# Patient Record
Sex: MALE | Race: WHITE | NOT HISPANIC OR LATINO | URBAN - METROPOLITAN AREA
[De-identification: names, ages, dates, MRNs, and addresses within clinical notes are randomized per-mention and may not be internally consistent; named-entity substitution may affect disease eponyms.]

---

## 2018-07-16 ENCOUNTER — EMERGENCY (EMERGENCY)
Facility: HOSPITAL | Age: 38
LOS: 0 days | Discharge: HOME | End: 2018-07-16
Attending: EMERGENCY MEDICINE | Admitting: EMERGENCY MEDICINE

## 2018-07-16 VITALS
SYSTOLIC BLOOD PRESSURE: 178 MMHG | HEART RATE: 80 BPM | DIASTOLIC BLOOD PRESSURE: 105 MMHG | RESPIRATION RATE: 17 BRPM | OXYGEN SATURATION: 99 %

## 2018-07-16 VITALS
RESPIRATION RATE: 18 BRPM | OXYGEN SATURATION: 98 % | TEMPERATURE: 98 F | HEART RATE: 90 BPM | SYSTOLIC BLOOD PRESSURE: 190 MMHG | DIASTOLIC BLOOD PRESSURE: 100 MMHG

## 2018-07-16 DIAGNOSIS — I10 ESSENTIAL (PRIMARY) HYPERTENSION: ICD-10-CM

## 2018-07-16 DIAGNOSIS — F17.200 NICOTINE DEPENDENCE, UNSPECIFIED, UNCOMPLICATED: ICD-10-CM

## 2018-07-16 DIAGNOSIS — J45.909 UNSPECIFIED ASTHMA, UNCOMPLICATED: ICD-10-CM

## 2018-07-16 NOTE — ED ADULT NURSE NOTE - CHPI ED SYMPTOMS NEG
no chest pain/no vomiting/no shortness of breath/no syncope/no cough/no back pain/no fever/no nausea/no dizziness/no chills

## 2018-07-16 NOTE — ED PROVIDER NOTE - MEDICAL DECISION MAKING DETAILS
Patient sent from [facility] [place] for asymptomatic HTN -- he has no CP, SOB, LE swelling, no change in exercise tolerance, works outside on a regular basis doing heavy work and has had no changes in his tolerance for work or exertional sx. At this point BP is elevated but patient has good outpatient follow up, believes he can be seen in the office in 1-2 days and has no signs of end organ damage. He is aware that he will likely require initiation of BP meds, that they will require frequent BP checks and slow titration of meds -- He will return for any CP, SOB, dizziness, weakness, change in urination or if he is unable to get appropriate outpatient assessment.

## 2018-07-16 NOTE — ED ADULT NURSE NOTE - OBJECTIVE STATEMENT
Pt presents with elevated BP for 2 days. Pt states he had a headache, cold sweats at work today, prompting visit to urgent care. Pt was BIBA from urgent care to ED. Pt denies chest pain, sob, chills, n/v. Pt with hx of asthma, no other significant PMH Pt presents with elevated BP for 2 days. Pt states he had a headache, cold sweats at work today, prompting visit to urgent care. Pt was outside working in heat. Pt was BIBA from urgent care to ED. Pt denies chest pain, sob, chills, n/v. Pt with hx of asthma, no other significant PMH. Pt asymptomatic at this time.

## 2018-07-16 NOTE — ED PROVIDER NOTE - PHYSICAL EXAMINATION
VITAL SIGNS: I have reviewed nursing notes and confirm.  CONSTITUTIONAL: Well-developed; well-nourished; in no acute distress.  SKIN: Skin exam is warm and dry, no acute rash.  HEAD: Normocephalic; atraumatic.  EYES: PERRL, EOM intact; conjunctiva and sclera clear.  ENT: No nasal discharge; airway clear. TMs clear.  NECK: Supple; non tender.  CARD: Regular rate and rhythm.  RESP: No wheezes, rales or rhonchi.  ABD: Normal bowel sounds; soft; non-distended; non-tender; no pulsatile mass  EXT: Normal ROM. No clubbing, cyanosis or edema..  NEURO: Awake, alert, oriented. CN II-XII intact, 5/5 strength at upper and lower extremities, no pronator drift, intact finger to nose, steady gait and tandem gait, clear speech  PSYCH: Cooperative, appropriate.

## 2018-07-16 NOTE — ED PROVIDER NOTE - NS ED ROS FT
Constitutional: no fever, chills, no recent weight loss, change in appetite or malaise  Cardiac: No chest pain, SOB or edema.  Respiratory: No cough or respiratory distress  GI: No nausea, vomiting, diarrhea or abdominal pain.  : No dysuria, frequency, urgency or hematuria  MS: no pain to back or extremities, no loss of ROM, no weakness  Neuro: No headache or weakness. No LOC.  Skin: No skin rash.  Except as documented in the HPI, all other systems are negative.

## 2018-07-16 NOTE — ED PROVIDER NOTE - OBJECTIVE STATEMENT
39 yo M, no significant history here for elevated BP, sent from [facility] [place] -- patient was working outside, was very sweaty, a friend told him this could be a sign of high blood pressure so sent him to [facility] -- was found to be hyptertensive there and was sent to ED. Sx of sweatiness resolved when he went into his air conditioned home.     He has no CP, SOB, LE swelling, HA, change in exercise tolerance, abdominal pain.   No history of elevated BP, not currently on BP meds.

## 2018-07-16 NOTE — ED PROVIDER NOTE - PROGRESS NOTE DETAILS
[place] [facility] [place] T changes, no peaked T waves, normal rate and intervals.

## 2018-10-14 ENCOUNTER — INPATIENT (INPATIENT)
Facility: HOSPITAL | Age: 38
LOS: 4 days | Discharge: HOME | End: 2018-10-19
Attending: HOSPITALIST | Admitting: HOSPITALIST

## 2018-10-14 VITALS
OXYGEN SATURATION: 97 % | SYSTOLIC BLOOD PRESSURE: 149 MMHG | WEIGHT: 190.04 LBS | HEIGHT: 72 IN | DIASTOLIC BLOOD PRESSURE: 89 MMHG | RESPIRATION RATE: 36 BRPM | HEART RATE: 114 BPM

## 2018-10-14 LAB
ALBUMIN SERPL ELPH-MCNC: 4.9 G/DL — SIGNIFICANT CHANGE UP (ref 3.5–5.2)
ALP SERPL-CCNC: 101 U/L — SIGNIFICANT CHANGE UP (ref 30–115)
ALT FLD-CCNC: 161 U/L — HIGH (ref 0–41)
ANION GAP SERPL CALC-SCNC: 28 MMOL/L — HIGH (ref 7–14)
AST SERPL-CCNC: 136 U/L — HIGH (ref 0–41)
BASE EXCESS BLDV CALC-SCNC: 0.7 MMOL/L — SIGNIFICANT CHANGE UP (ref -2–2)
BILIRUB DIRECT SERPL-MCNC: 0.5 MG/DL — HIGH (ref 0–0.2)
BILIRUB INDIRECT FLD-MCNC: 1.1 MG/DL — SIGNIFICANT CHANGE UP (ref 0.2–1.2)
BILIRUB SERPL-MCNC: 1.6 MG/DL — HIGH (ref 0.2–1.2)
BUN SERPL-MCNC: 8 MG/DL — LOW (ref 10–20)
CA-I SERPL-SCNC: 1.08 MMOL/L — LOW (ref 1.12–1.3)
CALCIUM SERPL-MCNC: 10.2 MG/DL — HIGH (ref 8.5–10.1)
CHLORIDE SERPL-SCNC: 89 MMOL/L — LOW (ref 98–110)
CK SERPL-CCNC: 256 U/L — HIGH (ref 0–225)
CO2 SERPL-SCNC: 19 MMOL/L — SIGNIFICANT CHANGE UP (ref 17–32)
CREAT SERPL-MCNC: 0.9 MG/DL — SIGNIFICANT CHANGE UP (ref 0.7–1.5)
GAS PNL BLDV: 132 MMOL/L — LOW (ref 136–145)
GAS PNL BLDV: SIGNIFICANT CHANGE UP
GLUCOSE SERPL-MCNC: 94 MG/DL — SIGNIFICANT CHANGE UP (ref 70–99)
HCO3 BLDV-SCNC: 21 MMOL/L — LOW (ref 22–29)
HCT VFR BLD CALC: 52.8 % — HIGH (ref 42–52)
HCT VFR BLDA CALC: 60.5 % — HIGH (ref 34–44)
HGB BLD CALC-MCNC: 19.7 G/DL — HIGH (ref 14–18)
HGB BLD-MCNC: 19.2 G/DL — CRITICAL HIGH (ref 14–18)
LACTATE BLDV-MCNC: SIGNIFICANT CHANGE UP MMOL/L (ref 0.5–1.6)
LIDOCAIN IGE QN: >600 U/L — HIGH (ref 7–60)
MAGNESIUM SERPL-MCNC: 1.4 MG/DL — LOW (ref 1.8–2.4)
MCHC RBC-ENTMCNC: 33.3 PG — HIGH (ref 27–31)
MCHC RBC-ENTMCNC: 36.4 G/DL — SIGNIFICANT CHANGE UP (ref 32–37)
MCV RBC AUTO: 91.7 FL — SIGNIFICANT CHANGE UP (ref 80–94)
PCO2 BLDV: 25 MMHG — LOW (ref 41–51)
PH BLDV: 7.53 — HIGH (ref 7.26–7.43)
PLATELET # BLD AUTO: 219 K/UL — SIGNIFICANT CHANGE UP (ref 130–400)
PO2 BLDV: 39 MMHG — SIGNIFICANT CHANGE UP (ref 20–40)
POTASSIUM BLDV-SCNC: SIGNIFICANT CHANGE UP MMOL/L (ref 3.3–5.6)
POTASSIUM SERPL-MCNC: 3.7 MMOL/L — SIGNIFICANT CHANGE UP (ref 3.5–5)
POTASSIUM SERPL-SCNC: 3.7 MMOL/L — SIGNIFICANT CHANGE UP (ref 3.5–5)
PROT SERPL-MCNC: 7.6 G/DL — SIGNIFICANT CHANGE UP (ref 6–8)
RBC # BLD: 5.76 M/UL — SIGNIFICANT CHANGE UP (ref 4.7–6.1)
RBC # FLD: 13 % — SIGNIFICANT CHANGE UP (ref 11.5–14.5)
SAO2 % BLDV: 82 % — SIGNIFICANT CHANGE UP
SODIUM SERPL-SCNC: 136 MMOL/L — SIGNIFICANT CHANGE UP (ref 135–146)
TROPONIN T SERPL-MCNC: <0.01 NG/ML — SIGNIFICANT CHANGE UP
WBC # BLD: 19.44 K/UL — HIGH (ref 4.8–10.8)
WBC # FLD AUTO: 19.44 K/UL — HIGH (ref 4.8–10.8)

## 2018-10-14 RX ORDER — ONDANSETRON 8 MG/1
4 TABLET, FILM COATED ORAL ONCE
Qty: 0 | Refills: 0 | Status: COMPLETED | OUTPATIENT
Start: 2018-10-14 | End: 2018-10-14

## 2018-10-14 RX ORDER — SODIUM CHLORIDE 9 MG/ML
1000 INJECTION INTRAMUSCULAR; INTRAVENOUS; SUBCUTANEOUS ONCE
Qty: 0 | Refills: 0 | Status: COMPLETED | OUTPATIENT
Start: 2018-10-14 | End: 2018-10-14

## 2018-10-14 RX ORDER — PANTOPRAZOLE SODIUM 20 MG/1
40 TABLET, DELAYED RELEASE ORAL ONCE
Qty: 0 | Refills: 0 | Status: COMPLETED | OUTPATIENT
Start: 2018-10-14 | End: 2018-10-14

## 2018-10-14 RX ORDER — HYDROMORPHONE HYDROCHLORIDE 2 MG/ML
1 INJECTION INTRAMUSCULAR; INTRAVENOUS; SUBCUTANEOUS ONCE
Qty: 0 | Refills: 0 | Status: DISCONTINUED | OUTPATIENT
Start: 2018-10-14 | End: 2018-10-14

## 2018-10-14 RX ORDER — FAMOTIDINE 10 MG/ML
20 INJECTION INTRAVENOUS ONCE
Qty: 0 | Refills: 0 | Status: COMPLETED | OUTPATIENT
Start: 2018-10-14 | End: 2018-10-14

## 2018-10-14 RX ORDER — MORPHINE SULFATE 50 MG/1
6 CAPSULE, EXTENDED RELEASE ORAL ONCE
Qty: 0 | Refills: 0 | Status: DISCONTINUED | OUTPATIENT
Start: 2018-10-14 | End: 2018-10-14

## 2018-10-14 RX ADMIN — HYDROMORPHONE HYDROCHLORIDE 1 MILLIGRAM(S): 2 INJECTION INTRAMUSCULAR; INTRAVENOUS; SUBCUTANEOUS at 22:29

## 2018-10-14 RX ADMIN — SODIUM CHLORIDE 1000 MILLILITER(S): 9 INJECTION INTRAMUSCULAR; INTRAVENOUS; SUBCUTANEOUS at 22:45

## 2018-10-14 RX ADMIN — SODIUM CHLORIDE 2000 MILLILITER(S): 9 INJECTION INTRAMUSCULAR; INTRAVENOUS; SUBCUTANEOUS at 22:45

## 2018-10-14 RX ADMIN — SODIUM CHLORIDE 2000 MILLILITER(S): 9 INJECTION INTRAMUSCULAR; INTRAVENOUS; SUBCUTANEOUS at 22:13

## 2018-10-14 RX ADMIN — HYDROMORPHONE HYDROCHLORIDE 1 MILLIGRAM(S): 2 INJECTION INTRAMUSCULAR; INTRAVENOUS; SUBCUTANEOUS at 22:45

## 2018-10-14 RX ADMIN — MORPHINE SULFATE 6 MILLIGRAM(S): 50 CAPSULE, EXTENDED RELEASE ORAL at 22:13

## 2018-10-14 RX ADMIN — PANTOPRAZOLE SODIUM 40 MILLIGRAM(S): 20 TABLET, DELAYED RELEASE ORAL at 22:45

## 2018-10-14 RX ADMIN — MORPHINE SULFATE 6 MILLIGRAM(S): 50 CAPSULE, EXTENDED RELEASE ORAL at 22:23

## 2018-10-14 RX ADMIN — FAMOTIDINE 20 MILLIGRAM(S): 10 INJECTION INTRAVENOUS at 22:13

## 2018-10-14 RX ADMIN — ONDANSETRON 4 MILLIGRAM(S): 8 TABLET, FILM COATED ORAL at 22:13

## 2018-10-14 NOTE — ED PROVIDER NOTE - PHYSICAL EXAMINATION
CONSTITUTIONAL: Well-appearing; well-nourished; in no apparent distress.   NECK: Supple; non-tender; no cervical lymphadenopathy. No JVD.   CARDIOVASCULAR: Normal S1, S2; no murmurs, rubs, or gallops.   RESPIRATORY: Normal chest excursion with respiration; breath sounds clear and equal bilaterally; no wheezes, rhonchi, or rales.  GI/: + non distended abdomen. + epigastric region ttp. No rebound or guarding. Negative Marquez's sign. No tenderness at mcburneys point  MS: No evidence of trauma or deformity. Normal ROM in all four extremities; non-tender to palpation  SKIN: Normal for age and race; warm; dry; good turgor; no apparent lesions or exudate.   NEURO/PSYCH: A & O x 4; grossly unremarkable. mood and manner are appropriate. Grooming and personal hygiene are appropriate.

## 2018-10-14 NOTE — ED PROVIDER NOTE - OBJECTIVE STATEMENT
38 year old male denies pmhx c/o epigastric abdominal pain, several episodes of bilious vomiting since 3am yesterday. Sts symptoms got progressively worse 1 hour pta. + mid sternal chest pressure. Pt sts he drinks about a half L of vodka a day. Last drink was yesterday. No diarrhea, past abdominal surgeries, fever, chills, no sob, significant cardiac hx, rectal bleeding, hx of pancreatitis.

## 2018-10-14 NOTE — ED ADULT TRIAGE NOTE - ARRIVAL INFO ADDITIONAL COMMENTS
Patient presenting in triage with hyperventilations, carpo- pedal spasms, Patient presenting in triage with hyperventilations, carpopedal spasms, very anxious

## 2018-10-14 NOTE — ED PROVIDER NOTE - ATTENDING CONTRIBUTION TO CARE
37 y/o male with hx of chronic alcoholism presents to ED with epigastric abdominal pain and vomiting x 1 day, progressively worsening.  No blood.  No BRBPR or melena.  No fever or chills. No diarrhea.  Last drank yesterday.  PE:  agree with above.  A/P:  Epigastric Pain, r/o perforated viscus, r/o pancreatitis.  Labs, IVF, Analgesia, CT scan.

## 2018-10-14 NOTE — ED PROVIDER NOTE - NS ED ROS FT
Constitutional: no fever, chills, no recent weight loss, change in appetite or malaise  Cardiac: + midsternal chest pressure. No edema  Respiratory: No cough or respiratory distress  GI: + n/v, epigastric abdominal pain. No diarrhea  : No dysuria, frequency, urgency or hematuria  MS: no pain to back or extremities, no loss of ROM, no weakness  Neuro: No headache or weakness. No LOC.  Skin: No skin rash.  Except as documented in the HPI, all other systems are negative.

## 2018-10-15 DIAGNOSIS — Z98.890 OTHER SPECIFIED POSTPROCEDURAL STATES: Chronic | ICD-10-CM

## 2018-10-15 PROBLEM — J45.909 UNSPECIFIED ASTHMA, UNCOMPLICATED: Chronic | Status: INACTIVE | Noted: 2018-07-16 | Resolved: 2018-10-15

## 2018-10-15 LAB
ALBUMIN SERPL ELPH-MCNC: 3.8 G/DL — SIGNIFICANT CHANGE UP (ref 3.5–5.2)
ALP SERPL-CCNC: 70 U/L — SIGNIFICANT CHANGE UP (ref 30–115)
ALT FLD-CCNC: 97 U/L — HIGH (ref 0–41)
ANION GAP SERPL CALC-SCNC: 16 MMOL/L — HIGH (ref 7–14)
ANION GAP SERPL CALC-SCNC: 16 MMOL/L — HIGH (ref 7–14)
AST SERPL-CCNC: 76 U/L — HIGH (ref 0–41)
BASE EXCESS BLDV CALC-SCNC: -0.7 MMOL/L — SIGNIFICANT CHANGE UP (ref -2–2)
BASOPHILS # BLD AUTO: 0.02 K/UL — SIGNIFICANT CHANGE UP (ref 0–0.2)
BASOPHILS # BLD AUTO: 0.03 K/UL — SIGNIFICANT CHANGE UP (ref 0–0.2)
BASOPHILS # BLD AUTO: 0.04 K/UL — SIGNIFICANT CHANGE UP (ref 0–0.2)
BASOPHILS NFR BLD AUTO: 0.1 % — SIGNIFICANT CHANGE UP (ref 0–1)
BASOPHILS NFR BLD AUTO: 0.2 % — SIGNIFICANT CHANGE UP (ref 0–1)
BASOPHILS NFR BLD AUTO: 0.2 % — SIGNIFICANT CHANGE UP (ref 0–1)
BILIRUB SERPL-MCNC: 1.7 MG/DL — HIGH (ref 0.2–1.2)
BUN SERPL-MCNC: 8 MG/DL — LOW (ref 10–20)
BUN SERPL-MCNC: 9 MG/DL — LOW (ref 10–20)
CA-I SERPL-SCNC: 1.03 MMOL/L — LOW (ref 1.12–1.3)
CALCIUM SERPL-MCNC: 7.6 MG/DL — LOW (ref 8.5–10.1)
CALCIUM SERPL-MCNC: 7.8 MG/DL — LOW (ref 8.5–10.1)
CHLORIDE SERPL-SCNC: 96 MMOL/L — LOW (ref 98–110)
CHLORIDE SERPL-SCNC: 97 MMOL/L — LOW (ref 98–110)
CHOLEST SERPL-MCNC: 116 MG/DL — SIGNIFICANT CHANGE UP (ref 100–200)
CK MB CFR SERPL CALC: 3 NG/ML — SIGNIFICANT CHANGE UP (ref 0.6–6.3)
CK MB CFR SERPL CALC: 3.8 NG/ML — SIGNIFICANT CHANGE UP (ref 0.6–6.3)
CK SERPL-CCNC: 227 U/L — HIGH (ref 0–225)
CK SERPL-CCNC: 410 U/L — HIGH (ref 0–225)
CO2 SERPL-SCNC: 24 MMOL/L — SIGNIFICANT CHANGE UP (ref 17–32)
CO2 SERPL-SCNC: 24 MMOL/L — SIGNIFICANT CHANGE UP (ref 17–32)
CREAT SERPL-MCNC: 0.7 MG/DL — SIGNIFICANT CHANGE UP (ref 0.7–1.5)
CREAT SERPL-MCNC: 0.8 MG/DL — SIGNIFICANT CHANGE UP (ref 0.7–1.5)
EOSINOPHIL # BLD AUTO: 0 K/UL — SIGNIFICANT CHANGE UP (ref 0–0.7)
EOSINOPHIL # BLD AUTO: 0 K/UL — SIGNIFICANT CHANGE UP (ref 0–0.7)
EOSINOPHIL # BLD AUTO: 0.03 K/UL — SIGNIFICANT CHANGE UP (ref 0–0.7)
EOSINOPHIL NFR BLD AUTO: 0 % — SIGNIFICANT CHANGE UP (ref 0–8)
EOSINOPHIL NFR BLD AUTO: 0 % — SIGNIFICANT CHANGE UP (ref 0–8)
EOSINOPHIL NFR BLD AUTO: 0.2 % — SIGNIFICANT CHANGE UP (ref 0–8)
GAS PNL BLDV: 133 MMOL/L — LOW (ref 136–145)
GAS PNL BLDV: SIGNIFICANT CHANGE UP
GLUCOSE SERPL-MCNC: 65 MG/DL — LOW (ref 70–99)
GLUCOSE SERPL-MCNC: 79 MG/DL — SIGNIFICANT CHANGE UP (ref 70–99)
HCO3 BLDV-SCNC: 25 MMOL/L — SIGNIFICANT CHANGE UP (ref 22–29)
HCT VFR BLD CALC: 47.9 % — SIGNIFICANT CHANGE UP (ref 42–52)
HCT VFR BLD CALC: 48.9 % — SIGNIFICANT CHANGE UP (ref 42–52)
HCT VFR BLDA CALC: 54.3 % — HIGH (ref 34–44)
HDLC SERPL-MCNC: 47 MG/DL — SIGNIFICANT CHANGE UP
HGB BLD CALC-MCNC: 17.7 G/DL — SIGNIFICANT CHANGE UP (ref 14–18)
HGB BLD-MCNC: 16.7 G/DL — SIGNIFICANT CHANGE UP (ref 14–18)
HGB BLD-MCNC: 17.2 G/DL — SIGNIFICANT CHANGE UP (ref 14–18)
IMM GRANULOCYTES NFR BLD AUTO: 0.3 % — SIGNIFICANT CHANGE UP (ref 0.1–0.3)
IMM GRANULOCYTES NFR BLD AUTO: 0.4 % — HIGH (ref 0.1–0.3)
IMM GRANULOCYTES NFR BLD AUTO: 0.4 % — HIGH (ref 0.1–0.3)
LACTATE BLDV-MCNC: 1.2 MMOL/L — SIGNIFICANT CHANGE UP (ref 0.5–1.6)
LACTATE SERPL-SCNC: 2.2 MMOL/L — SIGNIFICANT CHANGE UP (ref 0.5–2.2)
LACTATE SERPL-SCNC: 5.2 MMOL/L — CRITICAL HIGH (ref 0.5–2.2)
LIPID PNL WITH DIRECT LDL SERPL: 58 MG/DL — SIGNIFICANT CHANGE UP (ref 4–129)
LYMPHOCYTES # BLD AUTO: 0.72 K/UL — LOW (ref 1.2–3.4)
LYMPHOCYTES # BLD AUTO: 0.97 K/UL — LOW (ref 1.2–3.4)
LYMPHOCYTES # BLD AUTO: 1.16 K/UL — LOW (ref 1.2–3.4)
LYMPHOCYTES # BLD AUTO: 4.8 % — LOW (ref 20.5–51.1)
LYMPHOCYTES # BLD AUTO: 5 % — LOW (ref 20.5–51.1)
LYMPHOCYTES # BLD AUTO: 8.2 % — LOW (ref 20.5–51.1)
MAGNESIUM SERPL-MCNC: 1.6 MG/DL — LOW (ref 1.8–2.4)
MCHC RBC-ENTMCNC: 33 PG — HIGH (ref 27–31)
MCHC RBC-ENTMCNC: 33.3 PG — HIGH (ref 27–31)
MCHC RBC-ENTMCNC: 34.9 G/DL — SIGNIFICANT CHANGE UP (ref 32–37)
MCHC RBC-ENTMCNC: 35.2 G/DL — SIGNIFICANT CHANGE UP (ref 32–37)
MCV RBC AUTO: 94.7 FL — HIGH (ref 80–94)
MCV RBC AUTO: 94.8 FL — HIGH (ref 80–94)
MONOCYTES # BLD AUTO: 0.8 K/UL — HIGH (ref 0.1–0.6)
MONOCYTES # BLD AUTO: 0.93 K/UL — HIGH (ref 0.1–0.6)
MONOCYTES # BLD AUTO: 0.97 K/UL — HIGH (ref 0.1–0.6)
MONOCYTES NFR BLD AUTO: 4.8 % — SIGNIFICANT CHANGE UP (ref 1.7–9.3)
MONOCYTES NFR BLD AUTO: 5.7 % — SIGNIFICANT CHANGE UP (ref 1.7–9.3)
MONOCYTES NFR BLD AUTO: 6.5 % — SIGNIFICANT CHANGE UP (ref 1.7–9.3)
NEUTROPHILS # BLD AUTO: 12.03 K/UL — HIGH (ref 1.4–6.5)
NEUTROPHILS # BLD AUTO: 13.27 K/UL — HIGH (ref 1.4–6.5)
NEUTROPHILS # BLD AUTO: 17.42 K/UL — HIGH (ref 1.4–6.5)
NEUTROPHILS NFR BLD AUTO: 85.4 % — HIGH (ref 42.2–75.2)
NEUTROPHILS NFR BLD AUTO: 88.2 % — HIGH (ref 42.2–75.2)
NEUTROPHILS NFR BLD AUTO: 89.6 % — HIGH (ref 42.2–75.2)
NRBC # BLD: 0 /100 WBCS — SIGNIFICANT CHANGE UP (ref 0–0)
PCO2 BLDV: 42 MMHG — SIGNIFICANT CHANGE UP (ref 41–51)
PH BLDV: 7.38 — SIGNIFICANT CHANGE UP (ref 7.26–7.43)
PLATELET # BLD AUTO: 163 K/UL — SIGNIFICANT CHANGE UP (ref 130–400)
PLATELET # BLD AUTO: 179 K/UL — SIGNIFICANT CHANGE UP (ref 130–400)
PO2 BLDV: 123 MMHG — HIGH (ref 20–40)
POTASSIUM BLDV-SCNC: 3.9 MMOL/L — SIGNIFICANT CHANGE UP (ref 3.3–5.6)
POTASSIUM SERPL-MCNC: 4.3 MMOL/L — SIGNIFICANT CHANGE UP (ref 3.5–5)
POTASSIUM SERPL-MCNC: 4.3 MMOL/L — SIGNIFICANT CHANGE UP (ref 3.5–5)
POTASSIUM SERPL-SCNC: 4.3 MMOL/L — SIGNIFICANT CHANGE UP (ref 3.5–5)
POTASSIUM SERPL-SCNC: 4.3 MMOL/L — SIGNIFICANT CHANGE UP (ref 3.5–5)
PROT SERPL-MCNC: 5.9 G/DL — LOW (ref 6–8)
RBC # BLD: 5.06 M/UL — SIGNIFICANT CHANGE UP (ref 4.7–6.1)
RBC # BLD: 5.16 M/UL — SIGNIFICANT CHANGE UP (ref 4.7–6.1)
RBC # FLD: 13.2 % — SIGNIFICANT CHANGE UP (ref 11.5–14.5)
RBC # FLD: 13.4 % — SIGNIFICANT CHANGE UP (ref 11.5–14.5)
SAO2 % BLDV: 96 % — SIGNIFICANT CHANGE UP
SODIUM SERPL-SCNC: 136 MMOL/L — SIGNIFICANT CHANGE UP (ref 135–146)
SODIUM SERPL-SCNC: 137 MMOL/L — SIGNIFICANT CHANGE UP (ref 135–146)
TOTAL CHOLESTEROL/HDL RATIO MEASUREMENT: 2.5 RATIO — LOW (ref 4–5.5)
TRIGL SERPL-MCNC: 84 MG/DL — SIGNIFICANT CHANGE UP (ref 10–149)
TROPONIN T SERPL-MCNC: <0.01 NG/ML — SIGNIFICANT CHANGE UP
TROPONIN T SERPL-MCNC: <0.01 NG/ML — SIGNIFICANT CHANGE UP
WBC # BLD: 14.1 K/UL — HIGH (ref 4.8–10.8)
WBC # BLD: 15.03 K/UL — HIGH (ref 4.8–10.8)
WBC # FLD AUTO: 14.1 K/UL — HIGH (ref 4.8–10.8)
WBC # FLD AUTO: 15.03 K/UL — HIGH (ref 4.8–10.8)

## 2018-10-15 RX ORDER — HYDROMORPHONE HYDROCHLORIDE 2 MG/ML
1 INJECTION INTRAMUSCULAR; INTRAVENOUS; SUBCUTANEOUS ONCE
Qty: 0 | Refills: 0 | Status: DISCONTINUED | OUTPATIENT
Start: 2018-10-15 | End: 2018-10-15

## 2018-10-15 RX ORDER — PANTOPRAZOLE SODIUM 20 MG/1
40 TABLET, DELAYED RELEASE ORAL
Qty: 0 | Refills: 0 | Status: DISCONTINUED | OUTPATIENT
Start: 2018-10-15 | End: 2018-10-19

## 2018-10-15 RX ORDER — MORPHINE SULFATE 50 MG/1
4 CAPSULE, EXTENDED RELEASE ORAL EVERY 6 HOURS
Qty: 0 | Refills: 0 | Status: DISCONTINUED | OUTPATIENT
Start: 2018-10-15 | End: 2018-10-16

## 2018-10-15 RX ORDER — ONDANSETRON 8 MG/1
4 TABLET, FILM COATED ORAL EVERY 12 HOURS
Qty: 0 | Refills: 0 | Status: DISCONTINUED | OUTPATIENT
Start: 2018-10-15 | End: 2018-10-15

## 2018-10-15 RX ORDER — MAGNESIUM SULFATE 500 MG/ML
1 VIAL (ML) INJECTION ONCE
Qty: 0 | Refills: 0 | Status: COMPLETED | OUTPATIENT
Start: 2018-10-15 | End: 2018-10-15

## 2018-10-15 RX ORDER — KETOROLAC TROMETHAMINE 30 MG/ML
15 SYRINGE (ML) INJECTION EVERY 8 HOURS
Qty: 0 | Refills: 0 | Status: DISCONTINUED | OUTPATIENT
Start: 2018-10-15 | End: 2018-10-16

## 2018-10-15 RX ORDER — ONDANSETRON 8 MG/1
4 TABLET, FILM COATED ORAL EVERY 12 HOURS
Qty: 0 | Refills: 0 | Status: DISCONTINUED | OUTPATIENT
Start: 2018-10-15 | End: 2018-10-18

## 2018-10-15 RX ORDER — SODIUM CHLORIDE 9 MG/ML
1000 INJECTION INTRAMUSCULAR; INTRAVENOUS; SUBCUTANEOUS
Qty: 0 | Refills: 0 | Status: DISCONTINUED | OUTPATIENT
Start: 2018-10-15 | End: 2018-10-15

## 2018-10-15 RX ORDER — OXYCODONE HYDROCHLORIDE 5 MG/1
5 TABLET ORAL EVERY 6 HOURS
Qty: 0 | Refills: 0 | Status: DISCONTINUED | OUTPATIENT
Start: 2018-10-15 | End: 2018-10-15

## 2018-10-15 RX ORDER — FOLIC ACID 0.8 MG
1 TABLET ORAL DAILY
Qty: 0 | Refills: 0 | Status: DISCONTINUED | OUTPATIENT
Start: 2018-10-15 | End: 2018-10-18

## 2018-10-15 RX ORDER — SODIUM CHLORIDE 9 MG/ML
1000 INJECTION, SOLUTION INTRAVENOUS
Qty: 0 | Refills: 0 | Status: DISCONTINUED | OUTPATIENT
Start: 2018-10-15 | End: 2018-10-16

## 2018-10-15 RX ORDER — ENOXAPARIN SODIUM 100 MG/ML
40 INJECTION SUBCUTANEOUS EVERY 24 HOURS
Qty: 0 | Refills: 0 | Status: DISCONTINUED | OUTPATIENT
Start: 2018-10-15 | End: 2018-10-17

## 2018-10-15 RX ORDER — THIAMINE MONONITRATE (VIT B1) 100 MG
100 TABLET ORAL DAILY
Qty: 0 | Refills: 0 | Status: DISCONTINUED | OUTPATIENT
Start: 2018-10-15 | End: 2018-10-18

## 2018-10-15 RX ADMIN — MORPHINE SULFATE 4 MILLIGRAM(S): 50 CAPSULE, EXTENDED RELEASE ORAL at 11:57

## 2018-10-15 RX ADMIN — Medication 100 MILLIGRAM(S): at 11:04

## 2018-10-15 RX ADMIN — Medication 2 MILLIGRAM(S): at 22:25

## 2018-10-15 RX ADMIN — SODIUM CHLORIDE 250 MILLILITER(S): 9 INJECTION INTRAMUSCULAR; INTRAVENOUS; SUBCUTANEOUS at 11:59

## 2018-10-15 RX ADMIN — ONDANSETRON 4 MILLIGRAM(S): 8 TABLET, FILM COATED ORAL at 04:22

## 2018-10-15 RX ADMIN — Medication 15 MILLIGRAM(S): at 10:46

## 2018-10-15 RX ADMIN — Medication 2 MILLIGRAM(S): at 17:40

## 2018-10-15 RX ADMIN — Medication 1 MILLIGRAM(S): at 11:04

## 2018-10-15 RX ADMIN — SODIUM CHLORIDE 250 MILLILITER(S): 9 INJECTION INTRAMUSCULAR; INTRAVENOUS; SUBCUTANEOUS at 04:03

## 2018-10-15 RX ADMIN — SODIUM CHLORIDE 150 MILLILITER(S): 9 INJECTION, SOLUTION INTRAVENOUS at 14:08

## 2018-10-15 RX ADMIN — HYDROMORPHONE HYDROCHLORIDE 1 MILLIGRAM(S): 2 INJECTION INTRAMUSCULAR; INTRAVENOUS; SUBCUTANEOUS at 00:12

## 2018-10-15 RX ADMIN — MORPHINE SULFATE 4 MILLIGRAM(S): 50 CAPSULE, EXTENDED RELEASE ORAL at 18:33

## 2018-10-15 RX ADMIN — SODIUM CHLORIDE 1000 MILLILITER(S): 9 INJECTION INTRAMUSCULAR; INTRAVENOUS; SUBCUTANEOUS at 00:12

## 2018-10-15 RX ADMIN — MORPHINE SULFATE 4 MILLIGRAM(S): 50 CAPSULE, EXTENDED RELEASE ORAL at 04:22

## 2018-10-15 RX ADMIN — Medication 100 GRAM(S): at 14:44

## 2018-10-15 RX ADMIN — SODIUM CHLORIDE 150 MILLILITER(S): 9 INJECTION, SOLUTION INTRAVENOUS at 21:38

## 2018-10-15 RX ADMIN — SODIUM CHLORIDE 250 MILLILITER(S): 9 INJECTION INTRAMUSCULAR; INTRAVENOUS; SUBCUTANEOUS at 07:35

## 2018-10-15 NOTE — PATIENT PROFILE ADULT - NSTRANSFERBELONGINGSDISPO_GEN_A_NUR
Subjective:       Patient ID: Oracio Patel is a 52 y.o. male.    Chief Complaint: Eye Problem (right eye; been taking benadryl; started 2 weeks ago; denies pain or itching )    Patient of Dr Rodney - first time seeing in clinic        Eye Problem    The right eye is affected. This is a new problem. The current episode started in the past 7 days. The problem occurs daily. The problem has been gradually worsening. The injury mechanism was a direct trauma. The pain is at a severity of 2/10. The pain is mild. There is no known exposure to pink eye. He does not wear contacts. Associated symptoms include eye redness and itching. Pertinent negatives include no blurred vision, eye discharge, double vision, fever, foreign body sensation, nausea, photophobia, recent URI or vomiting. He has tried eye drops and commercial eye wash for the symptoms. The treatment provided no relief.     Vitals:    08/09/18 0807   BP: 138/84   Pulse: 76   Resp: 16   Temp: 98.7 °F (37.1 °C)     Review of Systems   Constitutional: Negative for fatigue and fever.   HENT: Negative.    Eyes: Positive for redness and itching. Negative for blurred vision, double vision, photophobia and discharge.   Respiratory: Negative for cough and shortness of breath.    Cardiovascular: Negative for chest pain.   Gastrointestinal: Negative for abdominal pain, diarrhea, nausea and vomiting.   Endocrine: Negative.    Genitourinary: Negative for dysuria and hematuria.   Musculoskeletal: Negative.    Skin: Negative for color change and rash.   Allergic/Immunologic: Negative.    Neurological: Negative.  Negative for numbness.   Hematological: Negative.        Past Medical History:   Diagnosis Date    Atrophic testicle     Beta thalassemia     Colon polyp     Hemorrhoids     HLD (hyperlipidemia)     Seasonal allergic rhinitis      Objective:      Physical Exam   Constitutional: He is oriented to person, place, and time. He appears well-developed and  well-nourished.   HENT:   Head: Normocephalic and atraumatic.   Mouth/Throat: Oropharynx is clear and moist.   Eyes: Conjunctivae and EOM are normal. Pupils are equal, round, and reactive to light. Right conjunctiva is not injected. Right conjunctiva has no hemorrhage. Left conjunctiva is not injected. Left conjunctiva has no hemorrhage.       Swelling to right eye lid    Neck: Normal range of motion. Neck supple.   Cardiovascular: Normal rate, regular rhythm, normal heart sounds and intact distal pulses.    Pulmonary/Chest: Effort normal and breath sounds normal.   Abdominal: Soft. Bowel sounds are normal.   Musculoskeletal: Normal range of motion.   Neurological: He is alert and oriented to person, place, and time.   Skin: Skin is warm and dry.   Psychiatric: He has a normal mood and affect. His behavior is normal.   Nursing note and vitals reviewed.      Assessment:       1. Preseptal cellulitis of right upper eyelid    2. Hyperlipidemia, unspecified hyperlipidemia type        Plan:       Preseptal cellulitis of right upper eyelid  -     sulfamethoxazole-trimethoprim 800-160mg (BACTRIM DS) 800-160 mg Tab; Take 1 tablet by mouth 2 (two) times daily. for 10 days  Dispense: 20 tablet; Refill: 0  Sun precautions with med     Hyperlipidemia, unspecified hyperlipidemia type  On lipitor     FU if not better   Education and any change in vision is emergent situation               with patient

## 2018-10-15 NOTE — H&P ADULT - NSHPLABSRESULTS_GEN_ALL_CORE
19.2   19.44 )-----------( 219      ( 14 Oct 2018 22:02 )             52.8   10-14    136  |  89<L>  |  8<L>  ----------------------------<  94  3.7   |  19  |  0.9    Ca    10.2<H>      14 Oct 2018 22:02  Mg     1.4     10-14    TPro  7.6  /  Alb  4.9  /  TBili  1.6<H>  /  DBili  0.5<H>  /  AST  136<H>  /  ALT  161<H>  /  AlkPhos  101  10-14  Lipase, Serum (10.14.18 @ 22:02)    Lipase, Serum: >600 U/L  < from: CT Abdomen and Pelvis w/ IV Cont (10.15.18 @ 00:51) >    Acute pancreatitis as detailed above.  No loculated fluid collection.  Hepatic steatosis.    < end of copied text >

## 2018-10-15 NOTE — H&P ADULT - ASSESSMENT
38Yr M no significant PMH presented to ED with 2 day hx of abd pain nausea and vomiting    #) Abd Pain N/V secondary to acute Pancreatitis  - Admit to medicine  - Lipase >600, Lactate 5.3, CT shows acute pancreatitis  - NPO  - IVF 250cc/hr NS  - Pain control Morphine and Oxycodone  - Zofran PRN for nausea  - PPI daily  - F/U Lipid profile    #) Transaminitis  - Likely secondary to ETOH use  - f/u RUQ sono  - Trend Hepatic function panel    #) Elevated lactate, WBC and Hgb  - Likely secondary to hemoconcentration and dehydration  - Cont IV fluid and f/u CBC and lactate    #) ETOH abuse  - Thiamine and Folic acid  - Pt doesnt think he is abusing. Will do Lorazepam PRN for agitation    DVT PPX Lovenox Sq  NPO

## 2018-10-15 NOTE — SBIRT NOTE. - NSSBIRTSERVICES_GEN_A_ED_FT
Provided SBIRT services:  Full Screen Positive. Brief Intervention Performed.    Screening results were reviewed with the patient and patient was provided information about healthy guidelines and potential negative consequences associated with level of risk. Motivation and readiness to reduce or stop use was discussed and goals and activities to make changes were suggested/offered.      AUDIT Score: 10  DAST-10 Score: 0  Duration = 15 Minutes

## 2018-10-15 NOTE — CONSULT NOTE ADULT - REASON FOR ADMISSION
MALE JESSICA           DOL 103d      PMA 39 weeks  25w with S/P NEC/perf/distal colostomy placed, Inguinal hernia, Anemia, Feeding support  *************************************************************************  Weight (grams): 2815 -5  Intake(ml/kg/day):  148  Urine output: X 8  Ostomy: 15ml/kg/d      FEN: FEHM (24 kcal/oz) to 52 q3h (154).  Nipple and OG remainder over 1 hr. Needs pacing, PO 35%.  (OT/PT).    ADWG:  3/9:  30 g/day.  Wylie 5 %  Renal: S/P REMINGTON,      GI: S/P NEC and ex lap 1/24 with perf of sigmoid, and descending colon, now with transverse colostomy.  Plan for closure week of 3/26. 3/19 Mucous fistula contrast study fine.  Apnea of Prematurity:   off Caffeine 2/15.  On  mL 21%.    CVS: S/P PDA and 3 courses of indocin, 1/18 ECHO- No PDA   Hem:  Anemia with last PRBC tx  1/18.        ID: Mom declines vaccines due to fear of autism.  Neuro:  Head US 1/1, 2/2: WNL  NDE 7. EI needed. Follow-up in 6 months   Seizures-ruled out by Video EEG 2/4- 2/5.     Ophthalmology:  3/12:  S2 Z2 Bilateral. Re-exam 2 weeks.  Thermal:   In open crib  Social:       Meds: PVS & Fe      Plan: Feeds as above. Monitor ostomy output.  Mom does not want vaccinations for fear of autism and vaccines. Surgery looking for OR time week of 3/26. MRI PTD. Family meeting this week.  Labs/Images/Studies: CBC, Lytes at am Abd pain, nausea and vomiting

## 2018-10-15 NOTE — H&P ADULT - HISTORY OF PRESENT ILLNESS
38Yr M no significant PMH presented to ED with 2 day hx of abd pain nausea and vomiting. Pt reports vomiting in non-bilious non-bloody, pain is 10/10 without any relieving factors. Eating makes pain unbearable. HE reports symptoms started after he was drinking vodka. He reports drinking 1/2 bottle of vodka daily. He endorses he is able to stop drinking whenever he wants without any withdrawal. He denies fever, chills, chest pain, SOB, diarrhea, or leg swelling.  In the ED /84 , WBC 19, Hgb 19.2, Lactate 5.2 and lipase >600. , . CT shows acute pancreatitis  Pt received 3L IVF, pain medication and zofran

## 2018-10-15 NOTE — CONSULT NOTE ADULT - SUBJECTIVE AND OBJECTIVE BOX
39 yo  male with no significant PMH, hx of Opioid Use Disorder, admitted for acute pancreatitis 2/2 Alcohol Use Disorder. Detox service consulted for management of AUD. Ativan taper, thiamine, folic acid ordered by primary team. Pt reports drinking 1/2 L vodka per night for past 2 years, last use 1 day ptp. Pt denies confusion/tremor/N/V/HA/pain. Reports no acute distress on Ativan taper. Pt expresses motivation to stop drinking after realizing the severity of acute pancreatitis and the impact of alcohol use on his health. Discussed inpatient and outpatient rehab options with pt.     Alcohol use:  1/2 L vodka per night for past 2 years, + eye opener  Denies past detox/rehab/withdrawal seizures    Opioid: Reports past use, sober for 11+ years  Denies current opioid, sedative hypnotic, or cannabis use    iSTOP Reference #: [id number]    Vital Signs Last 24 Hrs  T(C): 36.1 (15 Oct 2018 16:52), Max: 36.8 (15 Oct 2018 10:54)  T(F): 96.9 (15 Oct 2018 16:52), Max: 98.2 (15 Oct 2018 10:54)  HR: 111 (15 Oct 2018 16:52) (106 - 114)  BP: 126/79 (15 Oct 2018 16:52) (126/79 - 176/98)  BP(mean): --  RR: 18 (15 Oct 2018 16:52) (18 - 36)  SpO2: 96% (15 Oct 2018 16:52) (96% - 98%)                          16.7   14.10 )-----------( 163      ( 15 Oct 2018 13:10 )             47.9   10-15    136  |  96<L>  |  9<L>  ----------------------------<  65<L>  4.3   |  24  |  0.8    Ca    7.6<L>      15 Oct 2018 13:10  Mg     1.6     10-15    TPro  5.9<L>  /  Alb  3.8  /  TBili  1.7<H>  /  DBili  x   /  AST  76<H>  /  ALT  97<H>  /  AlkPhos  70  10-15

## 2018-10-15 NOTE — CONSULT NOTE ADULT - ASSESSMENT
37 yo  male with Alcohol Use Disorder admitted for acute pancreatitis 2/2 Alcohol Use Disorder, on Ativan taper started by primary team, currently in no acute distress    Dx: Alcohol use disorder    Plan:  1. Continue Ativan taper, thiamine, folic acid  2. CORRINAWA protocol  3. Pt interested in cessation - may follow up at [facility] Intake [phone] when medically stable

## 2018-10-15 NOTE — H&P ADULT - ATTENDING COMMENTS
Pt was seen and examined at bedside independently, he is c/o abdominal pain and constipation.  He was admitted for acute pancreatitis with known h/o Etoh abuse. Case was Pt was seen and examined at bedside independently, he is c/o abdominal pain and constipation.  He was admitted for acute pancreatitis with known h/o Etoh abuse. Case d/w medical resident during round, agree with above assessment and plan.  In addition will consult addiction medicine and start Detox protocol for Etoh abuse. Pt was extensively consulted regarding abstinence, he is receptive.   Will change IV fluids to 1/2 NS at 150 cc/h, keep pt NPO on pain medications standing and PRN, GI consult, f/u repeat pancreatic enzymes in 24 hours.

## 2018-10-15 NOTE — H&P ADULT - RS GEN PE MLT RESP DETAILS PC
good air movement/respirations non-labored/clear to auscultation bilaterally/airway patent/breath sounds equal/normal

## 2018-10-16 LAB
ALBUMIN SERPL ELPH-MCNC: 3.1 G/DL — LOW (ref 3.5–5.2)
ALP SERPL-CCNC: 61 U/L — SIGNIFICANT CHANGE UP (ref 30–115)
ALT FLD-CCNC: 61 U/L — HIGH (ref 0–41)
AMYLASE P1 CFR SERPL: 145 U/L — HIGH (ref 25–115)
ANION GAP SERPL CALC-SCNC: 13 MMOL/L — SIGNIFICANT CHANGE UP (ref 7–14)
AST SERPL-CCNC: 48 U/L — HIGH (ref 0–41)
BILIRUB SERPL-MCNC: 1.5 MG/DL — HIGH (ref 0.2–1.2)
BUN SERPL-MCNC: 9 MG/DL — LOW (ref 10–20)
CALCIUM SERPL-MCNC: 7.6 MG/DL — LOW (ref 8.5–10.1)
CHLORIDE SERPL-SCNC: 96 MMOL/L — LOW (ref 98–110)
CO2 SERPL-SCNC: 28 MMOL/L — SIGNIFICANT CHANGE UP (ref 17–32)
CREAT SERPL-MCNC: 0.9 MG/DL — SIGNIFICANT CHANGE UP (ref 0.7–1.5)
GLUCOSE SERPL-MCNC: 76 MG/DL — SIGNIFICANT CHANGE UP (ref 70–99)
HCT VFR BLD CALC: 44.8 % — SIGNIFICANT CHANGE UP (ref 42–52)
HGB BLD-MCNC: 15.5 G/DL — SIGNIFICANT CHANGE UP (ref 14–18)
LIDOCAIN IGE QN: 180 U/L — HIGH (ref 7–60)
MAGNESIUM SERPL-MCNC: 1.9 MG/DL — SIGNIFICANT CHANGE UP (ref 1.8–2.4)
MCHC RBC-ENTMCNC: 32.9 PG — HIGH (ref 27–31)
MCHC RBC-ENTMCNC: 34.6 G/DL — SIGNIFICANT CHANGE UP (ref 32–37)
MCV RBC AUTO: 95.1 FL — HIGH (ref 80–94)
NRBC # BLD: 0 /100 WBCS — SIGNIFICANT CHANGE UP (ref 0–0)
PLATELET # BLD AUTO: 128 K/UL — LOW (ref 130–400)
POTASSIUM SERPL-MCNC: 4 MMOL/L — SIGNIFICANT CHANGE UP (ref 3.5–5)
POTASSIUM SERPL-SCNC: 4 MMOL/L — SIGNIFICANT CHANGE UP (ref 3.5–5)
PROT SERPL-MCNC: 5.1 G/DL — LOW (ref 6–8)
RBC # BLD: 4.71 M/UL — SIGNIFICANT CHANGE UP (ref 4.7–6.1)
RBC # FLD: 13.2 % — SIGNIFICANT CHANGE UP (ref 11.5–14.5)
SODIUM SERPL-SCNC: 137 MMOL/L — SIGNIFICANT CHANGE UP (ref 135–146)
WBC # BLD: 14.56 K/UL — HIGH (ref 4.8–10.8)
WBC # FLD AUTO: 14.56 K/UL — HIGH (ref 4.8–10.8)

## 2018-10-16 RX ORDER — MORPHINE SULFATE 50 MG/1
4 CAPSULE, EXTENDED RELEASE ORAL EVERY 4 HOURS
Qty: 0 | Refills: 0 | Status: DISCONTINUED | OUTPATIENT
Start: 2018-10-16 | End: 2018-10-16

## 2018-10-16 RX ORDER — SODIUM CHLORIDE 9 MG/ML
1000 INJECTION, SOLUTION INTRAVENOUS
Qty: 0 | Refills: 0 | Status: DISCONTINUED | OUTPATIENT
Start: 2018-10-16 | End: 2018-10-16

## 2018-10-16 RX ORDER — SODIUM CHLORIDE 9 MG/ML
1000 INJECTION, SOLUTION INTRAVENOUS
Qty: 0 | Refills: 0 | Status: DISCONTINUED | OUTPATIENT
Start: 2018-10-16 | End: 2018-10-17

## 2018-10-16 RX ORDER — MORPHINE SULFATE 50 MG/1
4 CAPSULE, EXTENDED RELEASE ORAL
Qty: 0 | Refills: 0 | Status: DISCONTINUED | OUTPATIENT
Start: 2018-10-16 | End: 2018-10-18

## 2018-10-16 RX ADMIN — SODIUM CHLORIDE 250 MILLILITER(S): 9 INJECTION, SOLUTION INTRAVENOUS at 23:53

## 2018-10-16 RX ADMIN — MORPHINE SULFATE 4 MILLIGRAM(S): 50 CAPSULE, EXTENDED RELEASE ORAL at 23:01

## 2018-10-16 RX ADMIN — MORPHINE SULFATE 4 MILLIGRAM(S): 50 CAPSULE, EXTENDED RELEASE ORAL at 20:15

## 2018-10-16 RX ADMIN — MORPHINE SULFATE 4 MILLIGRAM(S): 50 CAPSULE, EXTENDED RELEASE ORAL at 01:12

## 2018-10-16 RX ADMIN — Medication 2 MILLIGRAM(S): at 05:28

## 2018-10-16 RX ADMIN — Medication 0.5 MILLIGRAM(S): at 12:59

## 2018-10-16 RX ADMIN — Medication 100 MILLIGRAM(S): at 12:57

## 2018-10-16 RX ADMIN — ENOXAPARIN SODIUM 40 MILLIGRAM(S): 100 INJECTION SUBCUTANEOUS at 07:55

## 2018-10-16 RX ADMIN — Medication 15 MILLIGRAM(S): at 05:35

## 2018-10-16 RX ADMIN — MORPHINE SULFATE 4 MILLIGRAM(S): 50 CAPSULE, EXTENDED RELEASE ORAL at 07:53

## 2018-10-16 RX ADMIN — SODIUM CHLORIDE 200 MILLILITER(S): 9 INJECTION, SOLUTION INTRAVENOUS at 10:43

## 2018-10-16 RX ADMIN — MORPHINE SULFATE 4 MILLIGRAM(S): 50 CAPSULE, EXTENDED RELEASE ORAL at 17:18

## 2018-10-16 RX ADMIN — MORPHINE SULFATE 4 MILLIGRAM(S): 50 CAPSULE, EXTENDED RELEASE ORAL at 19:54

## 2018-10-16 RX ADMIN — MORPHINE SULFATE 4 MILLIGRAM(S): 50 CAPSULE, EXTENDED RELEASE ORAL at 23:30

## 2018-10-16 RX ADMIN — SODIUM CHLORIDE 250 MILLILITER(S): 9 INJECTION, SOLUTION INTRAVENOUS at 20:01

## 2018-10-16 RX ADMIN — Medication 2 MILLIGRAM(S): at 01:12

## 2018-10-16 RX ADMIN — MORPHINE SULFATE 4 MILLIGRAM(S): 50 CAPSULE, EXTENDED RELEASE ORAL at 13:29

## 2018-10-16 RX ADMIN — Medication 1 MILLIGRAM(S): at 12:57

## 2018-10-16 RX ADMIN — MORPHINE SULFATE 4 MILLIGRAM(S): 50 CAPSULE, EXTENDED RELEASE ORAL at 03:27

## 2018-10-16 RX ADMIN — Medication 0.5 MILLIGRAM(S): at 19:54

## 2018-10-16 RX ADMIN — MORPHINE SULFATE 4 MILLIGRAM(S): 50 CAPSULE, EXTENDED RELEASE ORAL at 12:59

## 2018-10-16 RX ADMIN — PANTOPRAZOLE SODIUM 40 MILLIGRAM(S): 20 TABLET, DELAYED RELEASE ORAL at 05:27

## 2018-10-16 RX ADMIN — MORPHINE SULFATE 4 MILLIGRAM(S): 50 CAPSULE, EXTENDED RELEASE ORAL at 16:48

## 2018-10-16 RX ADMIN — MORPHINE SULFATE 4 MILLIGRAM(S): 50 CAPSULE, EXTENDED RELEASE ORAL at 11:10

## 2018-10-16 RX ADMIN — Medication 15 MILLIGRAM(S): at 06:50

## 2018-10-16 RX ADMIN — MORPHINE SULFATE 4 MILLIGRAM(S): 50 CAPSULE, EXTENDED RELEASE ORAL at 03:49

## 2018-10-16 RX ADMIN — Medication 0.5 MILLIGRAM(S): at 23:54

## 2018-10-16 NOTE — PROGRESS NOTE ADULT - SUBJECTIVE AND OBJECTIVE BOX
SUBJECTIVE:    Patient is a 38y old Male who presents with a chief complaint of Abd pain, nausea and vomiting (16 Oct 2018 11:30)    Currently admitted to medicine with the primary diagnosis of Pancreatitis     Today is hospital day 1d. This morning he is resting comfortably in bed and reports no new issues or overnight events. Patient reports sleeping well overnight, still with some abdominal pain and nausea although they have improved since presentation. He is ambulating in the hallway    PAST MEDICAL & SURGICAL HISTORY  No pertinent past medical history  H/O rotator cuff surgery    SOCIAL HISTORY:  Negative for smoking/alcohol/drug use.     ALLERGIES:  No Known Allergies    MEDICATIONS:  STANDING MEDICATIONS  enoxaparin Injectable 40 milliGRAM(s) SubCutaneous every 24 hours  folic acid 1 milliGRAM(s) Oral daily  lactated ringers. 1000 milliLiter(s) IV Continuous <Continuous>  pantoprazole    Tablet 40 milliGRAM(s) Oral before breakfast  thiamine 100 milliGRAM(s) Oral daily    PRN MEDICATIONS  LORazepam     Tablet 0.5 milliGRAM(s) Oral every 4 hours PRN  morphine  - Injectable 4 milliGRAM(s) IV Push every 3 hours PRN  ondansetron Injectable 4 milliGRAM(s) IV Push every 12 hours PRN    VITALS:   T(F): 98.2  HR: 125  BP: 117/69  RR: 18  SpO2: 96%    LABS:                        15.5   14.56 )-----------( 128      ( 16 Oct 2018 15:01 )             44.8     10-16    137  |  96<L>  |  9<L>  ----------------------------<  76  4.0   |  28  |  0.9    Ca    7.6<L>      16 Oct 2018 15:01  Mg     1.9     10-16    TPro  5.1<L>  /  Alb  3.1<L>  /  TBili  1.5<H>  /  DBili  x   /  AST  48<H>  /  ALT  61<H>  /  AlkPhos  61  10-16              CARDIAC MARKERS ( 15 Oct 2018 13:10 )  x     / <0.01 ng/mL / 410 U/L / x     / 3.0 ng/mL  CARDIAC MARKERS ( 15 Oct 2018 09:50 )  x     / <0.01 ng/mL / 227 U/L / x     / 3.8 ng/mL  CARDIAC MARKERS ( 14 Oct 2018 22:02 )  x     / <0.01 ng/mL / 256 U/L / x     / x          RADIOLOGY:    < from: CT Abdomen and Pelvis w/ IV Cont (10.15.18 @ 00:51) >  IMPRESSION:    Acute pancreatitis as detailed above.  No loculated fluid collection.  Hepatic steatosis.    < end of copied text >    < from: US Abdomen Limited (10.15.18 @ 04:51) >  IMPRESSION:    No evidence of cholelithiasis or cholecystitis.    Hepatomegaly with fatty infiltration.    Heterogeneous pancreatic head correlating with finding of acute   pancreatitis on recent CT.    < end of copied text >    PHYSICAL EXAM:  GEN: No acute distress  LUNGS: Clear to auscultation bilaterally   HEART: Tachycardic  ABD: Soft, tender to palpation diffusely but more in the upper quadrants  EXT: NC/NC/NE/2+PP/BEAN/Skin Intact.   NEURO: AAOX3    Intravenous access: PIV

## 2018-10-16 NOTE — PROGRESS NOTE ADULT - ASSESSMENT
# Abd Pain, N/V secondary to acute pancreatitis 2/2 etoh abuse - + SIRS on admission; no sepsis  clear liquids as yovany  LR 250cc/hr   Pain control Morphine 4mg ivp q3 prn (avoid NSAID, d/c ketorolac)  Zofran PRN for nausea  PPI daily  GI not needed for now  no abx for now, wbc decr    # Transaminitis - Likely secondary to ETOH use  check hep A, B, C panel  check LFTs q48  outpt f/u  d/c etoh    # ETOH abuse  Thiamine and Folic acid  ativan 0.5mg po q4 prn w/drawal  will refer to outpt counselling  inpt detox consult not needed    # DVT PPX: Lovenox Sq    Dispo: prob d/c in 48 hrs or so, if things cont to improve

## 2018-10-16 NOTE — MEDICAL STUDENT PROGRESS NOTE(EDUCATION) - NS MD HP STUD ASPLAN ASSES FT
Patient is a 38 year old male with no significant past medical history with acute pancreatitis and transaminitis. CT scan findings are consistent with acute pancreatitis.

## 2018-10-16 NOTE — MEDICAL STUDENT PROGRESS NOTE(EDUCATION) - SUBJECTIVE AND OBJECTIVE BOX
Patient is a 38 year old male with no significant PMH with a chief complaint of abdominal pain, nausea, and multiple episodes of vomiting that began 2 days ago. The vomiting is non-bilious and non-bloody. He rates the abdominal pain as a 10/10 with no alleviating factors. The pain radiates to his back. His symptoms began after consuming a large amount of vodka. He states that he drinks 2-3 glasses of vodka per night. Patient is a 38 year old male with no significant PMH with a chief complaint of abdominal pain that radiates to the back, nausea, and vomiting for two days. Today his abdominal pain and nausea have improved, but he is still feeling like he needs to gag at times. His pain in controlled with the medication.      Vital Signs:  T: 98.8  HR: 115  BP: 125/75  R: 18    PE:  General: No acute distress  Heart: RRR, no murmurs, rubs, or gallops  Lungs: CTAB, no wheezes, no crackles  Abdomen: mid-epigastric tenderness to palpation, soft, non-distended  Neuro: A&Ox3    10/15 CBC: 14.10/16.7/47.9/163  Lipase > 600  AST: 76  ALT: 97

## 2018-10-16 NOTE — PROGRESS NOTE ADULT - SUBJECTIVE AND OBJECTIVE BOX
ELZBIETADENISSE REY  38y  Male  ***My note supercedes ALL resident notes that I sign.  My corrections for their notes are in my note.***    I can be reached directly on [employer]. My office number is [phone]. My personal cell number is [phone].    PMD: Dr Colón    INTERVAL EVENTS: Here for f/u of pancreatitis. Pt feeling better than from admission, but still w/ a fair amt of abd pain. Pt thinks he might be able to try broth tonight, certainly not ready for solids. Vomiting has stopped. Occ still nauseous, but not severe. Does not feel any withdrawal, says ativan helps. Pt drinks a few glasses of vodka and cranberry a night, after work, to relax and wind down. He is not depressed, in fact, he says that his sister moved back in with him and they get along well. They are renovating the house, so things there are improving. His job is stable. No suicidal ideation at all.    T(F): 98.8 (10-16-18 @ 04:57), Max: 98.8 (10-16-18 @ 04:57)  HR: 115 (10-16-18 @ 04:57) (100 - 115)  BP: 125/75 (10-16-18 @ 04:57) (125/75 - 132/85)  RR: 18 (10-16-18 @ 04:57) (18 - 20)  SpO2: 96% (10-16-18 @ 01:15) (96% - 96%)    Gen: mild-mod pain in abd, colicky; excellent mental status; very polite and nice  HEENT: PERRL, EOMI, scl white, mouth clr, nose clr  Neck: no nodes, thyroid nl  lungs: CTA b/l  Hrt: s1 s2 reg, still tachy , no murmur  abd: dec BS, slightly distended, + diffusely tender, even to light touch; + voluntary guarding  ext: no edema; no c/c  neuro: wnl, AA ox3; no DTs; CN intact; no motor/sens def  skin- nl    LABS:                        16.7    (    94.7   14.10 )-----------( ---------      163      ( 15 Oct 2018 13:10 )             47.9    (    13.4     WBC Count: 14.10 K/uL (10-15-18 @ 13:10)  WBC Count: 15.03 K/uL (10-15-18 @ 09:50)  WBC Count: 19.44 K/uL (10-14-18 @ 22:02)    136   (   96   (   65      10-15-18 @ 13:10  ----------------------               4.3   (   24   (   9                             -----                        0.8  Ca  7.6   Mg  --    P   --     LFT  5.9  (  1.7  (  76       10-15-18 @ 13:10  -------------------------  3.8  (  70  (  97    T kristen 1.7   AP 70  AST 76  ALT 97  10-15-18 @ 13:10  T kristen 1.6         10-14-18 @ 22:02    CARDIAC MARKERS ( 15 Oct 2018 13:10 )  x     / <0.01 ng/mL / 410 U/L / x     / 3.0 ng/mL  CARDIAC MARKERS ( 15 Oct 2018 09:50 )  x     / <0.01 ng/mL / 227 U/L / x     / 3.8 ng/mL  CARDIAC MARKERS ( 14 Oct 2018 22:02 )  x     / <0.01 ng/mL / 256 U/L / x     / x        Lipase, Serum (10.14.18 @ 22:02)    Lipase, Serum: >600 U/L    Lipid Profile in AM (10.15.18 @ 13:10)    Total Cholesterol/HDL Ratio Measurement: 2.5 Ratio    Cholesterol, Serum: 116 mg/dL    Triglycerides, Serum: 84 mg/dL    HDL Cholesterol, Serum: 47: HDL Levels >/= 60 mg/dL are considered beneficial and a "negative" risk  factor.  Effective 08/15/2018: New reference range and interpretive comment. mg/dL    Direct LDL: 58    RADIOLOGY & ADDITIONAL TESTS:  < from: US Abdomen Limited (10.15.18 @ 04:51) >  ASCITES:  None.    IMPRESSION:    No evidence of cholelithiasis or cholecystitis.    Hepatomegaly with fatty infiltration.    Heterogeneous pancreatic head correlating with finding of acute   pancreatitis on recent CT.    < end of copied text >    < from: CT Abdomen and Pelvis w/ IV Cont (10.15.18 @ 00:51) >  IMPRESSION:    Acute pancreatitis as detailed above.  No loculated fluid collection.  Hepatic steatosis.    < end of copied text >    < from: Xray Chest 2 Views PA/Lat (10.14.18 @ 23:39) >  Impression:      No radiographic evidence of acute cardiopulmonary disease.    Unchanged.    < end of copied text >      MEDICATIONS:    enoxaparin Injectable 40 milliGRAM(s) SubCutaneous every 24 hours  folic acid 1 milliGRAM(s) Oral daily  lactated ringers. 1000 milliLiter(s) IV Continuous <Continuous>  LORazepam     Tablet 0.5 milliGRAM(s) Oral every 4 hours PRN  morphine  - Injectable 4 milliGRAM(s) IV Push every 4 hours PRN  ondansetron Injectable 4 milliGRAM(s) IV Push every 12 hours PRN  pantoprazole    Tablet 40 milliGRAM(s) Oral before breakfast  thiamine 100 milliGRAM(s) Oral daily

## 2018-10-16 NOTE — MEDICAL STUDENT PROGRESS NOTE(EDUCATION) - NS MD HP STUD ASPLAN PLAN FT
# Acute Pancreatitis  -advance diet to clear liquids  - cc/hr  -continue pain control  -zofran prn for nausea    #Transaminitis  -f/u RUQ US    #ETOH Abuse  -consult addiction medicine # Acute Pancreatitis  -advance diet to clear liquids  - cc/hr  -morphine 4mg IV q3 prn  -zofran prn for nausea  -PPI daily    #Transaminitis secondary to ETOH use  -hep A,B,C panel  -check LFTs q48  -outpatient f/u    #ETOH Abuse  -thiamine and folic acid  -ativan 0.5mg PO q4 prn for withdrawal    #DVT Prophylaxis  -lovenox subq

## 2018-10-16 NOTE — PROGRESS NOTE ADULT - ASSESSMENT
38Yr M no significant PMH presented to ED with 2 day hx of abd pain nausea and vomiting    #) Acute alcoholic pancreatitis  - On admission: Lipase >600, Lactate 5.3, CT shows acute pancreatitis. Today Lipase 180, Lactate 2.2  - Clear liquids today as tolerated  - IVF LR 250cc/hr  - Pain control Morphine  - Zofran PRN for nausea  - PPI daily  - Triglycerides WNL    #) Transaminitis  - Likely secondary to ETOH use  - RUQ US - hepatomegaly with steatosis  - LFTs q48  - f/u Hepatitis panel    #) ETOH abuse  - Thiamine and Folic acid  - Ativan 0.5 mg PO PRN  - Outpatient counseling  - Pt doesn't think he is abusing. Will do Lorazepam PRN for agitation    DVT PPX Lovenox Sq    Dispo: from home. If tolerating diet can be discharged tomorrow with outpatient follow up

## 2018-10-17 LAB
ANION GAP SERPL CALC-SCNC: 15 MMOL/L — HIGH (ref 7–14)
BASOPHILS # BLD AUTO: 0.03 K/UL — SIGNIFICANT CHANGE UP (ref 0–0.2)
BASOPHILS NFR BLD AUTO: 0.2 % — SIGNIFICANT CHANGE UP (ref 0–1)
BUN SERPL-MCNC: 6 MG/DL — LOW (ref 10–20)
CALCIUM SERPL-MCNC: 8.2 MG/DL — LOW (ref 8.5–10.1)
CHLORIDE SERPL-SCNC: 98 MMOL/L — SIGNIFICANT CHANGE UP (ref 98–110)
CO2 SERPL-SCNC: 27 MMOL/L — SIGNIFICANT CHANGE UP (ref 17–32)
CREAT SERPL-MCNC: 0.7 MG/DL — SIGNIFICANT CHANGE UP (ref 0.7–1.5)
EOSINOPHIL # BLD AUTO: 0.04 K/UL — SIGNIFICANT CHANGE UP (ref 0–0.7)
EOSINOPHIL NFR BLD AUTO: 0.3 % — SIGNIFICANT CHANGE UP (ref 0–8)
GLUCOSE SERPL-MCNC: 66 MG/DL — LOW (ref 70–99)
HCT VFR BLD CALC: 43.9 % — SIGNIFICANT CHANGE UP (ref 42–52)
HGB BLD-MCNC: 14.8 G/DL — SIGNIFICANT CHANGE UP (ref 14–18)
IMM GRANULOCYTES NFR BLD AUTO: 0.6 % — HIGH (ref 0.1–0.3)
LYMPHOCYTES # BLD AUTO: 1.11 K/UL — LOW (ref 1.2–3.4)
LYMPHOCYTES # BLD AUTO: 9 % — LOW (ref 20.5–51.1)
MCHC RBC-ENTMCNC: 32.5 PG — HIGH (ref 27–31)
MCHC RBC-ENTMCNC: 33.7 G/DL — SIGNIFICANT CHANGE UP (ref 32–37)
MCV RBC AUTO: 96.5 FL — HIGH (ref 80–94)
MONOCYTES # BLD AUTO: 1.03 K/UL — HIGH (ref 0.1–0.6)
MONOCYTES NFR BLD AUTO: 8.3 % — SIGNIFICANT CHANGE UP (ref 1.7–9.3)
NEUTROPHILS # BLD AUTO: 10.05 K/UL — HIGH (ref 1.4–6.5)
NEUTROPHILS NFR BLD AUTO: 81.6 % — HIGH (ref 42.2–75.2)
NRBC # BLD: 0 /100 WBCS — SIGNIFICANT CHANGE UP (ref 0–0)
PLATELET # BLD AUTO: 121 K/UL — LOW (ref 130–400)
POTASSIUM SERPL-MCNC: 3.7 MMOL/L — SIGNIFICANT CHANGE UP (ref 3.5–5)
POTASSIUM SERPL-SCNC: 3.7 MMOL/L — SIGNIFICANT CHANGE UP (ref 3.5–5)
RBC # BLD: 4.55 M/UL — LOW (ref 4.7–6.1)
RBC # FLD: 13.2 % — SIGNIFICANT CHANGE UP (ref 11.5–14.5)
SODIUM SERPL-SCNC: 140 MMOL/L — SIGNIFICANT CHANGE UP (ref 135–146)
WBC # BLD: 12.34 K/UL — HIGH (ref 4.8–10.8)
WBC # FLD AUTO: 12.34 K/UL — HIGH (ref 4.8–10.8)

## 2018-10-17 RX ORDER — SODIUM CHLORIDE 9 MG/ML
1000 INJECTION, SOLUTION INTRAVENOUS
Qty: 0 | Refills: 0 | Status: DISCONTINUED | OUTPATIENT
Start: 2018-10-17 | End: 2018-10-18

## 2018-10-17 RX ADMIN — Medication 0.5 MILLIGRAM(S): at 19:44

## 2018-10-17 RX ADMIN — MORPHINE SULFATE 4 MILLIGRAM(S): 50 CAPSULE, EXTENDED RELEASE ORAL at 16:32

## 2018-10-17 RX ADMIN — SODIUM CHLORIDE 250 MILLILITER(S): 9 INJECTION, SOLUTION INTRAVENOUS at 04:30

## 2018-10-17 RX ADMIN — SODIUM CHLORIDE 250 MILLILITER(S): 9 INJECTION, SOLUTION INTRAVENOUS at 11:45

## 2018-10-17 RX ADMIN — ENOXAPARIN SODIUM 40 MILLIGRAM(S): 100 INJECTION SUBCUTANEOUS at 08:08

## 2018-10-17 RX ADMIN — MORPHINE SULFATE 4 MILLIGRAM(S): 50 CAPSULE, EXTENDED RELEASE ORAL at 20:30

## 2018-10-17 RX ADMIN — MORPHINE SULFATE 4 MILLIGRAM(S): 50 CAPSULE, EXTENDED RELEASE ORAL at 04:15

## 2018-10-17 RX ADMIN — MORPHINE SULFATE 4 MILLIGRAM(S): 50 CAPSULE, EXTENDED RELEASE ORAL at 03:43

## 2018-10-17 RX ADMIN — Medication 0.5 MILLIGRAM(S): at 11:49

## 2018-10-17 RX ADMIN — MORPHINE SULFATE 4 MILLIGRAM(S): 50 CAPSULE, EXTENDED RELEASE ORAL at 22:49

## 2018-10-17 RX ADMIN — Medication 1 MILLIGRAM(S): at 11:45

## 2018-10-17 RX ADMIN — MORPHINE SULFATE 4 MILLIGRAM(S): 50 CAPSULE, EXTENDED RELEASE ORAL at 19:44

## 2018-10-17 RX ADMIN — MORPHINE SULFATE 4 MILLIGRAM(S): 50 CAPSULE, EXTENDED RELEASE ORAL at 11:48

## 2018-10-17 RX ADMIN — PANTOPRAZOLE SODIUM 40 MILLIGRAM(S): 20 TABLET, DELAYED RELEASE ORAL at 08:07

## 2018-10-17 RX ADMIN — Medication 0.5 MILLIGRAM(S): at 23:45

## 2018-10-17 RX ADMIN — MORPHINE SULFATE 4 MILLIGRAM(S): 50 CAPSULE, EXTENDED RELEASE ORAL at 23:15

## 2018-10-17 RX ADMIN — Medication 100 MILLIGRAM(S): at 11:45

## 2018-10-17 RX ADMIN — SODIUM CHLORIDE 150 MILLILITER(S): 9 INJECTION, SOLUTION INTRAVENOUS at 21:45

## 2018-10-17 RX ADMIN — Medication 0.5 MILLIGRAM(S): at 04:16

## 2018-10-17 RX ADMIN — MORPHINE SULFATE 4 MILLIGRAM(S): 50 CAPSULE, EXTENDED RELEASE ORAL at 14:09

## 2018-10-17 NOTE — PROGRESS NOTE ADULT - SUBJECTIVE AND OBJECTIVE BOX
SUBJECTIVE:    Patient is a 38y old Male who presents with a chief complaint of Abd pain, nausea and vomiting (17 Oct 2018 16:46)    Currently admitted to medicine with the primary diagnosis of Pancreatitis     Today is hospital day 2d. This morning he is resting comfortably in bed and reports no new issues or overnight events. Patient reports that pain has improved significantly although he has some B/L flank pain now. He is tolerating clears today and will advance to solids today.    PAST MEDICAL & SURGICAL HISTORY  No pertinent past medical history  H/O rotator cuff surgery    SOCIAL HISTORY:  Negative for smoking/alcohol/drug use.     ALLERGIES:  No Known Allergies    MEDICATIONS:  STANDING MEDICATIONS  folic acid 1 milliGRAM(s) Oral daily  lactated ringers. 1000 milliLiter(s) IV Continuous <Continuous>  pantoprazole    Tablet 40 milliGRAM(s) Oral before breakfast  thiamine 100 milliGRAM(s) Oral daily    PRN MEDICATIONS  LORazepam     Tablet 0.5 milliGRAM(s) Oral every 4 hours PRN  morphine  - Injectable 4 milliGRAM(s) IV Push every 3 hours PRN  ondansetron Injectable 4 milliGRAM(s) IV Push every 12 hours PRN    VITALS:   T(F): 98.1  HR: 94  BP: 143/79  RR: 18  SpO2: --    LABS:                        14.8   12.34 )-----------( 121      ( 17 Oct 2018 08:18 )             43.9     10-17    140  |  98  |  6<L>  ----------------------------<  66<L>  3.7   |  27  |  0.7    Ca    8.2<L>      17 Oct 2018 08:18  Mg     1.9     10-16    TPro  5.1<L>  /  Alb  3.1<L>  /  TBili  1.5<H>  /  DBili  x   /  AST  48<H>  /  ALT  61<H>  /  AlkPhos  61  10-16      RADIOLOGY:    < from: CT Abdomen and Pelvis w/ IV Cont (10.15.18 @ 00:51) >  IMPRESSION:    Acute pancreatitis as detailed above.  No loculated fluid collection.  Hepatic steatosis.    < end of copied text >    PHYSICAL EXAM:  GEN: No acute distress  LUNGS: Clear to auscultation bilaterally   HEART: Tachycardic  ABD: Soft, mildly tender   EXT: NC/NC/NE/2+PP/BEAN/Skin Intact.   NEURO: AAOX3    Intravenous access: PIV

## 2018-10-17 NOTE — PROGRESS NOTE ADULT - SUBJECTIVE AND OBJECTIVE BOX
COLEDENISSE  38y  Male  ***My note supercedes ALL resident notes that I sign.  My corrections for their notes are in my note.***    I can be reached directly on [employer]. My office number is [phone]. My personal cell number is [phone].    INTERVAL EVENTS: Here for f/u of pancreatitis. Pt is doing very well. Aurelia clrs. Would like to adv to solids. Pain is much better. Pt is ambulating well. No EtOH w/drawal.    T(F): 98.1 (10-17-18 @ 12:52), Max: 99.4 (10-16-18 @ 20:08)  HR: 94 (10-17-18 @ 12:52) (94 - 106)  BP: 143/79 (10-17-18 @ 12:52) (119/57 - 143/79)  RR: 18 (10-17-18 @ 12:52) (17 - 18)  SpO2: --    Gen: excellent mental status; very polite and nice  HEENT: PERRL, EOMI, scl white, mouth clr, nose clr  Neck: no nodes, thyroid nl  lungs: CTA b/l  Hrt: s1 s2 reg, HR better - 84, no murmur  abd: dec BS, non-distended, practically no pain; no guarding, markedly better  ext: no edema; no c/c  neuro: wnl, AA ox3; no DTs; CN intact; no motor/sens def  skin- nl    LABS:                        14.8    (    96.5   12.34 )-----------( ---------      121      ( 17 Oct 2018 08:18 )             43.9    (    13.2     WBC Count: 12.34 K/uL (10-17-18 @ 08:18)  WBC Count: 14.56 K/uL (10-16-18 @ 15:01)  WBC Count: 14.10 K/uL (10-15-18 @ 13:10)  WBC Count: 15.03 K/uL (10-15-18 @ 09:50)  WBC Count: 19.44 K/uL (10-14-18 @ 22:02)    Platelet Count - Automated: 121 K/uL (10-17-18 @ 08:18)  Platelet Count - Automated: 128 K/uL (10-16-18 @ 15:01)  Platelet Count - Automated: 163 K/uL (10-15-18 @ 13:10)  Platelet Count - Automated: 179 K/uL (10-15-18 @ 09:50)  Platelet Count - Automated: 219 K/uL (10-14-18 @ 22:02)    140   (   98   (   66      10-17-18 @ 08:18  ----------------------               3.7   (   27   (   6                             -----                        0.7  Ca  8.2   Mg  --    P   --     T kristen 1.5   AP 61  AST 48  ALT 61  10-16-18 @ 15:01  T krisetn 1.7   AP 70  AST 76  ALT 97  10-15-18 @ 13:10  T kristen 1.6         10-14-18 @ 22:02    Lipase, Serum (10.16.18 @ 15:01)    Lipase, Serum: 180 U/L    Lipid Profile in AM (10.15.18 @ 13:10)    Total Cholesterol/HDL Ratio Measurement: 2.5 Ratio    Cholesterol, Serum: 116 mg/dL    Triglycerides, Serum: 84 mg/dL    HDL Cholesterol, Serum: 47: HDL Levels >/= 60 mg/dL are considered beneficial and a "negative" risk  factor.  Effective 08/15/2018: New reference range and interpretive comment. mg/dL    Direct LDL: 58    RADIOLOGY & ADDITIONAL TESTS:      MEDICATIONS:    folic acid 1 milliGRAM(s) Oral daily  lactated ringers. 1000 milliLiter(s) IV Continuous <Continuous>  LORazepam     Tablet 0.5 milliGRAM(s) Oral every 4 hours PRN  morphine  - Injectable 4 milliGRAM(s) IV Push every 3 hours PRN  ondansetron Injectable 4 milliGRAM(s) IV Push every 12 hours PRN  pantoprazole    Tablet 40 milliGRAM(s) Oral before breakfast  thiamine 100 milliGRAM(s) Oral daily

## 2018-10-17 NOTE — MEDICAL STUDENT PROGRESS NOTE(EDUCATION) - NS MD HP STUD ASPLAN PLAN FT
#Acute Alcoholic Pancreatitis  -continue clear liquids, advance diet as tolerated  -IVF  cc/hr   -pain control morphine prn  -zofran prn for nausea  -PPI daily  -d/c lovenox    #Transaminitis secondary to ETOH use  -RUQ US showed hepatic steatosis  -AST: 48, ALT: 61  -f/u Hepatitis panel #Acute Alcoholic Pancreatitis  -continue clear liquids, advance diet as tolerated  -IVF  cc/hr   -pain control morphine 4mg IV q3 prn  -zofran prn for nausea  -PPI daily  -lorazepam 0.5 mg PO q4 prn for alcohol withdrawal  -d/c lovenox    #Transaminitis secondary to ETOH use  -RUQ US showed hepatic steatosis  -AST: 48, ALT: 61  -f/u Hepatitis panel #Acute Alcoholic Pancreatitis  -continue clear liquids, advance diet as tolerated  -IVF  cc/hr for now  -pain control morphine 4mg IV q3 prn  -Zofran prn for nausea  -PPI daily  -lorazepam 0.5 mg PO q4 prn for alcohol withdrawal  -d/c Lovenox    #Transaminitis secondary to ETOH use  -RUQ US showed hepatic steatosis  -LFTs improving - AST: 48, ALT: 61  -f/u Hepatitis panel    Disposition: anticipate discharge tomorrow

## 2018-10-17 NOTE — MEDICAL STUDENT PROGRESS NOTE(EDUCATION) - SUBJECTIVE AND OBJECTIVE BOX
Patient is a 38 year old male with no significant PMH who presented to the ED complaining on nausea, vomiting, and abdominal pain and was found to have acute alcoholic pancreatitis. Yesterday he was started on a clear liquid diet. He states that last night he was able to eat some broth and half of a jello, which was followed by a severe cramping pain. Overall today he is feeling better but still has some abdominal pain and bilateral flank pain.     Vital Signs:    T: 98.8  HR: 98  BP: 127/67  R: 17    PE:  General: No acute distress  Heart: normal S1,S2, no murmurs  Lungs: CTAB, no wheezes, no crackles  Abdomen: mild tenderness to lower abdomen bilaterally, slightly distended, soft  Neuro: A&Ox3 Patient is a 38 year old male with no significant PMH who presented to the ED complaining on nausea, vomiting, and abdominal pain and was found to have acute alcoholic pancreatitis. Yesterday he was started on a clear liquid diet. He states that last night he was able to eat some broth and half of a jello, which was followed by a severe cramping pain. Overall today he is feeling better but still has some abdominal pain and bilateral flank pain.     Vital Signs:    T: 98.8  HR: 98  BP: 127/67  R: 17    PE:  General: No acute distress  Heart: normal S1,S2, no murmurs  Lungs: CTAB, no wheezes, no crackles  Abdomen: mild tenderness to lower abdomen bilaterally, slightly distended, soft  Neuro: A&Ox3    CBC: 12.34/14.8/43.9/121  BMP: 140/3.7/98/27/6/0.7/66 Patient is a 38 year old male with no significant PMH who presented to the ED complaining on nausea, vomiting, and abdominal pain and was found to have acute alcoholic pancreatitis. Yesterday he was started on a clear liquid diet. He states that last night he was able to eat some broth and half of a jello, broth gave him some cramping pain while the jello was tolerable. Overall today he is feeling better, ambulating in the hallway but still has some mild abdominal pain and new bilateral flank pain.     Vital Signs:    T: 98.8  HR: 98  BP: 127/67  R: 17    PE:  General: No acute distress  Heart: RRR, normal S1,S2, no murmurs, rubs or gallops  Lungs: CTAB, no wheezes, no crackles, no rhonchi  Abdomen: mild tenderness to lower abdomen bilaterally, mildly distended, soft, +BS  Neuro: A&Ox3    CBC: 12.34/14.8/43.9/121  BMP: 140/3.7/98/27/6/0.7/66

## 2018-10-17 NOTE — PROGRESS NOTE ADULT - ASSESSMENT
# Abd Pain, N/V secondary to acute pancreatitis 2/2 etoh abuse - + SIRS on admission; no sepsis  improving well  decr LR 150cc/hr   adv diet to gi soft - low fat  Pain control Morphine 4mg ivp q3 prn (avoid NSAID, d/c ketorolac)  Zofran PRN for nausea  PPI daily  GI not needed for now  no abx for now, wbc decr    # Transaminitis - Likely secondary to ETOH use - improving  check hep A, B, C panel  check LFTs q48  outpt f/u  d/c etoh    # ETOH abuse  Thiamine and Folic acid  ativan 0.5mg po q4 prn w/drawal  will refer to outpt counselling  inpt detox consult not needed    # DVT PPX: Lovenox Sq    Dispo: prob d/c home tomorrow # Abd Pain, N/V secondary to acute pancreatitis 2/2 etoh abuse - + SIRS on admission; no sepsis  improving well  decr LR 150cc/hr   adv diet to gi soft - low fat  Pain control Morphine 4mg ivp q3 prn (avoid NSAID, d/c ketorolac)  Zofran PRN for nausea  PPI daily  GI not needed for now  no abx for now, wbc decr    # Transaminitis - Likely secondary to ETOH use - improving  check hep A, B, C panel  check LFTs q48  outpt f/u  d/c etoh    # thrombocytopenia - could be effect of SIRS/pancreatitis or result of LMWH  plt count dropped by > 50%  d/c LMWH  ambulate pt  cbc monika    # ETOH abuse  Thiamine and Folic acid  ativan 0.5mg po q4 prn w/drawal  will refer to outpt counselling  inpt detox consult not needed    # DVT PPX: ambulate pt    Dispo: prob d/c home tomorrow

## 2018-10-17 NOTE — PROGRESS NOTE ADULT - ASSESSMENT
38Yr M no significant PMH presented to ED with 2 day hx of abd pain nausea and vomiting    #) Acute alcoholic pancreatitis  - On admission: Lipase >600, Lactate 5.3, CT shows acute pancreatitis. LFTs improved Lipase 180, Lactate 2.2   - Advance diet to solids  - Decrease IVF LR to 150cc/hr  - Pain control Morphine  - Zofran PRN for nausea  - PPI daily  - Triglycerides WNL    #) Transaminitis - improving  - Likely secondary to ETOH use  - RUQ US - hepatomegaly with steatosis  - LFTs q48  - f/u Hepatitis panel    #) ETOH abuse  - Thiamine and Folic acid  - Ativan 0.5 mg PO PRN  - Outpatient counseling  - Pt doesn't think he is abusing. Will do Lorazepam PRN for agitation    DVT PPX Lovenox Sq    Dispo: from home. If tolerating diet can be discharged tomorrow with outpatient follow up

## 2018-10-18 LAB
ALBUMIN SERPL ELPH-MCNC: 3.1 G/DL — LOW (ref 3.5–5.2)
ALP SERPL-CCNC: 69 U/L — SIGNIFICANT CHANGE UP (ref 30–115)
ALT FLD-CCNC: 44 U/L — HIGH (ref 0–41)
ANION GAP SERPL CALC-SCNC: 15 MMOL/L — HIGH (ref 7–14)
AST SERPL-CCNC: 39 U/L — SIGNIFICANT CHANGE UP (ref 0–41)
BASOPHILS # BLD AUTO: 0.03 K/UL — SIGNIFICANT CHANGE UP (ref 0–0.2)
BASOPHILS NFR BLD AUTO: 0.2 % — SIGNIFICANT CHANGE UP (ref 0–1)
BILIRUB SERPL-MCNC: 1.1 MG/DL — SIGNIFICANT CHANGE UP (ref 0.2–1.2)
BUN SERPL-MCNC: 4 MG/DL — LOW (ref 10–20)
CALCIUM SERPL-MCNC: 8.1 MG/DL — LOW (ref 8.5–10.1)
CHLORIDE SERPL-SCNC: 98 MMOL/L — SIGNIFICANT CHANGE UP (ref 98–110)
CO2 SERPL-SCNC: 25 MMOL/L — SIGNIFICANT CHANGE UP (ref 17–32)
CREAT SERPL-MCNC: 0.7 MG/DL — SIGNIFICANT CHANGE UP (ref 0.7–1.5)
EOSINOPHIL # BLD AUTO: 0.05 K/UL — SIGNIFICANT CHANGE UP (ref 0–0.7)
EOSINOPHIL NFR BLD AUTO: 0.4 % — SIGNIFICANT CHANGE UP (ref 0–8)
GLUCOSE SERPL-MCNC: 71 MG/DL — SIGNIFICANT CHANGE UP (ref 70–99)
HAV IGM SER-ACNC: SIGNIFICANT CHANGE UP
HBV CORE IGM SER-ACNC: SIGNIFICANT CHANGE UP
HBV SURFACE AG SER-ACNC: SIGNIFICANT CHANGE UP
HCT VFR BLD CALC: 42.6 % — SIGNIFICANT CHANGE UP (ref 42–52)
HCV AB S/CO SERPL IA: 0.09 S/CO — SIGNIFICANT CHANGE UP
HCV AB SERPL-IMP: SIGNIFICANT CHANGE UP
HGB BLD-MCNC: 14.9 G/DL — SIGNIFICANT CHANGE UP (ref 14–18)
IMM GRANULOCYTES NFR BLD AUTO: 0.4 % — HIGH (ref 0.1–0.3)
LACTATE SERPL-SCNC: 0.7 MMOL/L — SIGNIFICANT CHANGE UP (ref 0.5–2.2)
LYMPHOCYTES # BLD AUTO: 1.39 K/UL — SIGNIFICANT CHANGE UP (ref 1.2–3.4)
LYMPHOCYTES # BLD AUTO: 10.1 % — LOW (ref 20.5–51.1)
MCHC RBC-ENTMCNC: 33 PG — HIGH (ref 27–31)
MCHC RBC-ENTMCNC: 35 G/DL — SIGNIFICANT CHANGE UP (ref 32–37)
MCV RBC AUTO: 94.5 FL — HIGH (ref 80–94)
MONOCYTES # BLD AUTO: 1.39 K/UL — HIGH (ref 0.1–0.6)
MONOCYTES NFR BLD AUTO: 10.1 % — HIGH (ref 1.7–9.3)
NEUTROPHILS # BLD AUTO: 10.79 K/UL — HIGH (ref 1.4–6.5)
NEUTROPHILS NFR BLD AUTO: 78.8 % — HIGH (ref 42.2–75.2)
NRBC # BLD: 0 /100 WBCS — SIGNIFICANT CHANGE UP (ref 0–0)
PLATELET # BLD AUTO: 190 K/UL — SIGNIFICANT CHANGE UP (ref 130–400)
POTASSIUM SERPL-MCNC: 3.5 MMOL/L — SIGNIFICANT CHANGE UP (ref 3.5–5)
POTASSIUM SERPL-SCNC: 3.5 MMOL/L — SIGNIFICANT CHANGE UP (ref 3.5–5)
PROT SERPL-MCNC: 5.1 G/DL — LOW (ref 6–8)
RBC # BLD: 4.51 M/UL — LOW (ref 4.7–6.1)
RBC # FLD: 12.6 % — SIGNIFICANT CHANGE UP (ref 11.5–14.5)
SODIUM SERPL-SCNC: 138 MMOL/L — SIGNIFICANT CHANGE UP (ref 135–146)
WBC # BLD: 13.7 K/UL — HIGH (ref 4.8–10.8)
WBC # FLD AUTO: 13.7 K/UL — HIGH (ref 4.8–10.8)

## 2018-10-18 RX ORDER — OXYCODONE AND ACETAMINOPHEN 5; 325 MG/1; MG/1
1 TABLET ORAL EVERY 6 HOURS
Qty: 0 | Refills: 0 | Status: DISCONTINUED | OUTPATIENT
Start: 2018-10-18 | End: 2018-10-19

## 2018-10-18 RX ORDER — SODIUM CHLORIDE 9 MG/ML
1000 INJECTION, SOLUTION INTRAVENOUS
Qty: 0 | Refills: 0 | Status: DISCONTINUED | OUTPATIENT
Start: 2018-10-18 | End: 2018-10-19

## 2018-10-18 RX ORDER — SIMETHICONE 80 MG/1
80 TABLET, CHEWABLE ORAL EVERY 6 HOURS
Qty: 0 | Refills: 0 | Status: DISCONTINUED | OUTPATIENT
Start: 2018-10-18 | End: 2018-10-19

## 2018-10-18 RX ADMIN — SIMETHICONE 80 MILLIGRAM(S): 80 TABLET, CHEWABLE ORAL at 23:26

## 2018-10-18 RX ADMIN — Medication 1 MILLIGRAM(S): at 11:46

## 2018-10-18 RX ADMIN — MORPHINE SULFATE 4 MILLIGRAM(S): 50 CAPSULE, EXTENDED RELEASE ORAL at 06:00

## 2018-10-18 RX ADMIN — Medication 0.5 MILLIGRAM(S): at 05:41

## 2018-10-18 RX ADMIN — MORPHINE SULFATE 4 MILLIGRAM(S): 50 CAPSULE, EXTENDED RELEASE ORAL at 08:42

## 2018-10-18 RX ADMIN — SIMETHICONE 80 MILLIGRAM(S): 80 TABLET, CHEWABLE ORAL at 17:28

## 2018-10-18 RX ADMIN — OXYCODONE AND ACETAMINOPHEN 1 TABLET(S): 5; 325 TABLET ORAL at 21:41

## 2018-10-18 RX ADMIN — SODIUM CHLORIDE 150 MILLILITER(S): 9 INJECTION, SOLUTION INTRAVENOUS at 20:16

## 2018-10-18 RX ADMIN — MORPHINE SULFATE 4 MILLIGRAM(S): 50 CAPSULE, EXTENDED RELEASE ORAL at 05:40

## 2018-10-18 RX ADMIN — OXYCODONE AND ACETAMINOPHEN 1 TABLET(S): 5; 325 TABLET ORAL at 15:30

## 2018-10-18 RX ADMIN — MORPHINE SULFATE 4 MILLIGRAM(S): 50 CAPSULE, EXTENDED RELEASE ORAL at 12:30

## 2018-10-18 RX ADMIN — SIMETHICONE 80 MILLIGRAM(S): 80 TABLET, CHEWABLE ORAL at 13:32

## 2018-10-18 RX ADMIN — SODIUM CHLORIDE 150 MILLILITER(S): 9 INJECTION, SOLUTION INTRAVENOUS at 17:46

## 2018-10-18 RX ADMIN — MORPHINE SULFATE 4 MILLIGRAM(S): 50 CAPSULE, EXTENDED RELEASE ORAL at 11:43

## 2018-10-18 RX ADMIN — SODIUM CHLORIDE 150 MILLILITER(S): 9 INJECTION, SOLUTION INTRAVENOUS at 01:09

## 2018-10-18 RX ADMIN — PANTOPRAZOLE SODIUM 40 MILLIGRAM(S): 20 TABLET, DELAYED RELEASE ORAL at 05:40

## 2018-10-18 RX ADMIN — MORPHINE SULFATE 4 MILLIGRAM(S): 50 CAPSULE, EXTENDED RELEASE ORAL at 01:50

## 2018-10-18 RX ADMIN — MORPHINE SULFATE 4 MILLIGRAM(S): 50 CAPSULE, EXTENDED RELEASE ORAL at 09:29

## 2018-10-18 RX ADMIN — MORPHINE SULFATE 4 MILLIGRAM(S): 50 CAPSULE, EXTENDED RELEASE ORAL at 02:30

## 2018-10-18 RX ADMIN — OXYCODONE AND ACETAMINOPHEN 1 TABLET(S): 5; 325 TABLET ORAL at 14:45

## 2018-10-18 RX ADMIN — Medication 100 MILLIGRAM(S): at 11:46

## 2018-10-18 NOTE — PROGRESS NOTE ADULT - SUBJECTIVE AND OBJECTIVE BOX
COLEDENISSE  38y  Male  ***My note supercedes ALL resident notes that I sign.  My corrections for their notes are in my note.***    I can be reached directly on [employer] [id number]. My office number is [phone]. My personal cell number is [phone].    INTERVAL EVENTS: Here for f/u of pancreatitis. Pt doing much better. Aurelia meals. Has some LLQ abd pain, that gets better w/ flatus and was better after sm BM last night. Pt has no epigastric pain. He does have residual back pain, but this is better since admit.    T(F): 98.5 (10-18-18 @ 06:01), Max: 99.5 (10-17-18 @ 19:57)  HR: 98 (10-18-18 @ 06:01) (94 - 98)  BP: 140/83 (10-18-18 @ 06:01) (140/83 - 143/79)  RR: 18 (10-18-18 @ 06:01) (18 - 18)  SpO2: --    Gen: no distress; not much pain, relaxed  HEENT: PERRL, EOMI, scl white, mouth clr, nose clr  Neck: no nodes, thyroid nl  lungs: CTA b/l  Hrt: s1 s2 reg, HR better - 88, no murmur  abd: dec BS, non-distended, no guarding, has pain in LLQ, no rebound, soft  ext: no edema; no c/c  neuro: wnl, AA ox3; no DTs; CN intact; no motor/sens def  skin- nl    LABS:                        14.9    (    94.5   13.70 )-----------( ---------      190      ( 18 Oct 2018 05:45 )             42.6    (    12.6     WBC Count: 13.70 K/uL (10-18-18 @ 05:45)  WBC Count: 12.34 K/uL (10-17-18 @ 08:18)  WBC Count: 14.56 K/uL (10-16-18 @ 15:01)  WBC Count: 14.10 K/uL (10-15-18 @ 13:10)  WBC Count: 15.03 K/uL (10-15-18 @ 09:50)  WBC Count: 19.44 K/uL (10-14-18 @ 22:02)    138   (   98   (   71      10-18-18 @ 05:45  ----------------------               3.5   (   25   (   4                             -----                        0.7  Ca  8.1   Mg  --    P   --     LFT  5.1  (  1.1  (  39       10-18-18 @ 05:45  -------------------------  3.1  (  69  (  44    Hep A, B,C - neg    RADIOLOGY & ADDITIONAL TESTS:      MEDICATIONS:    folic acid 1 milliGRAM(s) Oral daily  lactated ringers. 1000 milliLiter(s) IV Continuous <Continuous>  morphine  - Injectable 4 milliGRAM(s) IV Push every 3 hours PRN  pantoprazole    Tablet 40 milliGRAM(s) Oral before breakfast  thiamine 100 milliGRAM(s) Oral daily

## 2018-10-18 NOTE — PROGRESS NOTE ADULT - ASSESSMENT
# Abd Pain, N/V secondary to acute pancreatitis 2/2 etoh abuse -> + SIRS on admission; no sepsis  improving well; wbc still mildly elevated  d/c IVFs, pt is considering going home today  adv diet to gi soft - low fat  change morphine to percocet q6 prn - only use for epigastric or back pain (not for LLQ pain)  Zofran PRN for nausea  PPI daily  add simethicone for gas  GI not needed for now  no abx for now, afeb    # LLQ pain could be related to some constipation from morphine, or inflam fluid from pancreatitis, or other  change morphine to percocet - try not to take opiates  walk around  simethicone  cont diet and re-eval after lunch    # Transaminitis - Likely secondary to ETOH use - improving well; no evid of cirrhosis  hep A, B, C panel - neg  outpt f/u for fatty liver  d/c etoh    # thrombocytopenia - could be effect of SIRS/pancreatitis or result of LMWH  plt count dropped by > 50%  d/c LMWH - plt is better  ambulate pt  cbc monika    # ETOH abuse  d/c vitamins  d/c ativan  refer to outpt counselling  inpt detox consult not needed    # DVT PPX: ambulate pt    Dispo: if pt tolerates lunch and pain is minimal, he can go home today; if pain worsening, then hold d/c and restart LR at 150/hr (consider re-imaging w/ CT a/p w/ iv contrast)

## 2018-10-18 NOTE — PROGRESS NOTE ADULT - ASSESSMENT
38Yr M no significant PMH presented to ED with 2 day hx of abd pain nausea and vomiting    #) Acute alcoholic pancreatitis  - On admission: Lipase >600, Lactate 5.3, CT shows acute pancreatitis. LFTs improved Lipase 180, Lactate 2.2   - Low fat diet  - Restarted IVF LR to 150cc/hr as patient is complaining of new pain especially after eating  - Pain control - Percocet  - Zofran PRN for nausea  - PPI daily  - Triglycerides WNL    #) Transaminitis - improving  - Likely secondary to ETOH use  - RUQ US - hepatomegaly with steatosis  - LFTs q48  - Hepatitis panel - negative    #) ETOH abuse  - Thiamine and Folic acid  - Ativan 0.5 mg PO PRN  - Outpatient counseling  - Pt doesn't think he is abusing. Will do Lorazepam PRN for agitation    DVT PPX Lovenox Sq    Dispo: from home. If tolerating diet and feeling better can be discharged tomorrow with outpatient follow up

## 2018-10-18 NOTE — PROGRESS NOTE ADULT - SUBJECTIVE AND OBJECTIVE BOX
SUBJECTIVE:    Patient is a 38y old Male who presents with a chief complaint of Abd pain, nausea and vomiting (18 Oct 2018 12:19)    Currently admitted to medicine with the primary diagnosis of Pancreatitis     Today is hospital day 3d. This morning he is resting comfortably in bed and reports no new issues or overnight events. Symptoms has improved, tolerating low fat diet but still complaining of feeling bloated with gas. He is ambulating in the hallway.    PAST MEDICAL & SURGICAL HISTORY  No pertinent past medical history  H/O rotator cuff surgery    SOCIAL HISTORY:  Negative for smoking/alcohol/drug use.     ALLERGIES:  No Known Allergies    MEDICATIONS:  STANDING MEDICATIONS  lactated ringers. 1000 milliLiter(s) IV Continuous <Continuous>  pantoprazole    Tablet 40 milliGRAM(s) Oral before breakfast  simethicone 80 milliGRAM(s) Chew every 6 hours    PRN MEDICATIONS  oxyCODONE    5 mG/acetaminophen 325 mG 1 Tablet(s) Oral every 6 hours PRN    VITALS:   T(F): 97.4  HR: 97  BP: 145/93  RR: 18  SpO2: --    LABS:                        14.9   13.70 )-----------( 190      ( 18 Oct 2018 05:45 )             42.6     10-18    138  |  98  |  4<L>  ----------------------------<  71  3.5   |  25  |  0.7    Ca    8.1<L>      18 Oct 2018 05:45    TPro  5.1<L>  /  Alb  3.1<L>  /  TBili  1.1  /  DBili  x   /  AST  39  /  ALT  44<H>  /  AlkPhos  69  10-18          Lactate, Blood: 0.7 mmol/L (10-18-18 @ 05:45)          RADIOLOGY:    PHYSICAL EXAM:  GEN: No acute distress  LUNGS: Clear to auscultation bilaterally   HEART: Regular  ABD: Soft, mildly tender in LLQ  EXT: NC/NC/NE/2+PP/BEAN/Skin Intact.   NEURO: AAOX3    Intravenous access: PIV

## 2018-10-19 ENCOUNTER — TRANSCRIPTION ENCOUNTER (OUTPATIENT)
Age: 38
End: 2018-10-19

## 2018-10-19 VITALS
DIASTOLIC BLOOD PRESSURE: 62 MMHG | TEMPERATURE: 98 F | RESPIRATION RATE: 18 BRPM | HEART RATE: 81 BPM | SYSTOLIC BLOOD PRESSURE: 140 MMHG

## 2018-10-19 LAB
BASOPHILS # BLD AUTO: 0.04 K/UL — SIGNIFICANT CHANGE UP (ref 0–0.2)
BASOPHILS NFR BLD AUTO: 0.3 % — SIGNIFICANT CHANGE UP (ref 0–1)
EOSINOPHIL # BLD AUTO: 0.04 K/UL — SIGNIFICANT CHANGE UP (ref 0–0.7)
EOSINOPHIL NFR BLD AUTO: 0.3 % — SIGNIFICANT CHANGE UP (ref 0–8)
HCT VFR BLD CALC: 44.4 % — SIGNIFICANT CHANGE UP (ref 42–52)
HGB BLD-MCNC: 15.6 G/DL — SIGNIFICANT CHANGE UP (ref 14–18)
IMM GRANULOCYTES NFR BLD AUTO: 0.4 % — HIGH (ref 0.1–0.3)
LYMPHOCYTES # BLD AUTO: 1.34 K/UL — SIGNIFICANT CHANGE UP (ref 1.2–3.4)
LYMPHOCYTES # BLD AUTO: 10 % — LOW (ref 20.5–51.1)
MCHC RBC-ENTMCNC: 32.8 PG — HIGH (ref 27–31)
MCHC RBC-ENTMCNC: 35.1 G/DL — SIGNIFICANT CHANGE UP (ref 32–37)
MCV RBC AUTO: 93.3 FL — SIGNIFICANT CHANGE UP (ref 80–94)
MONOCYTES # BLD AUTO: 1.45 K/UL — HIGH (ref 0.1–0.6)
MONOCYTES NFR BLD AUTO: 10.8 % — HIGH (ref 1.7–9.3)
NEUTROPHILS # BLD AUTO: 10.49 K/UL — HIGH (ref 1.4–6.5)
NEUTROPHILS NFR BLD AUTO: 78.2 % — HIGH (ref 42.2–75.2)
NRBC # BLD: 0 /100 WBCS — SIGNIFICANT CHANGE UP (ref 0–0)
PLATELET # BLD AUTO: 253 K/UL — SIGNIFICANT CHANGE UP (ref 130–400)
RBC # BLD: 4.76 M/UL — SIGNIFICANT CHANGE UP (ref 4.7–6.1)
RBC # FLD: 12.4 % — SIGNIFICANT CHANGE UP (ref 11.5–14.5)
WBC # BLD: 13.42 K/UL — HIGH (ref 4.8–10.8)
WBC # FLD AUTO: 13.42 K/UL — HIGH (ref 4.8–10.8)

## 2018-10-19 RX ORDER — OMEPRAZOLE 10 MG/1
1 CAPSULE, DELAYED RELEASE ORAL
Qty: 14 | Refills: 0
Start: 2018-10-19 | End: 2018-11-01

## 2018-10-19 RX ORDER — SIMETHICONE 80 MG/1
1 TABLET, CHEWABLE ORAL
Qty: 28 | Refills: 0
Start: 2018-10-19 | End: 2018-10-25

## 2018-10-19 RX ADMIN — OXYCODONE AND ACETAMINOPHEN 1 TABLET(S): 5; 325 TABLET ORAL at 16:55

## 2018-10-19 RX ADMIN — OXYCODONE AND ACETAMINOPHEN 1 TABLET(S): 5; 325 TABLET ORAL at 12:02

## 2018-10-19 RX ADMIN — SIMETHICONE 80 MILLIGRAM(S): 80 TABLET, CHEWABLE ORAL at 06:26

## 2018-10-19 RX ADMIN — OXYCODONE AND ACETAMINOPHEN 1 TABLET(S): 5; 325 TABLET ORAL at 10:54

## 2018-10-19 RX ADMIN — OXYCODONE AND ACETAMINOPHEN 1 TABLET(S): 5; 325 TABLET ORAL at 18:04

## 2018-10-19 RX ADMIN — SIMETHICONE 80 MILLIGRAM(S): 80 TABLET, CHEWABLE ORAL at 11:18

## 2018-10-19 RX ADMIN — OXYCODONE AND ACETAMINOPHEN 1 TABLET(S): 5; 325 TABLET ORAL at 03:13

## 2018-10-19 RX ADMIN — OXYCODONE AND ACETAMINOPHEN 1 TABLET(S): 5; 325 TABLET ORAL at 03:42

## 2018-10-19 RX ADMIN — PANTOPRAZOLE SODIUM 40 MILLIGRAM(S): 20 TABLET, DELAYED RELEASE ORAL at 06:26

## 2018-10-19 RX ADMIN — SIMETHICONE 80 MILLIGRAM(S): 80 TABLET, CHEWABLE ORAL at 18:04

## 2018-10-19 NOTE — DISCHARGE NOTE ADULT - HOSPITAL COURSE
38Yr M no significant PMH presented to ED with 2 day hx of abd pain nausea and vomiting. Pt reports vomiting in non-bilious non-bloody, pain is 10/10 without any relieving factors. Eating makes pain unbearable. HE reports symptoms started after he was drinking vodka. He reports drinking 1/2 bottle of vodka daily. He endorses he is able to stop drinking whenever he wants without any withdrawal. He denies fever, chills, chest pain, SOB, diarrhea, or leg swelling.  In the ED /84 , WBC 19, Hgb 19.2, Lactate 5.2 and lipase >600. , . CT shows acute pancreatitis  Pt received 3L IVF, pain medication and zofran    #) Acute alcoholic pancreatitis  - On admission: Lipase >600, Lactate 5.3, CT shows acute pancreatitis. LFTs improved Lipase 180, Lactate 2.2   - Low fat diet  - IVF has been discontinued, as per patient's request and improved symptoms  - Pain control - Percocet  - Zofran PRN for nausea  - PPI daily  - Triglycerides WNL    #) Transaminitis - improving  - Likely secondary to ETOH use  - RUQ US - hepatomegaly with steatosis  - LFTs q48  - Hepatitis panel - negative    #) ETOH abuse  - Thiamine and Folic acid  - Ativan 0.5 mg PO PRN  - Outpatient counseling  - Pt doesn't think he is abusing. Will do Lorazepam PRN for agitation    DVT PPX Lovenox Sq

## 2018-10-19 NOTE — PROGRESS NOTE ADULT - SUBJECTIVE AND OBJECTIVE BOX
SUBJECTIVE:    Patient is a 38y old Male who presents with a chief complaint of Abd pain, nausea and vomiting (19 Oct 2018 12:45)    Currently admitted to medicine with the primary diagnosis of Alcohol-induced acute pancreatitis without infection or necrosis     Today is hospital day 4d. This morning he is resting comfortably in bed and reports no new issues or overnight events. Patient is feeling better, tolerating diet but complaining of mild abdominal pain mostly in the LLQ.    PAST MEDICAL & SURGICAL HISTORY  No pertinent past medical history  H/O rotator cuff surgery    SOCIAL HISTORY:  Negative for smoking/alcohol/drug use.     ALLERGIES:  No Known Allergies    MEDICATIONS:  STANDING MEDICATIONS  pantoprazole    Tablet 40 milliGRAM(s) Oral before breakfast  simethicone 80 milliGRAM(s) Chew every 6 hours    PRN MEDICATIONS  oxyCODONE    5 mG/acetaminophen 325 mG 1 Tablet(s) Oral every 6 hours PRN    VITALS:   T(F): 97.6  HR: 81  BP: 140/62  RR: 18  SpO2: 96%    LABS:                        15.6   13.42 )-----------( 253      ( 19 Oct 2018 07:53 )             44.4     10-18    138  |  98  |  4<L>  ----------------------------<  71  3.5   |  25  |  0.7    Ca    8.1<L>      18 Oct 2018 05:45    TPro  5.1<L>  /  Alb  3.1<L>  /  TBili  1.1  /  DBili  x   /  AST  39  /  ALT  44<H>  /  AlkPhos  69  10-18                  RADIOLOGY:    PHYSICAL EXAM:  GEN: No acute distress  LUNGS: Clear to auscultation bilaterally   HEART: Regular  ABD: Soft, mildly tender in LLQ  EXT: NC/NC/NE/2+PP/BEAN/Skin Intact.   NEURO: AAOX3    Intravenous access: PIV

## 2018-10-19 NOTE — PROGRESS NOTE ADULT - ASSESSMENT
38Yr M no significant PMH presented to ED with 2 day hx of abd pain nausea and vomiting    #) Acute alcoholic pancreatitis  - On admission: Lipase >600, Lactate 5.3, CT shows acute pancreatitis. LFTs improved Lipase 180, Lactate 2.2   - Low fat diet  - Pain control - Percocet  - Zofran PRN for nausea  - PPI daily  - Triglycerides WNL    #) Transaminitis - improving  - Likely secondary to ETOH use  - RUQ US - hepatomegaly with steatosis  - LFTs q48  - Hepatitis panel - negative    #) ETOH abuse  - Thiamine and Folic acid  - Ativan 0.5 mg PO PRN  - Outpatient counseling  - Pt doesn't think he is abusing. Will do Lorazepam PRN for agitation    DVT PPX Lovenox Sq    Dispo: from home. If tolerating diet and feeling better can be discharged  with outpatient follow up, However patient still have pain in the abdomen and also leukocytosis. will follow up of the repeat CT abdomen to check for narcotizing tissues.   D/W House staff

## 2018-10-19 NOTE — PROGRESS NOTE ADULT - ASSESSMENT
38Yr M no significant PMH presented to ED with 2 day hx of abd pain nausea and vomiting    #) Acute alcoholic pancreatitis  - On admission: Lipase >600, Lactate 5.3, CT shows acute pancreatitis. LFTs improved Lipase 180, Lactate 2.2   - WBC remains at 13  - Low fat diet  - Restarted IVF LR to 150cc/hr as patient is complaining of new pain especially after eating  - Pain control - Percocet  - Zofran PRN for nausea  - PPI daily  - Triglycerides WNL    #) Transaminitis - improving  - Likely secondary to ETOH use  - RUQ US - hepatomegaly with steatosis  - LFTs q48  - Hepatitis panel - negative    #) ETOH abuse  - Thiamine and Folic acid  - Ativan 0.5 mg PO PRN  - Outpatient counseling  - Pt doesn't think he is abusing. Will do Lorazepam PRN for agitation    DVT PPX Lovenox Sq    Dispo: from home. If tolerating diet and feeling better can be discharged tomorrow with outpatient follow up    <<<RESIDENT DISCHARGE NOTE>>>     DENISSE GALVAN  MRN-[id number]    FINAL DISCHARGE INTERVIEW:  Resident(s) Present: (Name: Dr. Angulo), RN Present: (Name:  ___________)    DISCHARGE MEDICATION RECONCILIATION  reviewed with Attending (Name: Dr. Mckeon)    DISPOSITION:   [ X ] Home,    [  ] Home with Visiting Nursing Services,   [    ]  SNF/ NH,    [   ] Acute Rehab (4A),   [   ] Other (Specify:_________)

## 2018-10-19 NOTE — DISCHARGE NOTE ADULT - PLAN OF CARE
Resolution of symptoms and prevention of recurrence You were admitted to the hospital for acute pancreatitis due to alcohol use  - We treated you conservatively including high rate IV fluids, NPO, anti-nausea medication and pain control  - Your symptoms has improved significantly as well as your lab values. You are able to tolerate low fat food, and pain has improved as well.   - We recommend that you continue with a low fat diet for now until your symptoms completely resolve.  - You can take Mylicon as needed for bloating or excessive gas, Percocet as needed for severe pain, and Omeprazole daily for 2 weeks to improve your dyspepsia  You were given information regarding cessation of alcohol use and you are encouraged to sign up for one of those programs.  Please follow up at the [facility] in one week

## 2018-10-19 NOTE — DISCHARGE NOTE ADULT - MEDICATION SUMMARY - MEDICATIONS TO TAKE
I will START or STAY ON the medications listed below when I get home from the hospital:    oxyCODONE-acetaminophen 5 mg-325 mg oral tablet  -- 1 tab(s) by mouth every 6 hours, As needed, Severe Pain (7 - 10)  -- Indication: For Severe pain    simethicone 80 mg oral tablet, chewable  -- 1 tab(s) by mouth every 6 hours, As Needed for bloating or gas  -- Indication: For Gas/Bloating    omeprazole 40 mg oral delayed release capsule  -- 1 cap(s) by mouth once a day   -- It is very important that you take or use this exactly as directed.  Do not skip doses or discontinue unless directed by your doctor.  Obtain medical advice before taking any non-prescription drugs as some may affect the action of this medication.  Swallow whole.  Do not crush.    -- Indication: For Dyspepsia

## 2018-10-19 NOTE — DISCHARGE NOTE ADULT - PATIENT PORTAL LINK FT
You can access the [facility] Patient Portal, offered by [facility], by registering with the following website: [email or web address]

## 2018-10-19 NOTE — PROGRESS NOTE ADULT - SUBJECTIVE AND OBJECTIVE BOX
SUBJECTIVE:    Patient is a 38y old Male who presents with a chief complaint of Abd pain, nausea and vomiting (18 Oct 2018 12:19)    Currently admitted to medicine with the primary diagnosis of Pancreatitis     This morning he is resting comfortably in bed and reports no new issues or overnight events. Symptoms has improved, tolerating low fat diet but still complaining of feeling bloated with gas. He is ambulating in the hallway.    But still complaints of pain at the abdomen   White count is still high    PAST MEDICAL & SURGICAL HISTORY  No pertinent past medical history  H/O rotator cuff surgery    SOCIAL HISTORY:  Negative for smoking/alcohol/drug use.     ALLERGIES:  No Known Allergies    MEDICATIONS:  STANDING MEDICATIONS  lactated ringers. 1000 milliLiter(s) IV Continuous <Continuous>  pantoprazole    Tablet 40 milliGRAM(s) Oral before breakfast  simethicone 80 milliGRAM(s) Chew every 6 hours    PRN MEDICATIONS  oxyCODONE    5 mG/acetaminophen 325 mG 1 Tablet(s) Oral every 6 hours PRN    VITALS:   T(F): 97.4  HR: 97  BP: 145/93  RR: 18  SpO2: --    LABS:                        14.9   13.70 )-----------( 190      ( 18 Oct 2018 05:45 )             42.6     10-18    138  |  98  |  4<L>  ----------------------------<  71  3.5   |  25  |  0.7    Ca    8.1<L>      18 Oct 2018 05:45    TPro  5.1<L>  /  Alb  3.1<L>  /  TBili  1.1  /  DBili  x   /  AST  39  /  ALT  44<H>  /  AlkPhos  69  10-18          Lactate, Blood: 0.7 mmol/L (10-18-18 @ 05:45)          RADIOLOGY:    PHYSICAL EXAM:  GEN: No acute distress  LUNGS/ pulomonary : Clear to auscultation bilaterally   HEART / CVS : Regular  ABD: Soft, mildly tender in LLQ  EXT: NC/NC/NE/2+PP/BEAN/Skin Intact.   NEURO: AAOX3    Intravenous access: PIV

## 2018-10-19 NOTE — DISCHARGE NOTE ADULT - CARE PLAN
Principal Discharge DX:	Alcohol-induced acute pancreatitis without infection or necrosis  Goal:	Resolution of symptoms and prevention of recurrence  Assessment and plan of treatment:	You were admitted to the hospital for acute pancreatitis due to alcohol use  - We treated you conservatively including high rate IV fluids, NPO, anti-nausea medication and pain control  - Your symptoms has improved significantly as well as your lab values. You are able to tolerate low fat food, and pain has improved as well.   - We recommend that you continue with a low fat diet for now until your symptoms completely resolve.  - You can take Mylicon as needed for bloating or excessive gas, Percocet as needed for severe pain, and Omeprazole daily for 2 weeks to improve your dyspepsia  You were given information regarding cessation of alcohol use and you are encouraged to sign up for one of those programs.  Please follow up at the [facility] in one week

## 2018-10-19 NOTE — DISCHARGE NOTE ADULT - CARE PROVIDER_API CALL
Laura Pedraza), Geriatric Medicine; Internal Medicine  [address] [place] [address] [phone]  Phone: [phone]  Fax: [phone]

## 2018-10-19 NOTE — PROGRESS NOTE ADULT - REASON FOR ADMISSION
Abd pain, nausea and vomiting

## 2018-10-26 DIAGNOSIS — K85.90 ACUTE PANCREATITIS WITHOUT NECROSIS OR INFECTION, UNSPECIFIED: ICD-10-CM

## 2018-10-26 DIAGNOSIS — D69.6 THROMBOCYTOPENIA, UNSPECIFIED: ICD-10-CM

## 2018-10-26 DIAGNOSIS — K59.03 DRUG INDUCED CONSTIPATION: ICD-10-CM

## 2018-10-26 DIAGNOSIS — T40.2X5A ADVERSE EFFECT OF OTHER OPIOIDS, INITIAL ENCOUNTER: ICD-10-CM

## 2018-10-26 DIAGNOSIS — R65.10 SYSTEMIC INFLAMMATORY RESPONSE SYNDROME (SIRS) OF NON-INFECTIOUS ORIGIN WITHOUT ACUTE ORGAN DYSFUNCTION: ICD-10-CM

## 2018-10-26 DIAGNOSIS — Z28.21 IMMUNIZATION NOT CARRIED OUT BECAUSE OF PATIENT REFUSAL: ICD-10-CM

## 2018-10-26 DIAGNOSIS — E86.0 DEHYDRATION: ICD-10-CM

## 2018-10-26 DIAGNOSIS — F17.210 NICOTINE DEPENDENCE, CIGARETTES, UNCOMPLICATED: ICD-10-CM

## 2018-10-26 DIAGNOSIS — F10.10 ALCOHOL ABUSE, UNCOMPLICATED: ICD-10-CM

## 2018-10-26 DIAGNOSIS — K85.20 ALCOHOL INDUCED ACUTE PANCREATITIS WITHOUT NECROSIS OR INFECTION: ICD-10-CM

## 2018-10-26 DIAGNOSIS — R74.0 NONSPECIFIC ELEVATION OF LEVELS OF TRANSAMINASE AND LACTIC ACID DEHYDROGENASE [LDH]: ICD-10-CM

## 2019-07-08 ENCOUNTER — INPATIENT (INPATIENT)
Facility: HOSPITAL | Age: 39
LOS: 4 days | Discharge: HOME | End: 2019-07-13
Attending: HOSPITALIST | Admitting: HOSPITALIST
Payer: SUBSIDIZED

## 2019-07-08 VITALS
TEMPERATURE: 98 F | HEART RATE: 119 BPM | RESPIRATION RATE: 20 BRPM | OXYGEN SATURATION: 98 % | WEIGHT: 190.04 LBS | HEIGHT: 68 IN | SYSTOLIC BLOOD PRESSURE: 173 MMHG | DIASTOLIC BLOOD PRESSURE: 113 MMHG

## 2019-07-08 DIAGNOSIS — Z98.890 OTHER SPECIFIED POSTPROCEDURAL STATES: Chronic | ICD-10-CM

## 2019-07-08 LAB
ALBUMIN SERPL ELPH-MCNC: 4.4 G/DL — SIGNIFICANT CHANGE UP (ref 3.5–5.2)
ALBUMIN SERPL ELPH-MCNC: 4.4 G/DL — SIGNIFICANT CHANGE UP (ref 3.5–5.2)
ALP SERPL-CCNC: 87 U/L — SIGNIFICANT CHANGE UP (ref 30–115)
ALP SERPL-CCNC: 94 U/L — SIGNIFICANT CHANGE UP (ref 30–115)
ALT FLD-CCNC: 149 U/L — HIGH (ref 0–41)
ALT FLD-CCNC: 164 U/L — HIGH (ref 0–41)
AMPHET UR-MCNC: NEGATIVE — SIGNIFICANT CHANGE UP
ANION GAP SERPL CALC-SCNC: 15 MMOL/L — HIGH (ref 7–14)
ANION GAP SERPL CALC-SCNC: 16 MMOL/L — HIGH (ref 7–14)
ANION GAP SERPL CALC-SCNC: 24 MMOL/L — HIGH (ref 7–14)
AST SERPL-CCNC: 106 U/L — HIGH (ref 0–41)
AST SERPL-CCNC: 120 U/L — HIGH (ref 0–41)
B-OH-BUTYR SERPL-SCNC: 1.5 MMOL/L — HIGH
BARBITURATES UR SCN-MCNC: NEGATIVE — SIGNIFICANT CHANGE UP
BASE EXCESS BLDV CALC-SCNC: 3 MMOL/L — HIGH (ref -2–2)
BASOPHILS # BLD AUTO: 0.04 K/UL — SIGNIFICANT CHANGE UP (ref 0–0.2)
BASOPHILS # BLD AUTO: 0.1 K/UL — SIGNIFICANT CHANGE UP (ref 0–0.2)
BASOPHILS NFR BLD AUTO: 0.3 % — SIGNIFICANT CHANGE UP (ref 0–1)
BASOPHILS NFR BLD AUTO: 0.6 % — SIGNIFICANT CHANGE UP (ref 0–1)
BENZODIAZ UR-MCNC: NEGATIVE — SIGNIFICANT CHANGE UP
BILIRUB DIRECT SERPL-MCNC: 0.2 MG/DL — SIGNIFICANT CHANGE UP (ref 0–0.2)
BILIRUB DIRECT SERPL-MCNC: 0.2 MG/DL — SIGNIFICANT CHANGE UP (ref 0–0.2)
BILIRUB INDIRECT FLD-MCNC: 1.1 MG/DL — SIGNIFICANT CHANGE UP (ref 0.2–1.2)
BILIRUB INDIRECT FLD-MCNC: 1.2 MG/DL — SIGNIFICANT CHANGE UP (ref 0.2–1.2)
BILIRUB SERPL-MCNC: 1.3 MG/DL — HIGH (ref 0.2–1.2)
BILIRUB SERPL-MCNC: 1.4 MG/DL — HIGH (ref 0.2–1.2)
BUN SERPL-MCNC: 6 MG/DL — LOW (ref 10–20)
BUN SERPL-MCNC: 6 MG/DL — LOW (ref 10–20)
BUN SERPL-MCNC: 8 MG/DL — LOW (ref 10–20)
CA-I SERPL-SCNC: 1.22 MMOL/L — SIGNIFICANT CHANGE UP (ref 1.12–1.3)
CALCIUM SERPL-MCNC: 10.2 MG/DL — HIGH (ref 8.5–10.1)
CALCIUM SERPL-MCNC: 9.5 MG/DL — SIGNIFICANT CHANGE UP (ref 8.5–10.1)
CALCIUM SERPL-MCNC: 9.5 MG/DL — SIGNIFICANT CHANGE UP (ref 8.5–10.1)
CHLORIDE SERPL-SCNC: 92 MMOL/L — LOW (ref 98–110)
CHLORIDE SERPL-SCNC: 92 MMOL/L — LOW (ref 98–110)
CHLORIDE SERPL-SCNC: 96 MMOL/L — LOW (ref 98–110)
CHOLEST SERPL-MCNC: 244 MG/DL — HIGH (ref 100–200)
CO2 SERPL-SCNC: 16 MMOL/L — LOW (ref 17–32)
CO2 SERPL-SCNC: 25 MMOL/L — SIGNIFICANT CHANGE UP (ref 17–32)
CO2 SERPL-SCNC: 29 MMOL/L — SIGNIFICANT CHANGE UP (ref 17–32)
COCAINE METAB.OTHER UR-MCNC: NEGATIVE — SIGNIFICANT CHANGE UP
CREAT SERPL-MCNC: 0.8 MG/DL — SIGNIFICANT CHANGE UP (ref 0.7–1.5)
CREAT SERPL-MCNC: 0.8 MG/DL — SIGNIFICANT CHANGE UP (ref 0.7–1.5)
CREAT SERPL-MCNC: 1.1 MG/DL — SIGNIFICANT CHANGE UP (ref 0.7–1.5)
DRUG SCREEN 1, URINE RESULT: SIGNIFICANT CHANGE UP
EOSINOPHIL # BLD AUTO: 0 K/UL — SIGNIFICANT CHANGE UP (ref 0–0.7)
EOSINOPHIL # BLD AUTO: 0.03 K/UL — SIGNIFICANT CHANGE UP (ref 0–0.7)
EOSINOPHIL NFR BLD AUTO: 0 % — SIGNIFICANT CHANGE UP (ref 0–8)
EOSINOPHIL NFR BLD AUTO: 0.2 % — SIGNIFICANT CHANGE UP (ref 0–8)
ETHANOL SERPL-MCNC: <10 MG/DL — SIGNIFICANT CHANGE UP
GAS PNL BLDV: 133 MMOL/L — LOW (ref 136–145)
GAS PNL BLDV: SIGNIFICANT CHANGE UP
GLUCOSE BLDC GLUCOMTR-MCNC: 97 MG/DL — SIGNIFICANT CHANGE UP (ref 70–99)
GLUCOSE SERPL-MCNC: 139 MG/DL — HIGH (ref 70–99)
GLUCOSE SERPL-MCNC: 76 MG/DL — SIGNIFICANT CHANGE UP (ref 70–99)
GLUCOSE SERPL-MCNC: 91 MG/DL — SIGNIFICANT CHANGE UP (ref 70–99)
HCO3 BLDV-SCNC: 30 MMOL/L — HIGH (ref 22–29)
HCT VFR BLD CALC: 53.2 % — HIGH (ref 42–52)
HCT VFR BLD CALC: 55.3 % — HIGH (ref 42–52)
HCT VFR BLDA CALC: 56.6 % — HIGH (ref 34–44)
HDLC SERPL-MCNC: 71 MG/DL — SIGNIFICANT CHANGE UP
HGB BLD CALC-MCNC: 18.5 G/DL — HIGH (ref 14–18)
HGB BLD-MCNC: 18.7 G/DL — HIGH (ref 14–18)
HGB BLD-MCNC: 19.5 G/DL — CRITICAL HIGH (ref 14–18)
IMM GRANULOCYTES NFR BLD AUTO: 0.4 % — HIGH (ref 0.1–0.3)
IMM GRANULOCYTES NFR BLD AUTO: 0.5 % — HIGH (ref 0.1–0.3)
LACTATE BLDV-MCNC: 2.6 MMOL/L — HIGH (ref 0.5–1.6)
LACTATE SERPL-SCNC: 2 MMOL/L — SIGNIFICANT CHANGE UP (ref 0.5–2.2)
LACTATE SERPL-SCNC: 4.1 MMOL/L — CRITICAL HIGH (ref 0.5–2.2)
LIDOCAIN IGE QN: 586 U/L — HIGH (ref 7–60)
LIPID PNL WITH DIRECT LDL SERPL: 161 MG/DL — HIGH (ref 4–129)
LYMPHOCYTES # BLD AUTO: 0.63 K/UL — LOW (ref 1.2–3.4)
LYMPHOCYTES # BLD AUTO: 13.5 % — LOW (ref 20.5–51.1)
LYMPHOCYTES # BLD AUTO: 2.23 K/UL — SIGNIFICANT CHANGE UP (ref 1.2–3.4)
LYMPHOCYTES # BLD AUTO: 4 % — LOW (ref 20.5–51.1)
MAGNESIUM SERPL-MCNC: 1.6 MG/DL — LOW (ref 1.8–2.4)
MCHC RBC-ENTMCNC: 32.2 PG — HIGH (ref 27–31)
MCHC RBC-ENTMCNC: 32.6 PG — HIGH (ref 27–31)
MCHC RBC-ENTMCNC: 35.2 G/DL — SIGNIFICANT CHANGE UP (ref 32–37)
MCHC RBC-ENTMCNC: 35.3 G/DL — SIGNIFICANT CHANGE UP (ref 32–37)
MCV RBC AUTO: 91.4 FL — SIGNIFICANT CHANGE UP (ref 80–94)
MCV RBC AUTO: 92.8 FL — SIGNIFICANT CHANGE UP (ref 80–94)
METHADONE UR-MCNC: NEGATIVE — SIGNIFICANT CHANGE UP
MONOCYTES # BLD AUTO: 0.97 K/UL — HIGH (ref 0.1–0.6)
MONOCYTES # BLD AUTO: 1.37 K/UL — HIGH (ref 0.1–0.6)
MONOCYTES NFR BLD AUTO: 6.1 % — SIGNIFICANT CHANGE UP (ref 1.7–9.3)
MONOCYTES NFR BLD AUTO: 8.3 % — SIGNIFICANT CHANGE UP (ref 1.7–9.3)
NEUTROPHILS # BLD AUTO: 12.71 K/UL — HIGH (ref 1.4–6.5)
NEUTROPHILS # BLD AUTO: 14.15 K/UL — HIGH (ref 1.4–6.5)
NEUTROPHILS NFR BLD AUTO: 76.9 % — HIGH (ref 42.2–75.2)
NEUTROPHILS NFR BLD AUTO: 89.2 % — HIGH (ref 42.2–75.2)
NRBC # BLD: 0 /100 WBCS — SIGNIFICANT CHANGE UP (ref 0–0)
NRBC # BLD: 0 /100 WBCS — SIGNIFICANT CHANGE UP (ref 0–0)
OPIATES UR-MCNC: POSITIVE
PCO2 BLDV: 53 MMHG — HIGH (ref 41–51)
PCP UR-MCNC: NEGATIVE — SIGNIFICANT CHANGE UP
PH BLDV: 7.36 — SIGNIFICANT CHANGE UP (ref 7.26–7.43)
PHOSPHATE SERPL-MCNC: 3.8 MG/DL — SIGNIFICANT CHANGE UP (ref 2.1–4.9)
PLATELET # BLD AUTO: 262 K/UL — SIGNIFICANT CHANGE UP (ref 130–400)
PLATELET # BLD AUTO: 295 K/UL — SIGNIFICANT CHANGE UP (ref 130–400)
PO2 BLDV: 28 MMHG — SIGNIFICANT CHANGE UP (ref 20–40)
POTASSIUM BLDV-SCNC: 4.7 MMOL/L — SIGNIFICANT CHANGE UP (ref 3.3–5.6)
POTASSIUM SERPL-MCNC: 4.5 MMOL/L — SIGNIFICANT CHANGE UP (ref 3.5–5)
POTASSIUM SERPL-MCNC: 4.7 MMOL/L — SIGNIFICANT CHANGE UP (ref 3.5–5)
POTASSIUM SERPL-MCNC: 5.3 MMOL/L — HIGH (ref 3.5–5)
POTASSIUM SERPL-SCNC: 4.5 MMOL/L — SIGNIFICANT CHANGE UP (ref 3.5–5)
POTASSIUM SERPL-SCNC: 4.7 MMOL/L — SIGNIFICANT CHANGE UP (ref 3.5–5)
POTASSIUM SERPL-SCNC: 5.3 MMOL/L — HIGH (ref 3.5–5)
PROPOXYPHENE QUALITATIVE URINE RESULT: NEGATIVE — SIGNIFICANT CHANGE UP
PROT SERPL-MCNC: 7.5 G/DL — SIGNIFICANT CHANGE UP (ref 6–8)
PROT SERPL-MCNC: 7.6 G/DL — SIGNIFICANT CHANGE UP (ref 6–8)
RBC # BLD: 5.73 M/UL — SIGNIFICANT CHANGE UP (ref 4.7–6.1)
RBC # BLD: 6.05 M/UL — SIGNIFICANT CHANGE UP (ref 4.7–6.1)
RBC # FLD: 14.8 % — HIGH (ref 11.5–14.5)
RBC # FLD: 15.1 % — HIGH (ref 11.5–14.5)
SAO2 % BLDV: 46 % — SIGNIFICANT CHANGE UP
SODIUM SERPL-SCNC: 133 MMOL/L — LOW (ref 135–146)
SODIUM SERPL-SCNC: 136 MMOL/L — SIGNIFICANT CHANGE UP (ref 135–146)
SODIUM SERPL-SCNC: 136 MMOL/L — SIGNIFICANT CHANGE UP (ref 135–146)
THC UR QL: POSITIVE
TOTAL CHOLESTEROL/HDL RATIO MEASUREMENT: 3.4 RATIO — LOW (ref 4–5.5)
TRIGL SERPL-MCNC: 192 MG/DL — HIGH (ref 10–149)
TROPONIN T SERPL-MCNC: <0.01 NG/ML — SIGNIFICANT CHANGE UP
WBC # BLD: 15.86 K/UL — HIGH (ref 4.8–10.8)
WBC # BLD: 16.53 K/UL — HIGH (ref 4.8–10.8)
WBC # FLD AUTO: 15.86 K/UL — HIGH (ref 4.8–10.8)
WBC # FLD AUTO: 16.53 K/UL — HIGH (ref 4.8–10.8)

## 2019-07-08 PROCEDURE — 71045 X-RAY EXAM CHEST 1 VIEW: CPT | Mod: 26

## 2019-07-08 PROCEDURE — 93010 ELECTROCARDIOGRAM REPORT: CPT

## 2019-07-08 PROCEDURE — 74177 CT ABD & PELVIS W/CONTRAST: CPT | Mod: 26

## 2019-07-08 PROCEDURE — 99291 CRITICAL CARE FIRST HOUR: CPT

## 2019-07-08 PROCEDURE — 76705 ECHO EXAM OF ABDOMEN: CPT | Mod: 26

## 2019-07-08 PROCEDURE — 99222 1ST HOSP IP/OBS MODERATE 55: CPT

## 2019-07-08 RX ORDER — SODIUM CHLORIDE 9 MG/ML
1000 INJECTION, SOLUTION INTRAVENOUS
Refills: 0 | Status: DISCONTINUED | OUTPATIENT
Start: 2019-07-08 | End: 2019-07-08

## 2019-07-08 RX ORDER — NICOTINE POLACRILEX 2 MG
1 GUM BUCCAL DAILY
Refills: 0 | Status: DISCONTINUED | OUTPATIENT
Start: 2019-07-08 | End: 2019-07-09

## 2019-07-08 RX ORDER — SODIUM CHLORIDE 9 MG/ML
2000 INJECTION, SOLUTION INTRAVENOUS ONCE
Refills: 0 | Status: COMPLETED | OUTPATIENT
Start: 2019-07-08 | End: 2019-07-08

## 2019-07-08 RX ORDER — FOLIC ACID 0.8 MG
1 TABLET ORAL DAILY
Refills: 0 | Status: DISCONTINUED | OUTPATIENT
Start: 2019-07-08 | End: 2019-07-13

## 2019-07-08 RX ORDER — HYDROMORPHONE HYDROCHLORIDE 2 MG/ML
1 INJECTION INTRAMUSCULAR; INTRAVENOUS; SUBCUTANEOUS ONCE
Refills: 0 | Status: DISCONTINUED | OUTPATIENT
Start: 2019-07-08 | End: 2019-07-08

## 2019-07-08 RX ORDER — FOLIC ACID 0.8 MG
1 TABLET ORAL ONCE
Refills: 0 | Status: COMPLETED | OUTPATIENT
Start: 2019-07-08 | End: 2019-07-08

## 2019-07-08 RX ORDER — PANTOPRAZOLE SODIUM 20 MG/1
40 TABLET, DELAYED RELEASE ORAL ONCE
Refills: 0 | Status: COMPLETED | OUTPATIENT
Start: 2019-07-08 | End: 2019-07-08

## 2019-07-08 RX ORDER — HYDROMORPHONE HYDROCHLORIDE 2 MG/ML
0.5 INJECTION INTRAMUSCULAR; INTRAVENOUS; SUBCUTANEOUS ONCE
Refills: 0 | Status: DISCONTINUED | OUTPATIENT
Start: 2019-07-08 | End: 2019-07-08

## 2019-07-08 RX ORDER — ONDANSETRON 8 MG/1
4 TABLET, FILM COATED ORAL ONCE
Refills: 0 | Status: COMPLETED | OUTPATIENT
Start: 2019-07-08 | End: 2019-07-08

## 2019-07-08 RX ORDER — MAGNESIUM SULFATE 500 MG/ML
2 VIAL (ML) INJECTION ONCE
Refills: 0 | Status: COMPLETED | OUTPATIENT
Start: 2019-07-08 | End: 2019-07-08

## 2019-07-08 RX ORDER — MORPHINE SULFATE 50 MG/1
4 CAPSULE, EXTENDED RELEASE ORAL ONCE
Refills: 0 | Status: DISCONTINUED | OUTPATIENT
Start: 2019-07-08 | End: 2019-07-08

## 2019-07-08 RX ORDER — ENOXAPARIN SODIUM 100 MG/ML
40 INJECTION SUBCUTANEOUS EVERY 24 HOURS
Refills: 0 | Status: DISCONTINUED | OUTPATIENT
Start: 2019-07-08 | End: 2019-07-13

## 2019-07-08 RX ORDER — THIAMINE MONONITRATE (VIT B1) 100 MG
400 TABLET ORAL ONCE
Refills: 0 | Status: COMPLETED | OUTPATIENT
Start: 2019-07-08 | End: 2019-07-08

## 2019-07-08 RX ORDER — HYDROMORPHONE HYDROCHLORIDE 2 MG/ML
2 INJECTION INTRAMUSCULAR; INTRAVENOUS; SUBCUTANEOUS EVERY 6 HOURS
Refills: 0 | Status: DISCONTINUED | OUTPATIENT
Start: 2019-07-08 | End: 2019-07-09

## 2019-07-08 RX ORDER — THIAMINE MONONITRATE (VIT B1) 100 MG
100 TABLET ORAL DAILY
Refills: 0 | Status: COMPLETED | OUTPATIENT
Start: 2019-07-08 | End: 2019-07-11

## 2019-07-08 RX ORDER — MORPHINE SULFATE 50 MG/1
2 CAPSULE, EXTENDED RELEASE ORAL
Refills: 0 | Status: DISCONTINUED | OUTPATIENT
Start: 2019-07-08 | End: 2019-07-10

## 2019-07-08 RX ORDER — SODIUM CHLORIDE 9 MG/ML
1000 INJECTION, SOLUTION INTRAVENOUS
Refills: 0 | Status: DISCONTINUED | OUTPATIENT
Start: 2019-07-08 | End: 2019-07-09

## 2019-07-08 RX ORDER — CHLORHEXIDINE GLUCONATE 213 G/1000ML
1 SOLUTION TOPICAL
Refills: 0 | Status: DISCONTINUED | OUTPATIENT
Start: 2019-07-08 | End: 2019-07-13

## 2019-07-08 RX ORDER — PANTOPRAZOLE SODIUM 20 MG/1
40 TABLET, DELAYED RELEASE ORAL DAILY
Refills: 0 | Status: DISCONTINUED | OUTPATIENT
Start: 2019-07-08 | End: 2019-07-13

## 2019-07-08 RX ORDER — THIAMINE MONONITRATE (VIT B1) 100 MG
100 TABLET ORAL ONCE
Refills: 0 | Status: COMPLETED | OUTPATIENT
Start: 2019-07-08 | End: 2019-07-08

## 2019-07-08 RX ORDER — ONDANSETRON 8 MG/1
8 TABLET, FILM COATED ORAL
Refills: 0 | Status: DISCONTINUED | OUTPATIENT
Start: 2019-07-08 | End: 2019-07-13

## 2019-07-08 RX ADMIN — SODIUM CHLORIDE 200 MILLILITER(S): 9 INJECTION, SOLUTION INTRAVENOUS at 14:23

## 2019-07-08 RX ADMIN — HYDROMORPHONE HYDROCHLORIDE 0.5 MILLIGRAM(S): 2 INJECTION INTRAMUSCULAR; INTRAVENOUS; SUBCUTANEOUS at 11:30

## 2019-07-08 RX ADMIN — MORPHINE SULFATE 2 MILLIGRAM(S): 50 CAPSULE, EXTENDED RELEASE ORAL at 20:18

## 2019-07-08 RX ADMIN — ONDANSETRON 4 MILLIGRAM(S): 8 TABLET, FILM COATED ORAL at 06:24

## 2019-07-08 RX ADMIN — MORPHINE SULFATE 4 MILLIGRAM(S): 50 CAPSULE, EXTENDED RELEASE ORAL at 06:43

## 2019-07-08 RX ADMIN — ONDANSETRON 8 MILLIGRAM(S): 8 TABLET, FILM COATED ORAL at 17:05

## 2019-07-08 RX ADMIN — Medication 1 MILLIGRAM(S): at 06:50

## 2019-07-08 RX ADMIN — Medication 400 MILLIGRAM(S): at 12:09

## 2019-07-08 RX ADMIN — HYDROMORPHONE HYDROCHLORIDE 0.5 MILLIGRAM(S): 2 INJECTION INTRAMUSCULAR; INTRAVENOUS; SUBCUTANEOUS at 10:47

## 2019-07-08 RX ADMIN — HYDROMORPHONE HYDROCHLORIDE 1 MILLIGRAM(S): 2 INJECTION INTRAMUSCULAR; INTRAVENOUS; SUBCUTANEOUS at 07:37

## 2019-07-08 RX ADMIN — Medication 50 GRAM(S): at 15:21

## 2019-07-08 RX ADMIN — SODIUM CHLORIDE 300 MILLILITER(S): 9 INJECTION, SOLUTION INTRAVENOUS at 22:00

## 2019-07-08 RX ADMIN — HYDROMORPHONE HYDROCHLORIDE 1 MILLIGRAM(S): 2 INJECTION INTRAMUSCULAR; INTRAVENOUS; SUBCUTANEOUS at 07:50

## 2019-07-08 RX ADMIN — PANTOPRAZOLE SODIUM 40 MILLIGRAM(S): 20 TABLET, DELAYED RELEASE ORAL at 15:21

## 2019-07-08 RX ADMIN — HYDROMORPHONE HYDROCHLORIDE 2 MILLIGRAM(S): 2 INJECTION INTRAMUSCULAR; INTRAVENOUS; SUBCUTANEOUS at 22:15

## 2019-07-08 RX ADMIN — Medication 2 MILLIGRAM(S): at 20:17

## 2019-07-08 RX ADMIN — Medication 2 MILLIGRAM(S): at 14:22

## 2019-07-08 RX ADMIN — Medication 100 MILLIGRAM(S): at 06:41

## 2019-07-08 RX ADMIN — ENOXAPARIN SODIUM 40 MILLIGRAM(S): 100 INJECTION SUBCUTANEOUS at 15:20

## 2019-07-08 RX ADMIN — MORPHINE SULFATE 4 MILLIGRAM(S): 50 CAPSULE, EXTENDED RELEASE ORAL at 06:11

## 2019-07-08 RX ADMIN — HYDROMORPHONE HYDROCHLORIDE 0.5 MILLIGRAM(S): 2 INJECTION INTRAMUSCULAR; INTRAVENOUS; SUBCUTANEOUS at 12:09

## 2019-07-08 RX ADMIN — SODIUM CHLORIDE 300 MILLILITER(S): 9 INJECTION, SOLUTION INTRAVENOUS at 16:50

## 2019-07-08 RX ADMIN — SODIUM CHLORIDE 2000 MILLILITER(S): 9 INJECTION, SOLUTION INTRAVENOUS at 00:00

## 2019-07-08 RX ADMIN — HYDROMORPHONE HYDROCHLORIDE 0.5 MILLIGRAM(S): 2 INJECTION INTRAMUSCULAR; INTRAVENOUS; SUBCUTANEOUS at 14:14

## 2019-07-08 RX ADMIN — HYDROMORPHONE HYDROCHLORIDE 2 MILLIGRAM(S): 2 INJECTION INTRAMUSCULAR; INTRAVENOUS; SUBCUTANEOUS at 16:50

## 2019-07-08 RX ADMIN — SODIUM CHLORIDE 2000 MILLILITER(S): 9 INJECTION, SOLUTION INTRAVENOUS at 07:33

## 2019-07-08 RX ADMIN — ONDANSETRON 4 MILLIGRAM(S): 8 TABLET, FILM COATED ORAL at 07:34

## 2019-07-08 RX ADMIN — PANTOPRAZOLE SODIUM 40 MILLIGRAM(S): 20 TABLET, DELAYED RELEASE ORAL at 09:58

## 2019-07-08 RX ADMIN — SODIUM CHLORIDE 2000 MILLILITER(S): 9 INJECTION, SOLUTION INTRAVENOUS at 06:11

## 2019-07-08 RX ADMIN — MORPHINE SULFATE 4 MILLIGRAM(S): 50 CAPSULE, EXTENDED RELEASE ORAL at 06:24

## 2019-07-08 RX ADMIN — HYDROMORPHONE HYDROCHLORIDE 2 MILLIGRAM(S): 2 INJECTION INTRAMUSCULAR; INTRAVENOUS; SUBCUTANEOUS at 22:45

## 2019-07-08 RX ADMIN — MORPHINE SULFATE 4 MILLIGRAM(S): 50 CAPSULE, EXTENDED RELEASE ORAL at 07:07

## 2019-07-08 NOTE — H&P ADULT - NSICDXPASTMEDICALHX_GEN_ALL_CORE_FT
PAST MEDICAL HISTORY:  Asthma no recent exacerbation, not using inhalers for a long time    No pertinent past medical history     Pancreatitis October 2018 due to alcohol

## 2019-07-08 NOTE — H&P ADULT - NSHPSOCIALHISTORY_GEN_ALL_CORE
Smoker 20 paq/year  Stopped alcohol after last pancreatitis in October 2018, restarted drinking last thursday 7/4, through saturday.

## 2019-07-08 NOTE — ED PROVIDER NOTE - ATTENDING CONTRIBUTION TO CARE
38 yo male with PMH chronic pancreatitis, asthma, hx alcohol abuse presents c/o acute onset epigastric pain and vomiting that started around 2 AM today awakening him from sleep. Pt states he was not feeling well on going to sleep and took an antacid and passed out. Drank several vodkas at a part over the weekend. No diarrhea, urinary complaints or flank pain. Denies any CP, SOB, cough, fevers, chills, palpitations, melena or hematochezia. No trauma or falls.     VITAL SIGNS: noted  CONSTITUTIONAL: Well-developed; well-nourished; in moderate distress, actively vomiting and retching  HEAD: Normocephalic; atraumatic  EYES: PERRL, EOM intact; conjunctiva and sclera clear  ENT: No nasal discharge; airway clear. MMM  NECK: Supple; non tender.    CARD: S1, S2 normal; no murmurs, gallops, or rubs. Regular rate and rhythm  RESP: CTAB/L, no wheezes, rales or rhonchi  ABD: Normal bowel sounds; soft; non-distended; + epigastric tenderness; no lower quadrant tenderness, no hepatosplenomegaly. No CVA tenderness  EXT: Normal ROM. No calf tenderness or edema. Distal pulses intact  NEURO: Alert, oriented. Grossly unremarkable. No focal deficits  SKIN: Skin exam is warm and dry   MS: No midline spinal tenderness

## 2019-07-08 NOTE — H&P ADULT - NSHPLABSRESULTS_GEN_ALL_CORE
Labs:                        19.5   16.53 )-----------( 295      ( 08 Jul 2019 06:10 )             55.3       07-08    136  |  96<L>  |  8<L>  ----------------------------<  139<H>  4.7   |  16<L>  |  1.1    Ca    10.2<H>      08 Jul 2019 06:10    TPro  7.6  /  Alb  4.4  /  TBili  1.4<H>  /  DBili  0.2  /  AST  120<H>  /  ALT  164<H>  /  AlkPhos  94  07-08            Lactate Trend  07-08 @ 06:10 Lactate:4.1       CT Abdomen and Pelvis w/ IV Cont (07.08.19 @ 09:38)    IMPRESSION: Findings compatible with acute pancreatitis. No evidence of   pancreatic necrosis or abscess formation. The splenic vein is patent     Xray Chest 1 View- PORTABLE-Urgent (07.08.19 @ 06:17)    No radiographic evidence of acute cardiopulmonary disease.

## 2019-07-08 NOTE — CONSULT NOTE ADULT - ASSESSMENT
IMPRESSION:  Acute Pancreatitis likely alcoholic   HO EtOH abuse     PLAN:    CNS: Pain control; CIWA protocol with ativan PRN; Folate/thiamine     HEENT: Oral care     PULMONARY: HOB at 45 degrees;     CARDIOVASCULAR: Keep I=O; LR at 200 cc /hr; CE x2; Repeat LA     GI: GI prophylaxis.  Feeding once tolerating; Zofran PRN; GI eval; RUQ sono;     RENAL: FU lytes; Monitor BUN    INFECTIOUS DISEASE: Panculture     HEMATOLOGICAL:  DVT prophylaxis. Monitor Ht     ENDOCRINE:  Follow up FS.  Insulin protocol if needed.    MUSCULOSKELETAL: OOB to chair     MICU monitoring for now IMPRESSION:    Acute Pancreatitis alcoholic / Binge drinking  HO EtOH abuse     PLAN:    CNS: Pain control; CIWA protocol with ativan PRN; Folate/thiamine     HEENT: Oral care     PULMONARY: HOB at 45 degrees;     CARDIOVASCULAR: Keep I=O; LR at 300 cc /hr; CE x2; Repeat LA     GI: GI prophylaxis.  Feeding once tolerating; Zofran PRN; GI eval; RUQ sono;     RENAL: FU lytes; Monitor BUN    INFECTIOUS DISEASE: Panculture     HEMATOLOGICAL:  DVT prophylaxis. Monitor Ht     ENDOCRINE:  Follow up FS.  Insulin protocol if needed.    MUSCULOSKELETAL: OOB to chair     MICU monitoring for now

## 2019-07-08 NOTE — CONSULT NOTE ADULT - SUBJECTIVE AND OBJECTIVE BOX
HPI:    39y Male with PMH of intermittent asthma, alcoholic pancreatitis in October 2018, presenting to ED on 7/8 with epigastric pain .  Patient started yesterday , and is continuos . Patient pain is severe 10 /10 in intensity , band like , radiating to the back , associated with nausea and vomiting , relieved by Dilaudid in ED. Patient had recurrent episodes of non bloody , bilious vomiting since yesterday.   Pt states he stopped drinking alcohol since last admission in October. However, he admits has been drinking over the weekend starting 7/4 through 7/6 , drinking Vodka(last drink on 7/6).   Pt denies sob, cp, f/c/d, urinary symptoms, lightheadedness, dizziness, HA. (08 Jul 2019 13:38). In ED , patient received 4 l OF LR. Patient denies any melena, hematochezia or hematemesis. Patient does not take any medication.       PAST MEDICAL & SURGICAL HISTORY  Asthma: no recent exacerbation, not using inhalers for a long time  Pancreatitis: October 2018 due to alcohol  No pertinent past medical history  H/O rotator cuff surgery      FAMILY HISTORY:  FAMILY HISTORY:  Alcohol use: sister, with admission due to pancreatitis and GI Bleed  Family history of pancreatic cancer (Father)      SOCIAL HISTORY:  smoker: 20 pack year history of smoking.   Alcohol:  continuos drinking over the weekend , history of alcohol abuse.   Drug: Denies    ALLERGIES:  No Known Allergies      MEDICATIONS:  MEDICATIONS  (STANDING):  chlorhexidine 4% Liquid 1 Application(s) Topical <User Schedule>  enoxaparin Injectable 40 milliGRAM(s) SubCutaneous every 24 hours  folic acid 1 milliGRAM(s) Oral daily  lactated ringers. 1000 milliLiter(s) (200 mL/Hr) IV Continuous <Continuous>  nicotine -  14 mG/24Hr(s) Patch 1 patch Transdermal daily  ondansetron Injectable 8 milliGRAM(s) IV Push two times a day  pantoprazole  Injectable 40 milliGRAM(s) IV Push daily  thiamine 100 milliGRAM(s) Oral daily    MEDICATIONS  (PRN):  HYDROmorphone  Injectable 2 milliGRAM(s) IV Push every 6 hours PRN Severe Pain (7 - 10)  LORazepam     Tablet 2 milliGRAM(s) Oral every 2 hours PRN CIWA-Ar score increase by 2 points and a total score of 7 or less  LORazepam     Tablet 2 milliGRAM(s) Oral every 1 hour PRN CIWA-Ar score 8 or greater      HOME MEDICATIONS:  Home Medications:      ROS:     REVIEW OF SYSTEMS:    CONSTITUTIONAL: No weakness, fevers or chills  EYES/ENT: No visual changes;  No vertigo or throat pain   NECK: No pain or stiffness  RESPIRATORY: No cough, wheezing, hemoptysis; No shortness of breath  CARDIOVASCULAR: No chest pain or palpitations  GASTROINTESTINAL: ++ epigastric pain. ++ nausea, ++vomiting, no  hematemesis; No diarrhea or constipation. No melena or hematochezia.  GENITOURINARY: No dysuria, frequency or hematuria  NEUROLOGICAL: No numbness or weakness  SKIN: No itching, rashes      VITALS:   T(F): 97.7 (07-08 @ 13:39), Max: 98.3 (07-08 @ 05:35)  HR: 99 (07-08 @ 14:15) (64 - 119)  BP: 198/117 (07-08 @ 14:15) (173/113 - 229/117)  BP(mean): --  RR: 20 (07-08 @ 14:15) (20 - 20)  SpO2: 100% (07-08 @ 14:15) (98% - 100%)    I&O's Summary      PHYSICAL EXAM:  Physical Exam:  GENERAL: NAD, well-developed  HEAD:  Atraumatic, Normocephalic  EYES: EOMI, PERRLA, conjunctiva and sclera clear  NECK: No thyromegaly  CHEST/LUNG: No accessory use of muscle  HEART: S1S2 Normal  ABDOMEN: Soft ,mild epigastric tenderness, , Nondistended; Bowel sounds present, no guarding or rigidity.  EXTREMITIES:  2+ Peripheral Pulses, No cyanosis  PSYCH: AAOx3  NEUROLOGY: non-focal      LABS:                        19.5   16.53 )-----------( 295      ( 08 Jul 2019 06:10 )             55.3     Lipase, Serum: 586 U/L (07.08.19 @ 06:10)    Blood Gas Venous - Lactate: 2.6 mmoL/L (07.08.19 @ 14:20)      LIVER FUNCTIONS - ( 08 Jul 2019 14:00 )  Alb: 4.4 g/dL / Pro: 7.5 g/dL / ALK PHOS: 87 U/L / ALT: 149 U/L / AST: 106 U/L / GGT: x           LIVER FUNCTIONS - ( 08 Jul 2019 14:00 )  Alb: 4.4 [3.5 - 5.2] / Pro: 7.5 [6.0 - 8.0] / ALK PHOS: 87 [30 - 115] / ALT: 149 [0 - 41] / AST: 106 [0 - 41] / GGT: x     LIVER FUNCTIONS - ( 08 Jul 2019 06:10 )  Alb: 4.4 [3.5 - 5.2] / Pro: 7.6 [6.0 - 8.0] / ALK PHOS: 94 [30 - 115] / ALT: 164 [0 - 41] / AST: 120 [0 - 41] / GGT: x       07-08    133<L>  |  92<L>  |  6<L>  ----------------------------<  76  5.3<H>   |  25  |  0.8    Ca    9.5      08 Jul 2019 14:00  Phos  3.8     07-08  Mg     1.6     07-08    Lipid Profile (07.08.19 @ 07:23)    Total Cholesterol/HDL Ratio Measurement: 3.4 Ratio    Cholesterol, Serum: 244 mg/dL    Triglycerides, Serum: 192 mg/dL    HDL Cholesterol, Serum: 71: HDL Levels >/= 60 mg/dL are considered beneficial and a "negative" risk  factor.  Effective 08/15/2018: New reference range and interpretive comment. mg/dL    Direct LDL: 161: LDL Cholesterol (mg/dL) --- Interpretive Comment (for adults 18 and over)  Optimal LDL Level may vary based on clinical situation  Below 70                   Ideal for people at very high risk of heart  disease  Below 100                  Ideal for people at risk of heart disease  100 - 129                    [place]  130 - 159                    Borderline high  160 - 189                    High  190 and Above           Very high mg/dL          07-08 Chol 244<H> <H> HDL 71 Trig 192<H>      RADIOLOGY:  < from: CT Abdomen and Pelvis w/ IV Cont (07.08.19 @ 09:38) >  IMPRESSION: Findings compatible with acute pancreatitis. No evidence of   pancreatic necrosis or abscess formation. The splenic vein is patent   < end of copied text >    < from: X- ray Chest 1 View- PORTABLE-Urgent (07.08.19 @ 06:17) >  No radiographic evidence of acute cardiopulmonary disease.      < end of copied text > HPI:    39y Male with PMH of intermittent asthma, alcoholic pancreatitis in October 2018, presenting to ED on 7/8 with epigastric pain .  Patient started yesterday , and is continuous . Patient pain is severe 10 /10 in intensity , band like , radiating to the back , associated with nausea and vomiting , relieved by Dilaudid in ED. Patient had recurrent episodes of non bloody , bilious vomiting since yesterday.   Pt states he stopped drinking alcohol since last admission in October. However, he admits has been drinking over the weekend starting 7/4 through 7/6 , drinking Vodka(last drink on 7/6).   Pt denies sob, cp, f/c/d, urinary symptoms, lightheadedness, dizziness, HA. (08 Jul 2019 13:38). In ED , patient received 4 l OF LR. Patient denies any melena, hematochezia or hematemesis. Patient does not take any medication.       PAST MEDICAL & SURGICAL HISTORY  Asthma: no recent exacerbation, not using inhalers for a long time  Pancreatitis: October 2018 due to alcohol  No pertinent past medical history  H/O rotator cuff surgery      FAMILY HISTORY:  FAMILY HISTORY:  Alcohol use: sister, with admission due to pancreatitis and GI Bleed  Family history of pancreatic cancer (Father)      SOCIAL HISTORY:  smoker: 20 pack year history of smoking.   Alcohol:  continuos drinking over the weekend , history of alcohol abuse.   Drug: Denies    ALLERGIES:  No Known Allergies      MEDICATIONS:  MEDICATIONS  (STANDING):  chlorhexidine 4% Liquid 1 Application(s) Topical <User Schedule>  enoxaparin Injectable 40 milliGRAM(s) SubCutaneous every 24 hours  folic acid 1 milliGRAM(s) Oral daily  lactated ringers. 1000 milliLiter(s) (200 mL/Hr) IV Continuous <Continuous>  nicotine -  14 mG/24Hr(s) Patch 1 patch Transdermal daily  ondansetron Injectable 8 milliGRAM(s) IV Push two times a day  pantoprazole  Injectable 40 milliGRAM(s) IV Push daily  thiamine 100 milliGRAM(s) Oral daily    MEDICATIONS  (PRN):  HYDROmorphone  Injectable 2 milliGRAM(s) IV Push every 6 hours PRN Severe Pain (7 - 10)  LORazepam     Tablet 2 milliGRAM(s) Oral every 2 hours PRN CIWA-Ar score increase by 2 points and a total score of 7 or less  LORazepam     Tablet 2 milliGRAM(s) Oral every 1 hour PRN CIWA-Ar score 8 or greater    .    ROS:     REVIEW OF SYSTEMS:    CONSTITUTIONAL: No weakness, fevers or chills  EYES/ENT: No visual changes;  No vertigo or throat pain   NECK: No pain or stiffness  RESPIRATORY: No cough, wheezing, hemoptysis; No shortness of breath  CARDIOVASCULAR: No chest pain or palpitations  GASTROINTESTINAL: ++ epigastric pain. ++ nausea, ++vomiting, no  hematemesis; No diarrhea or constipation. No melena or hematochezia.  GENITOURINARY: No dysuria, frequency or hematuria  NEUROLOGICAL: No numbness or weakness  SKIN: No itching, rashes      VITALS:   T(F): 97.7 (07-08 @ 13:39), Max: 98.3 (07-08 @ 05:35)  HR: 99 (07-08 @ 14:15) (64 - 119)  BP: 198/117 (07-08 @ 14:15) (173/113 - 229/117)  BP(mean): --  RR: 20 (07-08 @ 14:15) (20 - 20)  SpO2: 100% (07-08 @ 14:15) (98% - 100%)    I&O's Summary      PHYSICAL EXAM:  Physical Exam:  GENERAL: NAD, well-developed  HEAD:  Atraumatic, Normocephalic  EYES: EOMI, PERRLA, conjunctiva and sclera clear  NECK: No thyromegaly  CHEST/LUNG: No accessory use of muscle  HEART: S1S2 Normal  ABDOMEN: Soft ,mild epigastric tenderness, , Nondistended; Bowel sounds present, no guarding or rigidity.  EXTREMITIES:  2+ Peripheral Pulses, No cyanosis  PSYCH: AAOx3  NEUROLOGY: non-focal      LABS:                        19.5   16.53 )-----------( 295      ( 08 Jul 2019 06:10 )             55.3     Lipase, Serum: 586 U/L (07.08.19 @ 06:10)    Blood Gas Venous - Lactate: 2.6 mmoL/L (07.08.19 @ 14:20)      LIVER FUNCTIONS - ( 08 Jul 2019 14:00 )  Alb: 4.4 g/dL / Pro: 7.5 g/dL / ALK PHOS: 87 U/L / ALT: 149 U/L / AST: 106 U/L / GGT: x           LIVER FUNCTIONS - ( 08 Jul 2019 14:00 )  Alb: 4.4 [3.5 - 5.2] / Pro: 7.5 [6.0 - 8.0] / ALK PHOS: 87 [30 - 115] / ALT: 149 [0 - 41] / AST: 106 [0 - 41] / GGT: x     LIVER FUNCTIONS - ( 08 Jul 2019 06:10 )  Alb: 4.4 [3.5 - 5.2] / Pro: 7.6 [6.0 - 8.0] / ALK PHOS: 94 [30 - 115] / ALT: 164 [0 - 41] / AST: 120 [0 - 41] / GGT: x       07-08    133<L>  |  92<L>  |  6<L>  ----------------------------<  76  5.3<H>   |  25  |  0.8    Ca    9.5      08 Jul 2019 14:00  Phos  3.8     07-08  Mg     1.6     07-08    Lipid Profile (07.08.19 @ 07:23)    Total Cholesterol/HDL Ratio Measurement: 3.4 Ratio    Cholesterol, Serum: 244 mg/dL    Triglycerides, Serum: 192 mg/dL    HDL Cholesterol, Serum: 71: HDL Levels >/= 60 mg/dL are considered beneficial and a "negative" risk  factor.  Effective 08/15/2018: New reference range and interpretive comment. mg/dL    Direct LDL: 161: LDL Cholesterol (mg/dL) --- Interpretive Comment (for adults 18 and over)  Optimal LDL Level may vary based on clinical situation  Below 70                   Ideal for people at very high risk of heart  disease  Below 100                  Ideal for people at risk of heart disease  100 - 129                    [place]  130 - 159                    Borderline high  160 - 189                    High  190 and Above           Very high mg/dL          07-08 Chol 244<H> <H> HDL 71 Trig 192<H>      RADIOLOGY:  < from: CT Abdomen and Pelvis w/ IV Cont (07.08.19 @ 09:38) >  IMPRESSION: Findings compatible with acute pancreatitis. No evidence of   pancreatic necrosis or abscess formation. The splenic vein is patent   < end of copied text >    < from: X- ray Chest 1 View- PORTABLE-Urgent (07.08.19 @ 06:17) >  No radiographic evidence of acute cardiopulmonary disease.      < end of copied text >

## 2019-07-08 NOTE — H&P ADULT - NSICDXFAMILYHX_GEN_ALL_CORE_FT
FAMILY HISTORY:  Alcohol use, sister, with admission due to pancreatitis and GI Bleed    Father  Still living? No  Family history of pancreatic cancer, Age at diagnosis: Age Unknown

## 2019-07-08 NOTE — ED PROVIDER NOTE - PHYSICAL EXAMINATION
GEN: Alert & Oriented x 3, Patient appears uncomfortable and in pain.  Eyes: PERRL. No conjunctival injection. No scleral icterus.   RESP: Lungs clear to auscult bilat. no wheezes, rhonchi or rales. No retractions. Equal air entry.  CARDIO: regular rate and rhythm, no murmurs, rubs or gallops. Normal S1, S2. Radial pulses 2+ bilaterally. No lower extremity edema.  ABD: Nondistended.  No rebound tenderness/ (+) guarding. No pulsatile mass. Epigastric tenderness with diffuse generalized abd pain with palpation.   MS: Full ROM of extremities.  SKIN: no rashes/lesions, no petechiae, no ecchymosis.  NEURO: CN II-XII grossly intact. Strength + sensation intact x 4 extremities. Speech and cognition normal.
nuchal cord

## 2019-07-08 NOTE — CONSULT NOTE ADULT - ASSESSMENT
39y Male with PMH of intermittent asthma, alcoholic pancreatitis in October 2018, presenting to ED on 7/8 with epigastric pain .  Patient started yesterday , and is continuos . Patient pain is severe 10 /10 in intensity , band like , radiating to the back , associated with nausea and vomiting , relieved by Dilaudid in ED. Patient had recurrent episodes of non bloody , bilious vomiting since yesterday.   Pt states he stopped drinking alcohol since last admission in October. However, he admits has been drinking over the weekend starting 7/4 through 7/6 , drinking Vodka(last drink on 7/6).      #acute pancreatitis mostly alcohol induced   IV hydration with LR at 250 cc/hr  NPO for now  patient has significant hemoconcentration   repeat CBC , BMP to assess adequacy for hydration  pain control  [employer] PRN for nausea and vomiting   ultrasound abdomen   alcohol abstinence       #transaminitis: r/o alcoholic hepatitis versus choledocholithiasis  ultrasound abdomen  trend lfts   avoid hepatotoxic drugs     #alcohol abuse:   alcohol abstinence   thiamine and folic acid   watch for signs of withdrawal 39y Male with PMH of intermittent asthma, alcoholic pancreatitis in October 2018, presenting to ED on 7/8 with epigastric pain .  Patient started yesterday , and is continuos . Patient pain is severe 10 /10 in intensity , band like , radiating to the back , associated with nausea and vomiting , relieved by Dilaudid in ED. Patient had recurrent episodes of non bloody , bilious vomiting since yesterday.   Pt states he stopped drinking alcohol since last admission in October. However, he admits has been drinking over the weekend starting 7/4 through 7/6 , drinking Vodka(last drink on 7/6).      #acute pancreatitis most likely alcohol induced   IV hydration with LR at 250 cc/hr  NPO for now  patient has significant hemoconcentration   repeat CBC , BMP to assess adequacy for hydration (Goal is that neither BUN or HCT rise. If they do, increase IV fluids)  pain control  [employer] PRN for nausea and vomiting   ultrasound abdomen   alcohol abstinence       #transaminitis: r/o alcoholic hepatitis versus choledocholithiasis  ultrasound abdomen  trend lfts      #alcohol abuse:   alcohol abstinence   thiamine and folic acid   watch for signs of withdrawal

## 2019-07-08 NOTE — H&P ADULT - ASSESSMENT
Mr. Silveiro is a 39y Male with PMH of intermittent asthma, alcoholic pancreatitis in October 2018, presenting to ED on 7/8 with epigastric pain x 3hr. Pt states he developed severe, sharp epigastric pain, no aggravating, relieving factors, associated with nausea and NB vomiting. Pt states he stopped drinking alcohol since last admission in October, admits restarting drinking over the weekend starting 7/4 through 7/6 (last drink on 7/6).    # Acute pancreatitis and lactic acidosis  - with typical abdominal pain, elevated lipase and CT findings.  - No necrosis on CT of abdomen  - Received 4 L of LR in ED.   - Will start  cc/hour.  - NPO and start diet when tolerated.   - RUQ ultrasound + GI evaluation.   - Pain control, no indication for antibiotics at this time.   - will follow Hb, electrolytes and calcium, lactate in pm.    # Alcohol use  - last drink on 7/6  - will check serum alcohol level  - will initiate monitoring with [facility] protocol  - Thiamine + folic acid    # HTN  - Not known to have HTN, not on home medications.   - Asymptomatic at this time.   - Will give pain medications and lorazepam for agitation and reevaluate the need for HTN medications    # Nicotine dependance  - will start nicotine patch     Pantoprazole IV  Lovenox prophylaxis  Full code Mr. Silverio is a 39y Male with PMH of intermittent asthma, alcoholic pancreatitis in October 2018, presenting to ED on 7/8 with epigastric pain x 3hr. Pt states he developed severe, sharp epigastric pain, no aggravating, relieving factors, associated with nausea and NB vomiting. Pt states he stopped drinking alcohol since last admission in October, admits restarting drinking over the weekend starting 7/4 through 7/6 (last drink on 7/6).    # Acute pancreatitis and lactic acidosis  - with typical abdominal pain, elevated lipase and CT findings.  - No necrosis on CT of abdomen  - Received 4 L of LR in ED.   - Cont  cc/hour.  - NPO   - RUQ ultrasound + GI evaluation.   - Pain control, no indication for antibiotics at this time.     # Alcohol use  - last drink on 7/6  - [facility] protocol  - Thiamine + folic acid    # HTN  - Not known to have HTN, not on home medications.   - Asymptomatic at this time.   - Will give pain medications and lorazepam for agitation and reevaluate the need for HTN medications    # Nicotine dependance  - will start nicotine patch     POLYCYTHEMIA + CLUBBING - ? Hypoxia   - repeat CBC in pm  - Monitor         Pantoprazole IV  Lovenox prophylaxis  Full code Mr. Silverio is a 39y Male with PMH of intermittent asthma, alcoholic pancreatitis in October 2018, presenting to ED on 7/8 with epigastric pain x 3hr. Pt states he developed severe, sharp epigastric pain, no aggravating, relieving factors, associated with nausea and NB vomiting. Pt states he stopped drinking alcohol since last admission in October, admits restarting drinking over the weekend starting 7/4 through 7/6 (last drink on 7/6).    # Acute pancreatitis and lactic acidosis  - with typical abdominal pain, elevated lipase and CT findings.  - No necrosis on CT of abdomen  - Received 4 L of LR in ED.   - Cont  cc/hour.  - NPO   - RUQ ultrasound + GI evaluation.   - Pain control, no indication for antibiotics at this time.     # Alcohol use  - last drink on 7/6  - [facility] protocol  - Thiamine + folic acid    # HTN  - Not known to have HTN, not on home medications.   - Asymptomatic at this time.   - Will give pain medications and lorazepam for agitation and reevaluate the need for HTN medications    # Nicotine dependance  - will start nicotine patch     # POLYCYTHEMIA + CLUBBING - ? Hypoxia   - Patient was taking IM testosterone injections  - Monitor         Pantoprazole IV  Lovenox prophylaxis  Full code

## 2019-07-08 NOTE — ED PROVIDER NOTE - OBJECTIVE STATEMENT
The pt is a 39y Male with PMH pancreatitis, ETOH abuse and asthma is presenting to ED with epigastric pain x 3hr. Pt states he developed severe, sharp epigastric pain. no aggravating, relieving factors. The pt is a 39y Male with PMH pancreatitis, ETOH abuse and asthma is presenting to ED with epigastric pain x 3hr. Pt states he developed severe, sharp epigastric pain. no aggravating, relieving factors. associated with nausea and NB vomiting. Pt states he has been drinking over the weekend. Pt denies sob, cp, f/c/d, urinary symptoms, lightheadedness, dizziness, HA.

## 2019-07-08 NOTE — H&P ADULT - NSHPPHYSICALEXAM_GEN_ALL_CORE
ICU Vital Signs Last 24 Hrs  T(C): 36.6 (08 Jul 2019 07:30), Max: 36.8 (08 Jul 2019 05:35)  T(F): 97.8 (08 Jul 2019 07:30), Max: 98.3 (08 Jul 2019 05:35)  HR: 64 (08 Jul 2019 10:45) (64 - 119)  BP: 229/117 (08 Jul 2019 10:45) (173/113 - 229/117)  RR: 20 (08 Jul 2019 10:45) (20 - 20)  SpO2: 98% (08 Jul 2019 10:45) (98% - 99%)    PHYSICAL EXAM:  GENERAL: NAD, speaks in full sentences, no signs of respiratory distress, on room air.   HEAD:  Atraumatic, Normocephalic  EYES: well injected conjunctiva and anicteric sclera  NECK: Supple  CHEST/LUNG: Clear to auscultation bilaterally; No wheeze; No crackles; No accessory muscles used  HEART: Regular rate and rhythm; No murmurs;   ABDOMEN: Soft, mild distension, epigastric tenderness, no RUQ tenderness, no guarding  EXTREMITIES:  No cyanosis or edema  PSYCH: AAOx3, agitated  NEUROLOGY: non-focal, agitated  SKIN: No rashes or lesions, multiple tattoos ICU Vital Signs Last 24 Hrs  T(C): 36.6 (08 Jul 2019 07:30), Max: 36.8 (08 Jul 2019 05:35)  T(F): 97.8 (08 Jul 2019 07:30), Max: 98.3 (08 Jul 2019 05:35)  HR: 64 (08 Jul 2019 10:45) (64 - 119)  BP: 229/117 (08 Jul 2019 10:45) (173/113 - 229/117)  RR: 20 (08 Jul 2019 10:45) (20 - 20)  SpO2: 98% (08 Jul 2019 10:45) (98% - 99%)    PHYSICAL EXAM:  GENERAL: NAD, speaks in full sentences, no signs of respiratory distress, on room air.   HEAD:  Atraumatic, Normocephalic  EYES: well injected conjunctiva and anicteric sclera  NECK: Supple  CHEST/LUNG: Clear to auscultation bilaterally; No wheeze; No crackles; No accessory muscles used  HEART: Regular rate and rhythm; No murmurs;   ABDOMEN: Soft, mild distension, epigastric tenderness, no RUQ tenderness, no guarding  EXTREMITIES:  No cyanosis or edema, clubbing  PSYCH: AAOx3, agitated  NEUROLOGY: non-focal, no tremmors, no hallucinations  SKIN: No rashes or lesions, multiple tattoos

## 2019-07-08 NOTE — H&P ADULT - HISTORY OF PRESENT ILLNESS
Mr. Silverio is a 39y Male with PMH of intermittent asthma, alcoholic pancreatitis in October 2018, presenting to ED on 7/8 with epigastric pain x 3hr. Pt states he developed severe, sharp epigastric pain. no aggravating, relieving factors, associated with nausea and NB vomiting. Pt states he stopped drinking alcohol since last admission in October. However, he admits has been drinking over the weekend starting 7/4 through 7/6 (last drink on 7/6).   Pt denies sob, cp, f/c/d, urinary symptoms, lightheadedness, dizziness, HA. Mr. Silverio is a 39y Male with PMH of intermittent asthma, alcoholic pancreatitis in October 2018, presenting to ED on 7/8 with epigastric pain x 3hr. Pt states he developed severe, sharp epigastric pain. no aggravating, relieving factors, associated with nausea and NB vomiting. Pt states he stopped drinking alcohol since last admission in October. However, he admits has been drinking over the weekend starting 7/4 through 7/6 (last drink on 7/6).   Pt denies sob, cp, f/c/d, urinary symptoms, lightheadedness, dizziness, HA.    In ED 4 L LR was given. On CIWA protocol. On IVF. On Morphine and dilaudid.

## 2019-07-08 NOTE — ED PROVIDER NOTE - PROGRESS NOTE DETAILS
Reviewed chest xray. no free air or widened mediastinum. I was directly involved in the management of this case. Discussed case with PA fellow. Pt s/o to Dr. Hoffman to follow up labs, imaging, repeat VS and lactate, reassess and dispo. case d/w ICU fellow, will see pt in consult case d/w tox, low ciwa score, no tachycardia, diaphoresis, tremor, agotation, hallucinations, rec trial 2mg ativan and reassess. case d/w tox, low ciwa score, no tachycardia, diaphoresis, tremor, agitation, hallucinations, rec trial 2mg ativan and reassess. d/w tox attending, does not rec ativan, rather analgesics, additional thiamine, D5NS, reassess, will follow pt

## 2019-07-08 NOTE — ED ADULT NURSE REASSESSMENT NOTE - NS ED NURSE REASSESS COMMENT FT1
repeat lactate drawn and send to the respiratory
Patient continous to have a large amount of abdominal pain dispite pain medication pain medication and IV fluids only lasting a few minutes and pain returning.  Patient admitted to ICU remains hypertensive dispite pain meds ER attending Dr. Hoffman aware.   Report to [facility] ICU hold RN patient transferred to  area bed 1 in stable condition

## 2019-07-08 NOTE — CONSULT NOTE ADULT - SUBJECTIVE AND OBJECTIVE BOX
Patient is a 39y old  Male who presents with a chief complaint of     HPI:  The pt is a 39y Male with PMH pancreatitis, ETOH abuse and asthma is presenting to ED with epigastric pain x 3hr. Pt states he developed severe, sharp epigastric pain. no aggravating, relieving factors. associated with nausea and NB vomiting. Pt states he has been drinking over the weekend. Pt denies sob, cp, f/c/d, urinary symptoms, lightheadedness, dizziness, HA.  Notes HO pancreatitis last year.      In ED patient received 4 L LR.     PAST MEDICAL & SURGICAL HISTORY:  Asthma  Pancreatitis  No pertinent past medical history  H/O rotator cuff surgery      SOCIAL HX:  ETOH    active daily intake last drink 48 hrs ago        FAMILY HISTORY:  Family history of pancreatic cancer (Father)      Review of system:  See HPI    Allergies    No Known Allergies    Intolerances      PHYSICAL EXAM    ICU Vital Signs Last 24 Hrs  T(C): 36.6 (08 Jul 2019 07:30), Max: 36.8 (08 Jul 2019 05:35)  T(F): 97.8 (08 Jul 2019 07:30), Max: 98.3 (08 Jul 2019 05:35)  HR: 64 (08 Jul 2019 10:45) (64 - 119)  BP: 229/117 (08 Jul 2019 10:45) (173/113 - 229/117)  RR: 20 (08 Jul 2019 10:45) (20 - 20)  SpO2: 98% (08 Jul 2019 10:45) (98% - 99%)    I&O's Detail    General: Comfortable in bed  HEENT:  On RA  Lymph node: No palpable LN             Lungs: CTA  Cardiovascular: RRR, S1S2  Abdomen: BS+ve; soft Mild diffuse tenderness   Extremities: No LE edema  Skin:  No evident Rash  Neurological:  AAOx3; No focal deficit; No tremor    < from: CT Abdomen and Pelvis w/ IV Cont (07.08.19 @ 09:38) >  IMPRESSION: Findings compatible with acute pancreatitis. No evidence of   pancreatic necrosis or abscess formation. The splenic vein is patent     < end of copied text >    LABS:                          19.5   16.53 )-----------( 295      ( 08 Jul 2019 06:10 )             55.3     07-08    136  |  96<L>  |  8<L>  ----------------------------<  139<H>  4.7   |  16<L>  |  1.1    Ca    10.2<H>      08 Jul 2019 06:10    TPro  7.6  /  Alb  4.4  /  TBili  1.4<H>  /  DBili  0.2  /  AST  120<H>  /  ALT  164<H>  /  AlkPhos  94  07-08    LIVER FUNCTIONS - ( 08 Jul 2019 06:10 )  Alb: 4.4 g/dL / Pro: 7.6 g/dL / ALK PHOS: 94 U/L / ALT: 164 U/L / AST: 120 U/L / GGT: x                             Lactate (07-08-19 @ 06:10): 4.1<HH>    MEDICATIONS  (STANDING):  dextrose 5% + sodium chloride 0.9%. 1000 milliLiter(s) (150 mL/Hr) IV Continuous <Continuous>  HYDROmorphone  Injectable 0.5 milliGRAM(s) IV Push Once  thiamine Injectable 400 milliGRAM(s) IV Push Once    MEDICATIONS  (PRN):      Radiology:    < from: CT Abdomen and Pelvis w/ IV Cont (07.08.19 @ 09:38) >  IMPRESSION: Findings compatible with acute pancreatitis. No evidence of   pancreatic necrosis or abscess formation. The splenic vein is patent     < end of copied text > Patient is a 39y old  Male who presents with a chief complaint of     HPI:  The pt is a 39y Male with PMH alcoholic pancreatitis, ETOH abuse/ binge drinking, asthma is presented to ED with epigastric pain x 3hr. Pt states he developed severe, sharp epigastric pain. no aggravating, relieving factors. associated with nausea and NB vomiting. Pt states he has been drinking over the weekend. Pt denies sob, cp, f/c/d, urinary symptoms, lightheadedness, dizziness, HA.  Notes HO pancreatitis last year.      In ED patient received 4 L LR. called for MICU, s/p gi eval, CT A.P    PAST MEDICAL & SURGICAL HISTORY:  Asthma  Pancreatitis  No pertinent past medical history  H/O rotator cuff surgery      SOCIAL HX:  ETOH    active daily intake last drink 48 hrs ago        FAMILY HISTORY:  Family history of pancreatic cancer (Father)      Review of system:  See HPI    Allergies    No Known Allergies    Intolerances      PHYSICAL EXAM    ICU Vital Signs Last 24 Hrs  T(C): 36.6 (08 Jul 2019 07:30), Max: 36.8 (08 Jul 2019 05:35)  T(F): 97.8 (08 Jul 2019 07:30), Max: 98.3 (08 Jul 2019 05:35)  HR: 64 (08 Jul 2019 10:45) (64 - 119)  BP: 229/117 (08 Jul 2019 10:45) (173/113 - 229/117)  RR: 20 (08 Jul 2019 10:45) (20 - 20)  SpO2: 98% (08 Jul 2019 10:45) (98% - 99%)    I&O's Detail    General: Comfortable in bed  HEENT:  On RA  Lymph node: No palpable LN             Lungs: CTA  Cardiovascular: RRR, S1S2  Abdomen: BS+ve; soft Mild diffuse tenderness   Extremities: No LE edema  Skin:  No evident Rash  Neurological:  AAOx3; No focal deficit; No tremor    < from: CT Abdomen and Pelvis w/ IV Cont (07.08.19 @ 09:38) >  IMPRESSION: Findings compatible with acute pancreatitis. No evidence of   pancreatic necrosis or abscess formation. The splenic vein is patent     < end of copied text >    LABS:                          19.5   16.53 )-----------( 295      ( 08 Jul 2019 06:10 )             55.3     07-08    136  |  96<L>  |  8<L>  ----------------------------<  139<H>  4.7   |  16<L>  |  1.1    Ca    10.2<H>      08 Jul 2019 06:10    TPro  7.6  /  Alb  4.4  /  TBili  1.4<H>  /  DBili  0.2  /  AST  120<H>  /  ALT  164<H>  /  AlkPhos  94  07-08    LIVER FUNCTIONS - ( 08 Jul 2019 06:10 )  Alb: 4.4 g/dL / Pro: 7.6 g/dL / ALK PHOS: 94 U/L / ALT: 164 U/L / AST: 120 U/L / GGT: x                             Lactate (07-08-19 @ 06:10): 4.1<HH>    MEDICATIONS  (STANDING):  dextrose 5% + sodium chloride 0.9%. 1000 milliLiter(s) (150 mL/Hr) IV Continuous <Continuous>  HYDROmorphone  Injectable 0.5 milliGRAM(s) IV Push Once  thiamine Injectable 400 milliGRAM(s) IV Push Once    MEDICATIONS  (PRN):      Radiology:    < from: CT Abdomen and Pelvis w/ IV Cont (07.08.19 @ 09:38) >  IMPRESSION: Findings compatible with acute pancreatitis. No evidence of   pancreatic necrosis or abscess formation. The splenic vein is patent     < end of copied text >

## 2019-07-08 NOTE — ED PROVIDER NOTE - NS ED ROS FT
GEN: (-) fever, (-) chills, (-) malaise  HEENT: (-) vision changes, (-) HA  CV: (-) chest pain, (-) palpitations, (-) edema  PULM: (-) cough, (-) wheezing, (-) dyspnea  GI: (+) abdominal pain,(+) Nausea, (+) Vomiting, (-) Diarrhea, (-) Melena  NEURO: (-) weakness, (-) paresthesias, (-) syncope, (-) lightheadedness, (-) dizziness  : (-) dysuria, (-) frequency, (-) urgency  MS: (-) back pain, (-) joint pain, (-)myalgias, (-) swelling  SKIN: (-) rashes, (-) new lesions, (-) pruritus, (-) jaundice  HEME: (-) bleeding, (-) ecchymosis

## 2019-07-08 NOTE — ED PROVIDER NOTE - CARE PLAN
Principal Discharge DX:	Pancreatitis, alcoholic, acute  Secondary Diagnosis:	Hypertension Principal Discharge DX:	Pancreatitis, alcoholic, acute  Secondary Diagnosis:	Hypertension  Secondary Diagnosis:	Alcoholic ketoacidosis

## 2019-07-09 LAB
ALBUMIN SERPL ELPH-MCNC: 3.7 G/DL — SIGNIFICANT CHANGE UP (ref 3.5–5.2)
ALP SERPL-CCNC: 82 U/L — SIGNIFICANT CHANGE UP (ref 30–115)
ALT FLD-CCNC: 90 U/L — HIGH (ref 0–41)
ANION GAP SERPL CALC-SCNC: 11 MMOL/L — SIGNIFICANT CHANGE UP (ref 7–14)
ANION GAP SERPL CALC-SCNC: 14 MMOL/L — SIGNIFICANT CHANGE UP (ref 7–14)
AST SERPL-CCNC: 48 U/L — HIGH (ref 0–41)
BASOPHILS # BLD AUTO: 0.02 K/UL — SIGNIFICANT CHANGE UP (ref 0–0.2)
BASOPHILS # BLD AUTO: 0.03 K/UL — SIGNIFICANT CHANGE UP (ref 0–0.2)
BASOPHILS NFR BLD AUTO: 0.1 % — SIGNIFICANT CHANGE UP (ref 0–1)
BASOPHILS NFR BLD AUTO: 0.2 % — SIGNIFICANT CHANGE UP (ref 0–1)
BILIRUB DIRECT SERPL-MCNC: 0.3 MG/DL — HIGH (ref 0–0.2)
BILIRUB INDIRECT FLD-MCNC: 0.9 MG/DL — SIGNIFICANT CHANGE UP (ref 0.2–1.2)
BILIRUB SERPL-MCNC: 1.2 MG/DL — SIGNIFICANT CHANGE UP (ref 0.2–1.2)
BUN SERPL-MCNC: 6 MG/DL — LOW (ref 10–20)
BUN SERPL-MCNC: 7 MG/DL — LOW (ref 10–20)
CALCIUM SERPL-MCNC: 8.8 MG/DL — SIGNIFICANT CHANGE UP (ref 8.5–10.1)
CALCIUM SERPL-MCNC: 9.3 MG/DL — SIGNIFICANT CHANGE UP (ref 8.5–10.1)
CHLORIDE SERPL-SCNC: 100 MMOL/L — SIGNIFICANT CHANGE UP (ref 98–110)
CHLORIDE SERPL-SCNC: 96 MMOL/L — LOW (ref 98–110)
CO2 SERPL-SCNC: 21 MMOL/L — SIGNIFICANT CHANGE UP (ref 17–32)
CO2 SERPL-SCNC: 26 MMOL/L — SIGNIFICANT CHANGE UP (ref 17–32)
CREAT SERPL-MCNC: 0.7 MG/DL — SIGNIFICANT CHANGE UP (ref 0.7–1.5)
CREAT SERPL-MCNC: 0.9 MG/DL — SIGNIFICANT CHANGE UP (ref 0.7–1.5)
EOSINOPHIL # BLD AUTO: 0.01 K/UL — SIGNIFICANT CHANGE UP (ref 0–0.7)
EOSINOPHIL # BLD AUTO: 0.01 K/UL — SIGNIFICANT CHANGE UP (ref 0–0.7)
EOSINOPHIL NFR BLD AUTO: 0.1 % — SIGNIFICANT CHANGE UP (ref 0–8)
EOSINOPHIL NFR BLD AUTO: 0.1 % — SIGNIFICANT CHANGE UP (ref 0–8)
GLUCOSE BLDC GLUCOMTR-MCNC: 73 MG/DL — SIGNIFICANT CHANGE UP (ref 70–99)
GLUCOSE BLDC GLUCOMTR-MCNC: 89 MG/DL — SIGNIFICANT CHANGE UP (ref 70–99)
GLUCOSE BLDC GLUCOMTR-MCNC: 96 MG/DL — SIGNIFICANT CHANGE UP (ref 70–99)
GLUCOSE BLDC GLUCOMTR-MCNC: 99 MG/DL — SIGNIFICANT CHANGE UP (ref 70–99)
GLUCOSE SERPL-MCNC: 73 MG/DL — SIGNIFICANT CHANGE UP (ref 70–99)
GLUCOSE SERPL-MCNC: 89 MG/DL — SIGNIFICANT CHANGE UP (ref 70–99)
HCT VFR BLD CALC: 53.1 % — HIGH (ref 42–52)
HCT VFR BLD CALC: 53.5 % — HIGH (ref 42–52)
HGB BLD-MCNC: 18.5 G/DL — HIGH (ref 14–18)
HGB BLD-MCNC: 18.6 G/DL — HIGH (ref 14–18)
IMM GRANULOCYTES NFR BLD AUTO: 0.4 % — HIGH (ref 0.1–0.3)
IMM GRANULOCYTES NFR BLD AUTO: 0.5 % — HIGH (ref 0.1–0.3)
LIDOCAIN IGE QN: 167 U/L — HIGH (ref 7–60)
LYMPHOCYTES # BLD AUTO: 0.98 K/UL — LOW (ref 1.2–3.4)
LYMPHOCYTES # BLD AUTO: 1.11 K/UL — LOW (ref 1.2–3.4)
LYMPHOCYTES # BLD AUTO: 4.9 % — LOW (ref 20.5–51.1)
LYMPHOCYTES # BLD AUTO: 6.2 % — LOW (ref 20.5–51.1)
MAGNESIUM SERPL-MCNC: 1.8 MG/DL — SIGNIFICANT CHANGE UP (ref 1.8–2.4)
MCHC RBC-ENTMCNC: 32.1 PG — HIGH (ref 27–31)
MCHC RBC-ENTMCNC: 32.2 PG — HIGH (ref 27–31)
MCHC RBC-ENTMCNC: 34.6 G/DL — SIGNIFICANT CHANGE UP (ref 32–37)
MCHC RBC-ENTMCNC: 35 G/DL — SIGNIFICANT CHANGE UP (ref 32–37)
MCV RBC AUTO: 91.7 FL — SIGNIFICANT CHANGE UP (ref 80–94)
MCV RBC AUTO: 93.2 FL — SIGNIFICANT CHANGE UP (ref 80–94)
MONOCYTES # BLD AUTO: 1.4 K/UL — HIGH (ref 0.1–0.6)
MONOCYTES # BLD AUTO: 1.46 K/UL — HIGH (ref 0.1–0.6)
MONOCYTES NFR BLD AUTO: 7.4 % — SIGNIFICANT CHANGE UP (ref 1.7–9.3)
MONOCYTES NFR BLD AUTO: 7.8 % — SIGNIFICANT CHANGE UP (ref 1.7–9.3)
NEUTROPHILS # BLD AUTO: 15.3 K/UL — HIGH (ref 1.4–6.5)
NEUTROPHILS # BLD AUTO: 17.26 K/UL — HIGH (ref 1.4–6.5)
NEUTROPHILS NFR BLD AUTO: 85.3 % — HIGH (ref 42.2–75.2)
NEUTROPHILS NFR BLD AUTO: 87 % — HIGH (ref 42.2–75.2)
NRBC # BLD: 0 /100 WBCS — SIGNIFICANT CHANGE UP (ref 0–0)
NRBC # BLD: 0 /100 WBCS — SIGNIFICANT CHANGE UP (ref 0–0)
PHOSPHATE SERPL-MCNC: 3.1 MG/DL — SIGNIFICANT CHANGE UP (ref 2.1–4.9)
PLATELET # BLD AUTO: 232 K/UL — SIGNIFICANT CHANGE UP (ref 130–400)
PLATELET # BLD AUTO: 263 K/UL — SIGNIFICANT CHANGE UP (ref 130–400)
POTASSIUM SERPL-MCNC: 4.1 MMOL/L — SIGNIFICANT CHANGE UP (ref 3.5–5)
POTASSIUM SERPL-MCNC: 4.1 MMOL/L — SIGNIFICANT CHANGE UP (ref 3.5–5)
POTASSIUM SERPL-SCNC: 4.1 MMOL/L — SIGNIFICANT CHANGE UP (ref 3.5–5)
POTASSIUM SERPL-SCNC: 4.1 MMOL/L — SIGNIFICANT CHANGE UP (ref 3.5–5)
PROT SERPL-MCNC: 6.4 G/DL — SIGNIFICANT CHANGE UP (ref 6–8)
RBC # BLD: 5.74 M/UL — SIGNIFICANT CHANGE UP (ref 4.7–6.1)
RBC # BLD: 5.79 M/UL — SIGNIFICANT CHANGE UP (ref 4.7–6.1)
RBC # FLD: 15 % — HIGH (ref 11.5–14.5)
RBC # FLD: 15.1 % — HIGH (ref 11.5–14.5)
SODIUM SERPL-SCNC: 132 MMOL/L — LOW (ref 135–146)
SODIUM SERPL-SCNC: 136 MMOL/L — SIGNIFICANT CHANGE UP (ref 135–146)
WBC # BLD: 17.92 K/UL — HIGH (ref 4.8–10.8)
WBC # BLD: 19.82 K/UL — HIGH (ref 4.8–10.8)
WBC # FLD AUTO: 17.92 K/UL — HIGH (ref 4.8–10.8)
WBC # FLD AUTO: 19.82 K/UL — HIGH (ref 4.8–10.8)

## 2019-07-09 PROCEDURE — 99232 SBSQ HOSP IP/OBS MODERATE 35: CPT

## 2019-07-09 PROCEDURE — 99255 IP/OBS CONSLTJ NEW/EST HI 80: CPT

## 2019-07-09 PROCEDURE — 71045 X-RAY EXAM CHEST 1 VIEW: CPT | Mod: 26

## 2019-07-09 RX ORDER — MORPHINE SULFATE 50 MG/1
4 CAPSULE, EXTENDED RELEASE ORAL EVERY 4 HOURS
Refills: 0 | Status: DISCONTINUED | OUTPATIENT
Start: 2019-07-09 | End: 2019-07-10

## 2019-07-09 RX ORDER — SODIUM CHLORIDE 9 MG/ML
1000 INJECTION, SOLUTION INTRAVENOUS
Refills: 0 | Status: DISCONTINUED | OUTPATIENT
Start: 2019-07-09 | End: 2019-07-12

## 2019-07-09 RX ORDER — NICOTINE POLACRILEX 2 MG
1 GUM BUCCAL DAILY
Refills: 0 | Status: DISCONTINUED | OUTPATIENT
Start: 2019-07-09 | End: 2019-07-13

## 2019-07-09 RX ORDER — OXYMETAZOLINE HYDROCHLORIDE 0.5 MG/ML
1 SPRAY NASAL ONCE
Refills: 0 | Status: COMPLETED | OUTPATIENT
Start: 2019-07-09 | End: 2019-07-09

## 2019-07-09 RX ORDER — COLLAGENASE CLOSTRIDIUM HIST. 250 UNIT/G
1 OINTMENT (GRAM) TOPICAL DAILY
Refills: 0 | Status: DISCONTINUED | OUTPATIENT
Start: 2019-07-09 | End: 2019-07-09

## 2019-07-09 RX ADMIN — MORPHINE SULFATE 4 MILLIGRAM(S): 50 CAPSULE, EXTENDED RELEASE ORAL at 13:56

## 2019-07-09 RX ADMIN — MORPHINE SULFATE 2 MILLIGRAM(S): 50 CAPSULE, EXTENDED RELEASE ORAL at 22:03

## 2019-07-09 RX ADMIN — SODIUM CHLORIDE 300 MILLILITER(S): 9 INJECTION, SOLUTION INTRAVENOUS at 08:33

## 2019-07-09 RX ADMIN — MORPHINE SULFATE 2 MILLIGRAM(S): 50 CAPSULE, EXTENDED RELEASE ORAL at 06:13

## 2019-07-09 RX ADMIN — MORPHINE SULFATE 4 MILLIGRAM(S): 50 CAPSULE, EXTENDED RELEASE ORAL at 19:50

## 2019-07-09 RX ADMIN — PANTOPRAZOLE SODIUM 40 MILLIGRAM(S): 20 TABLET, DELAYED RELEASE ORAL at 11:54

## 2019-07-09 RX ADMIN — Medication 1 PATCH: at 06:14

## 2019-07-09 RX ADMIN — Medication 1 PATCH: at 19:55

## 2019-07-09 RX ADMIN — ONDANSETRON 8 MILLIGRAM(S): 8 TABLET, FILM COATED ORAL at 05:32

## 2019-07-09 RX ADMIN — HYDROMORPHONE HYDROCHLORIDE 2 MILLIGRAM(S): 2 INJECTION INTRAMUSCULAR; INTRAVENOUS; SUBCUTANEOUS at 03:45

## 2019-07-09 RX ADMIN — OXYMETAZOLINE HYDROCHLORIDE 1 SPRAY(S): 0.5 SPRAY NASAL at 22:42

## 2019-07-09 RX ADMIN — HYDROMORPHONE HYDROCHLORIDE 2 MILLIGRAM(S): 2 INJECTION INTRAMUSCULAR; INTRAVENOUS; SUBCUTANEOUS at 03:15

## 2019-07-09 RX ADMIN — SODIUM CHLORIDE 300 MILLILITER(S): 9 INJECTION, SOLUTION INTRAVENOUS at 05:00

## 2019-07-09 RX ADMIN — MORPHINE SULFATE 2 MILLIGRAM(S): 50 CAPSULE, EXTENDED RELEASE ORAL at 00:26

## 2019-07-09 RX ADMIN — MORPHINE SULFATE 4 MILLIGRAM(S): 50 CAPSULE, EXTENDED RELEASE ORAL at 19:23

## 2019-07-09 RX ADMIN — Medication 1 MILLIGRAM(S): at 22:42

## 2019-07-09 RX ADMIN — Medication 1 MILLIGRAM(S): at 11:54

## 2019-07-09 RX ADMIN — Medication 1 MILLIGRAM(S): at 00:19

## 2019-07-09 RX ADMIN — MORPHINE SULFATE 2 MILLIGRAM(S): 50 CAPSULE, EXTENDED RELEASE ORAL at 22:30

## 2019-07-09 RX ADMIN — SODIUM CHLORIDE 300 MILLILITER(S): 9 INJECTION, SOLUTION INTRAVENOUS at 01:30

## 2019-07-09 RX ADMIN — Medication 1 MILLIGRAM(S): at 05:32

## 2019-07-09 RX ADMIN — MORPHINE SULFATE 2 MILLIGRAM(S): 50 CAPSULE, EXTENDED RELEASE ORAL at 17:46

## 2019-07-09 RX ADMIN — HYDROMORPHONE HYDROCHLORIDE 2 MILLIGRAM(S): 2 INJECTION INTRAMUSCULAR; INTRAVENOUS; SUBCUTANEOUS at 10:30

## 2019-07-09 RX ADMIN — MORPHINE SULFATE 2 MILLIGRAM(S): 50 CAPSULE, EXTENDED RELEASE ORAL at 00:10

## 2019-07-09 RX ADMIN — HYDROMORPHONE HYDROCHLORIDE 2 MILLIGRAM(S): 2 INJECTION INTRAMUSCULAR; INTRAVENOUS; SUBCUTANEOUS at 10:00

## 2019-07-09 RX ADMIN — Medication 1 PATCH: at 07:30

## 2019-07-09 RX ADMIN — ENOXAPARIN SODIUM 40 MILLIGRAM(S): 100 INJECTION SUBCUTANEOUS at 15:07

## 2019-07-09 RX ADMIN — MORPHINE SULFATE 2 MILLIGRAM(S): 50 CAPSULE, EXTENDED RELEASE ORAL at 06:47

## 2019-07-09 RX ADMIN — Medication 2 MILLIGRAM(S): at 08:32

## 2019-07-09 RX ADMIN — ONDANSETRON 8 MILLIGRAM(S): 8 TABLET, FILM COATED ORAL at 17:47

## 2019-07-09 RX ADMIN — Medication 100 MILLIGRAM(S): at 11:54

## 2019-07-09 NOTE — PROGRESS NOTE ADULT - SUBJECTIVE AND OBJECTIVE BOX
Patient is a 39y old  Male who presents with a chief complaint of Epigastric pain (09 Jul 2019 11:36)    Over Night Events:    doing better    ROS:  See HPI    PHYSICAL EXAM    ICU Vital Signs Last 24 Hrs  T(C): 36.9 (09 Jul 2019 12:00), Max: 36.9 (09 Jul 2019 12:00)  T(F): 98.4 (09 Jul 2019 12:00), Max: 98.4 (09 Jul 2019 12:00)  HR: 126 (09 Jul 2019 12:00) (71 - 128)  BP: 154/87 (09 Jul 2019 12:00) (149/86 - 202/103)  BP(mean): 107 (09 Jul 2019 12:00) (107 - 155)  ABP: --  ABP(mean): --  RR: 14 (09 Jul 2019 12:00) (14 - 22)  SpO2: 95% (09 Jul 2019 05:00) (84% - 100%)      General: AAO x 3, not tremulous   HEENT: HARRIETT             Lymphatic system: No cervical LN   Lungs: Bilateral BS  Cardiovascular: Regular   Gastrointestinal: Soft, Positive BS  Extremities: No clubbing.  Moves extremities.  Full Range of motion   Skin: Warm, intact  Neurological: No motor or sensory deficit , no hallucinations       07-08-19 @ 07:01  -  07-09-19 @ 07:00  --------------------------------------------------------  IN:    IV PiggyBack: 50 mL    lactated ringers.: 4500 mL    lactated ringers.: 400 mL  Total IN: 4950 mL    OUT:    Voided: 2650 mL  Total OUT: 2650 mL    Total NET: 2300 mL      07-09-19 @ 07:01  -  07-09-19 @ 12:24  --------------------------------------------------------  IN:    lactated ringers.: 900 mL    lactated ringers.: 400 mL  Total IN: 1300 mL    OUT:    Voided: 1275 mL  Total OUT: 1275 mL    Total NET: 25 mL          LABS:                            18.6   19.82 )-----------( 263      ( 09 Jul 2019 04:54 )             53.1                                               07-09    132<L>  |  100  |  6<L>  ----------------------------<  89  4.1   |  21  |  0.7    Ca    9.3      09 Jul 2019 07:30  Phos  3.1     07-09  Mg     1.8     07-09    TPro  6.4  /  Alb  3.7  /  TBili  1.2  /  DBili  0.3<H>  /  AST  48<H>  /  ALT  90<H>  /  AlkPhos  82  07-09                                                 CARDIAC MARKERS ( 08 Jul 2019 16:00 )  x     / <0.01 ng/mL / x     / x     / x                                                LIVER FUNCTIONS - ( 09 Jul 2019 04:54 )  Alb: 3.7 g/dL / Pro: 6.4 g/dL / ALK PHOS: 82 U/L / ALT: 90 U/L / AST: 48 U/L / GGT: x                                                                                                                                       MEDICATIONS  (STANDING):  chlorhexidine 4% Liquid 1 Application(s) Topical <User Schedule>  enoxaparin Injectable 40 milliGRAM(s) SubCutaneous every 24 hours  folic acid 1 milliGRAM(s) Oral daily  lactated ringers. 1000 milliLiter(s) (200 mL/Hr) IV Continuous <Continuous>  nicotine -  14 mG/24Hr(s) Patch 1 patch Transdermal daily  ondansetron Injectable 8 milliGRAM(s) IV Push two times a day  pantoprazole  Injectable 40 milliGRAM(s) IV Push daily  thiamine 100 milliGRAM(s) Oral daily    MEDICATIONS  (PRN):  LORazepam     Tablet 2 milliGRAM(s) Oral every 4 hours PRN CIWA-Ar score 8 or greater  LORazepam     Tablet 1 milliGRAM(s) Oral every 4 hours PRN CIWA-Ar score increase by 2 points and a total score of 7 or less  morphine  - Injectable 2 milliGRAM(s) IV Push every 3 hours PRN Moderate Pain (4 - 6)  morphine  - Injectable 4 milliGRAM(s) IV Push every 4 hours PRN Severe Pain (7 - 10)      Xrays:                                                                                     ECHO Patient is a 39y old  Male who presents with a chief complaint of Epigastric pain (09 Jul 2019 11:36)    Over Night Events:    doing better, still c/o epigastric pain    ROS:  See HPI    PHYSICAL EXAM    ICU Vital Signs Last 24 Hrs  T(C): 36.9 (09 Jul 2019 12:00), Max: 36.9 (09 Jul 2019 12:00)  T(F): 98.4 (09 Jul 2019 12:00), Max: 98.4 (09 Jul 2019 12:00)  HR: 126 (09 Jul 2019 12:00) (71 - 128)  BP: 154/87 (09 Jul 2019 12:00) (149/86 - 202/103)  BP(mean): 107 (09 Jul 2019 12:00) (107 - 155)  RR: 14 (09 Jul 2019 12:00) (14 - 22)  SpO2: 95% (09 Jul 2019 05:00) (84% - 100%)      General: AAO x 3, not tremulous   HEENT: HARRIETT             Lymphatic system: No cervical LN   Lungs: Bilateral BS  Cardiovascular: Regular   Gastrointestinal: Soft, Positive BS  Extremities: No clubbing.  Moves extremities.  Full Range of motion   Skin: Warm, intact  Neurological: No motor or sensory deficit , no hallucinations       07-08-19 @ 07:01  -  07-09-19 @ 07:00  --------------------------------------------------------  IN:    IV PiggyBack: 50 mL    lactated ringers.: 4500 mL    lactated ringers.: 400 mL  Total IN: 4950 mL    OUT:    Voided: 2650 mL  Total OUT: 2650 mL    Total NET: 2300 mL      07-09-19 @ 07:01  -  07-09-19 @ 12:24  --------------------------------------------------------  IN:    lactated ringers.: 900 mL    lactated ringers.: 400 mL  Total IN: 1300 mL    OUT:    Voided: 1275 mL  Total OUT: 1275 mL    Total NET: 25 mL          LABS:                            18.6   19.82 )-----------( 263      ( 09 Jul 2019 04:54 )             53.1                                               07-09    132<L>  |  100  |  6<L>  ----------------------------<  89  4.1   |  21  |  0.7    Ca    9.3      09 Jul 2019 07:30  Phos  3.1     07-09  Mg     1.8     07-09    TPro  6.4  /  Alb  3.7  /  TBili  1.2  /  DBili  0.3<H>  /  AST  48<H>  /  ALT  90<H>  /  AlkPhos  82  07-09                                                 CARDIAC MARKERS ( 08 Jul 2019 16:00 )  x     / <0.01 ng/mL / x     / x     / x                                                LIVER FUNCTIONS - ( 09 Jul 2019 04:54 )  Alb: 3.7 g/dL / Pro: 6.4 g/dL / ALK PHOS: 82 U/L / ALT: 90 U/L / AST: 48 U/L / GGT: x                                                                                                                                       MEDICATIONS  (STANDING):  chlorhexidine 4% Liquid 1 Application(s) Topical <User Schedule>  enoxaparin Injectable 40 milliGRAM(s) SubCutaneous every 24 hours  folic acid 1 milliGRAM(s) Oral daily  lactated ringers. 1000 milliLiter(s) (200 mL/Hr) IV Continuous <Continuous>  nicotine -  14 mG/24Hr(s) Patch 1 patch Transdermal daily  ondansetron Injectable 8 milliGRAM(s) IV Push two times a day  pantoprazole  Injectable 40 milliGRAM(s) IV Push daily  thiamine 100 milliGRAM(s) Oral daily    MEDICATIONS  (PRN):  LORazepam     Tablet 2 milliGRAM(s) Oral every 4 hours PRN CIWA-Ar score 8 or greater  LORazepam     Tablet 1 milliGRAM(s) Oral every 4 hours PRN CIWA-Ar score increase by 2 points and a total score of 7 or less  morphine  - Injectable 2 milliGRAM(s) IV Push every 3 hours PRN Moderate Pain (4 - 6)  morphine  - Injectable 4 milliGRAM(s) IV Push every 4 hours PRN Severe Pain (7 - 10)      Xrays:   reviewed

## 2019-07-09 NOTE — CONSULT NOTE ADULT - ASSESSMENT
39m w a hx of prior pancreatitis and EtOH abuse presents after binge drinking this weekend now w abdominal pain due to pancreatitis.    --Continue supportive care & monitoring.  --Continue IV fluids, EtOH abstinence, NPO status & analgesia as needed.  --Mild EtOH withdrawal at this time. Trend CIWA scores. Please give patient IV or PO benzodiazepines (lorazepam or diazepam) as needed for symptoms of EtOH withdrawal which can include tremors, tachycardia/hypertension, confusion/agitation, hallucinations, seizures, etc. Titrate benzodiazepines as needed for symptoms of withdrawal w goal of therapy RASS 0->-1 and normal vitals.   --Lorazepam can be given IV in escalating doses every 15-20 minutes until adequate sedation is achieved. For example: can give 2mg now and in 15-20min can give another 2mg if still not adequately sedated. After 15-20min if still not adequately sedated can give 4mg. After 15-20 min if still not adequately sedated can give another 4mg. Can continue with this pattern until titrated to adequate dosing and RASS 0->-1 achieved. After adequate dose is found, can use this dose further as baseline for treatement of elevated CIWA scores as symptom triggered therapy.  --Would d/w patient continued alcohol abstinence and refer to outpatient detox on discharge planning.    --Phone consultation recommended IV thiamine & dextrose containing IV fluids for treatment of alcoholic ketosis. Anion gap closed after giving treatment and ketosis now resolved.  --Mild transaminitis improving with alcohol abstinence.  --Electrolyte, renal functions, & EKG ok.  --Will continue to follow. Please call with any further questions or changes in status.    Pam    [phone] (pager) 39m w a hx of prior pancreatitis and EtOH abuse presents after binge drinking this week now w abdominal pain due to pancreatitis.    --Continue supportive care & monitoring.  --Continue IV fluids, EtOH abstinence, NPO status & analgesia as needed.  --Mild EtOH withdrawal at this time. Trend CIWA scores. Please give patient IV or PO benzodiazepines (lorazepam or diazepam) as needed for symptoms of EtOH withdrawal which can include tremors, tachycardia/hypertension, confusion/agitation, hallucinations, seizures, etc. Titrate benzodiazepines as needed for symptoms of withdrawal w goal of therapy RASS 0->-1 and normal vitals.   --Lorazepam can be given IV in escalating doses every 15-20 minutes until adequate sedation is achieved. For example: can give 2mg now and in 15-20min can give another 2mg if still not adequately sedated. After 15-20min if still not adequately sedated can give 4mg. After 15-20 min if still not adequately sedated can give another 4mg. Can continue with this pattern until titrated to adequate dosing and RASS 0->-1 achieved. After adequate dose is found, can use this dose further as baseline for treatement of elevated CIWA scores as symptom triggered therapy.  --Would discuss w patient continued alcohol abstinence and refer to outpatient detox on discharge planning.    --Phone consultation recommended IV thiamine & dextrose containing IV fluids for treatment of alcoholic ketosis. Anion gap closed after giving treatment and ketosis now resolved.  --Mild transaminitis improving with alcohol abstinence.  --Electrolytes, renal function, & EKG ok.  --Will continue to follow. Please call with any further questions or changes in status.    Pam    [phone] (pager)

## 2019-07-09 NOTE — PROGRESS NOTE ADULT - ASSESSMENT
IMPRESSION:    Pancreatitis   ETOH withdrawal  Polycethemia   Possible COPD    PLAN:    CNS: CIWA protocol, thiamine, folate,     HEENT: Oral care    PULMONARY:  HOB @ 45 degrees, smoking cessation, nicotine patch     CARDIOVASCULAR: LR @ 250    GI: GI prophylaxis. NPO, pain control, GI f/u, trend lft    RENAL:  Follow up lytes.  Correct as needed    INFECTIOUS DISEASE: Follow up cultures, no abx     HEMATOLOGICAL:  DVT prophylaxis. trend cbc. if persistent elevated Hb might need further work up    ENDOCRINE:  Follow up FS.  Insulin protocol if needed.    ICU monitoring for now IMPRESSION:    Pancreatitis/ acute (binge drinking)   ETOH withdrawal      PLAN:    CNS: CIWA protocol, thiamine, folate,     HEENT: Oral care    PULMONARY:  HOB @ 45 degrees, smoking cessation, nicotine patch     CARDIOVASCULAR: LR @ 250    GI: GI prophylaxis. NPO, pain control, GI f/u, trend lft    RENAL:  Follow up lytes.  Correct as needed    INFECTIOUS DISEASE: Follow up cultures, no abx     HEMATOLOGICAL:  DVT prophylaxis. trend cbc. if persistent elevated Hb might need further work up    ENDOCRINE:  Follow up FS.  Insulin protocol if needed.    ICU monitoring for now

## 2019-07-09 NOTE — CONSULT NOTE ADULT - SUBJECTIVE AND OBJECTIVE BOX
Time:19 @ 15:50  [facility]  [facility]    Chief Complaint: Abdominal Pain    History of Present Illness:  39m w a hx of asthma, EtOH abuse and prior pancreatitis presented to ED reporting abdominal pain. Pain is epigastric, sharp, severe, no exacerbating/alleviating. Pt also reporting vomiting (no blood/bile). No diarrhea, no black/bloody stools. Pt reports that he was binge drinking this week w heavy EtOH use from Wednesday to Saturday. Last drink on . Pt reports that since he had pancreatitis back in 10/2018 he has been abstinent from EtOH until this week.      Past Medical History:  Alcohol use  Asthma  Pancreatitis  H/O rotator cuff surgery      Home Medications:  None    Active Medications:  MEDICATIONS  (STANDING):  chlorhexidine 4% Liquid 1 Application(s) Topical <User Schedule>  enoxaparin Injectable 40 milliGRAM(s) SubCutaneous every 24 hours  folic acid 1 milliGRAM(s) Oral daily  lactated ringers. 1000 milliLiter(s) (200 mL/Hr) IV Continuous <Continuous>  nicotine -  14 mG/24Hr(s) Patch 1 patch Transdermal daily  ondansetron Injectable 8 milliGRAM(s) IV Push two times a day  pantoprazole  Injectable 40 milliGRAM(s) IV Push daily  thiamine 100 milliGRAM(s) Oral daily    MEDICATIONS  (PRN):  LORazepam     Tablet 2 milliGRAM(s) Oral every 4 hours PRN CIWA-Ar score 8 or greater  LORazepam     Tablet 1 milliGRAM(s) Oral every 4 hours PRN CIWA-Ar score increase by 2 points and a total score of 7 or less  morphine  - Injectable 2 milliGRAM(s) IV Push every 3 hours PRN Moderate Pain (4 - 6)  morphine  - Injectable 4 milliGRAM(s) IV Push every 4 hours PRN Severe Pain (7 - 10)      Social History:  Tobacco:   +  EtOH:   +  Illicit Substances:   Denies      Review of Systems:  Constitutional:  No fever or chills.   Eyes:  No visual changes, eye pain, or discharge.  ENMT:  No nasal congestion, discharge, or throat pain.   Cardiac:  No chest pain, syncope, or edema.  Respiratory:  No dyspnea, wheezing, or cough. No hemoptysis.  GI:  No diarrhea, melena, or hematochezia. +Abdominal pain +vomiting  :  No dysuria or hematuria.  Musculoskeletal:  No joint swelling, joint pain, or back pain.  Skin:  No skin rash, jaundice, or lesions.  Neuro:  No headache, loss of sensation, or focal weakness.  No change in mental status. +Tremors      Vital Signs Last 24 Hrs  T(C): 36.9 (2019 12:00), Max: 36.9 (2019 12:00)  T(F): 98.4 (2019 12:00), Max: 98.4 (2019 12:00)  HR: 126 (2019 12:00) (98 - 128)  BP: 154/87 (2019 12:00) (149/86 - 200/116)  BP(mean): 107 (2019 12:00) (107 - 155)  RR: 14 (2019 12:00) (14 - 22)  SpO2: 95% (2019 05:00) (84% - 97%)    CAPILLARY BLOOD GLUCOSE  POCT Blood Glucose.: 89 mg/dL (2019 10:20)  POCT Blood Glucose.: 99 mg/dL (2019 05:48)  POCT Blood Glucose.: 96 mg/dL (2019 02:51)  POCT Blood Glucose.: 97 mg/dL (2019 22:04)      Physical Exam:  General: Awake, alert, NAD, WDWN, non-toxic appearing, NCAT  Eyes: PERRL, EOMI, no icterus, lids and conjunctivae are normal  ENT: External inspection normal, pink/dry membranes, pharynx normal  CV: S1S2, regular rate and rhythm, no murmur/gallops/rubs, no JVD, 2+ pulses b/l, no edema/cords/homans, warm/well-perfused  Respiratory: Normal respiratory rate/effort, no respiratory distress, normal voice, speaking full sentences, lungs clear to auscultation b/l, no wheezing/rales/rhonchi, no retractions, no stridor  Abdomen: Soft abdomen, +epigastric tender, no distended/guarding/rebound, no CVA tender, neg Marquez  Musculoskeletal: FROM all 4 extremities, N/V intact, normal tone.  Neck: FROM neck, supple, no meningismus, trachea midline, no JVD  Integumentary: Color normal for race, warm and dry, no rash  Neuro: Oriented x3, CN 2-12 intact, normal motor, normal sensory, normal cerebellar, mild tremors, no clonus/rigidity/hyper-reflexia   Psych: Oriented x3, mood normal, affect normal, denies SI/HI, denies AV hallucinations    GCS:  E:   4/4  V:   5/5  M:   6/6    EKG: EKG Sinus @75 QRS/QTC: 92/464, +LVH  Labs:                        18.6   19.82 )-----------( 263      ( 2019 04:54 )             53.1     -    132<L>  |  100  |  6<L>  ----------------------------<  89  4.1   |  21  |  0.7    Ca    9.3      2019 07:30  Phos  3.1       Mg     1.8         TPro  6.4  /  Alb  3.7  /  TBili  1.2  /  DBili  0.3<H>  /  AST  48<H>  /  ALT  90<H>  /  AlkPhos  82      CARDIAC MARKERS ( 2019 16:00 )  x     / <0.01 ng/mL / x     / x     / x        Ethanol:  Neg  UDS: +Opiates +THC  VB.36/53  Lactate: 4.1 -> 2  Beta-Hydroxybutyrate: 1.5mmol/L    Lipase: 586  HDL/LDL/Cholesterol/Triglycerides: 71/161/244/192    CXR: No consolidation/effusion/pntx    EXAM:  CT ABDOMEN AND PELVIS IC        PROCEDURE DATE:  2019    INTERPRETATION:  CLINICAL STATEMENT: Abdominal pain.     TECHNIQUE: Contiguous axial CT images were obtained from the lower chest   to the pubic symphysis with IV contrast.  Oral contrast was not given.    Reformatted images in the coronal and sagittal planes were acquired.  COMPARISON CT: 10/19/2018.  FINDINGS:  LOWER CHEST: There are bibasilar subsegmental dependant atelectasis.   HEPATOBILIARY: Hepatomegaly. There is hepatic steatosis.   SPLEEN: Unremarkable.  PANCREAS: Edematous pancreas with surrounding peripancreatic inflammatory   fat stranding and fluid. No evidence of pancreatic necrosis. The splenic   vein is patent without evidence of thrombus  ADRENAL GLANDS: Unremarkable.  KIDNEYS: Symmetric renal enhancement is noted.  There is no   hydronephrosis.  ABDOMINOPELVIC NODES: There are no enlarged abdominal or pelvic lymph   nodes.   PELVIC ORGANS: Unremarkable.  PERITONEUM/MESENTERY/BOWEL: There is no bowel obstruction. There is no   free intraperitoneal air. The appendix is unremarkable.   BONES/SOFT TISSUES: There are mild degenerative changes.   IMPRESSION: Findings compatible with acute pancreatitis. No evidence of   pancreatic necrosis or abscess formation. The splenic vein is patent     EXAM:  US ABDOMEN LIMITED        PROCEDURE DATE:  2019    INTERPRETATION:  CLINICAL HISTORY: Pancreatitis..  COMPARISON: Concurrent CT scan of the abdomen and pelvis, ultrasound   dated October 15, 2018.  PROCEDURE: Ultrasound of the right upper quadrant was performed.  FINDINGS:  LIVER: The liver is echogenic, consistent with fatty infiltration. There   is no mass or ductal dilatation. It measures 19 cm x 12 cm.  GALLBLADDER/BILIARY TREE: There are no gallstones. There is no   gallbladder wall thickening.  There is no pericholecystic fluid. The common duct measures less than 5   mm.   PANCREAS: The pancreas and peripancreatic inflammation is better seen on   the current current CT scan..  KIDNEY:  Right kidney measures 11.8 cm in length. No hydronephrosis,   calculi or solid mass.  AORTA/IVC:  Visualized proximal portions unremarkable.  ASCITES:  None.  IMPRESSION:  Hepatic steatosis.  No cholelithiasis or ductal dilatation. Time:19 @ 15:50  [facility]  [facility]    Chief Complaint: Abdominal Pain    History of Present Illness:  39m w a hx of asthma, EtOH abuse and prior pancreatitis presented to ED reporting abdominal pain. Pain is epigastric, sharp, severe, no exacerbating/alleviating. Pt also reporting vomiting (no blood/bile). No diarrhea, no black/bloody stools. Pt reports that he was binge drinking this week w heavy EtOH use from Wednesday to Saturday. Last drink on . Pt reports that since he had pancreatitis back in 10/2018 he has been abstinent from EtOH until this week.      Past Medical History:  Alcohol use  Asthma  Pancreatitis  H/O rotator cuff surgery      Home Medications:  None    Active Medications:  MEDICATIONS  (STANDING):  chlorhexidine 4% Liquid 1 Application(s) Topical <User Schedule>  enoxaparin Injectable 40 milliGRAM(s) SubCutaneous every 24 hours  folic acid 1 milliGRAM(s) Oral daily  lactated ringers. 1000 milliLiter(s) (200 mL/Hr) IV Continuous <Continuous>  nicotine -  14 mG/24Hr(s) Patch 1 patch Transdermal daily  ondansetron Injectable 8 milliGRAM(s) IV Push two times a day  pantoprazole  Injectable 40 milliGRAM(s) IV Push daily  thiamine 100 milliGRAM(s) Oral daily    MEDICATIONS  (PRN):  LORazepam     Tablet 2 milliGRAM(s) Oral every 4 hours PRN CIWA-Ar score 8 or greater  LORazepam     Tablet 1 milliGRAM(s) Oral every 4 hours PRN CIWA-Ar score increase by 2 points and a total score of 7 or less  morphine  - Injectable 2 milliGRAM(s) IV Push every 3 hours PRN Moderate Pain (4 - 6)  morphine  - Injectable 4 milliGRAM(s) IV Push every 4 hours PRN Severe Pain (7 - 10)      Social History:  Tobacco:   +  EtOH:   +  Illicit Substances:   Denies      Review of Systems:  Constitutional:  No fever or chills.   Eyes:  No visual changes, eye pain, or discharge.  ENMT:  No nasal congestion, discharge, or throat pain.   Cardiac:  No chest pain, syncope, or edema.  Respiratory:  No dyspnea, wheezing, or cough. No hemoptysis.  GI:  No diarrhea, melena, or hematochezia. +Abdominal pain +vomiting  :  No dysuria or hematuria.  Musculoskeletal:  No joint swelling, joint pain, or back pain.  Skin:  No skin rash, jaundice, or lesions.  Neuro:  No headache, loss of sensation, or focal weakness.  No change in mental status. +Tremors      Vital Signs Last 24 Hrs  T(C): 36.9 (2019 12:00), Max: 36.9 (2019 12:00)  T(F): 98.4 (2019 12:00), Max: 98.4 (2019 12:00)  HR: 126 (2019 12:00) (98 - 128)  BP: 154/87 (2019 12:00) (149/86 - 200/116)  BP(mean): 107 (2019 12:00) (107 - 155)  RR: 14 (2019 12:00) (14 - 22)  SpO2: 95% (2019 05:00) (84% - 97%)    CAPILLARY BLOOD GLUCOSE  POCT Blood Glucose.: 89 mg/dL (2019 10:20)  POCT Blood Glucose.: 99 mg/dL (2019 05:48)  POCT Blood Glucose.: 96 mg/dL (2019 02:51)  POCT Blood Glucose.: 97 mg/dL (2019 22:04)      Physical Exam:  General: Awake, alert, NAD, WDWN, non-toxic appearing, NCAT  Eyes: PERRL, EOMI, no icterus, lids and conjunctivae are normal  ENT: External inspection normal, pink/dry membranes, pharynx normal  CV: S1S2, mild tachycardia, regular rhythm, no murmur/gallops/rubs, no JVD, 2+ pulses b/l, no edema/cords/homans, warm/well-perfused  Respiratory: Normal respiratory rate/effort, no respiratory distress, normal voice, speaking full sentences, lungs clear to auscultation b/l, no wheezing/rales/rhonchi, no retractions, no stridor  Abdomen: Soft abdomen, +epigastric tender, no distended/guarding/rebound, no CVA tender, neg Marquez  Musculoskeletal: FROM all 4 extremities, N/V intact, normal tone.  Neck: FROM neck, supple, no meningismus, trachea midline, no JVD  Integumentary: Color normal for race, warm and dry, no rash  Neuro: Oriented x3, CN 2-12 intact, normal motor, normal sensory, normal cerebellar, mild tremors, no clonus/rigidity/hyper-reflexia   Psych: Oriented x3, mood normal, affect normal, denies SI/HI, denies AV hallucinations    GCS:  E:   4/4  V:   5/5  M:   6/6    EKG: Sinus @75 QRS/QTC: 92/464, +LVH  Labs:                        18.6   19.82 )-----------( 263      ( 2019 04:54 )             53.1         132<L>  |  100  |  6<L>  ----------------------------<  89  4.1   |  21  |  0.7    Ca    9.3      2019 07:30  Phos  3.1       Mg     1.8         TPro  6.4  /  Alb  3.7  /  TBili  1.2  /  DBili  0.3<H>  /  AST  48<H>  /  ALT  90<H>  /  AlkPhos  82      CARDIAC MARKERS ( 2019 16:00 )  x     / <0.01 ng/mL / x     / x     / x        Ethanol:  Neg  UDS: +Opiates +THC  VB.36/53  Lactate: 4.1 -> 2  Beta-Hydroxybutyrate: 1.5mmol/L    Lipase: 586 -> 167  HDL/LDL/Cholesterol/Triglycerides: 71/161/244/192    CXR: No consolidation/effusion/pntx    EXAM:  CT ABDOMEN AND PELVIS IC        PROCEDURE DATE:  2019    INTERPRETATION:  CLINICAL STATEMENT: Abdominal pain.     TECHNIQUE: Contiguous axial CT images were obtained from the lower chest   to the pubic symphysis with IV contrast.  Oral contrast was not given.    Reformatted images in the coronal and sagittal planes were acquired.  COMPARISON CT: 10/19/2018.  FINDINGS:  LOWER CHEST: There are bibasilar subsegmental dependant atelectasis.   HEPATOBILIARY: Hepatomegaly. There is hepatic steatosis.   SPLEEN: Unremarkable.  PANCREAS: Edematous pancreas with surrounding peripancreatic inflammatory   fat stranding and fluid. No evidence of pancreatic necrosis. The splenic   vein is patent without evidence of thrombus  ADRENAL GLANDS: Unremarkable.  KIDNEYS: Symmetric renal enhancement is noted.  There is no   hydronephrosis.  ABDOMINOPELVIC NODES: There are no enlarged abdominal or pelvic lymph   nodes.   PELVIC ORGANS: Unremarkable.  PERITONEUM/MESENTERY/BOWEL: There is no bowel obstruction. There is no   free intraperitoneal air. The appendix is unremarkable.   BONES/SOFT TISSUES: There are mild degenerative changes.   IMPRESSION: Findings compatible with acute pancreatitis. No evidence of   pancreatic necrosis or abscess formation. The splenic vein is patent     EXAM:  US ABDOMEN LIMITED        PROCEDURE DATE:  2019    INTERPRETATION:  CLINICAL HISTORY: Pancreatitis..  COMPARISON: Concurrent CT scan of the abdomen and pelvis, ultrasound   dated October 15, 2018.  PROCEDURE: Ultrasound of the right upper quadrant was performed.  FINDINGS:  LIVER: The liver is echogenic, consistent with fatty infiltration. There   is no mass or ductal dilatation. It measures 19 cm x 12 cm.  GALLBLADDER/BILIARY TREE: There are no gallstones. There is no   gallbladder wall thickening.  There is no pericholecystic fluid. The common duct measures less than 5   mm.   PANCREAS: The pancreas and peripancreatic inflammation is better seen on   the current current CT scan..  KIDNEY:  Right kidney measures 11.8 cm in length. No hydronephrosis,   calculi or solid mass.  AORTA/IVC:  Visualized proximal portions unremarkable.  ASCITES:  None.  IMPRESSION:  Hepatic steatosis.  No cholelithiasis or ductal dilatation.

## 2019-07-09 NOTE — PROGRESS NOTE ADULT - ASSESSMENT
Assessment and Plan  Patient is a 39y old Male who presents with a chief complaint of Epigastric pain (08 Jul 2019 14:53)    Acute pancreatitis  -d/t alcohol use  -lipase 586 on admission  - AST/ALT (106/149)  elevated on admission trending down(48/90),   - abdominal ultrasound shows Hepatic steatosis.  - LR @ 250 ml/hr  - pain control , continue with morphine prn  - diet- clear liquids     Alcohol withdrawal   -CIWA protocol, thiamine, folate,      Polycythemia possible copd?  - f/u repeat cbc   - digital clubbing     Hypomagnesemia  -resolved Assessment and Plan  Patient is a 39y old Male who presents with a chief complaint of Epigastric pain (08 Jul 2019 14:53)    Acute pancreatitis  -d/t alcohol use  -lipase 586 on admission  - AST/ALT (106/149)  elevated on admission trending down(48/90),   - abdominal ultrasound shows Hepatic steatosis.  - LR @ 250 ml/hr  - pain control , continue with morphine prn  - diet- clear liquids     Alcohol withdrawal   -CIWA protocol    Possible thiamine deficiency   - thiamine supplemented    Possible Folate deficiency   -folate supplemented     Polycythemia possible copd?  - f/u repeat cbc   - digital clubbing     Hypomagnesemia  -resolved

## 2019-07-09 NOTE — PROGRESS NOTE ADULT - ASSESSMENT
39y Male with PMH of intermittent asthma, alcoholic pancreatitis in October 2018, presenting to ED on 7/8 with epigastric pain .  Patient started yesterday , and is continuos . Patient pain is severe 10 /10 in intensity , band like , radiating to the back , associated with nausea and vomiting , relieved by Dilaudid in ED. Patient had recurrent episodes of non bloody , bilious vomiting since yesterday.   Pt states he stopped drinking alcohol since last admission in October. However, he admits has been drinking over the weekend starting 7/4 through 7/6 , drinking Vodka(last drink on 7/6).    ultrasound abdomen : No cholelithiasis or ductal dilatation.      #acute pancreatitis most likely alcohol induced   IV hydration   clear liquid for today   hematocrit trending down  trend cbc, BMP  pain control  antiemetics PRN for nausea and vomiting   alcohol abstinence       #transaminitis: r/o alcoholic hepatitis ( improving)   trend lfts      #alcohol abuse:   alcohol abstinence   thiamine and folic acid   watch for signs of withdrawal 39y Male with PMH of intermittent asthma, alcoholic pancreatitis in October 2018, presenting to ED on 7/8 with epigastric pain .  Patient started yesterday , and is continuos . Patient pain is severe 10 /10 in intensity , band like , radiating to the back , associated with nausea and vomiting , relieved by Dilaudid in ED. Patient had recurrent episodes of non bloody , bilious vomiting since yesterday.   Pt states he stopped drinking alcohol since last admission in October. However, he admits has been drinking over the weekend starting 7/4 through 7/6 , drinking Vodka(last drink on 7/6).    ultrasound abdomen : No cholelithiasis or ductal dilatation.      #acute pancreatitis most likely alcohol induced   IV hydration   clear liquid for today   hematocrit trending down indicating adequate hydration  trend cbc, BMP  pain control  antiemetics PRN for nausea and vomiting   alcohol abstinence       #transaminitis: r/o alcoholic hepatitis ( improving)   trend lfts      #alcohol abuse:   alcohol abstinence   thiamine and folic acid   watch for signs of withdrawal

## 2019-07-09 NOTE — PROGRESS NOTE ADULT - SUBJECTIVE AND OBJECTIVE BOX
GI HPI Today:  Patient is a 39y old  Male who presents with a chief complaint of Epigastric pain (09 Jul 2019 15:49)  . Patient is seen for pancreatitis, pain is better today , no nausea or vomiting, passing gas.       PAST MEDICAL & SURGICAL HISTORY  Asthma: no recent exacerbation, not using inhalers for a long time  Pancreatitis: October 2018 due to alcohol  No pertinent past medical history  H/O rotator cuff surgery      ALLERGIES:  No Known Allergies      MEDICATIONS:  MEDICATIONS  (STANDING):  chlorhexidine 4% Liquid 1 Application(s) Topical <User Schedule>  enoxaparin Injectable 40 milliGRAM(s) SubCutaneous every 24 hours  folic acid 1 milliGRAM(s) Oral daily  lactated ringers. 1000 milliLiter(s) (200 mL/Hr) IV Continuous <Continuous>  nicotine -  14 mG/24Hr(s) Patch 1 patch Transdermal daily  ondansetron Injectable 8 milliGRAM(s) IV Push two times a day  pantoprazole  Injectable 40 milliGRAM(s) IV Push daily  thiamine 100 milliGRAM(s) Oral daily    MEDICATIONS  (PRN):  LORazepam     Tablet 2 milliGRAM(s) Oral every 4 hours PRN CIWA-Ar score 8 or greater  LORazepam     Tablet 1 milliGRAM(s) Oral every 4 hours PRN CIWA-Ar score increase by 2 points and a total score of 7 or less  morphine  - Injectable 2 milliGRAM(s) IV Push every 3 hours PRN Moderate Pain (4 - 6)  morphine  - Injectable 4 milliGRAM(s) IV Push every 4 hours PRN Severe Pain (7 - 10)      REVIEW OF SYSTEMS  General:  No fevers  Eyes:  No reported pain   ENT:  No sore throat   NECK: No stiffness   CV:  No chest pain   Resp:  No shortness of breath  GI:  See HPI  :  No dysuria  Muscle:  No weakness  Neuro:  No tingling  Endocrine:  No polyuria  Heme:  No ecchymosis        VITALS:   T(F): 99.1 (07-09 @ 16:00), Max: 99.1 (07-09 @ 16:00)  HR: 116 (07-09 @ 16:00) (64 - 128)  BP: 109/75 (07-09 @ 16:00) (109/75 - 229/117)  BP(mean): 90 (07-09 @ 16:00) (90 - 155)  RR: 17 (07-09 @ 16:00) (14 - 22)  SpO2: 95% (07-09 @ 05:00) (84% - 100%)        PHYSICAL EXAM:  general: looks comfortable , not in distress  EYES: No scleral icterus   LUNG: Clear to auscultation bilaterally  HEART: RRR; No murmurs  ABDOMEN: Soft, +BS, mild Abdominal Tenderness, No guarding, No Marquez Sign   neurological: non focal      Blood Work :                        18.6   19.82 )-----------( 263      ( 09 Jul 2019 04:54 )             53.1       07-09    132<L>  |  100  |  6<L>  ----------------------------<  89  4.1   |  21  |  0.7    Ca    9.3      09 Jul 2019 07:30  Phos  3.1     07-09  Mg     1.8     07-09      CBC -  ( 09 Jul 2019 04:54 )  Hemoglobin : 18.6    CBC -  ( 08 Jul 2019 16:00 )  Hemoglobin : 18.7    CBC -  ( 08 Jul 2019 06:10 )  Hemoglobin : 19.5      LIVER FUNCTIONS - ( 09 Jul 2019 04:54 )  Alb: 3.7 [3.5 - 5.2] / Pro: 6.4 [6.0 - 8.0] / ALK PHOS: 82 [30 - 115] / ALT: 90 [0 - 41] / AST: 48 [0 - 41] / GGT: x     LIVER FUNCTIONS - ( 08 Jul 2019 14:00 )  Alb: 4.4 [3.5 - 5.2] / Pro: 7.5 [6.0 - 8.0] / ALK PHOS: 87 [30 - 115] / ALT: 149 [0 - 41] / AST: 106 [0 - 41] / GGT: x     LIVER FUNCTIONS - ( 08 Jul 2019 06:10 )  Alb: 4.4 [3.5 - 5.2] / Pro: 7.6 [6.0 - 8.0] / ALK PHOS: 94 [30 - 115] / ALT: 164 [0 - 41] / AST: 120 [0 - 41] / GGT: x         < from: US Abdomen Limited (07.08.19 @ 21:55) >    No cholelithiasis or ductal dilatation.    < end of copied text > GI HPI Today:  Patient is a 39y old  Male who presents with a chief complaint of Epigastric pain (09 Jul 2019 15:49)  . Patient is seen for pancreatitis, pain is better today , no nausea or vomiting, passing gas. Patient has little appetite but is willing to try clear liquids      PAST MEDICAL & SURGICAL HISTORY  Asthma: no recent exacerbation, not using inhalers for a long time  Pancreatitis: October 2018 due to alcohol  No pertinent past medical history  H/O rotator cuff surgery      ALLERGIES:  No Known Allergies      MEDICATIONS:  MEDICATIONS  (STANDING):  chlorhexidine 4% Liquid 1 Application(s) Topical <User Schedule>  enoxaparin Injectable 40 milliGRAM(s) SubCutaneous every 24 hours  folic acid 1 milliGRAM(s) Oral daily  lactated ringers. 1000 milliLiter(s) (200 mL/Hr) IV Continuous <Continuous>  nicotine -  14 mG/24Hr(s) Patch 1 patch Transdermal daily  ondansetron Injectable 8 milliGRAM(s) IV Push two times a day  pantoprazole  Injectable 40 milliGRAM(s) IV Push daily  thiamine 100 milliGRAM(s) Oral daily    MEDICATIONS  (PRN):  LORazepam     Tablet 2 milliGRAM(s) Oral every 4 hours PRN CIWA-Ar score 8 or greater  LORazepam     Tablet 1 milliGRAM(s) Oral every 4 hours PRN CIWA-Ar score increase by 2 points and a total score of 7 or less  morphine  - Injectable 2 milliGRAM(s) IV Push every 3 hours PRN Moderate Pain (4 - 6)  morphine  - Injectable 4 milliGRAM(s) IV Push every 4 hours PRN Severe Pain (7 - 10)      REVIEW OF SYSTEMS  General:  No fevers  Eyes:  No reported pain   ENT:  No sore throat   NECK: No stiffness   CV:  No chest pain   Resp:  No shortness of breath  GI:  See HPI  :  No dysuria  Muscle:  No weakness  Neuro:  No tingling  Endocrine:  No polyuria  Heme:  No ecchymosis        VITALS:   T(F): 99.1 (07-09 @ 16:00), Max: 99.1 (07-09 @ 16:00)  HR: 116 (07-09 @ 16:00) (64 - 128)  BP: 109/75 (07-09 @ 16:00) (109/75 - 229/117)  BP(mean): 90 (07-09 @ 16:00) (90 - 155)  RR: 17 (07-09 @ 16:00) (14 - 22)  SpO2: 95% (07-09 @ 05:00) (84% - 100%)        PHYSICAL EXAM:  general: looks comfortable , not in distress  EYES: No scleral icterus   LUNG: Clear to auscultation bilaterally  HEART: RRR; No murmurs  ABDOMEN: Soft, +BS, mild Abdominal Tenderness, No guarding, No Marquez Sign   neurological: non focal      Blood Work :                        18.6   19.82 )-----------( 263      ( 09 Jul 2019 04:54 )             53.1       07-09    132<L>  |  100  |  6<L>  ----------------------------<  89  4.1   |  21  |  0.7    Ca    9.3      09 Jul 2019 07:30  Phos  3.1     07-09  Mg     1.8     07-09      CBC -  ( 09 Jul 2019 04:54 )  Hemoglobin : 18.6    CBC -  ( 08 Jul 2019 16:00 )  Hemoglobin : 18.7    CBC -  ( 08 Jul 2019 06:10 )  Hemoglobin : 19.5      LIVER FUNCTIONS - ( 09 Jul 2019 04:54 )  Alb: 3.7 [3.5 - 5.2] / Pro: 6.4 [6.0 - 8.0] / ALK PHOS: 82 [30 - 115] / ALT: 90 [0 - 41] / AST: 48 [0 - 41] / GGT: x     LIVER FUNCTIONS - ( 08 Jul 2019 14:00 )  Alb: 4.4 [3.5 - 5.2] / Pro: 7.5 [6.0 - 8.0] / ALK PHOS: 87 [30 - 115] / ALT: 149 [0 - 41] / AST: 106 [0 - 41] / GGT: x     LIVER FUNCTIONS - ( 08 Jul 2019 06:10 )  Alb: 4.4 [3.5 - 5.2] / Pro: 7.6 [6.0 - 8.0] / ALK PHOS: 94 [30 - 115] / ALT: 164 [0 - 41] / AST: 120 [0 - 41] / GGT: x         < from: US Abdomen Limited (07.08.19 @ 21:55) >    No cholelithiasis or ductal dilatation.    < end of copied text >

## 2019-07-09 NOTE — PROGRESS NOTE ADULT - SUBJECTIVE AND OBJECTIVE BOX
SUBJECTIVE:    Patient is a 39y old Male who presents with a chief complaint of Epigastric pain (08 Jul 2019 14:53)    Overnight Events:  Patient was seen at bed side this morning .  today is hospital day 2 for the patient. patient states his pain is improved and well controlled. he says his pain is 8/10. patient states the pain is in the epigastric region and radiates to the back. patient states he had one episode of vomiting yesterday. patient denies chest pain, sob, diarrhea , constipation. patient is urinating normally.    PAST MEDICAL & SURGICAL HISTORY  Asthma: no recent exacerbation, not using inhalers for a long time  Pancreatitis: October 2018 due to alcohol  No pertinent past medical history  H/O rotator cuff surgery    SOCIAL HISTORY:  Negative for smoking/alcohol/drug use.     ALLERGIES:  No Known Allergies    MEDICATIONS:  STANDING MEDICATIONS  chlorhexidine 4% Liquid 1 Application(s) Topical <User Schedule>  enoxaparin Injectable 40 milliGRAM(s) SubCutaneous every 24 hours  folic acid 1 milliGRAM(s) Oral daily  lactated ringers. 1000 milliLiter(s) IV Continuous <Continuous>  nicotine -  14 mG/24Hr(s) Patch 1 patch Transdermal daily  ondansetron Injectable 8 milliGRAM(s) IV Push two times a day  pantoprazole  Injectable 40 milliGRAM(s) IV Push daily  thiamine 100 milliGRAM(s) Oral daily    PRN MEDICATIONS  LORazepam     Tablet 2 milliGRAM(s) Oral every 4 hours PRN  LORazepam     Tablet 1 milliGRAM(s) Oral every 4 hours PRN  morphine  - Injectable 2 milliGRAM(s) IV Push every 3 hours PRN  morphine  - Injectable 4 milliGRAM(s) IV Push every 4 hours PRN    VITALS:   T(F): 97.9  HR: 126  BP: 149/86  RR: 20  SpO2: 95%    07-08-19 @ 07:01  -  07-09-19 @ 07:00  --------------------------------------------------------  IN: 4950 mL / OUT: 2650 mL / NET: 2300 mL    07-09-19 @ 07:01  -  07-09-19 @ 11:37  --------------------------------------------------------  IN: 900 mL / OUT: 1275 mL / NET: -375 mL      PHYSICAL EXAM:  . General: NAD  . HEENT  · Respiratory: Breath Sounds equal & clear to  auscultation, no accessory muscle use  · Cardiovascular: Regular rate & rhythm, normal S1, S2; no murmurs, gallops or rubs; no S3, S4  · Gastrointestinal	Soft, ttp eigastric , no hepatosplenomegaly, normal bowel sounds  · Genitourinary: Normal genitalia; no lesions; no discharge  · Extremities:	No cyanosis, clubbing or edema  · Skin no macular rashs, no lacerations.   . Neuro:    LABS:                        18.6   19.82 )-----------( 263      ( 09 Jul 2019 04:54 )             53.1     07-09    132<L>  |  100  |  6<L>  ----------------------------<  89  4.1   |  21  |  0.7    Ca    9.3      09 Jul 2019 07:30  Phos  3.1     07-09  Mg     1.8     07-09    TPro  6.4  /  Alb  3.7  /  TBili  1.2  /  DBili  0.3<H>  /  AST  48<H>  /  ALT  90<H>  /  AlkPhos  82  07-09          Troponin T, Serum: <0.01 ng/mL (07-08-19 @ 16:00)  Lactate, Blood: 2.0 mmol/L (07-08-19 @ 14:00)      CARDIAC MARKERS ( 08 Jul 2019 16:00 )  x     / <0.01 ng/mL / x     / x     / x              07-08-19 @ 07:01  -  07-09-19 @ 07:00  --------------------------------------------------------  IN: 4950 mL / OUT: 2650 mL / NET: 2300 mL    07-09-19 @ 07:01  -  07-09-19 @ 11:37  --------------------------------------------------------  IN: 900 mL / OUT: 1275 mL / NET: -375 mL          Radiology: SUBJECTIVE:    Patient is a 39y old Male who presents with a chief complaint of Epigastric pain (08 Jul 2019 14:53)    Overnight Events:  Patient was seen at bed side this morning .  today is hospital day 2 for the patient. patient states his pain is improved and well controlled. he says his pain is 8/10. patient states the pain is in the epigastric region and radiates to the back. patient states he had one episode of vomiting yesterday. patient denies chest pain, sob, diarrhea , constipation. patient is urinating normally.    PAST MEDICAL & SURGICAL HISTORY  Asthma: no recent exacerbation, not using inhalers for a long time  Pancreatitis: October 2018 due to alcohol  No pertinent past medical history  H/O rotator cuff surgery    SOCIAL HISTORY:  Negative for smoking/alcohol/drug use.     ALLERGIES:  No Known Allergies    MEDICATIONS:  STANDING MEDICATIONS  chlorhexidine 4% Liquid 1 Application(s) Topical <User Schedule>  enoxaparin Injectable 40 milliGRAM(s) SubCutaneous every 24 hours  folic acid 1 milliGRAM(s) Oral daily  lactated ringers. 1000 milliLiter(s) IV Continuous <Continuous>  nicotine -  14 mG/24Hr(s) Patch 1 patch Transdermal daily  ondansetron Injectable 8 milliGRAM(s) IV Push two times a day  pantoprazole  Injectable 40 milliGRAM(s) IV Push daily  thiamine 100 milliGRAM(s) Oral daily    PRN MEDICATIONS  LORazepam     Tablet 2 milliGRAM(s) Oral every 4 hours PRN  LORazepam     Tablet 1 milliGRAM(s) Oral every 4 hours PRN  morphine  - Injectable 2 milliGRAM(s) IV Push every 3 hours PRN  morphine  - Injectable 4 milliGRAM(s) IV Push every 4 hours PRN    VITALS:   T(F): 97.9  HR: 126  BP: 149/86  RR: 20  SpO2: 95%    07-08-19 @ 07:01  -  07-09-19 @ 07:00  --------------------------------------------------------  IN: 4950 mL / OUT: 2650 mL / NET: 2300 mL    07-09-19 @ 07:01  -  07-09-19 @ 11:37  --------------------------------------------------------  IN: 900 mL / OUT: 1275 mL / NET: -375 mL      PHYSICAL EXAM:  . General: NAD  . HEENT  · Respiratory: Breath Sounds equal & clear to  auscultation, no accessory muscle use  · Cardiovascular: Regular rate & rhythm, normal S1, S2; no murmurs, gallops or rubs; no S3, S4  · Gastrointestinal	Soft, ttp eigastric , no hepatosplenomegaly, normal bowel sounds  · Genitourinary: Normal genitalia; no lesions; no discharge  · Extremities:	No cyanosis no edema, clubbing +  · Skin no macular rashs, no lacerations.   . Neuro:    LABS:                        18.6   19.82 )-----------( 263      ( 09 Jul 2019 04:54 )             53.1     07-09    132<L>  |  100  |  6<L>  ----------------------------<  89  4.1   |  21  |  0.7    Ca    9.3      09 Jul 2019 07:30  Phos  3.1     07-09  Mg     1.8     07-09    TPro  6.4  /  Alb  3.7  /  TBili  1.2  /  DBili  0.3<H>  /  AST  48<H>  /  ALT  90<H>  /  AlkPhos  82  07-09          Troponin T, Serum: <0.01 ng/mL (07-08-19 @ 16:00)  Lactate, Blood: 2.0 mmol/L (07-08-19 @ 14:00)      CARDIAC MARKERS ( 08 Jul 2019 16:00 )  x     / <0.01 ng/mL / x     / x     / x              07-08-19 @ 07:01  -  07-09-19 @ 07:00  --------------------------------------------------------  IN: 4950 mL / OUT: 2650 mL / NET: 2300 mL    07-09-19 @ 07:01  -  07-09-19 @ 11:37  --------------------------------------------------------  IN: 900 mL / OUT: 1275 mL / NET: -375 mL          Radiology:      EXAM:  US ABDOMEN LIMITED            PROCEDURE DATE:  07/08/2019            INTERPRETATION:  CLINICAL HISTORY: Pancreatitis..    COMPARISON: Concurrent CT scan of the abdomen and pelvis, ultrasound   dated October 15, 2018.    PROCEDURE: Ultrasoundof the right upper quadrant was performed.    FINDINGS:    LIVER: The liver is echogenic, consistent with fatty infiltration. There   is no mass or ductal dilatation. It measures 19 cm x 12 cm.    GALLBLADDER/BILIARY TREE: There are no gallstones. There is no   gallbladder wall thickening.    There is no pericholecystic fluid. The common duct measures less than 5   mm.     PANCREAS: The pancreas and peripancreatic inflammation is better seen on   the current current CT scan..    KIDNEY:  Right kidney measures 11.8 cm in length. No hydronephrosis,   calculi or solid mass.    AORTA/IVC:  Visualized proximal portions unremarkable.    ASCITES:  None.    IMPRESSION:    Hepatic steatosis.    No cholelithiasis or ductal dilatation.          EXAM:  CT ABDOMEN AND PELVIS IC            PROCEDURE DATE:  07/08/2019            INTERPRETATION:  CLINICAL STATEMENT: Abdominal pain.       TECHNIQUE: Contiguous axial CT images were obtained from the lower chest   to the pubic symphysis with IV contrast.  Oral contrast was not given.    Reformatted images in the coronal and sagittal planes were acquired.    COMPARISON CT: 10/19/2018.    FINDINGS:    LOWER CHEST: There are bibasilar subsegmental dependant atelectasis.     HEPATOBILIARY: Hepatomegaly. There is hepatic steatosis.     SPLEEN: Unremarkable.    PANCREAS: Edematous pancreas with surrounding peripancreatic inflammatory   fat stranding and fluid. No evidence of pancreatic necrosis. The splenic   vein is patent without evidence of thrombus    ADRENAL GLANDS: Unremarkable.    KIDNEYS: Symmetric renal enhancement is noted.  There is no   hydronephrosis.    ABDOMINOPELVIC NODES: There are no enlarged abdominal or pelvic lymph   nodes.     PELVIC ORGANS: Unremarkable.    PERITONEUM/MESENTERY/BOWEL: There is no bowel obstruction. There is no   free intraperitoneal air. The appendix is unremarkable.     BONES/SOFT TISSUES: There are mild degenerative changes.       IMPRESSION: Findings compatible with acute pancreatitis. No evidence of   pancreatic necrosis or abscess formation. The splenic vein is patent               KAYCEE SCOTT M.D., RESIDENT RADIOLOGIST  This document has been electronically signed.  ADIS CARMONA M.D., ATTENDING RADIOLOGIST  This document has been electronically signed. Jul 8 2019 10:19AM

## 2019-07-10 LAB
ANION GAP SERPL CALC-SCNC: 14 MMOL/L — SIGNIFICANT CHANGE UP (ref 7–14)
BUN SERPL-MCNC: 9 MG/DL — LOW (ref 10–20)
CALCIUM SERPL-MCNC: 8.9 MG/DL — SIGNIFICANT CHANGE UP (ref 8.5–10.1)
CHLORIDE SERPL-SCNC: 95 MMOL/L — LOW (ref 98–110)
CO2 SERPL-SCNC: 28 MMOL/L — SIGNIFICANT CHANGE UP (ref 17–32)
CREAT SERPL-MCNC: 0.9 MG/DL — SIGNIFICANT CHANGE UP (ref 0.7–1.5)
GLUCOSE BLDC GLUCOMTR-MCNC: 81 MG/DL — SIGNIFICANT CHANGE UP (ref 70–99)
GLUCOSE BLDC GLUCOMTR-MCNC: 86 MG/DL — SIGNIFICANT CHANGE UP (ref 70–99)
GLUCOSE SERPL-MCNC: 69 MG/DL — LOW (ref 70–99)
HCT VFR BLD CALC: 50 % — SIGNIFICANT CHANGE UP (ref 42–52)
HGB BLD-MCNC: 16.9 G/DL — SIGNIFICANT CHANGE UP (ref 14–18)
MCHC RBC-ENTMCNC: 31.9 PG — HIGH (ref 27–31)
MCHC RBC-ENTMCNC: 33.8 G/DL — SIGNIFICANT CHANGE UP (ref 32–37)
MCV RBC AUTO: 94.3 FL — HIGH (ref 80–94)
NRBC # BLD: 0 /100 WBCS — SIGNIFICANT CHANGE UP (ref 0–0)
PLATELET # BLD AUTO: 217 K/UL — SIGNIFICANT CHANGE UP (ref 130–400)
POTASSIUM SERPL-MCNC: 4 MMOL/L — SIGNIFICANT CHANGE UP (ref 3.5–5)
POTASSIUM SERPL-SCNC: 4 MMOL/L — SIGNIFICANT CHANGE UP (ref 3.5–5)
RBC # BLD: 5.3 M/UL — SIGNIFICANT CHANGE UP (ref 4.7–6.1)
RBC # FLD: 14.9 % — HIGH (ref 11.5–14.5)
SODIUM SERPL-SCNC: 137 MMOL/L — SIGNIFICANT CHANGE UP (ref 135–146)
WBC # BLD: 14.48 K/UL — HIGH (ref 4.8–10.8)
WBC # FLD AUTO: 14.48 K/UL — HIGH (ref 4.8–10.8)

## 2019-07-10 PROCEDURE — 99232 SBSQ HOSP IP/OBS MODERATE 35: CPT

## 2019-07-10 PROCEDURE — 71045 X-RAY EXAM CHEST 1 VIEW: CPT | Mod: 26

## 2019-07-10 RX ORDER — MORPHINE SULFATE 50 MG/1
2 CAPSULE, EXTENDED RELEASE ORAL EVERY 4 HOURS
Refills: 0 | Status: DISCONTINUED | OUTPATIENT
Start: 2019-07-10 | End: 2019-07-13

## 2019-07-10 RX ORDER — ONDANSETRON 8 MG/1
4 TABLET, FILM COATED ORAL ONCE
Refills: 0 | Status: COMPLETED | OUTPATIENT
Start: 2019-07-10 | End: 2019-07-10

## 2019-07-10 RX ORDER — ONDANSETRON 8 MG/1
4 TABLET, FILM COATED ORAL ONCE
Refills: 0 | Status: DISCONTINUED | OUTPATIENT
Start: 2019-07-10 | End: 2019-07-10

## 2019-07-10 RX ORDER — ZOLPIDEM TARTRATE 10 MG/1
5 TABLET ORAL AT BEDTIME
Refills: 0 | Status: DISCONTINUED | OUTPATIENT
Start: 2019-07-10 | End: 2019-07-13

## 2019-07-10 RX ADMIN — MORPHINE SULFATE 4 MILLIGRAM(S): 50 CAPSULE, EXTENDED RELEASE ORAL at 01:31

## 2019-07-10 RX ADMIN — SODIUM CHLORIDE 200 MILLILITER(S): 9 INJECTION, SOLUTION INTRAVENOUS at 18:23

## 2019-07-10 RX ADMIN — Medication 1 MILLIGRAM(S): at 11:51

## 2019-07-10 RX ADMIN — MORPHINE SULFATE 2 MILLIGRAM(S): 50 CAPSULE, EXTENDED RELEASE ORAL at 13:24

## 2019-07-10 RX ADMIN — MORPHINE SULFATE 2 MILLIGRAM(S): 50 CAPSULE, EXTENDED RELEASE ORAL at 10:20

## 2019-07-10 RX ADMIN — MORPHINE SULFATE 2 MILLIGRAM(S): 50 CAPSULE, EXTENDED RELEASE ORAL at 17:45

## 2019-07-10 RX ADMIN — MORPHINE SULFATE 2 MILLIGRAM(S): 50 CAPSULE, EXTENDED RELEASE ORAL at 17:24

## 2019-07-10 RX ADMIN — CHLORHEXIDINE GLUCONATE 1 APPLICATION(S): 213 SOLUTION TOPICAL at 07:07

## 2019-07-10 RX ADMIN — MORPHINE SULFATE 2 MILLIGRAM(S): 50 CAPSULE, EXTENDED RELEASE ORAL at 13:50

## 2019-07-10 RX ADMIN — Medication 2 MILLIGRAM(S): at 21:20

## 2019-07-10 RX ADMIN — ONDANSETRON 8 MILLIGRAM(S): 8 TABLET, FILM COATED ORAL at 17:31

## 2019-07-10 RX ADMIN — ZOLPIDEM TARTRATE 5 MILLIGRAM(S): 10 TABLET ORAL at 23:19

## 2019-07-10 RX ADMIN — MORPHINE SULFATE 2 MILLIGRAM(S): 50 CAPSULE, EXTENDED RELEASE ORAL at 21:21

## 2019-07-10 RX ADMIN — Medication 1 PATCH: at 07:08

## 2019-07-10 RX ADMIN — MORPHINE SULFATE 2 MILLIGRAM(S): 50 CAPSULE, EXTENDED RELEASE ORAL at 04:31

## 2019-07-10 RX ADMIN — MORPHINE SULFATE 4 MILLIGRAM(S): 50 CAPSULE, EXTENDED RELEASE ORAL at 07:22

## 2019-07-10 RX ADMIN — Medication 2 MILLIGRAM(S): at 18:02

## 2019-07-10 RX ADMIN — MORPHINE SULFATE 2 MILLIGRAM(S): 50 CAPSULE, EXTENDED RELEASE ORAL at 10:01

## 2019-07-10 RX ADMIN — Medication 1 PATCH: at 11:51

## 2019-07-10 RX ADMIN — MORPHINE SULFATE 4 MILLIGRAM(S): 50 CAPSULE, EXTENDED RELEASE ORAL at 01:16

## 2019-07-10 RX ADMIN — ENOXAPARIN SODIUM 40 MILLIGRAM(S): 100 INJECTION SUBCUTANEOUS at 15:01

## 2019-07-10 RX ADMIN — Medication 100 MILLIGRAM(S): at 11:51

## 2019-07-10 RX ADMIN — MORPHINE SULFATE 2 MILLIGRAM(S): 50 CAPSULE, EXTENDED RELEASE ORAL at 21:40

## 2019-07-10 RX ADMIN — Medication 2 MILLIGRAM(S): at 10:00

## 2019-07-10 RX ADMIN — MORPHINE SULFATE 4 MILLIGRAM(S): 50 CAPSULE, EXTENDED RELEASE ORAL at 07:07

## 2019-07-10 RX ADMIN — ONDANSETRON 4 MILLIGRAM(S): 8 TABLET, FILM COATED ORAL at 01:30

## 2019-07-10 RX ADMIN — PANTOPRAZOLE SODIUM 40 MILLIGRAM(S): 20 TABLET, DELAYED RELEASE ORAL at 11:51

## 2019-07-10 RX ADMIN — ONDANSETRON 8 MILLIGRAM(S): 8 TABLET, FILM COATED ORAL at 07:07

## 2019-07-10 NOTE — PROGRESS NOTE ADULT - SUBJECTIVE AND OBJECTIVE BOX
Hospital Day # 2d    Gastroenterology team is following this patient for pancreatitis.    Interval Events: patient pain is better today , but still not tolerating diet , feeling nauseous but no vomiting, no bowl movement     PAST MEDICAL & SURGICAL HISTORY  Asthma: no recent exacerbation, not using inhalers for a long time  Pancreatitis: October 2018 due to alcohol  No pertinent past medical history  H/O rotator cuff surgery      SOCIAL HISTORY:  Alcohol: recent alcohol intake   Drug: no IVD abuse    ALLERGIES:  No Known Allergies      MEDICATIONS:  MEDICATIONS  (STANDING):  chlorhexidine 4% Liquid 1 Application(s) Topical <User Schedule>  enoxaparin Injectable 40 milliGRAM(s) SubCutaneous every 24 hours  folic acid 1 milliGRAM(s) Oral daily  lactated ringers. 1000 milliLiter(s) (200 mL/Hr) IV Continuous <Continuous>  nicotine -  14 mG/24Hr(s) Patch 1 patch Transdermal daily  ondansetron Injectable 8 milliGRAM(s) IV Push two times a day  pantoprazole  Injectable 40 milliGRAM(s) IV Push daily  thiamine 100 milliGRAM(s) Oral daily    MEDICATIONS  (PRN):  LORazepam     Tablet 2 milliGRAM(s) Oral every 4 hours PRN CIWA-Ar score 8 or greater  LORazepam     Tablet 1 milliGRAM(s) Oral every 6 hours PRN CIWA-Ar score increase by 2 points and a total score of 7 or less  morphine  - Injectable 2 milliGRAM(s) IV Push every 4 hours PRN Moderate Pain (4 - 6)      REVIEW OF SYSTEMS:    CONSTITUTIONAL: No fever  EYES/ENT: No scleral icterus  RESPIRATORY: No shortness of breath  CARDIOVASCULAR: No chest pain   GASTROINTESTINAL: as per HPI  GENITOURINARY: No dysuria  NEUROLOGICAL: No dizziness  SKIN: No cyanosis        VITALS:   T(F): 98.5 (07-10 @ 16:00), Max: 99.1 (07-09 @ 16:00)  HR: 110 (07-10 @ 18:00) (64 - 128)  BP: 139/83 (07-10 @ 18:00) (109/75 - 229/117)  BP(mean): 112 (07-10 @ 18:00) (90 - 155)  RR: 26 (07-10 @ 18:00) (14 - 30)  SpO2: 95% (07-10 @ 06:00) (84% - 100%)    I&O's Summary    09 Jul 2019 07:01  -  10 Jul 2019 07:00  --------------------------------------------------------  IN: 5100 mL / OUT: 3625 mL / NET: 1475 mL    10 Jul 2019 07:01  -  10 Jul 2019 19:19  --------------------------------------------------------  IN: 2760 mL / OUT: 1675 mL / NET: 1085 mL        Physical Exam:  GENERAL: NAD, well-developed  HEAD:  Atraumatic, Normocephalic  EYES: EOMI, PERRLA, conjunctiva and sclera clear  NECK: No thyromegaly  CHEST/LUNG: No accessory use of muscle  HEART: S1S2 Normal  ABDOMEN: Soft, mild epigastric tenderness, Nondistended; Bowel sounds present, no guarding or rigidity.  EXTREMITIES:  2+ Peripheral Pulses, No cyanosis  PSYCH: AAOx3  NEUROLOGY: non-focal      LABS:                        16.9   14.48 )-----------( 217      ( 10 Jul 2019 11:02 )             50.0         LIVER FUNCTIONS - ( 09 Jul 2019 04:54 )  Alb: 3.7 g/dL / Pro: 6.4 g/dL / ALK PHOS: 82 U/L / ALT: 90 U/L / AST: 48 U/L / GGT: x           LIVER FUNCTIONS - ( 09 Jul 2019 04:54 )  Alb: 3.7 [3.5 - 5.2] / Pro: 6.4 [6.0 - 8.0] / ALK PHOS: 82 [30 - 115] / ALT: 90 [0 - 41] / AST: 48 [0 - 41] / GGT: x     LIVER FUNCTIONS - ( 08 Jul 2019 14:00 )  Alb: 4.4 [3.5 - 5.2] / Pro: 7.5 [6.0 - 8.0] / ALK PHOS: 87 [30 - 115] / ALT: 149 [0 - 41] / AST: 106 [0 - 41] / GGT: x     LIVER FUNCTIONS - ( 08 Jul 2019 06:10 )  Alb: 4.4 [3.5 - 5.2] / Pro: 7.6 [6.0 - 8.0] / ALK PHOS: 94 [30 - 115] / ALT: 164 [0 - 41] / AST: 120 [0 - 41] / GGT: x       07-10    137  |  95<L>  |  9<L>  ----------------------------<  69<L>  4.0   |  28  |  0.9    Ca    8.9      10 Jul 2019 11:02  Phos  3.1     07-09  Mg     1.8     07-09        07-08 Chol 244<H> <H> HDL 71 Trig 192<H> Hospital Day # 2d    Gastroenterology team is following this patient for pancreatitis.    Interval Events: patient pain is better today , but still not tolerating diet , feeling nauseous but no vomiting, no bowel movement + flatus    PAST MEDICAL & SURGICAL HISTORY  Asthma: no recent exacerbation, not using inhalers for a long time  Pancreatitis: October 2018 due to alcohol  No pertinent past medical history  H/O rotator cuff surgery      SOCIAL HISTORY:  Alcohol: recent alcohol intake   Drug: no IVD abuse    ALLERGIES:  No Known Allergies      MEDICATIONS:  MEDICATIONS  (STANDING):  chlorhexidine 4% Liquid 1 Application(s) Topical <User Schedule>  enoxaparin Injectable 40 milliGRAM(s) SubCutaneous every 24 hours  folic acid 1 milliGRAM(s) Oral daily  lactated ringers. 1000 milliLiter(s) (200 mL/Hr) IV Continuous <Continuous>  nicotine -  14 mG/24Hr(s) Patch 1 patch Transdermal daily  ondansetron Injectable 8 milliGRAM(s) IV Push two times a day  pantoprazole  Injectable 40 milliGRAM(s) IV Push daily  thiamine 100 milliGRAM(s) Oral daily    MEDICATIONS  (PRN):  LORazepam     Tablet 2 milliGRAM(s) Oral every 4 hours PRN CIWA-Ar score 8 or greater  LORazepam     Tablet 1 milliGRAM(s) Oral every 6 hours PRN CIWA-Ar score increase by 2 points and a total score of 7 or less  morphine  - Injectable 2 milliGRAM(s) IV Push every 4 hours PRN Moderate Pain (4 - 6)      REVIEW OF SYSTEMS:    CONSTITUTIONAL: No fever  EYES/ENT: No scleral icterus  RESPIRATORY: No shortness of breath  CARDIOVASCULAR: No chest pain   GASTROINTESTINAL: as per HPI  GENITOURINARY: No dysuria  NEUROLOGICAL: No dizziness  SKIN: No cyanosis        VITALS:   T(F): 98.5 (07-10 @ 16:00), Max: 99.1 (07-09 @ 16:00)  HR: 110 (07-10 @ 18:00) (64 - 128)  BP: 139/83 (07-10 @ 18:00) (109/75 - 229/117)  BP(mean): 112 (07-10 @ 18:00) (90 - 155)  RR: 26 (07-10 @ 18:00) (14 - 30)  SpO2: 95% (07-10 @ 06:00) (84% - 100%)    I&O's Summary    09 Jul 2019 07:01  -  10 Jul 2019 07:00  --------------------------------------------------------  IN: 5100 mL / OUT: 3625 mL / NET: 1475 mL    10 Jul 2019 07:01  -  10 Jul 2019 19:19  --------------------------------------------------------  IN: 2760 mL / OUT: 1675 mL / NET: 1085 mL        Physical Exam:  GENERAL: NAD, well-developed  HEAD:  Atraumatic, Normocephalic  EYES: EOMI, PERRLA, conjunctiva and sclera clear  NECK: No thyromegaly  CHEST/LUNG: No accessory use of muscle  HEART: S1S2 Normal  ABDOMEN: Soft, mild epigastric tenderness, Nondistended; Bowel sounds present, no guarding or rigidity.  EXTREMITIES:  2+ Peripheral Pulses, No cyanosis  PSYCH: AAOx3  NEUROLOGY: non-focal      LABS:                        16.9   14.48 )-----------( 217      ( 10 Jul 2019 11:02 )             50.0         LIVER FUNCTIONS - ( 09 Jul 2019 04:54 )  Alb: 3.7 g/dL / Pro: 6.4 g/dL / ALK PHOS: 82 U/L / ALT: 90 U/L / AST: 48 U/L / GGT: x           LIVER FUNCTIONS - ( 09 Jul 2019 04:54 )  Alb: 3.7 [3.5 - 5.2] / Pro: 6.4 [6.0 - 8.0] / ALK PHOS: 82 [30 - 115] / ALT: 90 [0 - 41] / AST: 48 [0 - 41] / GGT: x     LIVER FUNCTIONS - ( 08 Jul 2019 14:00 )  Alb: 4.4 [3.5 - 5.2] / Pro: 7.5 [6.0 - 8.0] / ALK PHOS: 87 [30 - 115] / ALT: 149 [0 - 41] / AST: 106 [0 - 41] / GGT: x     LIVER FUNCTIONS - ( 08 Jul 2019 06:10 )  Alb: 4.4 [3.5 - 5.2] / Pro: 7.6 [6.0 - 8.0] / ALK PHOS: 94 [30 - 115] / ALT: 164 [0 - 41] / AST: 120 [0 - 41] / GGT: x       07-10    137  |  95<L>  |  9<L>  ----------------------------<  69<L>  4.0   |  28  |  0.9    Ca    8.9      10 Jul 2019 11:02  Phos  3.1     07-09  Mg     1.8     07-09        07-08 Chol 244<H> <H> HDL 71 Trig 192<H>

## 2019-07-10 NOTE — PROGRESS NOTE ADULT - ASSESSMENT
39y Male with PMH of intermittent asthma, alcoholic pancreatitis in October 2018, presenting to ED on 7/8 with epigastric pain .  Patient started yesterday , and is continuos . Patient pain is severe 10 /10 in intensity , band like , radiating to the back , associated with nausea and vomiting , relieved by Dilaudid in ED. Patient had recurrent episodes of non bloody , bilious vomiting since yesterday.   Pt states he stopped drinking alcohol since last admission in October. However, he admits has been drinking over the weekend starting 7/4 through 7/6 , drinking Vodka(last drink on 7/6).    ultrasound abdomen : No cholelithiasis or ductal dilatation.      #acute pancreatitis most likely alcohol induced   IV hydration   continue clear liquid for today   hematocrit trending down indicating adequate hydration  trend cbc, BMP  pain control  antiemetics PRN for nausea and vomiting   alcohol abstinence   KUB r/o ileus as patient is not tolerating diet, if KUB showed no ileus and patient remained not tolerating diet , will repeat CT abdomen and pelvis r/o pancreatic necrosis.     #transaminitis: r/o alcoholic hepatitis ( improving)   trend lfts      #alcohol abuse:   alcohol abstinence   thiamine and folic acid   watch for signs of withdrawal 39y Male with PMH of intermittent asthma, alcoholic pancreatitis in October 2018, presenting to ED on 7/8 with epigastric pain .  Patient started yesterday , and is continuos . Patient pain is severe 10 /10 in intensity , band like , radiating to the back , associated with nausea and vomiting , relieved by Dilaudid in ED. Patient had recurrent episodes of non bloody , bilious vomiting since yesterday.   Pt states he stopped drinking alcohol since last admission in October. However, he admits has been drinking over the weekend starting 7/4 through 7/6 , drinking Vodka(last drink on 7/6).    ultrasound abdomen : No cholelithiasis or ductal dilatation.      #acute pancreatitis most likely alcohol induced   IV hydration    hematocrit trending down indicating adequate hydration  trend cbc, BMP  pain control  antiemetics PRN for nausea and vomiting   alcohol abstinence   KUB r/o ileus as patient is not tolerating diet, if KUB showed no ileus or SBO, try clear liquids. If not tolerated, will repeat CT abdomen and pelvis Pancreas protocol r/o pancreatic necrosis.     #transaminitis: r/o alcoholic hepatitis ( improving)   trend lfts      #alcohol abuse:   alcohol abstinence   thiamine and folic acid   watch for signs of withdrawal

## 2019-07-10 NOTE — PROGRESS NOTE ADULT - SUBJECTIVE AND OBJECTIVE BOX
Patient is a 39y old  Male who presents with a chief complaint of Epigastric pain (09 Jul 2019 18:16)    Over Night Events:    Doing better  Still nauseous     ROS:  See HPI    PHYSICAL EXAM    ICU Vital Signs Last 24 Hrs  T(C): 36.8 (10 Jul 2019 00:00), Max: 37.3 (09 Jul 2019 16:00)  T(F): 98.2 (10 Jul 2019 00:00), Max: 99.1 (09 Jul 2019 16:00)  HR: 100 (10 Jul 2019 06:00) (100 - 126)  BP: 162/79 (10 Jul 2019 06:00) (109/75 - 162/79)  BP(mean): 101 (10 Jul 2019 06:00) (90 - 114)  ABP: --  ABP(mean): --  RR: 30 (10 Jul 2019 06:00) (14 - 30)  SpO2: 95% (10 Jul 2019 06:00) (95% - 98%)      General: AAO x 3   HEENT: HARRIETT             Lymphatic system: No cervical LN   Lungs: Bilateral BS  Cardiovascular: Regular   Gastrointestinal: Soft, Positive BS  Extremities: No clubbing.  Moves extremities.  Full Range of motion   Skin: Warm, intact  Neurological: No motor or sensory deficit , no tremors       07-09-19 @ 07:01  -  07-10-19 @ 07:00  --------------------------------------------------------  IN:    lactated ringers.: 900 mL    lactated ringers.: 4200 mL  Total IN: 5100 mL    OUT:    Voided: 3625 mL  Total OUT: 3625 mL    Total NET: 1475 mL    LABS:                            18.5   17.92 )-----------( 232      ( 09 Jul 2019 17:37 )             53.5                                               07-09    136  |  96<L>  |  7<L>  ----------------------------<  73  4.1   |  26  |  0.9    Ca    8.8      09 Jul 2019 17:37  Phos  3.1     07-09  Mg     1.8     07-09    TPro  6.4  /  Alb  3.7  /  TBili  1.2  /  DBili  0.3<H>  /  AST  48<H>  /  ALT  90<H>  /  AlkPhos  82  07-09                                                 CARDIAC MARKERS ( 08 Jul 2019 16:00 )  x     / <0.01 ng/mL / x     / x     / x                                                LIVER FUNCTIONS - ( 09 Jul 2019 04:54 )  Alb: 3.7 g/dL / Pro: 6.4 g/dL / ALK PHOS: 82 U/L / ALT: 90 U/L / AST: 48 U/L / GGT: x                                                                 EDICATIONS  (STANDING):  chlorhexidine 4% Liquid 1 Application(s) Topical <User Schedule>  enoxaparin Injectable 40 milliGRAM(s) SubCutaneous every 24 hours  folic acid 1 milliGRAM(s) Oral daily  lactated ringers. 1000 milliLiter(s) (200 mL/Hr) IV Continuous <Continuous>  nicotine -  14 mG/24Hr(s) Patch 1 patch Transdermal daily  ondansetron Injectable 8 milliGRAM(s) IV Push two times a day  pantoprazole  Injectable 40 milliGRAM(s) IV Push daily  thiamine 100 milliGRAM(s) Oral daily    MEDICATIONS  (PRN):  LORazepam     Tablet 2 milliGRAM(s) Oral every 4 hours PRN CIWA-Ar score 8 or greater  LORazepam     Tablet 1 milliGRAM(s) Oral every 4 hours PRN CIWA-Ar score increase by 2 points and a total score of 7 or less  morphine  - Injectable 2 milliGRAM(s) IV Push every 3 hours PRN Moderate Pain (4 - 6)  morphine  - Injectable 4 milliGRAM(s) IV Push every 4 hours PRN Severe Pain (7 - 10)      Xrays:                                                                                     ECHO Patient is a 39y old  Male who presents with a chief complaint of Epigastric pain (09 Jul 2019 18:16)    Over Night Events:    Doing better  Still nauseous     ROS:  See HPI    PHYSICAL EXAM    ICU Vital Signs Last 24 Hrs  T(C): 36.8 (10 Jul 2019 00:00), Max: 37.3 (09 Jul 2019 16:00)  T(F): 98.2 (10 Jul 2019 00:00), Max: 99.1 (09 Jul 2019 16:00)  HR: 100 (10 Jul 2019 06:00) (100 - 126)  BP: 162/79 (10 Jul 2019 06:00) (109/75 - 162/79)  BP(mean): 101 (10 Jul 2019 06:00) (90 - 114)  RR: 30 (10 Jul 2019 06:00) (14 - 30)  SpO2: 95% (10 Jul 2019 06:00) (95% - 98%)      General: AAO x 3   HEENT: HARRIETT             Lymphatic system: No cervical LN   Lungs: Bilateral BS  Cardiovascular: Regular   Gastrointestinal: Soft, Positive BS. mild epigastric tenderness  Extremities: No clubbing.  Moves extremities.  Full Range of motion   Skin: Warm, intact  Neurological: No motor or sensory deficit , no tremors       07-09-19 @ 07:01  -  07-10-19 @ 07:00  --------------------------------------------------------  IN:    lactated ringers.: 900 mL    lactated ringers.: 4200 mL  Total IN: 5100 mL    OUT:    Voided: 3625 mL  Total OUT: 3625 mL    Total NET: 1475 mL    LABS:                            18.5   17.92 )-----------( 232      ( 09 Jul 2019 17:37 )             53.5                                               07-09    136  |  96<L>  |  7<L>  ----------------------------<  73  4.1   |  26  |  0.9    Ca    8.8      09 Jul 2019 17:37  Phos  3.1     07-09  Mg     1.8     07-09    TPro  6.4  /  Alb  3.7  /  TBili  1.2  /  DBili  0.3<H>  /  AST  48<H>  /  ALT  90<H>  /  AlkPhos  82  07-09                                                 CARDIAC MARKERS ( 08 Jul 2019 16:00 )  x     / <0.01 ng/mL / x     / x     / x                                                LIVER FUNCTIONS - ( 09 Jul 2019 04:54 )  Alb: 3.7 g/dL / Pro: 6.4 g/dL / ALK PHOS: 82 U/L / ALT: 90 U/L / AST: 48 U/L / GGT: x                                                                 EDICATIONS  (STANDING):  chlorhexidine 4% Liquid 1 Application(s) Topical <User Schedule>  enoxaparin Injectable 40 milliGRAM(s) SubCutaneous every 24 hours  folic acid 1 milliGRAM(s) Oral daily  lactated ringers. 1000 milliLiter(s) (200 mL/Hr) IV Continuous <Continuous>  nicotine -  14 mG/24Hr(s) Patch 1 patch Transdermal daily  ondansetron Injectable 8 milliGRAM(s) IV Push two times a day  pantoprazole  Injectable 40 milliGRAM(s) IV Push daily  thiamine 100 milliGRAM(s) Oral daily    MEDICATIONS  (PRN):  LORazepam     Tablet 2 milliGRAM(s) Oral every 4 hours PRN CIWA-Ar score 8 or greater  LORazepam     Tablet 1 milliGRAM(s) Oral every 4 hours PRN CIWA-Ar score increase by 2 points and a total score of 7 or less  morphine  - Injectable 2 milliGRAM(s) IV Push every 3 hours PRN Moderate Pain (4 - 6)  morphine  - Injectable 4 milliGRAM(s) IV Push every 4 hours PRN Severe Pain (7 - 10)      Xrays:   reviewed

## 2019-07-10 NOTE — PROGRESS NOTE ADULT - ASSESSMENT
IMPRESSION:    Pancreatitis/ acute (binge drinking)   ETOH withdrawal      PLAN:    CNS: CIWA protocol, thiamine, folate,     HEENT: Oral care    PULMONARY:  HOB @ 45 degrees, smoking cessation, nicotine patch     CARDIOVASCULAR: LR @ 250    GI: GI prophylaxis. NPO, pain control, GI f/u, trend lft    RENAL:  Follow up lytes.  Correct as needed    INFECTIOUS DISEASE: Follow up cultures, no abx     HEMATOLOGICAL:  DVT prophylaxis. trend cbc, High Hb 2/2 testosterone injections      ENDOCRINE:  Follow up FS.  Insulin protocol if needed.    Downgrade to floor

## 2019-07-10 NOTE — CHART NOTE - NSCHARTNOTEFT_GEN_A_CORE
ICU DOWNGRADE NOTE:    39y Male transferred to floor from ICU    Patient is a 39y old Male who presents with a chief complaint of Epigastric pain (10 Jul 2019 10:05)    The patient is currently admitted for the primary diagnosis of Pancreatitis, alcoholic, acute    The patient was admitted to the unit for (2) Days.    The patient (was never) intubated for (days), and (was never) on pressors for (days).    Indwelling vascular catheters: no    Urinary Catheter: no    Disposition: to home when medically stable    Code Status: full     ICU COURSE OF EVENTS:  -------------------------------------------------------------------------------------------  39 year old male with pmh of alcoholic pancreatitis and asthma . Patient presented with epigastric pain after alcohol binge. Lipase was elevated at 586 now trending down to about 150. CT scan showed acute pancreatitis. Patient was given boluses of LR and put on IV fluid LR. Pain control via morphine PRN. patient started on clear liquid diet and is to be advanced as tolerated. Ativan is given according to CIWA protocol.   -------------------------------------------------------------------------------------------    Current workup in progress:      Assessment and Plan:   · Assessment	  Assessment and Plan  Patient is a 39y old Male who presents with a chief complaint of Epigastric pain (08 Jul 2019 14:53)    Acute pancreatitis  -d/t alcohol use  -lipase 586 on admission now 167  - AST/ALT (106/149)  elevated on admission trending down(48/90),   - abdominal ultrasound shows Hepatic steatosis.  - LR @ 200 ml/hr  - pain control , continue with morphine prn  - diet- full liquids     Alcohol withdrawal   -CIWA protocol    Possible thiamine deficiency   - thiamine supplemented    Possible Folate deficiency   -folate supplemented     Polycythemia possible copd?  - f/u repeat cbc   - digital clubbing     Hypomagnesemia  -resolved      diet - full liquids  dvt ppx - enoxaparin   07-10-19 @ 15:25 GIVEN TO:

## 2019-07-11 LAB
ALBUMIN SERPL ELPH-MCNC: 3.2 G/DL — LOW (ref 3.5–5.2)
ALP SERPL-CCNC: 64 U/L — SIGNIFICANT CHANGE UP (ref 30–115)
ALT FLD-CCNC: 48 U/L — HIGH (ref 0–41)
ANION GAP SERPL CALC-SCNC: 17 MMOL/L — HIGH (ref 7–14)
AST SERPL-CCNC: 25 U/L — SIGNIFICANT CHANGE UP (ref 0–41)
BASOPHILS # BLD AUTO: 0.03 K/UL — SIGNIFICANT CHANGE UP (ref 0–0.2)
BASOPHILS NFR BLD AUTO: 0.2 % — SIGNIFICANT CHANGE UP (ref 0–1)
BILIRUB SERPL-MCNC: 1.1 MG/DL — SIGNIFICANT CHANGE UP (ref 0.2–1.2)
BUN SERPL-MCNC: 7 MG/DL — LOW (ref 10–20)
CALCIUM SERPL-MCNC: 8.8 MG/DL — SIGNIFICANT CHANGE UP (ref 8.5–10.1)
CHLORIDE SERPL-SCNC: 97 MMOL/L — LOW (ref 98–110)
CO2 SERPL-SCNC: 24 MMOL/L — SIGNIFICANT CHANGE UP (ref 17–32)
CREAT SERPL-MCNC: 0.7 MG/DL — SIGNIFICANT CHANGE UP (ref 0.7–1.5)
EOSINOPHIL # BLD AUTO: 0.04 K/UL — SIGNIFICANT CHANGE UP (ref 0–0.7)
EOSINOPHIL NFR BLD AUTO: 0.3 % — SIGNIFICANT CHANGE UP (ref 0–8)
GLUCOSE BLDC GLUCOMTR-MCNC: 127 MG/DL — HIGH (ref 70–99)
GLUCOSE SERPL-MCNC: 86 MG/DL — SIGNIFICANT CHANGE UP (ref 70–99)
HCT VFR BLD CALC: 46.8 % — SIGNIFICANT CHANGE UP (ref 42–52)
HGB BLD-MCNC: 16 G/DL — SIGNIFICANT CHANGE UP (ref 14–18)
IMM GRANULOCYTES NFR BLD AUTO: 0.5 % — HIGH (ref 0.1–0.3)
LYMPHOCYTES # BLD AUTO: 1.02 K/UL — LOW (ref 1.2–3.4)
LYMPHOCYTES # BLD AUTO: 7.6 % — LOW (ref 20.5–51.1)
MCHC RBC-ENTMCNC: 32.1 PG — HIGH (ref 27–31)
MCHC RBC-ENTMCNC: 34.2 G/DL — SIGNIFICANT CHANGE UP (ref 32–37)
MCV RBC AUTO: 93.8 FL — SIGNIFICANT CHANGE UP (ref 80–94)
MONOCYTES # BLD AUTO: 1.27 K/UL — HIGH (ref 0.1–0.6)
MONOCYTES NFR BLD AUTO: 9.5 % — HIGH (ref 1.7–9.3)
NEUTROPHILS # BLD AUTO: 10.98 K/UL — HIGH (ref 1.4–6.5)
NEUTROPHILS NFR BLD AUTO: 81.9 % — HIGH (ref 42.2–75.2)
NRBC # BLD: 0 /100 WBCS — SIGNIFICANT CHANGE UP (ref 0–0)
PLATELET # BLD AUTO: 190 K/UL — SIGNIFICANT CHANGE UP (ref 130–400)
POTASSIUM SERPL-MCNC: 3.7 MMOL/L — SIGNIFICANT CHANGE UP (ref 3.5–5)
POTASSIUM SERPL-SCNC: 3.7 MMOL/L — SIGNIFICANT CHANGE UP (ref 3.5–5)
PROT SERPL-MCNC: 5.7 G/DL — LOW (ref 6–8)
RBC # BLD: 4.99 M/UL — SIGNIFICANT CHANGE UP (ref 4.7–6.1)
RBC # FLD: 14.5 % — SIGNIFICANT CHANGE UP (ref 11.5–14.5)
SODIUM SERPL-SCNC: 138 MMOL/L — SIGNIFICANT CHANGE UP (ref 135–146)
WBC # BLD: 13.41 K/UL — HIGH (ref 4.8–10.8)
WBC # FLD AUTO: 13.41 K/UL — HIGH (ref 4.8–10.8)

## 2019-07-11 PROCEDURE — 99232 SBSQ HOSP IP/OBS MODERATE 35: CPT

## 2019-07-11 PROCEDURE — 74177 CT ABD & PELVIS W/CONTRAST: CPT | Mod: 26

## 2019-07-11 RX ORDER — LANOLIN ALCOHOL/MO/W.PET/CERES
5 CREAM (GRAM) TOPICAL AT BEDTIME
Refills: 0 | Status: DISCONTINUED | OUTPATIENT
Start: 2019-07-11 | End: 2019-07-13

## 2019-07-11 RX ORDER — DOCUSATE SODIUM 100 MG
100 CAPSULE ORAL THREE TIMES A DAY
Refills: 0 | Status: DISCONTINUED | OUTPATIENT
Start: 2019-07-11 | End: 2019-07-13

## 2019-07-11 RX ORDER — SENNA PLUS 8.6 MG/1
2 TABLET ORAL AT BEDTIME
Refills: 0 | Status: DISCONTINUED | OUTPATIENT
Start: 2019-07-11 | End: 2019-07-13

## 2019-07-11 RX ORDER — IOHEXOL 300 MG/ML
30 INJECTION, SOLUTION INTRAVENOUS ONCE
Refills: 0 | Status: COMPLETED | OUTPATIENT
Start: 2019-07-11 | End: 2019-07-11

## 2019-07-11 RX ADMIN — MORPHINE SULFATE 2 MILLIGRAM(S): 50 CAPSULE, EXTENDED RELEASE ORAL at 22:54

## 2019-07-11 RX ADMIN — Medication 100 MILLIGRAM(S): at 11:03

## 2019-07-11 RX ADMIN — MORPHINE SULFATE 2 MILLIGRAM(S): 50 CAPSULE, EXTENDED RELEASE ORAL at 11:00

## 2019-07-11 RX ADMIN — MORPHINE SULFATE 2 MILLIGRAM(S): 50 CAPSULE, EXTENDED RELEASE ORAL at 02:20

## 2019-07-11 RX ADMIN — MORPHINE SULFATE 2 MILLIGRAM(S): 50 CAPSULE, EXTENDED RELEASE ORAL at 05:56

## 2019-07-11 RX ADMIN — MORPHINE SULFATE 2 MILLIGRAM(S): 50 CAPSULE, EXTENDED RELEASE ORAL at 14:36

## 2019-07-11 RX ADMIN — Medication 100 MILLIGRAM(S): at 08:53

## 2019-07-11 RX ADMIN — Medication 1 PATCH: at 10:58

## 2019-07-11 RX ADMIN — Medication 1 PATCH: at 15:00

## 2019-07-11 RX ADMIN — IOHEXOL 30 MILLILITER(S): 300 INJECTION, SOLUTION INTRAVENOUS at 09:57

## 2019-07-11 RX ADMIN — ONDANSETRON 8 MILLIGRAM(S): 8 TABLET, FILM COATED ORAL at 18:06

## 2019-07-11 RX ADMIN — Medication 5 MILLIGRAM(S): at 17:08

## 2019-07-11 RX ADMIN — ONDANSETRON 8 MILLIGRAM(S): 8 TABLET, FILM COATED ORAL at 05:55

## 2019-07-11 RX ADMIN — MORPHINE SULFATE 2 MILLIGRAM(S): 50 CAPSULE, EXTENDED RELEASE ORAL at 18:40

## 2019-07-11 RX ADMIN — Medication 100 MILLIGRAM(S): at 13:00

## 2019-07-11 RX ADMIN — PANTOPRAZOLE SODIUM 40 MILLIGRAM(S): 20 TABLET, DELAYED RELEASE ORAL at 11:03

## 2019-07-11 RX ADMIN — Medication 2 MILLIGRAM(S): at 01:56

## 2019-07-11 RX ADMIN — Medication 1 MILLIGRAM(S): at 11:02

## 2019-07-11 RX ADMIN — SODIUM CHLORIDE 200 MILLILITER(S): 9 INJECTION, SOLUTION INTRAVENOUS at 00:39

## 2019-07-11 RX ADMIN — MORPHINE SULFATE 2 MILLIGRAM(S): 50 CAPSULE, EXTENDED RELEASE ORAL at 10:12

## 2019-07-11 RX ADMIN — MORPHINE SULFATE 2 MILLIGRAM(S): 50 CAPSULE, EXTENDED RELEASE ORAL at 01:56

## 2019-07-11 RX ADMIN — Medication 1 MILLIGRAM(S): at 10:12

## 2019-07-11 RX ADMIN — Medication 2 MILLIGRAM(S): at 05:56

## 2019-07-11 RX ADMIN — MORPHINE SULFATE 2 MILLIGRAM(S): 50 CAPSULE, EXTENDED RELEASE ORAL at 06:20

## 2019-07-11 RX ADMIN — ZOLPIDEM TARTRATE 5 MILLIGRAM(S): 10 TABLET ORAL at 23:20

## 2019-07-11 RX ADMIN — SODIUM CHLORIDE 200 MILLILITER(S): 9 INJECTION, SOLUTION INTRAVENOUS at 05:56

## 2019-07-11 NOTE — PROGRESS NOTE ADULT - SUBJECTIVE AND OBJECTIVE BOX
Hospital Day # 3d    Gastroenterology team is following this patient for acute pancreatitis.    Interval Events:  no acute events overnight , patient pain is better , but intermittently his pain gets worse. Patient did not tolerate clear liquids yet.    PAST MEDICAL & SURGICAL HISTORY  Asthma: no recent exacerbation, not using inhalers for a long time  Pancreatitis: October 2018 due to alcohol  No pertinent past medical history  H/O rotator cuff surgery        SOCIAL HISTORY:  Alcohol: intermittent alcohol intake      ALLERGIES:  No Known Allergies      MEDICATIONS:  MEDICATIONS  (STANDING):  chlorhexidine 4% Liquid 1 Application(s) Topical <User Schedule>  docusate sodium 100 milliGRAM(s) Oral three times a day  enoxaparin Injectable 40 milliGRAM(s) SubCutaneous every 24 hours  folic acid 1 milliGRAM(s) Oral daily  lactated ringers. 1000 milliLiter(s) (200 mL/Hr) IV Continuous <Continuous>  melatonin 5 milliGRAM(s) Oral at bedtime  nicotine -  14 mG/24Hr(s) Patch 1 patch Transdermal daily  ondansetron Injectable 8 milliGRAM(s) IV Push two times a day  pantoprazole  Injectable 40 milliGRAM(s) IV Push daily  senna 2 Tablet(s) Oral at bedtime    MEDICATIONS  (PRN):  LORazepam     Tablet 2 milliGRAM(s) Oral every 4 hours PRN CIWA-Ar score 8 or greater  LORazepam     Tablet 1 milliGRAM(s) Oral every 6 hours PRN CIWA-Ar score increase by 2 points and a total score of 7 or less  morphine  - Injectable 2 milliGRAM(s) IV Push every 4 hours PRN Moderate Pain (4 - 6)  zolpidem 5 milliGRAM(s) Oral at bedtime PRN Insomnia      REVIEW OF SYSTEMS:    CONSTITUTIONAL: No fever  EYES/ENT: No scleral icterus  RESPIRATORY: No shortness of breath  CARDIOVASCULAR: No chest pain   GASTROINTESTINAL: as per HPI   GENITOURINARY: No dysuria  NEUROLOGICAL: No dizziness  SKIN: No cyanosis        VITALS:   T(F): 97.5 (07-11 @ 12:01), Max: 99.1 (07-09 @ 16:00)  HR: 105 (07-11 @ 12:01) (71 - 128)  BP: 159/90 (07-11 @ 12:01) (109/75 - 202/103)  BP(mean): 112 (07-10 @ 18:00) (90 - 155)  RR: 18 (07-11 @ 12:01) (14 - 30)  SpO2: 95% (07-10 @ 06:00) (84% - 100%)    I&O's Summary    10 Jul 2019 07:01  -  11 Jul 2019 07:00  --------------------------------------------------------  IN: 2880 mL / OUT: 1975 mL / NET: 905 mL        Physical Exam:  GENERAL: NAD, well-developed  HEAD:  Atraumatic, Normocephalic  EYES: EOMI, PERRLA, conjunctiva and sclera clear  NECK: No thyromegaly  CHEST/LUNG: No accessory use of muscle  HEART: S1S2 Normal  ABDOMEN: Soft, mild epigastric tenderness, Nondistended; Bowel sounds present, no guarding or rigidity.  EXTREMITIES:  2+ Peripheral Pulses, No cyanosis  PSYCH: AAOx3  NEUROLOGY: non-focal      LABS:                        16.0   13.41 )-----------( 190      ( 11 Jul 2019 07:44 )             46.8         LIVER FUNCTIONS - ( 11 Jul 2019 07:44 )  Alb: 3.2 g/dL / Pro: 5.7 g/dL / ALK PHOS: 64 U/L / ALT: 48 U/L / AST: 25 U/L / GGT: x           LIVER FUNCTIONS - ( 11 Jul 2019 07:44 )  Alb: 3.2 [3.5 - 5.2] / Pro: 5.7 [6.0 - 8.0] / ALK PHOS: 64 [30 - 115] / ALT: 48 [0 - 41] / AST: 25 [0 - 41] / GGT: x     LIVER FUNCTIONS - ( 09 Jul 2019 04:54 )  Alb: 3.7 [3.5 - 5.2] / Pro: 6.4 [6.0 - 8.0] / ALK PHOS: 82 [30 - 115] / ALT: 90 [0 - 41] / AST: 48 [0 - 41] / GGT: x     LIVER FUNCTIONS - ( 08 Jul 2019 14:00 )  Alb: 4.4 [3.5 - 5.2] / Pro: 7.5 [6.0 - 8.0] / ALK PHOS: 87 [30 - 115] / ALT: 149 [0 - 41] / AST: 106 [0 - 41] / GGT: x     LIVER FUNCTIONS - ( 08 Jul 2019 06:10 )  Alb: 4.4 [3.5 - 5.2] / Pro: 7.6 [6.0 - 8.0] / ALK PHOS: 94 [30 - 115] / ALT: 164 [0 - 41] / AST: 120 [0 - 41] / GGT: x       07-11    138  |  97<L>  |  7<L>  ----------------------------<  86  3.7   |  24  |  0.7    Ca    8.8      11 Jul 2019 07:44  Phos  3.1     07-09  Mg     1.8     07-09 07-08 Chol 244<H> <H> HDL 71 Trig 192<H>        RADIOLOGY:    < from: CT Abdomen and Pelvis w/ Oral Cont and w/ IV Cont (07.11.19 @ 14:10) >  1.  Since July 8, 2019, persistent findings of acute pancreatitis without   evidence for pancreatic necrosis or loculated fluid collections.    2.  Hepatic steatosis and hepatomegaly.    3.  Trace bilateral pleural effusions and bibasilar atelectasis.    < end of copied text > Hospital Day # 3d    Gastroenterology team is following this patient for acute pancreatitis.    Interval Events:  no acute events overnight , patient pain is better , but intermittently his pain gets worse. Patient did not tolerate clear liquids.    PAST MEDICAL & SURGICAL HISTORY  Asthma: no recent exacerbation, not using inhalers for a long time  Pancreatitis: October 2018 due to alcohol  No pertinent past medical history  H/O rotator cuff surgery        SOCIAL HISTORY:  Alcohol: intermittent alcohol intake      ALLERGIES:  No Known Allergies      MEDICATIONS:  MEDICATIONS  (STANDING):  chlorhexidine 4% Liquid 1 Application(s) Topical <User Schedule>  docusate sodium 100 milliGRAM(s) Oral three times a day  enoxaparin Injectable 40 milliGRAM(s) SubCutaneous every 24 hours  folic acid 1 milliGRAM(s) Oral daily  lactated ringers. 1000 milliLiter(s) (200 mL/Hr) IV Continuous <Continuous>  melatonin 5 milliGRAM(s) Oral at bedtime  nicotine -  14 mG/24Hr(s) Patch 1 patch Transdermal daily  ondansetron Injectable 8 milliGRAM(s) IV Push two times a day  pantoprazole  Injectable 40 milliGRAM(s) IV Push daily  senna 2 Tablet(s) Oral at bedtime    MEDICATIONS  (PRN):  LORazepam     Tablet 2 milliGRAM(s) Oral every 4 hours PRN CIWA-Ar score 8 or greater  LORazepam     Tablet 1 milliGRAM(s) Oral every 6 hours PRN CIWA-Ar score increase by 2 points and a total score of 7 or less  morphine  - Injectable 2 milliGRAM(s) IV Push every 4 hours PRN Moderate Pain (4 - 6)  zolpidem 5 milliGRAM(s) Oral at bedtime PRN Insomnia      REVIEW OF SYSTEMS:    CONSTITUTIONAL: No fever  EYES/ENT: No scleral icterus  RESPIRATORY: No shortness of breath  CARDIOVASCULAR: No chest pain   GASTROINTESTINAL: as per HPI   GENITOURINARY: No dysuria  NEUROLOGICAL: No dizziness  SKIN: No cyanosis        VITALS:   T(F): 97.5 (07-11 @ 12:01), Max: 99.1 (07-09 @ 16:00)  HR: 105 (07-11 @ 12:01) (71 - 128)  BP: 159/90 (07-11 @ 12:01) (109/75 - 202/103)  BP(mean): 112 (07-10 @ 18:00) (90 - 155)  RR: 18 (07-11 @ 12:01) (14 - 30)  SpO2: 95% (07-10 @ 06:00) (84% - 100%)    I&O's Summary    10 Jul 2019 07:01  -  11 Jul 2019 07:00  --------------------------------------------------------  IN: 2880 mL / OUT: 1975 mL / NET: 905 mL        Physical Exam:  GENERAL: NAD, well-developed  HEAD:  Atraumatic, Normocephalic  EYES: EOMI, PERRLA, conjunctiva and sclera clear  NECK: No thyromegaly  CHEST/LUNG: No accessory use of muscle  HEART: S1S2 Normal  ABDOMEN: Soft, mild epigastric tenderness, Nondistended; Bowel sounds present, no guarding or rigidity.  EXTREMITIES:  2+ Peripheral Pulses, No cyanosis  PSYCH: AAOx3  NEUROLOGY: non-focal      LABS:                        16.0   13.41 )-----------( 190      ( 11 Jul 2019 07:44 )             46.8         LIVER FUNCTIONS - ( 11 Jul 2019 07:44 )  Alb: 3.2 g/dL / Pro: 5.7 g/dL / ALK PHOS: 64 U/L / ALT: 48 U/L / AST: 25 U/L / GGT: x           LIVER FUNCTIONS - ( 11 Jul 2019 07:44 )  Alb: 3.2 [3.5 - 5.2] / Pro: 5.7 [6.0 - 8.0] / ALK PHOS: 64 [30 - 115] / ALT: 48 [0 - 41] / AST: 25 [0 - 41] / GGT: x     LIVER FUNCTIONS - ( 09 Jul 2019 04:54 )  Alb: 3.7 [3.5 - 5.2] / Pro: 6.4 [6.0 - 8.0] / ALK PHOS: 82 [30 - 115] / ALT: 90 [0 - 41] / AST: 48 [0 - 41] / GGT: x     LIVER FUNCTIONS - ( 08 Jul 2019 14:00 )  Alb: 4.4 [3.5 - 5.2] / Pro: 7.5 [6.0 - 8.0] / ALK PHOS: 87 [30 - 115] / ALT: 149 [0 - 41] / AST: 106 [0 - 41] / GGT: x     LIVER FUNCTIONS - ( 08 Jul 2019 06:10 )  Alb: 4.4 [3.5 - 5.2] / Pro: 7.6 [6.0 - 8.0] / ALK PHOS: 94 [30 - 115] / ALT: 164 [0 - 41] / AST: 120 [0 - 41] / GGT: x       07-11    138  |  97<L>  |  7<L>  ----------------------------<  86  3.7   |  24  |  0.7    Ca    8.8      11 Jul 2019 07:44  Phos  3.1     07-09  Mg     1.8     07-09 07-08 Chol 244<H> <H> HDL 71 Trig 192<H>        RADIOLOGY:    < from: CT Abdomen and Pelvis w/ Oral Cont and w/ IV Cont (07.11.19 @ 14:10) >  1.  Since July 8, 2019, persistent findings of acute pancreatitis without   evidence for pancreatic necrosis or loculated fluid collections.    2.  Hepatic steatosis and hepatomegaly.    3.  Trace bilateral pleural effusions and bibasilar atelectasis.    < end of copied text >

## 2019-07-11 NOTE — PROGRESS NOTE ADULT - ASSESSMENT
39y Male with PMH of intermittent asthma, alcoholic pancreatitis in October 2018, presenting to ED on 7/8 with epigastric pain .  Patient started yesterday , and is continuos . Patient pain is severe 10 /10 in intensity , band like , radiating to the back , associated with nausea and vomiting , relieved by Dilaudid in ED. Patient had recurrent episodes of non bloody , bilious vomiting since yesterday.   Pt states he stopped drinking alcohol since last admission in October. However, he admits has been drinking over the weekend starting 7/4 through 7/6 , drinking Vodka(last drink on 7/6).    ultrasound abdomen : No cholelithiasis or ductal dilatation.  ct abdomen 7/11 : no evidence of necrosis or  loculated fluid collections.   no evidence of bowl obstruction or ileus.     #acute pancreatitis most likely alcohol induced   IV hydration   hematocrit trending down indicating adequate hydration  trend cbc, BMP  pain control  antiemetics PRN for nausea and vomiting   alcohol abstinence   Keep patient on clear liquid for now     #transaminitis: r/o alcoholic hepatitis ( improving)   trend lfts      #alcohol abuse:   alcohol abstinence   thiamine and folic acid   watch for signs of withdrawal 39y Male with PMH of intermittent asthma, alcoholic pancreatitis in October 2018, presenting to ED on 7/8 with epigastric pain .  Patient started yesterday , and is continuos . Patient pain is severe 10 /10 in intensity , band like , radiating to the back , associated with nausea and vomiting , relieved by Dilaudid in ED. Patient had recurrent episodes of non bloody , bilious vomiting since yesterday.   Pt states he stopped drinking alcohol since last admission in October. However, he admits has been drinking over the weekend starting 7/4 through 7/6 , drinking Vodka(last drink on 7/6).    ultrasound abdomen : No cholelithiasis or ductal dilatation.  ct abdomen 7/11 : no evidence of necrosis or  loculated fluid collections.   no evidence of bowel obstruction or ileus.     #acute pancreatitis most likely alcohol induced   IV hydration   hematocrit trending down indicating adequate hydration  trend cbc, BMP  pain control  antiemetics PRN for nausea and vomiting   alcohol abstinence   Keep patient on clear liquid for now     #transaminitis: r/o alcoholic hepatitis ( improving)   trend lfts      #alcohol abuse:   alcohol abstinence   thiamine and folic acid   watch for signs of withdrawal

## 2019-07-11 NOTE — PROGRESS NOTE ADULT - SUBJECTIVE AND OBJECTIVE BOX
DENISSE GALVAN 39y Male  MRN#: [id number]   CODE STATUS:________      SUBJECTIVE  No new complaints, except difficulty sleeping, Epigastric pain improved  OBJECTIVE    VITAL SIGNS: Last 24 Hours  T(C): 36.4 (11 Jul 2019 12:01), Max: 36.4 (11 Jul 2019 12:01)  T(F): 97.5 (11 Jul 2019 12:01), Max: 97.5 (11 Jul 2019 12:01)  HR: 105 (11 Jul 2019 12:01) (103 - 107)  BP: 159/90 (11 Jul 2019 12:01) (138/85 - 166/78)  BP(mean): --  RR: 18 (11 Jul 2019 12:01) (18 - 24)  SpO2: --    PHYSICAL EXAM:    GENERAL: NAD, well-developed, AAOx3  HEENT:  Atraumatic, Normocephalic. EOMI, PERRLA, conjunctiva and sclera clear, No JVD  PULMONARY: Clear to auscultation bilaterally; No wheeze  CARDIOVASCULAR: Regular rate and rhythm; No murmurs, rubs, or gallops  GASTROINTESTINAL: Soft, tender, Nondistended; Bowel sounds present  MUSCULOSKELETAL:  2+ Peripheral Pulses, No clubbing, cyanosis, or edema  NEUROLOGY: non-focal  SKIN: No rashes or lesions  LABS:                        16.0   13.41 )-----------( 190      ( 11 Jul 2019 07:44 )             46.8     07-11    138  |  97<L>  |  7<L>  ----------------------------<  86  3.7   |  24  |  0.7    Ca    8.8      11 Jul 2019 07:44    TPro  5.7<L>  /  Alb  3.2<L>  /  TBili  1.1  /  DBili  x   /  AST  25  /  ALT  48<H>  /  AlkPhos  64  07-11                  RADIOLOGY:  < from: CT Abdomen and Pelvis w/ Oral Cont and w/ IV Cont (07.11.19 @ 14:10) >  1.  Since July 8, 2019, persistent findings of acute pancreatitis without   evidence for pancreatic necrosis or loculated fluid collections.    2.  Hepatic steatosis and hepatomegaly.    3.  Trace bilateral pleural effusions and bibasilar atelectasis.    < end of copied text >        ADMISSION SUMMARY      ASSESSMENT & PLAN    Assessment and Plan  Patient is a 39y old Male who presents with a chief complaint of Epigastric pain (08 Jul 2019 14:53)    #Acute pancreatitis secondary to uncontrolled alcohol use  -lipase 586 on admission now 167  - AST/ALT (106/149)  elevated on admission trending down(48/90),   - abdominal ultrasound shows Hepatic steatosis.  - LR @ 200 ml/hr  - pain control , continue with morphine prn  - patient pain has tremendously improved  - diet- full liquids - patient starts to have hiccups soon as he drinks liquid  -CT on July 11 = < from: CT Abdomen and Pelvis w/ Oral Cont and w/ IV Cont (07.11.19 @ 14:10) >  1.  Since July 8, 2019, persistent findings of acute pancreatitis without   evidence for pancreatic necrosis or loculated fluid collections.    2.  Hepatic steatosis and hepatomegaly.    3.  Trace bilateral pleural effusions and bibasilar atelectasis.    < end of copied text >       #Alcohol withdrawal   -CIWA protocol  -CIWA score 0    #Possible thiamine deficiency   - thiamine supplemented    #Possible Folate deficiency   -folate supplemented     #Polycythemia possible copd?  - f/u repeat cbc   - digital clubbing     #Insomnia  -Ambien    #constipation  -colace    Pending: GI     diet - full liquids  dvt ppx - enoxaparin

## 2019-07-12 ENCOUNTER — TRANSCRIPTION ENCOUNTER (OUTPATIENT)
Age: 39
End: 2019-07-12

## 2019-07-12 LAB
GLUCOSE BLDC GLUCOMTR-MCNC: 180 MG/DL — HIGH (ref 70–99)
GLUCOSE BLDC GLUCOMTR-MCNC: 92 MG/DL — SIGNIFICANT CHANGE UP (ref 70–99)
GLUCOSE BLDC GLUCOMTR-MCNC: 97 MG/DL — SIGNIFICANT CHANGE UP (ref 70–99)
GLUCOSE BLDC GLUCOMTR-MCNC: 98 MG/DL — SIGNIFICANT CHANGE UP (ref 70–99)

## 2019-07-12 PROCEDURE — 99232 SBSQ HOSP IP/OBS MODERATE 35: CPT

## 2019-07-12 RX ORDER — SODIUM CHLORIDE 9 MG/ML
1000 INJECTION, SOLUTION INTRAVENOUS
Refills: 0 | Status: DISCONTINUED | OUTPATIENT
Start: 2019-07-12 | End: 2019-07-12

## 2019-07-12 RX ORDER — FOLIC ACID 0.8 MG
1 TABLET ORAL
Qty: 14 | Refills: 0
Start: 2019-07-12 | End: 2019-07-25

## 2019-07-12 RX ORDER — THIAMINE MONONITRATE (VIT B1) 100 MG
1 TABLET ORAL
Qty: 14 | Refills: 0
Start: 2019-07-12 | End: 2019-07-25

## 2019-07-12 RX ORDER — SODIUM CHLORIDE 9 MG/ML
1000 INJECTION, SOLUTION INTRAVENOUS
Refills: 0 | Status: DISCONTINUED | OUTPATIENT
Start: 2019-07-12 | End: 2019-07-13

## 2019-07-12 RX ORDER — THIAMINE MONONITRATE (VIT B1) 100 MG
100 TABLET ORAL DAILY
Refills: 0 | Status: DISCONTINUED | OUTPATIENT
Start: 2019-07-12 | End: 2019-07-13

## 2019-07-12 RX ADMIN — Medication 1 MILLIGRAM(S): at 11:56

## 2019-07-12 RX ADMIN — MORPHINE SULFATE 2 MILLIGRAM(S): 50 CAPSULE, EXTENDED RELEASE ORAL at 13:51

## 2019-07-12 RX ADMIN — MORPHINE SULFATE 2 MILLIGRAM(S): 50 CAPSULE, EXTENDED RELEASE ORAL at 21:10

## 2019-07-12 RX ADMIN — MORPHINE SULFATE 2 MILLIGRAM(S): 50 CAPSULE, EXTENDED RELEASE ORAL at 21:25

## 2019-07-12 RX ADMIN — PANTOPRAZOLE SODIUM 40 MILLIGRAM(S): 20 TABLET, DELAYED RELEASE ORAL at 11:56

## 2019-07-12 RX ADMIN — ZOLPIDEM TARTRATE 5 MILLIGRAM(S): 10 TABLET ORAL at 22:17

## 2019-07-12 RX ADMIN — Medication 20 MILLILITER(S): at 00:13

## 2019-07-12 RX ADMIN — Medication 100 MILLIGRAM(S): at 11:57

## 2019-07-12 RX ADMIN — ONDANSETRON 8 MILLIGRAM(S): 8 TABLET, FILM COATED ORAL at 17:32

## 2019-07-12 RX ADMIN — MORPHINE SULFATE 2 MILLIGRAM(S): 50 CAPSULE, EXTENDED RELEASE ORAL at 08:11

## 2019-07-12 RX ADMIN — ONDANSETRON 8 MILLIGRAM(S): 8 TABLET, FILM COATED ORAL at 05:36

## 2019-07-12 RX ADMIN — SENNA PLUS 2 TABLET(S): 8.6 TABLET ORAL at 21:07

## 2019-07-12 RX ADMIN — MORPHINE SULFATE 2 MILLIGRAM(S): 50 CAPSULE, EXTENDED RELEASE ORAL at 03:15

## 2019-07-12 NOTE — DISCHARGE NOTE PROVIDER - NSDCFUADDINST_GEN_ALL_CORE_FT
Please stop drinking alcohol and stop smoking as both are associated with serious long term consequences.

## 2019-07-12 NOTE — DISCHARGE NOTE PROVIDER - NSDCCPCAREPLAN_GEN_ALL_CORE_FT
PRINCIPAL DISCHARGE DIAGNOSIS  Diagnosis: Pancreatitis, alcoholic, acute  Assessment and Plan of Treatment: You had inflammation of your pancreas that likely caused your pain and nausea. Please stop drinking alcohol heavily as this can be cause of your pancreatitis.   - Please follow up with Gastroenterology on discharge  - Continue to hydrate well and eat a LOW FAT diet to avoid irritating pancrease further.   - Can use over the counter pain meds such as tylenol as indicated on label.      SECONDARY DISCHARGE DIAGNOSES  Diagnosis: Cigarette smoker  Assessment and Plan of Treatment: Please also stop smoking. There are may resources available to you to help in this goal both online and through medicine services.  [email or web address]    Diagnosis: Alcoholic ketoacidosis  Assessment and Plan of Treatment: When your were admitted, there was concern for your drinking for withdrawal and acid in the blood. Please stop drinking - follow up with your primary to discuss best strategy for how to achieve this. Long term alcohol abuse can cause bleeding, liver and stomach issues, as well as further episodes of pancreatitis.    Diagnosis: Hypertension  Assessment and Plan of Treatment: Continue taking medications at home doses, follow up with primary on discharge.

## 2019-07-12 NOTE — PROGRESS NOTE ADULT - ASSESSMENT
Patient is a 39y old Male who presents with a chief complaint of Epigastric pain (08 Jul 2019 14:53)    #Acute pancreatitis secondary to uncontrolled alcohol use  -lipase 586 on admission now 167  - AST/ALT (106/149)  elevated on admission trending down(48/90),   - abdominal ultrasound shows Hepatic steatosis.  - LR @ 100 ml/hr  - pain control , continue with morphine prn  - patient pain has tremendously improved  - diet- full liquids - patient starts to have hiccups soon as he drinks liquid  -CT on July 11 = < from: CT Abdomen and Pelvis w/ Oral Cont and w/ IV Cont (07.11.19 @ 14:10) >  1.  Since July 8, 2019, persistent findings of acute pancreatitis without   evidence for pancreatic necrosis or loculated fluid collections.    2.  Hepatic steatosis and hepatomegaly.    3.  Trace bilateral pleural effusions and bibasilar atelectasis.    < end of copied text >       #Alcohol withdrawal   -CIWA protocol  -CIWA score 0    #thiamine deficiency   - thiamine supplemented    #Folate deficiency   -folate supplemented     #Polycythemia possible copd?  - f/u repeat cbc   - digital clubbing     #Insomnia  -Ambien    #constipation  -colace    diet - advance as tolerated  dvt ppx - enoxaparin Patient is a 39y old Male who presents with a chief complaint of Epigastric pain (08 Jul 2019 14:53)    #Acute pancreatitis secondary to uncontrolled alcohol use  -lipase 586 on admission now 167 - improving  - AST/ALT (106/149)  elevated on admission trending down  - abdominal ultrasound shows Hepatic steatosis.  - LR @ 100 ml/hr  - pain control , continue with morphine prn  - patient pain has tremendously improved  - diet- full liquids - patient starts to have hiccups soon as he drinks liquid  -CT on July 11 = < from: CT Abdomen and Pelvis w/ Oral Cont and w/ IV Cont (07.11.19 @ 14:10) >    1.  Since July 8, 2019, persistent findings of acute pancreatitis without   evidence for pancreatic necrosis or loculated fluid collections.  2.  Hepatic steatosis and hepatomegaly.  3.  Trace bilateral pleural effusions and bibasilar atelectasis.    < end of copied text >       #Alcohol withdrawal   -CIWA protocol  -CIWA score 0  -d/c lorazepam    #thiamine deficiency   - thiamine supplemented    #Folate deficiency   -folate supplemented     #Polycythemia possible copd?  - f/u repeat cbc   - digital clubbing     #Insomnia  -Ambien    #constipation  -colace    diet - advance as tolerated  dvt ppx - enoxaparin   code- full  activity- as tolerated    Dispo: we will keep him one more night, once he is able to tolerate solid food, he will then be discharge Patient is a 39y old Male who presents with a chief complaint of Epigastric pain (08 Jul 2019 14:53)    #Acute pancreatitis secondary to uncontrolled alcohol use  -lipase 586 on admission now 167 - improving  - AST/ALT (106/149)  elevated on admission trending down  - abdominal ultrasound shows Hepatic steatosis.  - LR @ 100 ml/hr  - pain control , continue with morphine prn  - patient pain has tremendously improved  - diet- full liquids - patient starts to have hiccups soon as he drinks liquid  -CT on July 11 = < from: CT Abdomen and Pelvis w/ Oral Cont and w/ IV Cont (07.11.19 @ 14:10) >    1.  Since July 8, 2019, persistent findings of acute pancreatitis without   evidence for pancreatic necrosis or loculated fluid collections.  2.  Hepatic steatosis and hepatomegaly  3.  Trace bilateral pleural effusions and bibasilar atelectasis.    < end of copied text >       #Alcohol withdrawal   -CIWA protocol  -CIWA score 0  -d/c lorazepam    #thiamine deficiency   - thiamine supplemented    #Folate deficiency   -folate supplemented     #Polycythemia possible copd?  - f/u repeat cbc   - digital clubbing     #Insomnia  -Ambien    #constipation  -colace    diet - advance as tolerated  dvt ppx - enoxaparin   code- full  activity- as tolerated    Dispo: we will keep him one more night, once he is able to tolerate solid food, he will then be discharge Patient is a 39y old Male who presents with a chief complaint of Epigastric pain (08 Jul 2019 14:53)    #Acute pancreatitis secondary to uncontrolled alcohol use  -lipase 586 on admission now 167 - improving  - AST/ALT (106/149)  elevated on admission trending down  - abdominal ultrasound shows Hepatic steatosis.  - LR @ 100 ml/hr  - pain control , continue with morphine prn  - patient pain has tremendously improved  - diet- full liquids - patient starts to have hiccups soon as he drinks liquid     #Alcohol withdrawal   -CIWA protocol  -CIWA score 0  -d/c lorazepam    #thiamine deficiency   - thiamine supplemented    #Folate deficiency   -folate supplemented     #Polycythemia possible copd?  - f/u repeat cbc   - digital clubbing     #Insomnia  -Ambien    #constipation  -colace    diet - advance as tolerated  dvt ppx - enoxaparin   code- full  activity- as tolerated    Dispo: we will keep him one more night, once he is able to tolerate solid food, he will then be discharge

## 2019-07-12 NOTE — PROGRESS NOTE ADULT - SUBJECTIVE AND OBJECTIVE BOX
DENISSE GALVAN 39y Male  MRN#: [id number]   CODE STATUS Full      SUBJECTIVE  Patient is a 39y old Male who presents with a chief complaint of Epigastric pain (12 Jul 2019 06:59)  Currently admitted to medicine with the primary diagnosis of Pancreatitis, alcoholic, acute  No over night complaints, tolerating liquid well. not nauseated anymore, pain has subsided      OBJECTIVE    PHYSICAL EXAM:    GENERAL: NAD, well-developed, AAOx3  HEENT:  Atraumatic, Normocephalic. EOMI, PERRLA, conjunctiva and sclera clear, No JVD  PULMONARY: Clear to auscultation bilaterally; No wheeze  CARDIOVASCULAR: Regular rate and rhythm; No murmurs, rubs, or gallops  GASTROINTESTINAL: Soft, Nontender, Nondistended; Bowel sounds present  MUSCULOSKELETAL:  2+ Peripheral Pulses, No clubbing, cyanosis, or edema  NEUROLOGY: non-focal  SKIN: No rashes or lesions    VITAL SIGNS: Last 24 Hours  T(C): 36.2 (12 Jul 2019 05:36), Max: 36.7 (11 Jul 2019 21:00)  T(F): 97.1 (12 Jul 2019 05:36), Max: 98 (11 Jul 2019 21:00)  HR: 104 (12 Jul 2019 05:36) (102 - 104)  BP: 157/84 (12 Jul 2019 05:36) (140/98 - 157/84)  BP(mean): --  RR: 18 (12 Jul 2019 05:36) (18 - 18)  SpO2: --    LABS:                        16.0   13.41 )-----------( 190      ( 11 Jul 2019 07:44 )             46.8     07-11    138  |  97<L>  |  7<L>  ----------------------------<  86  3.7   |  24  |  0.7    Ca    8.8      11 Jul 2019 07:44    TPro  5.7<L>  /  Alb  3.2<L>  /  TBili  1.1  /  DBili  x   /  AST  25  /  ALT  48<H>  /  AlkPhos  64  07-11      RADIOLOGY:    < from: CT Abdomen and Pelvis w/ Oral Cont and w/ IV Cont (07.11.19 @ 14:10) >  IMPRESSION:   1.  Since July 8, 2019, persistent findings of acute pancreatitis without   evidence for pancreatic necrosis or loculated fluid collections.    2.  Hepatic steatosis and hepatomegaly.    3.  Trace bilateral pleural effusions and bibasilar atelectasis.    < end of copied text > DENISSE GALVAN 39y Male  MRN#: [id number]   CODE STATUS Full      SUBJECTIVE  Patient is a 39y old Male who presents with a chief complaint of Epigastric pain (12 Jul 2019 06:59)  Currently admitted to medicine with the primary diagnosis of Pancreatitis, alcoholic, acute  No over night complaints, tolerating liquid well. not nauseated anymore, pain has subsided      OBJECTIVE    PHYSICAL EXAM:    GENERAL: NAD, well-developed, AAOx3  HEENT:  Atraumatic, Normocephalic. EOMI, PERRLA, conjunctiva and sclera clear, No JVD  PULMONARY: Clear to auscultation bilaterally; No wheeze  CARDIOVASCULAR: Regular rate and rhythm; No murmurs, rubs, or gallops  GASTROINTESTINAL: Soft, Nontender, Nondistended; Bowel sounds present  MUSCULOSKELETAL:  2+ Peripheral Pulses, No clubbing, cyanosis, or edema  NEUROLOGY: non-focal  SKIN: No rashes or lesions    VITAL SIGNS: Last 24 Hours  T(C): 36.2 (12 Jul 2019 05:36), Max: 36.7 (11 Jul 2019 21:00)  T(F): 97.1 (12 Jul 2019 05:36), Max: 98 (11 Jul 2019 21:00)  HR: 104 (12 Jul 2019 05:36) (102 - 104)  BP: 157/84 (12 Jul 2019 05:36) (140/98 - 157/84)  BP(mean): --  RR: 18 (12 Jul 2019 05:36) (18 - 18)  SpO2: --    LABS:                        16.0   13.41 )-----------( 190      ( 11 Jul 2019 07:44 )             46.8     07-11    138  |  97<L>  |  7<L>  ----------------------------<  86  3.7   |  24  |  0.7    Ca    8.8      11 Jul 2019 07:44    TPro  5.7<L>  /  Alb  3.2<L>  /  TBili  1.1  /  DBili  x   /  AST  25  /  ALT  48<H>  /  AlkPhos  64  07-11      RADIOLOGY:    < from: CT Abdomen and Pelvis w/ Oral Cont and w/ IV Cont (07.11.19 @ 14:10) >  IMPRESSION:   1.  Since July 8, 2019, persistent findings of acute pancreatitis without   evidence for pancreatic necrosis or loculated fluid collections.    2.  Hepatic steatosis and hepatomegaly.    3.  Trace bilateral pleural effusions and bibasilar atelectasis.    < end of copied text >    -CT on July 11 = < from: CT Abdomen and Pelvis w/ Oral Cont and w/ IV Cont (07.11.19 @ 14:10) >    1.  Since July 8, 2019, persistent findings of acute pancreatitis without   evidence for pancreatic necrosis or loculated fluid collections.  2.  Hepatic steatosis and hepatomegaly  3.  Trace bilateral pleural effusions and bibasilar atelectasis.    < end of copied text >

## 2019-07-12 NOTE — PROGRESS NOTE ADULT - ASSESSMENT
39y Male with PMH of intermittent asthma, alcoholic pancreatitis in October 2018, presenting to ED on 7/8 with epigastric pain .  Patient started yesterday , and is continuos . Patient pain is severe 10 /10 in intensity , band like , radiating to the back , associated with nausea and vomiting , relieved by Dilaudid in ED. Patient had recurrent episodes of non bloody , bilious vomiting since yesterday.   Pt states he stopped drinking alcohol since last admission in October. However, he admits has been drinking over the weekend starting 7/4 through 7/6 , drinking Vodka(last drink on 7/6).    ultrasound abdomen : No cholelithiasis or ductal dilatation.  ct abdomen 7/11 : no evidence of necrosis or  loculated fluid collections.   no evidence of bowel obstruction or ileus.     #acute pancreatitis most likely alcohol induced : RESOLVING  IV hydration   hematocrit trending down indicating adequate hydration  trend cbc, BMP  pain control  antiemetics PRN for nausea and vomiting   alcohol abstinence   Patient started tolerating clear liquids if breakfast tolerated can advance to low fat diet in afternoon     #transaminitis: r/o alcoholic hepatitis ( improving)   trend lfts      #alcohol abuse:   alcohol abstinence   thiamine and folic acid   watch for signs of withdrawal 39y Male with PMH of intermittent asthma, alcoholic pancreatitis in October 2018, presenting to ED on 7/8 with epigastric pain .  Patient started yesterday , and is continuos . Patient pain is severe 10 /10 in intensity , band like , radiating to the back , associated with nausea and vomiting , relieved by Dilaudid in ED. Patient had recurrent episodes of non bloody , bilious vomiting since yesterday.   Pt states he stopped drinking alcohol since last admission in October. However, he admits has been drinking over the weekend starting 7/4 through 7/6 , drinking Vodka(last drink on 7/6).    ultrasound abdomen : No cholelithiasis or ductal dilatation.  ct abdomen 7/11 : no evidence of necrosis or  loculated fluid collections.   no evidence of bowel obstruction or ileus.     #acute pancreatitis most likely alcohol induced : RESOLVING  IV hydration   hematocrit trending down indicating adequate hydration  pain control  antiemetics PRN for nausea and vomiting   alcohol abstinence   Patient tolerating clear liquids ,  can advance to low fat diet in afternoon     #transaminitis: r/o alcoholic hepatitis ( improving)   trend lfts      #alcohol abuse:   alcohol abstinence   thiamine and folic acid   watch for signs of withdrawal 39y Male with PMH of intermittent asthma, alcoholic pancreatitis in October 2018, presenting to ED on 7/8 with epigastric pain .  Patient started yesterday , and is continuos . Patient pain is severe 10 /10 in intensity , band like , radiating to the back , associated with nausea and vomiting , relieved by Dilaudid in ED. Patient had recurrent episodes of non bloody , bilious vomiting since yesterday.   Pt states he stopped drinking alcohol since last admission in October. However, he admits has been drinking over the weekend starting 7/4 through 7/6 , drinking Vodka(last drink on 7/6).    ultrasound abdomen : No cholelithiasis or ductal dilatation.  ct abdomen 7/11 : no evidence of necrosis or  loculated fluid collections.   no evidence of bowel obstruction or ileus.     #acute pancreatitis most likely alcohol induced : RESOLVING  DC iv fluids  pain control  antiemetics PRN for nausea and vomiting   alcohol and smoking abstinence     low fat diet     #transaminitis: r/o alcoholic hepatitis ( improving)   trend lfts      #alcohol abuse:   alcohol abstinence   thiamine and folic acid   watch for signs of withdrawal

## 2019-07-12 NOTE — PROGRESS NOTE ADULT - SUBJECTIVE AND OBJECTIVE BOX
Hospital Day # 4d    Gastroenterology team is following this patient for pancreatitis.       Interval Events: no acute events overnight , patient pain is better , was able to tolerate food last night , is passing gas, no abdominal distension, no nausea or vomiting still complaining of hiccups.     PAST MEDICAL & SURGICAL HISTORY  Asthma: no recent exacerbation, not using inhalers for a long time  Pancreatitis: October 2018 due to alcohol  No pertinent past medical history  H/O rotator cuff surgery      ALLERGIES:  No Known Allergies      MEDICATIONS:  MEDICATIONS  (STANDING):  chlorhexidine 4% Liquid 1 Application(s) Topical <User Schedule>  docusate sodium 100 milliGRAM(s) Oral three times a day  enoxaparin Injectable 40 milliGRAM(s) SubCutaneous every 24 hours  folic acid 1 milliGRAM(s) Oral daily  lactated ringers. 1000 milliLiter(s) (200 mL/Hr) IV Continuous <Continuous>  melatonin 5 milliGRAM(s) Oral at bedtime  nicotine -  14 mG/24Hr(s) Patch 1 patch Transdermal daily  ondansetron Injectable 8 milliGRAM(s) IV Push two times a day  pantoprazole  Injectable 40 milliGRAM(s) IV Push daily  senna 2 Tablet(s) Oral at bedtime    MEDICATIONS  (PRN):  LORazepam     Tablet 2 milliGRAM(s) Oral every 4 hours PRN CIWA-Ar score 8 or greater  LORazepam     Tablet 1 milliGRAM(s) Oral every 6 hours PRN CIWA-Ar score increase by 2 points and a total score of 7 or less  morphine  - Injectable 2 milliGRAM(s) IV Push every 4 hours PRN Moderate Pain (4 - 6)  zolpidem 5 milliGRAM(s) Oral at bedtime PRN Insomnia      REVIEW OF SYSTEMS:    CONSTITUTIONAL: No fever  EYES/ENT: No scleral icterus  RESPIRATORY: No shortness of breath  CARDIOVASCULAR: No chest pain   GASTROINTESTINAL: as noted above  GENITOURINARY: No dysuria  NEUROLOGICAL: No dizziness  SKIN: No cyanosis        VITALS:   T(F): 97.1 (07-12 @ 05:36), Max: 99.1 (07-09 @ 16:00)  HR: 104 (07-12 @ 05:36) (96 - 126)  BP: 157/84 (07-12 @ 05:36) (109/75 - 187/111)  BP(mean): 112 (07-10 @ 18:00) (90 - 155)  RR: 18 (07-12 @ 05:36) (14 - 30)  SpO2: 95% (07-10 @ 06:00) (95% - 98%)    I&O's Summary    10 Jul 2019 07:01  -  11 Jul 2019 07:00  --------------------------------------------------------  IN: 2880 mL / OUT: 1975 mL / NET: 905 mL        Physical Exam:  GENERAL: NAD, well-developed  HEAD:  Atraumatic, Normocephalic  EYES: EOMI, PERRLA, conjunctiva and sclera clear  NECK: No thyromegaly  CHEST/LUNG: No accessory use of muscle  HEART: S1S2 Normal  ABDOMEN: Soft, Nontender, Nondistended; Bowel sounds present, no guarding or rigidity.  EXTREMITIES:  2+ Peripheral Pulses, No cyanosis  PSYCH: AAOx3  NEUROLOGY: non-focal      LABS:                        16.0   13.41 )-----------( 190      ( 11 Jul 2019 07:44 )             46.8         LIVER FUNCTIONS - ( 11 Jul 2019 07:44 )  Alb: 3.2 g/dL / Pro: 5.7 g/dL / ALK PHOS: 64 U/L / ALT: 48 U/L / AST: 25 U/L / GGT: x           LIVER FUNCTIONS - ( 11 Jul 2019 07:44 )  Alb: 3.2 [3.5 - 5.2] / Pro: 5.7 [6.0 - 8.0] / ALK PHOS: 64 [30 - 115] / ALT: 48 [0 - 41] / AST: 25 [0 - 41] / GGT: x     LIVER FUNCTIONS - ( 09 Jul 2019 04:54 )  Alb: 3.7 [3.5 - 5.2] / Pro: 6.4 [6.0 - 8.0] / ALK PHOS: 82 [30 - 115] / ALT: 90 [0 - 41] / AST: 48 [0 - 41] / GGT: x     LIVER FUNCTIONS - ( 08 Jul 2019 14:00 )  Alb: 4.4 [3.5 - 5.2] / Pro: 7.5 [6.0 - 8.0] / ALK PHOS: 87 [30 - 115] / ALT: 149 [0 - 41] / AST: 106 [0 - 41] / GGT: x     LIVER FUNCTIONS - ( 08 Jul 2019 06:10 )  Alb: 4.4 [3.5 - 5.2] / Pro: 7.6 [6.0 - 8.0] / ALK PHOS: 94 [30 - 115] / ALT: 164 [0 - 41] / AST: 120 [0 - 41] / GGT: x       07-11    138  |  97<L>  |  7<L>  ----------------------------<  86  3.7   |  24  |  0.7    Ca    8.8      11 Jul 2019 07:44  Phos  3.1     07-09  Mg     1.8     07-09 07-08 Chol 244<H> <H> HDL 71 Trig 192<H>      < from: CT Abdomen and Pelvis w/ Oral Cont and w/ IV Cont (07.11.19 @ 14:10) >  1.  Since July 8, 2019, persistent findings of acute pancreatitis without   evidence for pancreatic necrosis or loculated fluid collections.    2.  Hepatic steatosis and hepatomegaly.    3.  Trace bilateral pleural effusions and bibasilar atelectasis.    < end of copied text > Hospital Day # 4d    Gastroenterology team is following this patient for pancreatitis.       Interval Events: no acute events overnight , patient pain is better , was able to tolerate food last night , is passing gas, no abdominal distension, no nausea or vomiting still complaining of hiccups.  + flatus    PAST MEDICAL & SURGICAL HISTORY  Asthma: no recent exacerbation, not using inhalers for a long time  Pancreatitis: October 2018 due to alcohol  No pertinent past medical history  H/O rotator cuff surgery      ALLERGIES:  No Known Allergies      MEDICATIONS:  MEDICATIONS  (STANDING):  chlorhexidine 4% Liquid 1 Application(s) Topical <User Schedule>  docusate sodium 100 milliGRAM(s) Oral three times a day  enoxaparin Injectable 40 milliGRAM(s) SubCutaneous every 24 hours  folic acid 1 milliGRAM(s) Oral daily  lactated ringers. 1000 milliLiter(s) (200 mL/Hr) IV Continuous <Continuous>  melatonin 5 milliGRAM(s) Oral at bedtime  nicotine -  14 mG/24Hr(s) Patch 1 patch Transdermal daily  ondansetron Injectable 8 milliGRAM(s) IV Push two times a day  pantoprazole  Injectable 40 milliGRAM(s) IV Push daily  senna 2 Tablet(s) Oral at bedtime    MEDICATIONS  (PRN):  LORazepam     Tablet 2 milliGRAM(s) Oral every 4 hours PRN CIWA-Ar score 8 or greater  LORazepam     Tablet 1 milliGRAM(s) Oral every 6 hours PRN CIWA-Ar score increase by 2 points and a total score of 7 or less  morphine  - Injectable 2 milliGRAM(s) IV Push every 4 hours PRN Moderate Pain (4 - 6)  zolpidem 5 milliGRAM(s) Oral at bedtime PRN Insomnia      REVIEW OF SYSTEMS:    CONSTITUTIONAL: No fever  EYES/ENT: No scleral icterus  RESPIRATORY: No shortness of breath  CARDIOVASCULAR: No chest pain   GASTROINTESTINAL: as noted above  GENITOURINARY: No dysuria  NEUROLOGICAL: No dizziness  SKIN: No cyanosis        VITALS:   T(F): 97.1 (07-12 @ 05:36), Max: 99.1 (07-09 @ 16:00)  HR: 104 (07-12 @ 05:36) (96 - 126)  BP: 157/84 (07-12 @ 05:36) (109/75 - 187/111)  BP(mean): 112 (07-10 @ 18:00) (90 - 155)  RR: 18 (07-12 @ 05:36) (14 - 30)  SpO2: 95% (07-10 @ 06:00) (95% - 98%)    I&O's Summary    10 Jul 2019 07:01  -  11 Jul 2019 07:00  --------------------------------------------------------  IN: 2880 mL / OUT: 1975 mL / NET: 905 mL        Physical Exam:  GENERAL: NAD, well-developed  HEAD:  Atraumatic, Normocephalic  EYES: EOMI, PERRLA, conjunctiva and sclera clear  NECK: No thyromegaly  CHEST/LUNG: No accessory use of muscle  HEART: S1S2 Normal  ABDOMEN: Soft, Nontender, Nondistended; Bowel sounds present, no guarding or rigidity.  EXTREMITIES:  2+ Peripheral Pulses, No cyanosis  PSYCH: AAOx3  NEUROLOGY: non-focal      LABS:                        16.0   13.41 )-----------( 190      ( 11 Jul 2019 07:44 )             46.8         LIVER FUNCTIONS - ( 11 Jul 2019 07:44 )  Alb: 3.2 g/dL / Pro: 5.7 g/dL / ALK PHOS: 64 U/L / ALT: 48 U/L / AST: 25 U/L / GGT: x           LIVER FUNCTIONS - ( 11 Jul 2019 07:44 )  Alb: 3.2 [3.5 - 5.2] / Pro: 5.7 [6.0 - 8.0] / ALK PHOS: 64 [30 - 115] / ALT: 48 [0 - 41] / AST: 25 [0 - 41] / GGT: x     LIVER FUNCTIONS - ( 09 Jul 2019 04:54 )  Alb: 3.7 [3.5 - 5.2] / Pro: 6.4 [6.0 - 8.0] / ALK PHOS: 82 [30 - 115] / ALT: 90 [0 - 41] / AST: 48 [0 - 41] / GGT: x     LIVER FUNCTIONS - ( 08 Jul 2019 14:00 )  Alb: 4.4 [3.5 - 5.2] / Pro: 7.5 [6.0 - 8.0] / ALK PHOS: 87 [30 - 115] / ALT: 149 [0 - 41] / AST: 106 [0 - 41] / GGT: x     LIVER FUNCTIONS - ( 08 Jul 2019 06:10 )  Alb: 4.4 [3.5 - 5.2] / Pro: 7.6 [6.0 - 8.0] / ALK PHOS: 94 [30 - 115] / ALT: 164 [0 - 41] / AST: 120 [0 - 41] / GGT: x       07-11    138  |  97<L>  |  7<L>  ----------------------------<  86  3.7   |  24  |  0.7    Ca    8.8      11 Jul 2019 07:44  Phos  3.1     07-09  Mg     1.8     07-09 07-08 Chol 244<H> <H> HDL 71 Trig 192<H>      < from: CT Abdomen and Pelvis w/ Oral Cont and w/ IV Cont (07.11.19 @ 14:10) >  1.  Since July 8, 2019, persistent findings of acute pancreatitis without   evidence for pancreatic necrosis or loculated fluid collections.    2.  Hepatic steatosis and hepatomegaly.    3.  Trace bilateral pleural effusions and bibasilar atelectasis.    < end of copied text >

## 2019-07-12 NOTE — PROGRESS NOTE ADULT - ATTENDING COMMENTS
I have seen and evaluated the patient.  I have reviewed the patient's history and physical examination findings with the resident. I have also discussed the diagnosis and treatment plan with the resident and I agree with the information documented in the resident's note.    A/P:  Acute pancreatitis  decreasing IVF to 100cc/hr  plan for DC tmrw

## 2019-07-12 NOTE — DISCHARGE NOTE PROVIDER - HOSPITAL COURSE
39 year old male with pmh of alcoholic pancreatitis and asthma . Patient presented with epigastric pain after alcohol binge. Lipase was elevated at 586 now trending down to about 150. CT scan showed acute pancreatitis. Patient was given boluses of LR and put on IV fluid LR. Pain control via morphine PRN. patient started on clear liquid diet and is to be advanced as tolerated. Ativan is given according to [facility] protocol.         Patient had no indications of acute withdrawal and downgraded to medicine floor. While on floor patient continued to have some pain and nausea which slowly resolved with fluids and slow advancement of diet. Patient improved and now ready for discharge.

## 2019-07-12 NOTE — DISCHARGE NOTE PROVIDER - PROVIDER TOKENS
PROVIDER:[TOKEN:[[id number]],FOLLOWUP:[2 weeks]],FREE:[LAST:[Christopher],FIRST:[Sherwin],PHONE:[phone]],FAX:[(   )    -],ADDRESS:[Sherwin White MD  Directions  Address: [address] [place] [address]  Phone: [phone]],FOLLOWUP:[1-3 days]]

## 2019-07-13 ENCOUNTER — TRANSCRIPTION ENCOUNTER (OUTPATIENT)
Age: 39
End: 2019-07-13

## 2019-07-13 VITALS
RESPIRATION RATE: 19 BRPM | SYSTOLIC BLOOD PRESSURE: 130 MMHG | OXYGEN SATURATION: 96 % | TEMPERATURE: 97 F | DIASTOLIC BLOOD PRESSURE: 81 MMHG | HEART RATE: 71 BPM

## 2019-07-13 LAB
ALBUMIN SERPL ELPH-MCNC: 3.1 G/DL — LOW (ref 3.5–5.2)
ALP SERPL-CCNC: 63 U/L — SIGNIFICANT CHANGE UP (ref 30–115)
ALT FLD-CCNC: 47 U/L — HIGH (ref 0–41)
ANION GAP SERPL CALC-SCNC: 11 MMOL/L — SIGNIFICANT CHANGE UP (ref 7–14)
AST SERPL-CCNC: 30 U/L — SIGNIFICANT CHANGE UP (ref 0–41)
BILIRUB SERPL-MCNC: 0.6 MG/DL — SIGNIFICANT CHANGE UP (ref 0.2–1.2)
BUN SERPL-MCNC: 7 MG/DL — LOW (ref 10–20)
CALCIUM SERPL-MCNC: 9.1 MG/DL — SIGNIFICANT CHANGE UP (ref 8.5–10.1)
CHLORIDE SERPL-SCNC: 99 MMOL/L — SIGNIFICANT CHANGE UP (ref 98–110)
CO2 SERPL-SCNC: 29 MMOL/L — SIGNIFICANT CHANGE UP (ref 17–32)
CREAT SERPL-MCNC: 0.8 MG/DL — SIGNIFICANT CHANGE UP (ref 0.7–1.5)
GLUCOSE BLDC GLUCOMTR-MCNC: 109 MG/DL — HIGH (ref 70–99)
GLUCOSE BLDC GLUCOMTR-MCNC: 90 MG/DL — SIGNIFICANT CHANGE UP (ref 70–99)
GLUCOSE SERPL-MCNC: 183 MG/DL — HIGH (ref 70–99)
HCT VFR BLD CALC: 46.4 % — SIGNIFICANT CHANGE UP (ref 42–52)
HGB BLD-MCNC: 15.5 G/DL — SIGNIFICANT CHANGE UP (ref 14–18)
LIDOCAIN IGE QN: 171 U/L — HIGH (ref 7–60)
MCHC RBC-ENTMCNC: 32 PG — HIGH (ref 27–31)
MCHC RBC-ENTMCNC: 33.4 G/DL — SIGNIFICANT CHANGE UP (ref 32–37)
MCV RBC AUTO: 95.7 FL — HIGH (ref 80–94)
NRBC # BLD: 0 /100 WBCS — SIGNIFICANT CHANGE UP (ref 0–0)
PLATELET # BLD AUTO: 265 K/UL — SIGNIFICANT CHANGE UP (ref 130–400)
POTASSIUM SERPL-MCNC: 3.7 MMOL/L — SIGNIFICANT CHANGE UP (ref 3.5–5)
POTASSIUM SERPL-SCNC: 3.7 MMOL/L — SIGNIFICANT CHANGE UP (ref 3.5–5)
PROT SERPL-MCNC: 5.8 G/DL — LOW (ref 6–8)
RBC # BLD: 4.85 M/UL — SIGNIFICANT CHANGE UP (ref 4.7–6.1)
RBC # FLD: 14.4 % — SIGNIFICANT CHANGE UP (ref 11.5–14.5)
SODIUM SERPL-SCNC: 139 MMOL/L — SIGNIFICANT CHANGE UP (ref 135–146)
WBC # BLD: 9.45 K/UL — SIGNIFICANT CHANGE UP (ref 4.8–10.8)
WBC # FLD AUTO: 9.45 K/UL — SIGNIFICANT CHANGE UP (ref 4.8–10.8)

## 2019-07-13 PROCEDURE — 99238 HOSP IP/OBS DSCHRG MGMT 30/<: CPT

## 2019-07-13 RX ADMIN — ONDANSETRON 8 MILLIGRAM(S): 8 TABLET, FILM COATED ORAL at 05:54

## 2019-07-13 RX ADMIN — MORPHINE SULFATE 2 MILLIGRAM(S): 50 CAPSULE, EXTENDED RELEASE ORAL at 02:50

## 2019-07-13 RX ADMIN — MORPHINE SULFATE 2 MILLIGRAM(S): 50 CAPSULE, EXTENDED RELEASE ORAL at 03:05

## 2019-07-13 NOTE — PROGRESS NOTE ADULT - REASON FOR ADMISSION
Epigastric pain

## 2019-07-13 NOTE — PROGRESS NOTE ADULT - SUBJECTIVE AND OBJECTIVE BOX
SUBJECTIVE  Patient feels well today and ready to go home    OBJECTIVE    PHYSICAL EXAM:    GENERAL: NAD, well-developed, AAOx3  HEENT:  Atraumatic, Normocephalic. EOMI, PERRLA, conjunctiva and sclera clear, No JVD  PULMONARY: Clear to auscultation bilaterally; No wheeze  CARDIOVASCULAR: Regular rate and rhythm; No murmurs, rubs, or gallops  GASTROINTESTINAL: Soft, Nontender, Nondistended; Bowel sounds present  MUSCULOSKELETAL:  2+ Peripheral Pulses, No clubbing, cyanosis, or edema  NEUROLOGY: non-focal  SKIN: No rashes or lesions    VITAL SIGNS: Last 24 Hours  T(C): 36.2 (12 Jul 2019 05:36), Max: 36.7 (11 Jul 2019 21:00)  T(F): 97.1 (12 Jul 2019 05:36), Max: 98 (11 Jul 2019 21:00)  HR: 104 (12 Jul 2019 05:36) (102 - 104)  BP: 157/84 (12 Jul 2019 05:36) (140/98 - 157/84)  BP(mean): --  RR: 18 (12 Jul 2019 05:36) (18 - 18)  SpO2: --    LABS:                        16.0   13.41 )-----------( 190      ( 11 Jul 2019 07:44 )             46.8     07-11    138  |  97<L>  |  7<L>  ----------------------------<  86  3.7   |  24  |  0.7    Ca    8.8      11 Jul 2019 07:44    TPro  5.7<L>  /  Alb  3.2<L>  /  TBili  1.1  /  DBili  x   /  AST  25  /  ALT  48<H>  /  AlkPhos  64  07-11      RADIOLOGY:    < from: CT Abdomen and Pelvis w/ Oral Cont and w/ IV Cont (07.11.19 @ 14:10) >  IMPRESSION:   1.  Since July 8, 2019, persistent findings of acute pancreatitis without   evidence for pancreatic necrosis or loculated fluid collections.    2.  Hepatic steatosis and hepatomegaly.    3.  Trace bilateral pleural effusions and bibasilar atelectasis.        -CT on July 11 = < from: CT Abdomen and Pelvis w/ Oral Cont and w/ IV Cont (07.11.19 @ 14:10) >    1.  Since July 8, 2019, persistent findings of acute pancreatitis without   evidence for pancreatic necrosis or loculated fluid collections.  2.  Hepatic steatosis and hepatomegaly  3.  Trace bilateral pleural effusions and bibasilar atelectasis.

## 2019-07-13 NOTE — PROGRESS NOTE ADULT - ASSESSMENT
Patient is a 39y old Male who presents with a chief complaint of Epigastric pain (08 Jul 2019 14:53)    #Acute alcoholic pancreatitis   now clinically improved and tolerating PO intake  plan for DC home today     no evidence of alcohol withdrawal    #suspected thiamine deficiency   - thiamine supplemented    #suspected Folate deficiency   -folate supplemented       DC home today

## 2019-07-13 NOTE — DISCHARGE NOTE NURSING/CASE MANAGEMENT/SOCIAL WORK - NSDCPEWEB_GEN_ALL_CORE
[facility] for Tobacco Control website --- [email or web address] website --- [email or web address]

## 2019-07-14 LAB
CARBOXYTHC UR CFM-MCNC: 178 NG/ML — SIGNIFICANT CHANGE UP
CARBOXYTHC UR QL SCN: 38 MG/DL — SIGNIFICANT CHANGE UP
CARBOXYTHC UR QL SCN: 468 — SIGNIFICANT CHANGE UP
THC CREATININE URINE: 38 MG/DL — SIGNIFICANT CHANGE UP
THC METABOLITE/CREAT URINE: 468 — SIGNIFICANT CHANGE UP

## 2019-07-17 DIAGNOSIS — K59.00 CONSTIPATION, UNSPECIFIED: ICD-10-CM

## 2019-07-17 DIAGNOSIS — K85.20 ALCOHOL INDUCED ACUTE PANCREATITIS WITHOUT NECROSIS OR INFECTION: ICD-10-CM

## 2019-07-17 DIAGNOSIS — K76.0 FATTY (CHANGE OF) LIVER, NOT ELSEWHERE CLASSIFIED: ICD-10-CM

## 2019-07-17 DIAGNOSIS — E83.42 HYPOMAGNESEMIA: ICD-10-CM

## 2019-07-17 DIAGNOSIS — D75.1 SECONDARY POLYCYTHEMIA: ICD-10-CM

## 2019-07-17 DIAGNOSIS — E51.9 THIAMINE DEFICIENCY, UNSPECIFIED: ICD-10-CM

## 2019-07-17 DIAGNOSIS — G47.00 INSOMNIA, UNSPECIFIED: ICD-10-CM

## 2019-07-17 DIAGNOSIS — J45.909 UNSPECIFIED ASTHMA, UNCOMPLICATED: ICD-10-CM

## 2019-07-17 DIAGNOSIS — E53.8 DEFICIENCY OF OTHER SPECIFIED B GROUP VITAMINS: ICD-10-CM

## 2019-07-17 DIAGNOSIS — F10.188 ALCOHOL ABUSE WITH OTHER ALCOHOL-INDUCED DISORDER: ICD-10-CM

## 2019-07-17 DIAGNOSIS — E87.2 ACIDOSIS: ICD-10-CM

## 2019-07-17 DIAGNOSIS — R10.9 UNSPECIFIED ABDOMINAL PAIN: ICD-10-CM

## 2019-07-17 DIAGNOSIS — F17.210 NICOTINE DEPENDENCE, CIGARETTES, UNCOMPLICATED: ICD-10-CM

## 2019-07-17 DIAGNOSIS — R74.0 NONSPECIFIC ELEVATION OF LEVELS OF TRANSAMINASE AND LACTIC ACID DEHYDROGENASE [LDH]: ICD-10-CM

## 2019-07-17 DIAGNOSIS — I10 ESSENTIAL (PRIMARY) HYPERTENSION: ICD-10-CM

## 2020-03-03 NOTE — PROGRESS NOTE ADULT - I WAS PHYSICALLY PRESENT FOR THE KEY PORTIONS OF THE EVALUATION AND MANAGEMENT (E/M) SERVICE PROVIDED.  I AGREE WITH THE ABOVE HISTORY, PHYSICAL, AND PLAN WHICH I HAVE REVIEWED AND EDITED WHERE APPROPRIATE
Triage call: Pt's DTR requesting refill for pt's nitro. Refill submitted to pt's preferred pharmacy. E-receipt rec'vd. Pt's DTR v/u and agrees to all.   
Statement Selected

## 2020-12-01 ENCOUNTER — INPATIENT (INPATIENT)
Facility: HOSPITAL | Age: 40
LOS: 6 days | Discharge: HOME | End: 2020-12-08
Attending: INTERNAL MEDICINE | Admitting: INTERNAL MEDICINE
Payer: MEDICAID

## 2020-12-01 VITALS
DIASTOLIC BLOOD PRESSURE: 83 MMHG | TEMPERATURE: 98 F | WEIGHT: 190.04 LBS | SYSTOLIC BLOOD PRESSURE: 170 MMHG | OXYGEN SATURATION: 96 % | HEIGHT: 72 IN | HEART RATE: 109 BPM | RESPIRATION RATE: 18 BRPM

## 2020-12-01 DIAGNOSIS — Z98.890 OTHER SPECIFIED POSTPROCEDURAL STATES: Chronic | ICD-10-CM

## 2020-12-01 LAB
ALBUMIN SERPL ELPH-MCNC: 4.9 G/DL — SIGNIFICANT CHANGE UP (ref 3.5–5.2)
ALP SERPL-CCNC: 68 U/L — SIGNIFICANT CHANGE UP (ref 30–115)
ALT FLD-CCNC: 69 U/L — HIGH (ref 0–41)
ANION GAP SERPL CALC-SCNC: 25 MMOL/L — HIGH (ref 7–14)
AST SERPL-CCNC: 66 U/L — HIGH (ref 0–41)
BASE EXCESS BLDV CALC-SCNC: -0.3 MMOL/L — SIGNIFICANT CHANGE UP (ref -2–2)
BASOPHILS # BLD AUTO: 0.09 K/UL — SIGNIFICANT CHANGE UP (ref 0–0.2)
BASOPHILS NFR BLD AUTO: 0.7 % — SIGNIFICANT CHANGE UP (ref 0–1)
BILIRUB DIRECT SERPL-MCNC: 0.3 MG/DL — HIGH (ref 0–0.2)
BILIRUB INDIRECT FLD-MCNC: 0.9 MG/DL — SIGNIFICANT CHANGE UP (ref 0.2–1.2)
BILIRUB SERPL-MCNC: 1.2 MG/DL — SIGNIFICANT CHANGE UP (ref 0.2–1.2)
BUN SERPL-MCNC: 9 MG/DL — LOW (ref 10–20)
CA-I SERPL-SCNC: 1.1 MMOL/L — LOW (ref 1.12–1.3)
CALCIUM SERPL-MCNC: 10.1 MG/DL — SIGNIFICANT CHANGE UP (ref 8.5–10.1)
CHLORIDE SERPL-SCNC: 96 MMOL/L — LOW (ref 98–110)
CHOLEST SERPL-MCNC: 229 MG/DL — HIGH
CO2 SERPL-SCNC: 20 MMOL/L — SIGNIFICANT CHANGE UP (ref 17–32)
CREAT SERPL-MCNC: 1.2 MG/DL — SIGNIFICANT CHANGE UP (ref 0.7–1.5)
EOSINOPHIL # BLD AUTO: 0.04 K/UL — SIGNIFICANT CHANGE UP (ref 0–0.7)
EOSINOPHIL NFR BLD AUTO: 0.3 % — SIGNIFICANT CHANGE UP (ref 0–8)
GAS PNL BLDV: 142 MMOL/L — SIGNIFICANT CHANGE UP (ref 136–145)
GAS PNL BLDV: SIGNIFICANT CHANGE UP
GLUCOSE SERPL-MCNC: 96 MG/DL — SIGNIFICANT CHANGE UP (ref 70–99)
HCO3 BLDV-SCNC: 24 MMOL/L — SIGNIFICANT CHANGE UP (ref 22–29)
HCT VFR BLD CALC: 50.6 % — SIGNIFICANT CHANGE UP (ref 42–52)
HCT VFR BLDA CALC: 51.5 % — HIGH (ref 34–44)
HDLC SERPL-MCNC: 9 MG/DL — LOW
HGB BLD CALC-MCNC: 16.8 G/DL — SIGNIFICANT CHANGE UP (ref 14–18)
HGB BLD-MCNC: 17.4 G/DL — SIGNIFICANT CHANGE UP (ref 14–18)
IMM GRANULOCYTES NFR BLD AUTO: 0.3 % — SIGNIFICANT CHANGE UP (ref 0.1–0.3)
LACTATE BLDV-MCNC: 8 MMOL/L — HIGH (ref 0.5–1.6)
LACTATE SERPL-SCNC: 9 MMOL/L — CRITICAL HIGH (ref 0.7–2)
LIDOCAIN IGE QN: 235 U/L — HIGH (ref 7–60)
LIPID PNL WITH DIRECT LDL SERPL: 194 MG/DL — HIGH
LYMPHOCYTES # BLD AUTO: 28.6 % — SIGNIFICANT CHANGE UP (ref 20.5–51.1)
LYMPHOCYTES # BLD AUTO: 3.71 K/UL — HIGH (ref 1.2–3.4)
MCHC RBC-ENTMCNC: 31.8 PG — HIGH (ref 27–31)
MCHC RBC-ENTMCNC: 34.4 G/DL — SIGNIFICANT CHANGE UP (ref 32–37)
MCV RBC AUTO: 92.5 FL — SIGNIFICANT CHANGE UP (ref 80–94)
MONOCYTES # BLD AUTO: 1.17 K/UL — HIGH (ref 0.1–0.6)
MONOCYTES NFR BLD AUTO: 9 % — SIGNIFICANT CHANGE UP (ref 1.7–9.3)
NEUTROPHILS # BLD AUTO: 7.94 K/UL — HIGH (ref 1.4–6.5)
NEUTROPHILS NFR BLD AUTO: 61.1 % — SIGNIFICANT CHANGE UP (ref 42.2–75.2)
NON HDL CHOLESTEROL: 220 MG/DL — HIGH
NRBC # BLD: 0 /100 WBCS — SIGNIFICANT CHANGE UP (ref 0–0)
PCO2 BLDV: 37 MMHG — LOW (ref 41–51)
PH BLDV: 7.42 — SIGNIFICANT CHANGE UP (ref 7.26–7.43)
PLATELET # BLD AUTO: 355 K/UL — SIGNIFICANT CHANGE UP (ref 130–400)
PO2 BLDV: 55 MMHG — HIGH (ref 20–40)
POTASSIUM BLDV-SCNC: 3.9 MMOL/L — SIGNIFICANT CHANGE UP (ref 3.3–5.6)
POTASSIUM SERPL-MCNC: 4.5 MMOL/L — SIGNIFICANT CHANGE UP (ref 3.5–5)
POTASSIUM SERPL-SCNC: 4.5 MMOL/L — SIGNIFICANT CHANGE UP (ref 3.5–5)
PROT SERPL-MCNC: 7.8 G/DL — SIGNIFICANT CHANGE UP (ref 6–8)
RBC # BLD: 5.47 M/UL — SIGNIFICANT CHANGE UP (ref 4.7–6.1)
RBC # FLD: 15.1 % — HIGH (ref 11.5–14.5)
SAO2 % BLDV: 86 % — SIGNIFICANT CHANGE UP
SODIUM SERPL-SCNC: 141 MMOL/L — SIGNIFICANT CHANGE UP (ref 135–146)
TRIGL SERPL-MCNC: 186 MG/DL — HIGH
WBC # BLD: 12.99 K/UL — HIGH (ref 4.8–10.8)
WBC # FLD AUTO: 12.99 K/UL — HIGH (ref 4.8–10.8)

## 2020-12-01 PROCEDURE — 74177 CT ABD & PELVIS W/CONTRAST: CPT | Mod: 26

## 2020-12-01 PROCEDURE — 99285 EMERGENCY DEPT VISIT HI MDM: CPT

## 2020-12-01 PROCEDURE — 93010 ELECTROCARDIOGRAM REPORT: CPT

## 2020-12-01 RX ORDER — SODIUM CHLORIDE 9 MG/ML
2000 INJECTION, SOLUTION INTRAVENOUS ONCE
Refills: 0 | Status: COMPLETED | OUTPATIENT
Start: 2020-12-01 | End: 2020-12-01

## 2020-12-01 RX ORDER — FOLIC ACID 0.8 MG
1 TABLET ORAL ONCE
Refills: 0 | Status: COMPLETED | OUTPATIENT
Start: 2020-12-01 | End: 2020-12-01

## 2020-12-01 RX ORDER — HYDROMORPHONE HYDROCHLORIDE 2 MG/ML
1 INJECTION INTRAMUSCULAR; INTRAVENOUS; SUBCUTANEOUS ONCE
Refills: 0 | Status: DISCONTINUED | OUTPATIENT
Start: 2020-12-01 | End: 2020-12-01

## 2020-12-01 RX ORDER — MORPHINE SULFATE 50 MG/1
8 CAPSULE, EXTENDED RELEASE ORAL ONCE
Refills: 0 | Status: DISCONTINUED | OUTPATIENT
Start: 2020-12-01 | End: 2020-12-01

## 2020-12-01 RX ORDER — ONDANSETRON 8 MG/1
4 TABLET, FILM COATED ORAL ONCE
Refills: 0 | Status: COMPLETED | OUTPATIENT
Start: 2020-12-01 | End: 2020-12-01

## 2020-12-01 RX ORDER — SODIUM CHLORIDE 9 MG/ML
1000 INJECTION, SOLUTION INTRAVENOUS ONCE
Refills: 0 | Status: COMPLETED | OUTPATIENT
Start: 2020-12-01 | End: 2020-12-01

## 2020-12-01 RX ORDER — MORPHINE SULFATE 50 MG/1
6 CAPSULE, EXTENDED RELEASE ORAL ONCE
Refills: 0 | Status: DISCONTINUED | OUTPATIENT
Start: 2020-12-01 | End: 2020-12-01

## 2020-12-01 RX ORDER — THIAMINE MONONITRATE (VIT B1) 100 MG
200 TABLET ORAL ONCE
Refills: 0 | Status: COMPLETED | OUTPATIENT
Start: 2020-12-01 | End: 2020-12-01

## 2020-12-01 RX ADMIN — MORPHINE SULFATE 8 MILLIGRAM(S): 50 CAPSULE, EXTENDED RELEASE ORAL at 21:35

## 2020-12-01 RX ADMIN — SODIUM CHLORIDE 1000 MILLILITER(S): 9 INJECTION, SOLUTION INTRAVENOUS at 21:35

## 2020-12-01 RX ADMIN — MORPHINE SULFATE 6 MILLIGRAM(S): 50 CAPSULE, EXTENDED RELEASE ORAL at 22:02

## 2020-12-01 RX ADMIN — MORPHINE SULFATE 6 MILLIGRAM(S): 50 CAPSULE, EXTENDED RELEASE ORAL at 22:00

## 2020-12-01 RX ADMIN — ONDANSETRON 4 MILLIGRAM(S): 8 TABLET, FILM COATED ORAL at 21:35

## 2020-12-01 RX ADMIN — Medication 200 MILLIGRAM(S): at 22:02

## 2020-12-01 RX ADMIN — Medication 1 MILLIGRAM(S): at 23:54

## 2020-12-01 RX ADMIN — HYDROMORPHONE HYDROCHLORIDE 1 MILLIGRAM(S): 2 INJECTION INTRAMUSCULAR; INTRAVENOUS; SUBCUTANEOUS at 23:53

## 2020-12-01 RX ADMIN — MORPHINE SULFATE 8 MILLIGRAM(S): 50 CAPSULE, EXTENDED RELEASE ORAL at 23:04

## 2020-12-01 RX ADMIN — SODIUM CHLORIDE 4000 MILLILITER(S): 9 INJECTION, SOLUTION INTRAVENOUS at 21:49

## 2020-12-01 NOTE — ED PROVIDER NOTE - OBJECTIVE STATEMENT
39 y/o male with a PMH of HTN, asthma, alcohol abuse, and pancreatitis presents to the ED for evaluation of sharp intermittent nonradiating epigastric pain that began this afternoon. pt reports nausea. pt denies worsening or alleviating factors. pt denies fever, chills, cough, chest pain, sob, back pain, urinary symptoms, blood in the urine or stool, illicit drug use, gallstones, excessive use of tylenol or NSAIDS, hx of abdominal surgeries, recent endoscopy or colonoscopy, weakness, dizziness, recent trauma, rashes, or recent viral illness. 41 y/o male with a PMH of HTN, asthma, alcohol abuse, and ETOH pancreatitis presents to the ED for evaluation of sharp intermittent nonradiating epigastric pain that began this afternoon. pt reports nausea. pt denies worsening or alleviating factors. pt denies fever, chills, cough, chest pain, sob, back pain, urinary symptoms, blood in the urine or stool, illicit drug use, gallstones, excessive use of tylenol or NSAIDS, hx of abdominal surgeries, recent endoscopy or colonoscopy, weakness, dizziness, recent trauma, rashes, or recent viral illness. 39 y/o male with a PMH of HTN, asthma, alcohol abuse, and ETOH pancreatitis presents to the ED for evaluation of sharp intermittent nonradiating epigastric pain that began this afternoon. pt reports nausea. pt denies worsening or alleviating factors. pt reports last alcoholic beverage was at 3AM 12/01. pt denies fever, chills, cough, chest pain, sob, back pain, urinary symptoms, blood in the urine or stool, illicit drug use, gallstones, excessive use of tylenol or NSAIDS, hx of abdominal surgeries, recent endoscopy or colonoscopy, weakness, dizziness, recent trauma, rashes, or recent viral illness.

## 2020-12-01 NOTE — ED PROVIDER NOTE - PROGRESS NOTE DETAILS
spoke with ICU fellow Dr. Aceves, accepts pt to icu under dr. padilla. Pt feeling better. Still nauseas. Will order Reglan IV. Pain is improved. Approved for ICU. Will admit.

## 2020-12-01 NOTE — ED PROVIDER NOTE - PHYSICAL EXAMINATION
Physical Exam    Vital Signs: I have reviewed the initial vital signs.  Constitutional: well-nourished, appears stated age, no acute distress  Eyes: Conjunctiva pink, Sclera clear  Cardiovascular: S1 and S2, regular rate, regular rhythm, well-perfused extremities, radial pulses equal and 2+ b/l.   Respiratory: unlabored respiratory effort, clear to auscultation bilaterally no wheezing, rales and rhonchi. pt is speaking full sentences. no accessory muscle use.   Gastrointestinal: soft, (+) epigastric tenderness, nondistended abdomen, no pulsatile mass, normal bowl sounds, no rebound, (+) guarding, negative psoas, negative obturator, negative murphys. no organomegaly. no cva tenderness.   Musculoskeletal: supple neck, no lower extremity edema, no calf tenderness, no midline tenderness, no palpable spinal step offs  Integumentary: warm, dry, no rash  Neurologic: awake, alert  Psychiatric: appropriate mood, appropriate affect

## 2020-12-01 NOTE — ED ADULT NURSE NOTE - CHIEF COMPLAINT QUOTE
Pt c/o mid upper abd pain started 3 hours ago, NB vomiting since am, no diarrhea, no fever. Has history of pancreatitis

## 2020-12-01 NOTE — ED ADULT NURSE NOTE - OBJECTIVE STATEMENT
pt aox4 complaining of pain sharp / stabbing abdominal pain. states " I have pancreatitis." states his last drink was this morning, onset of pain was 3pm. complains of associated nausea/ vomiting with abdominal pain. respirations even / unlabored. IV placed, labs sent. pain medications administered as per order.

## 2020-12-01 NOTE — ED PROVIDER NOTE - NS ED ROS FT
CONST: No fever, chills or bodyaches  EYES: No pain, redness, drainage or visual changes.  ENT: No ear pain or discharge, nasal discharge or congestion. No sore throat  CARD: No chest pain, palpitations  RESP: No SOB, cough, hemoptysis. No hx of asthma or COPD  GI: (+) epigastric abdominal pain, Nausea. No vomiting or diarrhea  : No urinary symptoms  MS: No joint pain, back pain or extremity pain/injury  SKIN: No rashes  NEURO: No headache, dizziness, paresthesias or LOC

## 2020-12-01 NOTE — ED PROVIDER NOTE - CLINICAL SUMMARY MEDICAL DECISION MAKING FREE TEXT BOX
Pt feeling better. Still nauseas. Will order Reglan IV. Pain is improved. Approved for ICU. Will admit.

## 2020-12-01 NOTE — ED ADULT TRIAGE NOTE - CHIEF COMPLAINT QUOTE
Pt c/o abd pain started 3 hours ago, vomiting, no diarrhea, no fever. Has history of pancreatitis Pt c/o mid upper abd pain started 3 hours ago, vomiting, no diarrhea, no fever. Has history of pancreatitis Pt c/o mid upper abd pain started 3 hours ago, NB vomiting since am, no diarrhea, no fever. Has history of pancreatitis

## 2020-12-01 NOTE — ED ADULT TRIAGE NOTE - DOMESTIC TRAVEL HIGH RISK QUESTION
Admission Reconciliation is Completed  Discharge Reconciliation is Not Complete Admission Reconciliation is Completed  Discharge Reconciliation is Completed No

## 2020-12-01 NOTE — ED PROVIDER NOTE - ATTENDING CONTRIBUTION TO CARE
I personally evaluated the patient. I reviewed the Resident’s or Physician Assistant’s note (as assigned above), and agree with the findings and plan except as documented in my note.    41 y/o male with a PMH of HTN, asthma, alcohol abuse, and ETOH pancreatitis presents to the ED for 2 days of epigastric pain. Reports nausea. Last drink in the morning. No CP. No SOB.    CONSTITUTIONAL: In pain   SKIN: skin exam is warm and dry, no acute rash.  HEAD: Normocephalic; atraumatic.  EYES: PERRL, 3 mm bilateral, no nystagmus, EOM intact; conjunctiva and sclera clear.  ENT: No nasal discharge; airway clear.  NECK: Supple; non tender.+ full passive ROM in all directions. No JVD  CARD: S1, S2 normal; no murmurs, gallops, or rubs. Regular rate and rhythm. + Symmetric Strong Pulses  RESP: No wheezes, rales or rhonchi. Good air movement bilaterally  ABD: tender in epigastric area   EXT: Normal ROM. No clubbing, cyanosis or edema    Plan- labs, , IVF, CT, pain control, reassess

## 2020-12-02 PROBLEM — J45.909 UNSPECIFIED ASTHMA, UNCOMPLICATED: Chronic | Status: ACTIVE | Noted: 2019-07-08

## 2020-12-02 LAB
ALBUMIN SERPL ELPH-MCNC: 4.1 G/DL — SIGNIFICANT CHANGE UP (ref 3.5–5.2)
ALP SERPL-CCNC: 56 U/L — SIGNIFICANT CHANGE UP (ref 30–115)
ALT FLD-CCNC: 56 U/L — HIGH (ref 0–41)
ANION GAP SERPL CALC-SCNC: 10 MMOL/L — SIGNIFICANT CHANGE UP (ref 7–14)
APPEARANCE UR: CLEAR — SIGNIFICANT CHANGE UP
APTT BLD: 23.7 SEC — CRITICAL LOW (ref 27–39.2)
AST SERPL-CCNC: 47 U/L — HIGH (ref 0–41)
BILIRUB SERPL-MCNC: 1.2 MG/DL — SIGNIFICANT CHANGE UP (ref 0.2–1.2)
BILIRUB UR-MCNC: NEGATIVE — SIGNIFICANT CHANGE UP
BUN SERPL-MCNC: 9 MG/DL — LOW (ref 10–20)
CALCIUM SERPL-MCNC: 9.8 MG/DL — SIGNIFICANT CHANGE UP (ref 8.5–10.1)
CHLORIDE SERPL-SCNC: 97 MMOL/L — LOW (ref 98–110)
CO2 SERPL-SCNC: 29 MMOL/L — SIGNIFICANT CHANGE UP (ref 17–32)
COLOR SPEC: SIGNIFICANT CHANGE UP
CREAT SERPL-MCNC: 0.9 MG/DL — SIGNIFICANT CHANGE UP (ref 0.7–1.5)
DIFF PNL FLD: NEGATIVE — SIGNIFICANT CHANGE UP
GLUCOSE BLDC GLUCOMTR-MCNC: 116 MG/DL — HIGH (ref 70–99)
GLUCOSE SERPL-MCNC: 111 MG/DL — HIGH (ref 70–99)
GLUCOSE UR QL: NEGATIVE — SIGNIFICANT CHANGE UP
HCT VFR BLD CALC: 43.8 % — SIGNIFICANT CHANGE UP (ref 42–52)
HCV AB S/CO SERPL IA: 0.03 COI — SIGNIFICANT CHANGE UP
HCV AB SERPL-IMP: SIGNIFICANT CHANGE UP
HGB BLD-MCNC: 15 G/DL — SIGNIFICANT CHANGE UP (ref 14–18)
INR BLD: 0.96 RATIO — SIGNIFICANT CHANGE UP (ref 0.65–1.3)
KETONES UR-MCNC: ABNORMAL
LACTATE SERPL-SCNC: 0.9 MMOL/L — SIGNIFICANT CHANGE UP (ref 0.7–2)
LEUKOCYTE ESTERASE UR-ACNC: NEGATIVE — SIGNIFICANT CHANGE UP
LIDOCAIN IGE QN: 221 U/L — HIGH (ref 7–60)
MAGNESIUM SERPL-MCNC: 1.6 MG/DL — LOW (ref 1.8–2.4)
MCHC RBC-ENTMCNC: 32.5 PG — HIGH (ref 27–31)
MCHC RBC-ENTMCNC: 34.2 G/DL — SIGNIFICANT CHANGE UP (ref 32–37)
MCV RBC AUTO: 94.8 FL — HIGH (ref 80–94)
NITRITE UR-MCNC: NEGATIVE — SIGNIFICANT CHANGE UP
NRBC # BLD: 0 /100 WBCS — SIGNIFICANT CHANGE UP (ref 0–0)
PH UR: 6.5 — SIGNIFICANT CHANGE UP (ref 5–8)
PLATELET # BLD AUTO: 276 K/UL — SIGNIFICANT CHANGE UP (ref 130–400)
POTASSIUM SERPL-MCNC: 4.3 MMOL/L — SIGNIFICANT CHANGE UP (ref 3.5–5)
POTASSIUM SERPL-SCNC: 4.3 MMOL/L — SIGNIFICANT CHANGE UP (ref 3.5–5)
PROT SERPL-MCNC: 6.3 G/DL — SIGNIFICANT CHANGE UP (ref 6–8)
PROT UR-MCNC: SIGNIFICANT CHANGE UP
PROTHROM AB SERPL-ACNC: 11 SEC — SIGNIFICANT CHANGE UP (ref 9.95–12.87)
RBC # BLD: 4.62 M/UL — LOW (ref 4.7–6.1)
RBC # FLD: 15.3 % — HIGH (ref 11.5–14.5)
SARS-COV-2 RNA SPEC QL NAA+PROBE: SIGNIFICANT CHANGE UP
SODIUM SERPL-SCNC: 136 MMOL/L — SIGNIFICANT CHANGE UP (ref 135–146)
SP GR SPEC: 1.02 — SIGNIFICANT CHANGE UP (ref 1.01–1.03)
TRANSFERRIN SERPL-MCNC: 296 MG/DL — SIGNIFICANT CHANGE UP (ref 200–360)
TROPONIN T SERPL-MCNC: <0.01 NG/ML — SIGNIFICANT CHANGE UP
UROBILINOGEN FLD QL: SIGNIFICANT CHANGE UP
WBC # BLD: 12.65 K/UL — HIGH (ref 4.8–10.8)
WBC # FLD AUTO: 12.65 K/UL — HIGH (ref 4.8–10.8)

## 2020-12-02 PROCEDURE — 99223 1ST HOSP IP/OBS HIGH 75: CPT

## 2020-12-02 PROCEDURE — 73030 X-RAY EXAM OF SHOULDER: CPT | Mod: 26,LT

## 2020-12-02 PROCEDURE — 93010 ELECTROCARDIOGRAM REPORT: CPT

## 2020-12-02 PROCEDURE — 93976 VASCULAR STUDY: CPT | Mod: 26

## 2020-12-02 PROCEDURE — 76705 ECHO EXAM OF ABDOMEN: CPT | Mod: 26,59

## 2020-12-02 RX ORDER — HYDROMORPHONE HYDROCHLORIDE 2 MG/ML
1 INJECTION INTRAMUSCULAR; INTRAVENOUS; SUBCUTANEOUS ONCE
Refills: 0 | Status: DISCONTINUED | OUTPATIENT
Start: 2020-12-02 | End: 2020-12-02

## 2020-12-02 RX ORDER — HYDROMORPHONE HYDROCHLORIDE 2 MG/ML
1 INJECTION INTRAMUSCULAR; INTRAVENOUS; SUBCUTANEOUS
Refills: 0 | Status: DISCONTINUED | OUTPATIENT
Start: 2020-12-02 | End: 2020-12-02

## 2020-12-02 RX ORDER — ONDANSETRON 8 MG/1
4 TABLET, FILM COATED ORAL ONCE
Refills: 0 | Status: COMPLETED | OUTPATIENT
Start: 2020-12-02 | End: 2020-12-02

## 2020-12-02 RX ORDER — NIFEDIPINE 30 MG
30 TABLET, EXTENDED RELEASE 24 HR ORAL DAILY
Refills: 0 | Status: DISCONTINUED | OUTPATIENT
Start: 2020-12-02 | End: 2020-12-08

## 2020-12-02 RX ORDER — CHLORHEXIDINE GLUCONATE 213 G/1000ML
1 SOLUTION TOPICAL
Refills: 0 | Status: DISCONTINUED | OUTPATIENT
Start: 2020-12-02 | End: 2020-12-08

## 2020-12-02 RX ORDER — SODIUM CHLORIDE 9 MG/ML
1000 INJECTION, SOLUTION INTRAVENOUS
Refills: 0 | Status: DISCONTINUED | OUTPATIENT
Start: 2020-12-02 | End: 2020-12-02

## 2020-12-02 RX ORDER — INFLUENZA VIRUS VACCINE 15; 15; 15; 15 UG/.5ML; UG/.5ML; UG/.5ML; UG/.5ML
0.5 SUSPENSION INTRAMUSCULAR ONCE
Refills: 0 | Status: DISCONTINUED | OUTPATIENT
Start: 2020-12-02 | End: 2020-12-08

## 2020-12-02 RX ORDER — THIAMINE MONONITRATE (VIT B1) 100 MG
100 TABLET ORAL DAILY
Refills: 0 | Status: COMPLETED | OUTPATIENT
Start: 2020-12-02 | End: 2020-12-04

## 2020-12-02 RX ORDER — HYDROMORPHONE HYDROCHLORIDE 2 MG/ML
2 INJECTION INTRAMUSCULAR; INTRAVENOUS; SUBCUTANEOUS
Refills: 0 | Status: DISCONTINUED | OUTPATIENT
Start: 2020-12-02 | End: 2020-12-02

## 2020-12-02 RX ORDER — LABETALOL HCL 100 MG
100 TABLET ORAL ONCE
Refills: 0 | Status: COMPLETED | OUTPATIENT
Start: 2020-12-02 | End: 2020-12-02

## 2020-12-02 RX ORDER — NIFEDIPINE 30 MG
10 TABLET, EXTENDED RELEASE 24 HR ORAL ONCE
Refills: 0 | Status: COMPLETED | OUTPATIENT
Start: 2020-12-02 | End: 2020-12-02

## 2020-12-02 RX ORDER — HYDROMORPHONE HYDROCHLORIDE 2 MG/ML
2 INJECTION INTRAMUSCULAR; INTRAVENOUS; SUBCUTANEOUS EVERY 4 HOURS
Refills: 0 | Status: DISCONTINUED | OUTPATIENT
Start: 2020-12-02 | End: 2020-12-02

## 2020-12-02 RX ORDER — PANTOPRAZOLE SODIUM 20 MG/1
40 TABLET, DELAYED RELEASE ORAL ONCE
Refills: 0 | Status: COMPLETED | OUTPATIENT
Start: 2020-12-02 | End: 2020-12-02

## 2020-12-02 RX ORDER — PANTOPRAZOLE SODIUM 20 MG/1
40 TABLET, DELAYED RELEASE ORAL DAILY
Refills: 0 | Status: DISCONTINUED | OUTPATIENT
Start: 2020-12-02 | End: 2020-12-04

## 2020-12-02 RX ORDER — LABETALOL HCL 100 MG
10 TABLET ORAL ONCE
Refills: 0 | Status: COMPLETED | OUTPATIENT
Start: 2020-12-02 | End: 2020-12-02

## 2020-12-02 RX ORDER — METOCLOPRAMIDE HCL 10 MG
10 TABLET ORAL ONCE
Refills: 0 | Status: COMPLETED | OUTPATIENT
Start: 2020-12-02 | End: 2020-12-02

## 2020-12-02 RX ORDER — ENOXAPARIN SODIUM 100 MG/ML
40 INJECTION SUBCUTANEOUS DAILY
Refills: 0 | Status: DISCONTINUED | OUTPATIENT
Start: 2020-12-02 | End: 2020-12-08

## 2020-12-02 RX ORDER — NIFEDIPINE 30 MG
30 TABLET, EXTENDED RELEASE 24 HR ORAL ONCE
Refills: 0 | Status: COMPLETED | OUTPATIENT
Start: 2020-12-02 | End: 2020-12-02

## 2020-12-02 RX ORDER — HYDROMORPHONE HYDROCHLORIDE 2 MG/ML
2 INJECTION INTRAMUSCULAR; INTRAVENOUS; SUBCUTANEOUS EVERY 4 HOURS
Refills: 0 | Status: DISCONTINUED | OUTPATIENT
Start: 2020-12-02 | End: 2020-12-06

## 2020-12-02 RX ORDER — SODIUM CHLORIDE 9 MG/ML
1000 INJECTION, SOLUTION INTRAVENOUS
Refills: 0 | Status: DISCONTINUED | OUTPATIENT
Start: 2020-12-02 | End: 2020-12-03

## 2020-12-02 RX ORDER — MAGNESIUM SULFATE 500 MG/ML
2 VIAL (ML) INJECTION ONCE
Refills: 0 | Status: COMPLETED | OUTPATIENT
Start: 2020-12-02 | End: 2020-12-02

## 2020-12-02 RX ORDER — HYDROMORPHONE HYDROCHLORIDE 2 MG/ML
0.5 INJECTION INTRAMUSCULAR; INTRAVENOUS; SUBCUTANEOUS ONCE
Refills: 0 | Status: DISCONTINUED | OUTPATIENT
Start: 2020-12-02 | End: 2020-12-02

## 2020-12-02 RX ORDER — SENNA PLUS 8.6 MG/1
2 TABLET ORAL AT BEDTIME
Refills: 0 | Status: DISCONTINUED | OUTPATIENT
Start: 2020-12-02 | End: 2020-12-08

## 2020-12-02 RX ORDER — FOLIC ACID 0.8 MG
1 TABLET ORAL DAILY
Refills: 0 | Status: DISCONTINUED | OUTPATIENT
Start: 2020-12-02 | End: 2020-12-08

## 2020-12-02 RX ORDER — ONDANSETRON 8 MG/1
8 TABLET, FILM COATED ORAL ONCE
Refills: 0 | Status: DISCONTINUED | OUTPATIENT
Start: 2020-12-02 | End: 2020-12-02

## 2020-12-02 RX ORDER — NIFEDIPINE 30 MG
20 TABLET, EXTENDED RELEASE 24 HR ORAL ONCE
Refills: 0 | Status: COMPLETED | OUTPATIENT
Start: 2020-12-02 | End: 2020-12-02

## 2020-12-02 RX ADMIN — PANTOPRAZOLE SODIUM 40 MILLIGRAM(S): 20 TABLET, DELAYED RELEASE ORAL at 11:33

## 2020-12-02 RX ADMIN — Medication 1 MILLIGRAM(S): at 11:33

## 2020-12-02 RX ADMIN — Medication 2 MILLIGRAM(S): at 15:55

## 2020-12-02 RX ADMIN — ONDANSETRON 4 MILLIGRAM(S): 8 TABLET, FILM COATED ORAL at 23:01

## 2020-12-02 RX ADMIN — Medication 10 MILLIGRAM(S): at 19:13

## 2020-12-02 RX ADMIN — Medication 2 MILLIGRAM(S): at 13:50

## 2020-12-02 RX ADMIN — SODIUM CHLORIDE 250 MILLILITER(S): 9 INJECTION, SOLUTION INTRAVENOUS at 07:47

## 2020-12-02 RX ADMIN — Medication 1 TABLET(S): at 11:33

## 2020-12-02 RX ADMIN — Medication 2 MILLIGRAM(S): at 17:29

## 2020-12-02 RX ADMIN — Medication 2 MILLIGRAM(S): at 05:45

## 2020-12-02 RX ADMIN — HYDROMORPHONE HYDROCHLORIDE 1 MILLIGRAM(S): 2 INJECTION INTRAMUSCULAR; INTRAVENOUS; SUBCUTANEOUS at 17:29

## 2020-12-02 RX ADMIN — SODIUM CHLORIDE 150 MILLILITER(S): 9 INJECTION, SOLUTION INTRAVENOUS at 02:20

## 2020-12-02 RX ADMIN — HYDROMORPHONE HYDROCHLORIDE 1 MILLIGRAM(S): 2 INJECTION INTRAMUSCULAR; INTRAVENOUS; SUBCUTANEOUS at 13:00

## 2020-12-02 RX ADMIN — HYDROMORPHONE HYDROCHLORIDE 2 MILLIGRAM(S): 2 INJECTION INTRAMUSCULAR; INTRAVENOUS; SUBCUTANEOUS at 16:00

## 2020-12-02 RX ADMIN — HYDROMORPHONE HYDROCHLORIDE 2 MILLIGRAM(S): 2 INJECTION INTRAMUSCULAR; INTRAVENOUS; SUBCUTANEOUS at 15:22

## 2020-12-02 RX ADMIN — ONDANSETRON 4 MILLIGRAM(S): 8 TABLET, FILM COATED ORAL at 05:46

## 2020-12-02 RX ADMIN — HYDROMORPHONE HYDROCHLORIDE 2 MILLIGRAM(S): 2 INJECTION INTRAMUSCULAR; INTRAVENOUS; SUBCUTANEOUS at 11:00

## 2020-12-02 RX ADMIN — HYDROMORPHONE HYDROCHLORIDE 2 MILLIGRAM(S): 2 INJECTION INTRAMUSCULAR; INTRAVENOUS; SUBCUTANEOUS at 23:45

## 2020-12-02 RX ADMIN — ENOXAPARIN SODIUM 40 MILLIGRAM(S): 100 INJECTION SUBCUTANEOUS at 11:33

## 2020-12-02 RX ADMIN — HYDROMORPHONE HYDROCHLORIDE 2 MILLIGRAM(S): 2 INJECTION INTRAMUSCULAR; INTRAVENOUS; SUBCUTANEOUS at 20:28

## 2020-12-02 RX ADMIN — HYDROMORPHONE HYDROCHLORIDE 1 MILLIGRAM(S): 2 INJECTION INTRAMUSCULAR; INTRAVENOUS; SUBCUTANEOUS at 08:32

## 2020-12-02 RX ADMIN — SODIUM CHLORIDE 250 MILLILITER(S): 9 INJECTION, SOLUTION INTRAVENOUS at 22:00

## 2020-12-02 RX ADMIN — HYDROMORPHONE HYDROCHLORIDE 0.5 MILLIGRAM(S): 2 INJECTION INTRAMUSCULAR; INTRAVENOUS; SUBCUTANEOUS at 02:19

## 2020-12-02 RX ADMIN — CHLORHEXIDINE GLUCONATE 1 APPLICATION(S): 213 SOLUTION TOPICAL at 05:46

## 2020-12-02 RX ADMIN — Medication 100 MILLIGRAM(S): at 11:33

## 2020-12-02 RX ADMIN — Medication 25 GRAM(S): at 10:39

## 2020-12-02 RX ADMIN — ONDANSETRON 4 MILLIGRAM(S): 8 TABLET, FILM COATED ORAL at 00:31

## 2020-12-02 RX ADMIN — HYDROMORPHONE HYDROCHLORIDE 2 MILLIGRAM(S): 2 INJECTION INTRAMUSCULAR; INTRAVENOUS; SUBCUTANEOUS at 06:20

## 2020-12-02 RX ADMIN — Medication 2 MILLIGRAM(S): at 18:41

## 2020-12-02 RX ADMIN — SODIUM CHLORIDE 150 MILLILITER(S): 9 INJECTION, SOLUTION INTRAVENOUS at 05:13

## 2020-12-02 RX ADMIN — Medication 2 MILLIGRAM(S): at 10:28

## 2020-12-02 RX ADMIN — SODIUM CHLORIDE 250 MILLILITER(S): 9 INJECTION, SOLUTION INTRAVENOUS at 10:06

## 2020-12-02 RX ADMIN — ONDANSETRON 4 MILLIGRAM(S): 8 TABLET, FILM COATED ORAL at 20:46

## 2020-12-02 RX ADMIN — ONDANSETRON 4 MILLIGRAM(S): 8 TABLET, FILM COATED ORAL at 12:20

## 2020-12-02 RX ADMIN — Medication 2 MILLIGRAM(S): at 21:18

## 2020-12-02 RX ADMIN — Medication 2 MILLIGRAM(S): at 09:13

## 2020-12-02 RX ADMIN — Medication 10 MILLIGRAM(S): at 01:00

## 2020-12-02 RX ADMIN — HYDROMORPHONE HYDROCHLORIDE 1 MILLIGRAM(S): 2 INJECTION INTRAMUSCULAR; INTRAVENOUS; SUBCUTANEOUS at 12:41

## 2020-12-02 RX ADMIN — Medication 100 MILLIGRAM(S): at 18:41

## 2020-12-02 RX ADMIN — Medication 30 MILLIGRAM(S): at 11:32

## 2020-12-02 RX ADMIN — HYDROMORPHONE HYDROCHLORIDE 1 MILLIGRAM(S): 2 INJECTION INTRAMUSCULAR; INTRAVENOUS; SUBCUTANEOUS at 18:00

## 2020-12-02 RX ADMIN — Medication 10 MILLIGRAM(S): at 05:45

## 2020-12-02 RX ADMIN — HYDROMORPHONE HYDROCHLORIDE 2 MILLIGRAM(S): 2 INJECTION INTRAMUSCULAR; INTRAVENOUS; SUBCUTANEOUS at 20:10

## 2020-12-02 RX ADMIN — HYDROMORPHONE HYDROCHLORIDE 2 MILLIGRAM(S): 2 INJECTION INTRAMUSCULAR; INTRAVENOUS; SUBCUTANEOUS at 10:33

## 2020-12-02 RX ADMIN — HYDROMORPHONE HYDROCHLORIDE 1 MILLIGRAM(S): 2 INJECTION INTRAMUSCULAR; INTRAVENOUS; SUBCUTANEOUS at 09:08

## 2020-12-02 RX ADMIN — HYDROMORPHONE HYDROCHLORIDE 2 MILLIGRAM(S): 2 INJECTION INTRAMUSCULAR; INTRAVENOUS; SUBCUTANEOUS at 05:50

## 2020-12-02 RX ADMIN — PANTOPRAZOLE SODIUM 40 MILLIGRAM(S): 20 TABLET, DELAYED RELEASE ORAL at 04:00

## 2020-12-02 NOTE — CONSULT NOTE ADULT - ASSESSMENT
40 year old male with a hx of HTN,  ETOH abuse , previous episode of ETOH pancreatitis (7/2019) presents to the ED for an evaluation of sharp intermittent epigastric pain which radiates across the stomach and too the back. Pain started this afternoon and quickly became unbearable so he came to the ED.      # Abdominal pain: Acute interstitial pancreatitis secondary to alcohol   Ct abdomen: 1.2 cm hypodensity pancreatic neck/body with upstream ductal dilatation,  new compared to 2019 CT scan  WBC 12.65, lipase 242  Cr N, Geovany/ALP Normal    Rec:  NPO  c/w RL at 250 cc/hr  Pain control as needed  Get US abdomen  Monitor HCT/BUN/UO daily   get CEA, CA 19-9  Alcohol cessation.   Folate, thiamine       Elevated LFTS hepatocellular:  Likely fatty liver  INR normal  Alcohol cessation  Please perform a CLD work up which includes HAV IgM/IgG, HBsAg, HBsAb, HBcAb IgM/IgG, HCV Ab, Anti HEV, Serum Ferritin, Transferrin Saturation, Ceruloplasmin level, MICAELA, SMA, gamma globulin, AMA.       40 year old male with a hx of HTN,  ETOH abuse , previous episode of ETOH pancreatitis (7/2019) presents to the ED for an evaluation of sharp intermittent epigastric pain which radiates across the stomach and too the back. Pain started this afternoon and quickly became unbearable so he came to the ED.      # Abdominal pain: Acute interstitial pancreatitis secondary to alcohol   Ct abdomen: 1.2 cm hypodensity pancreatic neck/body with upstream ductal dilatation,  new compared to 2019 CT scan  WBC 12.65, lipase 242  Cr N, Geovany/ALP Normal    Rec:  NPO  c/w RL at 250 cc/hr  Pain control as needed  Get US abdomen  Monitor HCT/BUN/UO daily   get CEA, CA 19-9  Alcohol cessation.   Folate, thiamine   Get MRI w/wo Guy with MRCP as outpatient in 2-3 weeks once pancreatitis is resolved to get better images of pancreas.      Elevated LFTS hepatocellular:  Likely fatty liver  INR normal  Alcohol cessation  Please perform a CLD work up which includes HAV IgM/IgG, HBsAg, HBsAb, HBcAb IgM/IgG, HCV Ab, Anti HEV, Serum Ferritin, Transferrin Saturation, Ceruloplasmin level, MICAELA, SMA, gamma globulin, AMA.    will follow up.       40 year old male with a hx of HTN,  ETOH abuse , previous episode of ETOH pancreatitis (7/2019) presents to the ED for an evaluation of sharp intermittent epigastric pain which radiates across the stomach and too the back. Pain started this afternoon and quickly became unbearable so he came to the ED.      # Abdominal pain: Acute interstitial pancreatitis secondary to alcohol   Ct abdomen: 1.2 cm hypodensity pancreatic neck/body with upstream ductal dilatation,  new compared to 2019 CT scan  WBC 12.65, lipase 242  Cr N, Geovany/ALP Normal    Rec:  NPO  c/w LR at 250 cc/hr  Pain control as needed  Get US abdomen  Monitor HCT/BUN/UO daily   get CEA, CA 19-9  Alcohol cessation.   Folate, thiamine   Get MRI w/wo Guy with MRCP as outpatient in 2-3 weeks once pancreatitis is resolved to get better images of pancreas. At that point can perform EUS with possible FNA.       Elevated LFTS hepatocellular:  Likely fatty liver  INR normal  Alcohol cessation  Please perform a CLD work up which includes HAV IgM/IgG, HBsAg, HBsAb, HBcAb IgM/IgG, HCV Ab, Anti HEV, Serum Ferritin, Transferrin Saturation, Ceruloplasmin level, MICAELA, SMA, gamma globulin, AMA.    will follow up.

## 2020-12-02 NOTE — CHART NOTE - NSCHARTNOTEFT_GEN_A_CORE
Patient on CIWA for alcohol withdrawal.   Pt repeatedly agitated despite getting standing and PRN 2 mg IV lorazepam.  Lorazepam PRN changed to q 2 hr.    BP persistently elevated. single dose of PO labetalol 100 mg given.  Repeat BP still in 200s with HR > 100.  single dose of IV labetalol 10 mg ordered.

## 2020-12-02 NOTE — H&P ADULT - NSHPPHYSICALEXAM_GEN_ALL_CORE
PHYSICAL EXAM:  GENERAL: NAD, speaks in full sentences, no signs of respiratory distress  HEAD: Atraumatic  NECK: Supple  CHEST/LUNG: Clear to auscultation bilaterally; No wheeze or crackles  HEART: S1, S2; RRR; No murmurs, rubs, or gallops  ABDOMEN: BS+; Soft, Non-tender, Non-distended  EXTREMITIES:  2+ Peripheral Pulses, No clubbing, cyanosis, or edema  PSYCH: AAOx3  NEUROLOGY: non-focal  SKIN: No rashes or lesions PHYSICAL EXAM:  GENERAL: NAD, speaks in full sentences, no signs of respiratory distress, tremors in UE b/l, tremulous speaking   HEAD: Atraumatic  NECK: Supple  CHEST/LUNG: Clear to auscultation bilaterally;   HEART: S1, S2; RRR; No murmurs, rubs, or gallops  ABDOMEN: BS+; Soft, tender to palpation   EXTREMITIES:  2+ Peripheral Pulses, No clubbing, cyanosis, or edema  PSYCH: AAOx3  NEUROLOGY: non-focal

## 2020-12-02 NOTE — H&P ADULT - NSHPLABSRESULTS_GEN_ALL_CORE
VITALS:   T(F): 97.6  HR: 92  BP: 168/96  RR: 18  SpO2: 99%    LABS:                        17.4   12.99 )-----------( 355      ( 01 Dec 2020 20:58 )             50.6         141  |  96<L>  |  9<L>  ----------------------------<  96  4.5   |  20  |  1.2    Ca    10.1      01 Dec 2020 20:58    TPro  7.8  /  Alb  4.9  /  TBili  1.2  /  DBili  0.3<H>  /  AST  66<H>  /  ALT  69<H>  /  AlkPhos  68        Urinalysis Basic - ( 01 Dec 2020 23:45 )    Color: Light Yellow / Appearance: Clear / S.023 / pH: x  Gluc: x / Ketone: Small  / Bili: Negative / Urobili: <2 mg/dL   Blood: x / Protein: Trace / Nitrite: Negative   Leuk Esterase: Negative / RBC: x / WBC x   Sq Epi: x / Non Sq Epi: x / Bacteria: x        Lactate, Blood: 9.0 mmol/L <HH> (20 @ 20:58)

## 2020-12-02 NOTE — PROGRESS NOTE ADULT - SUBJECTIVE AND OBJECTIVE BOX
HPI:  40 year old male with a hx of HTN,  ETOH abuse , previous episode of ETOH pancreatitis (7/2019) presents to the ED for an evaluation of sharp intermittent epigastric pain which radiates across the stomach and too the back. Pain started this afternoon and quickly became unbearable so he came to the ED. He endorses that he was drinking all night the night before with his last drink around 3am 12/1. He states that he drinks every day afterwork. Abdominal pain is associated with nausea and NBNB vomiting Denies any CP, SOB, headache.  In ED vitals: Temp 97.5F, , / 83, RR 18, SpO2 96%. CT A + P: Inflammation surrounding the pancreas, suspect pancreatitis. There is also a new suspicious approximately 1.2 cm hypodensity in the pancreatic neck/body with upstream ductal dilatation, rule out neoplasm. lactate 9, Lipase 235, Anion gap 25.   Received 28mg of morphine in the ED, Zofran, and 2.5mg of Dilaudid Pain was uncontrolled with morphine, but controlled with Dilaudid. Given a Bolus with 3L of LR and on maintenance LR at 150cc/hr. NPO. Placed on CIWA protocol.    (02 Dec 2020 03:55)      INTERVAL HISTORY:    Patient was seen at bedside. Currently doing well. Denies any abdominal pain, nausea, vomiting. His last BM was yesterday morning. No fevers    PAST MEDICAL & SURGICAL HISTORY  Asthma  no recent exacerbation, not using inhalers for a long time    Pancreatitis  October 2018 due to alcohol    No pertinent past medical history    H/O rotator cuff surgery        ALLERGIES:  No Known Allergies      MEDICATIONS:  MEDICATIONS  (STANDING):  chlorhexidine 4% Liquid 1 Application(s) Topical <User Schedule>  enoxaparin Injectable 40 milliGRAM(s) SubCutaneous daily  folic acid 1 milliGRAM(s) Oral daily  influenza   Vaccine 0.5 milliLiter(s) IntraMuscular once  lactated ringers. 1000 milliLiter(s) (150 mL/Hr) IV Continuous <Continuous>  LORazepam   Injectable 2 milliGRAM(s) IV Push every 4 hours  LORazepam   Injectable   IV Push   multivitamin 1 Tablet(s) Oral daily  NIFEdipine XL 30 milliGRAM(s) Oral daily  pantoprazole  Injectable 40 milliGRAM(s) IV Push daily  thiamine 100 milliGRAM(s) Oral daily    MEDICATIONS  (PRN):  HYDROmorphone  Injectable 2 milliGRAM(s) IV Push every 4 hours PRN Severe Pain (7 - 10)  LORazepam   Injectable 2 milliGRAM(s) IV Push every 1 hour PRN Symptom-triggered: each CIWA -Ar score 8 or GREATER  ondansetron Injectable 4 milliGRAM(s) IV Push once PRN Nausea and/or Vomiting      HOME MEDICATIONS:  Home Medications:        OBJECTIVE:  ICU Vital Signs Last 24 Hrs  T(C): 36.6 (02 Dec 2020 05:00), Max: 36.6 (02 Dec 2020 05:00)  T(F): 97.9 (02 Dec 2020 05:00), Max: 97.9 (02 Dec 2020 05:00)  HR: 86 (02 Dec 2020 06:20) (82 - 109)  BP: 151/86 (02 Dec 2020 06:20) (151/86 - 208/109)  BP(mean): 120 (02 Dec 2020 06:20) (120 - 133)  ABP: --  ABP(mean): --  RR: 14 (02 Dec 2020 06:20) (11 - 18)  SpO2: 97% (02 Dec 2020 06:20) (89% - 99%)      01 Dec 2020 07:01  -  02 Dec 2020 07:00  --------------------------------------------------------  IN: 450 mL / OUT: 0 mL / NET: 450 mL      Daily Height in cm: 182.88 (02 Dec 2020 05:00)    Daily     PHYSICAL EXAM:  NEURO: patient is awake , alert and oriented  GEN: Not in acute distress  NECK: no thyroid enlargement, no JVD  LUNGS: Clear to auscultation bilaterally   CARDIOVASCULAR: S1/S2 present, RRR , no murmurs or rubs, no carotid bruits,  + PP bilaterally  ABD: Soft, non-tender, non-distended, +BS  EXT: No SONIA  SKIN: Intact      LABS:                        15.0   12.65 )-----------( 276      ( 02 Dec 2020 04:19 )             43.8     12-02    136  |  97<L>  |  9<L>  ----------------------------<  111<H>  4.3   |  29  |  0.9    Ca    9.8      02 Dec 2020 04:19  Mg     1.6     12-02    TPro  6.3  /  Alb  4.1  /  TBili  1.2  /  DBili  x   /  AST  47<H>  /  ALT  56<H>  /  AlkPhos  56  12-02    PT/INR - ( 02 Dec 2020 04:19 )   PT: 11.00 sec;   INR: 0.96 ratio           Troponin T, Serum: <0.01 ng/mL (12-02-20 @ 04:19)  Lactate, Blood: 9.0 mmol/L <HH> (12-01-20 @ 20:58)    CARDIAC MARKERS ( 02 Dec 2020 04:19 )  x     / <0.01 ng/mL / x     / x     / x            Troponin trend:      12-01 Chol 229<H> LDL -- HDL 9<L> Trig 186<H>      RADIOLOGY:  -CT  EXAM:  CT ABDOMEN AND PELVIS IC            PROCEDURE DATE:  12/01/2020            INTERPRETATION:  CLINICAL STATEMENT: Epigastric pain.    TECHNIQUE: Contiguous axial CT images were obtained from the lower chest to the pubic symphysis following administration of 100cc Omnipaque 350 intravenous contrast.  Oral contrast was not administered.  Reformatted images in the coronal and sagittal planes were acquired.    COMPARISON CT: 7/11/2019.    OTHER STUDIES USED FOR CORRELATION: None.      FINDINGS:    LOWER CHEST: Unremarkable.    HEPATOBILIARY: Hepatic steatosis. Hepatomegaly. Focal fatty infiltration at the falciform ligament.    SPLEEN: Unremarkable.    PANCREAS: Inflammation surrounding the pancreas. There is also a new suspicious approximately 1.2 cm hypodensity in the pancreatic neck/body (series 5, image 161) with upstream ductal dilatation.    ADRENAL GLANDS: Unremarkable.    KIDNEYS: Unremarkable.    ABDOMINOPELVIC NODES: Unremarkable.    PELVIC ORGANS: Unremarkable.    PERITONEUM/MESENTERY/BOWEL: Unremarkable.    BONES/SOFT TISSUES: Unremarkable.    OTHER:      IMPRESSION:    Inflammation surrounding the pancreas, suspect pancreatitis. There is also a new suspicious approximately 1.2 cm hypodensity in the pancreatic neck/bodywith upstream ductal dilatation, rule out neoplasm.    Hepatic steatosis and hepatomegaly.        ECG:    Ventricular Rate 84 BPM    Atrial Rate 84 BPM    P-R Interval 162 ms    QRS Duration 96 ms    Q-T Interval 388 ms    QTC Calculation(Bazett) 458 ms    P Axis 54 degrees    R Axis 46 degrees    T Axis 40 degrees    Diagnosis Line Normal sinus rhythm  Minimal voltage criteria for LVH, may be normal variant  Borderline ECG    TELEMETRY EVENTS:  none overnight     HPI:  40 year old male with a hx of HTN,  ETOH abuse , previous episode of ETOH pancreatitis (7/2019) presents to the ED for an evaluation of sharp intermittent epigastric pain which radiates across the stomach and too the back. Pain started this afternoon and quickly became unbearable so he came to the ED. He endorses that he was drinking all night the night before with his last drink around 3am 12/1. He states that he drinks every day afterwork. Abdominal pain is associated with nausea and NBNB vomiting Denies any CP, SOB, headache.  In ED vitals: Temp 97.5F, , / 83, RR 18, SpO2 96%. CT A + P: Inflammation surrounding the pancreas, suspect pancreatitis. There is also a new suspicious approximately 1.2 cm hypodensity in the pancreatic neck/body with upstream ductal dilatation, rule out neoplasm. lactate 9, Lipase 235, Anion gap 25.   Received 28mg of morphine in the ED, Zofran, and 2.5mg of Dilaudid Pain was uncontrolled with morphine, but controlled with Dilaudid. Given a Bolus with 3L of LR and on maintenance LR at 150cc/hr. NPO. Placed on CIWA protocol.    (02 Dec 2020 03:55)      INTERVAL HISTORY:    Patient was seen at bedside. Currently doing well. Denies any abdominal pain, nausea, vomiting. His last BM was yesterday morning. No fevers    PAST MEDICAL & SURGICAL HISTORY  Asthma  no recent exacerbation, not using inhalers for a long time    Pancreatitis  October 2018 due to alcohol    No pertinent past medical history    H/O rotator cuff surgery        ALLERGIES:  No Known Allergies      MEDICATIONS:  MEDICATIONS  (STANDING):  chlorhexidine 4% Liquid 1 Application(s) Topical <User Schedule>  enoxaparin Injectable 40 milliGRAM(s) SubCutaneous daily  folic acid 1 milliGRAM(s) Oral daily  influenza   Vaccine 0.5 milliLiter(s) IntraMuscular once  lactated ringers. 1000 milliLiter(s) (150 mL/Hr) IV Continuous <Continuous>  LORazepam   Injectable 2 milliGRAM(s) IV Push every 4 hours  LORazepam   Injectable   IV Push   multivitamin 1 Tablet(s) Oral daily  NIFEdipine XL 30 milliGRAM(s) Oral daily  pantoprazole  Injectable 40 milliGRAM(s) IV Push daily  thiamine 100 milliGRAM(s) Oral daily    MEDICATIONS  (PRN):  HYDROmorphone  Injectable 2 milliGRAM(s) IV Push every 4 hours PRN Severe Pain (7 - 10)  LORazepam   Injectable 2 milliGRAM(s) IV Push every 1 hour PRN Symptom-triggered: each CIWA -Ar score 8 or GREATER  ondansetron Injectable 4 milliGRAM(s) IV Push once PRN Nausea and/or Vomiting      HOME MEDICATIONS:  Home Medications:        OBJECTIVE:  ICU Vital Signs Last 24 Hrs  T(C): 36.6 (02 Dec 2020 05:00), Max: 36.6 (02 Dec 2020 05:00)  T(F): 97.9 (02 Dec 2020 05:00), Max: 97.9 (02 Dec 2020 05:00)  HR: 86 (02 Dec 2020 06:20) (82 - 109)  BP: 151/86 (02 Dec 2020 06:20) (151/86 - 208/109)  BP(mean): 120 (02 Dec 2020 06:20) (120 - 133)  ABP: --  ABP(mean): --  RR: 14 (02 Dec 2020 06:20) (11 - 18)  SpO2: 97% (02 Dec 2020 06:20) (89% - 99%)      01 Dec 2020 07:01  -  02 Dec 2020 07:00  --------------------------------------------------------  IN: 450 mL / OUT: 0 mL / NET: 450 mL      Daily Height in cm: 182.88 (02 Dec 2020 05:00)    Daily     PHYSICAL EXAM:  NEURO: patient is awake , alert and oriented  GEN: Not in acute distress  NECK: no thyroid enlargement, no JVD  LUNGS: Clear to auscultation bilaterally   CARDIOVASCULAR: S1/S2 present, RRR , no murmurs or rubs, no carotid bruits,  + PP bilaterally  ABD: Soft, non-tender, non-distended, +BS  EXT: No SONIA, tremors present in bilateral hands   SKIN: Intact      LABS:                        15.0   12.65 )-----------( 276      ( 02 Dec 2020 04:19 )             43.8     12-02    136  |  97<L>  |  9<L>  ----------------------------<  111<H>  4.3   |  29  |  0.9    Ca    9.8      02 Dec 2020 04:19  Mg     1.6     12-02    TPro  6.3  /  Alb  4.1  /  TBili  1.2  /  DBili  x   /  AST  47<H>  /  ALT  56<H>  /  AlkPhos  56  12-02    PT/INR - ( 02 Dec 2020 04:19 )   PT: 11.00 sec;   INR: 0.96 ratio           Troponin T, Serum: <0.01 ng/mL (12-02-20 @ 04:19)  Lactate, Blood: 9.0 mmol/L <HH> (12-01-20 @ 20:58)    CARDIAC MARKERS ( 02 Dec 2020 04:19 )  x     / <0.01 ng/mL / x     / x     / x            Troponin trend:      12-01 Chol 229<H> LDL -- HDL 9<L> Trig 186<H>      RADIOLOGY:  -CT  EXAM:  CT ABDOMEN AND PELVIS IC            PROCEDURE DATE:  12/01/2020            INTERPRETATION:  CLINICAL STATEMENT: Epigastric pain.    TECHNIQUE: Contiguous axial CT images were obtained from the lower chest to the pubic symphysis following administration of 100cc Omnipaque 350 intravenous contrast.  Oral contrast was not administered.  Reformatted images in the coronal and sagittal planes were acquired.    COMPARISON CT: 7/11/2019.    OTHER STUDIES USED FOR CORRELATION: None.      FINDINGS:    LOWER CHEST: Unremarkable.    HEPATOBILIARY: Hepatic steatosis. Hepatomegaly. Focal fatty infiltration at the falciform ligament.    SPLEEN: Unremarkable.    PANCREAS: Inflammation surrounding the pancreas. There is also a new suspicious approximately 1.2 cm hypodensity in the pancreatic neck/body (series 5, image 161) with upstream ductal dilatation.    ADRENAL GLANDS: Unremarkable.    KIDNEYS: Unremarkable.    ABDOMINOPELVIC NODES: Unremarkable.    PELVIC ORGANS: Unremarkable.    PERITONEUM/MESENTERY/BOWEL: Unremarkable.    BONES/SOFT TISSUES: Unremarkable.    OTHER:      IMPRESSION:    Inflammation surrounding the pancreas, suspect pancreatitis. There is also a new suspicious approximately 1.2 cm hypodensity in the pancreatic neck/bodywith upstream ductal dilatation, rule out neoplasm.    Hepatic steatosis and hepatomegaly.        ECG:    Ventricular Rate 84 BPM    Atrial Rate 84 BPM    P-R Interval 162 ms    QRS Duration 96 ms    Q-T Interval 388 ms    QTC Calculation(Bazett) 458 ms    P Axis 54 degrees    R Axis 46 degrees    T Axis 40 degrees    Diagnosis Line Normal sinus rhythm  Minimal voltage criteria for LVH, may be normal variant  Borderline ECG    TELEMETRY EVENTS:  none overnight

## 2020-12-02 NOTE — H&P ADULT - HISTORY OF PRESENT ILLNESS
40 year old male with a hx of HTN,  ETOH abuse , previous episode of ETOH pancreatitis (7/2019) presents to the ED for an evaluation of sharp intermittent epigastric pain which radiates across the stomach and too the back. Pain started this afternoon and quickly became unbearable so he came to the ED. He endorses that he was drinking all night the night before with his last drink around 3am 12/1. He states that he drinks every day afterwork. Abdominal pain is associated with nausea and NBNB vomiting Denies any CP, SOB, headache.  In ED vitals: Temp 97.5F, , / 83, RR 18, SpO2 96%. CT A + P: Inflammation surrounding the pancreas, suspect pancreatitis. There is also a new suspicious approximately 1.2 cm hypodensity in the pancreatic neck/body with upstream ductal dilatation, rule out neoplasm. lactate 9, Lipase 235, Anion gap 25.   Received 28mg of morphine in the ED, Zofran, and 2.5mg of Dilaudid Pain was uncontrolled with morphine, but controlled with Dilaudid. Given a Bolus with 3L of LR and on maintenance LR at 150cc/hr. NPO. Placed on CIWA protocol.

## 2020-12-02 NOTE — PROGRESS NOTE ADULT - ASSESSMENT
40 year old male with a hx of HTN,  ETOH abuse , previous episode of ETOH pancreatitis (7/2019) admitted for alcoholic pancreatitis.     #Alcoholic Pancreatitis   #Alcohol abuse   #Lactic Acidosis   #Hepatitic Steatosis   - CT abdomen: Inflammation surrounding the pancreas, suspect pancreatitis. There is also a new suspicious approximately 1.2 cm hypodensity in the pancreatic neck/body with upstream ductal dilatation, rule out neoplasm. Hepatic steatosis and hepatomegaly.  - Lipase: 235  - Lactate 9   - AST 66  ALT 69  - received 28mg morphine in ED, did not control pain  - 3L LR in ED   - Dilaudid 2mg q4 prn for pain control  - NPO  - maintenance fluids LR 250cc / hr   - Zofran prn   - CIWA protocol   - Protonix 40mg IV QD   - f/u GI consult     #HTN - uncontrolled  - takes Nifedipine 30mg dose at home  - give Nifedipine IR 10mg overnight   - continue with Nifedipine  - likely also related to pain, continue with pain control    #Lovenox: 40mg QD  #GI PPX: protonix   #activity: bedrest   #Diet: NPO  #DISPO: CCU

## 2020-12-02 NOTE — CONSULT NOTE ADULT - ATTENDING COMMENTS
Pt with significant EtOH abuse who presents with pancreatitis, found on CT to have possible mass in the pancreas. Would wait for pancreatitis to improve prior to performing MRI with contrast, EUS with FNA. Cont treatment for pancreatitis - LR at 250 cc/hr, opiates prn.

## 2020-12-02 NOTE — H&P ADULT - ASSESSMENT
40 year old male with a hx of HTN,  ETOH abuse , previous episode of ETOH pancreatitis (7/2019) admitted for alcoholic pancreatitis.     #Alcoholic Pancreatitis   #Alcohol abuse   #Lactic Acidosis   #Hepatitic Steatosis   - CT abdomen: Inflammation surrounding the pancreas, suspect pancreatitis. There is also a new suspicious approximately 1.2 cm hypodensity in the pancreatic neck/bodywith upstream ductal dilatation, rule out neoplasm. Hepatic steatosis and hepatomegaly.  - Lipase: 235  - Lactate 9   - AST 66  ALT 69  - received 28mg morphine in ED, did not control pain  - 3L LR in ED   - Diluadid 2mg q4 prn for pain control  - NPO  - maintenance fluids LR 150cc / hr   - Zofran prn   - CIWA protocol   - Protonix 40mg IV QD   - Consider GI consult     #HTN  - takes Nifedipine dose at home, unsure of dose     #Lovenox: 40mg QD  #GI PPX: protonix   #activity: bedrest   #Diet: NPO  #DISPO: MICU

## 2020-12-02 NOTE — SBIRT NOTE ADULT - NSSBIRTUNABLESCROTHER_GEN_A_CORE
LALISW met with pt at bedside, however, unable to complete SBIRT assessment due to pt being in acute pain. Pt seen by CATCH team. referral given for [facility] .

## 2020-12-02 NOTE — CONSULT NOTE ADULT - SUBJECTIVE AND OBJECTIVE BOX
Gastroenterology Consultation:    Patient is a 40y old  Male who presents with a chief complaint of     Admitted on: 12-02-20    HPI:    40 year old male with a hx of HTN,  ETOH abuse , previous episode of ETOH pancreatitis (7/2019) presents to the ED for an evaluation of sharp intermittent epigastric pain which radiates across the stomach and too the back. Pain started this afternoon and quickly became unbearable so he came to the ED. He endorses that he was drinking all night the night before with his last drink around 3am 12/1. He states that he drinks every day afterwork. Abdominal pain is associated with nausea and NBNB vomiting Denies any CP, SOB, headache.  In ED vitals: Temp 97.5F, , / 83, RR 18, SpO2 96%.   Received 28mg of morphine in the ED, Zofran, and 2.5mg of Dilaudid Pain was uncontrolled with morphine, but controlled with Dilaudid. Given a Bolus with 3L of LR and on maintenance LR at 150cc/hr. NPO. Placed on CIWA protocol.    (02 Dec 2020 03:55)      COVID 19: Negative    NO EGD/Colonoscopy: On report    PAST MEDICAL & SURGICAL HISTORY:  Asthma  no recent exacerbation, not using inhalers for a long time    Pancreatitis  October 2018 due to alcohol    No pertinent past medical history    H/O rotator cuff surgery        FAMILY HISTORY:  Alcohol use  sister, with admission due to pancreatitis and GI Bleed    Family history of pancreatic cancer (Father)        Social History:  Alcohol abuse    Home Medications:    MEDICATIONS  (STANDING):  chlorhexidine 4% Liquid 1 Application(s) Topical <User Schedule>  enoxaparin Injectable 40 milliGRAM(s) SubCutaneous daily  folic acid 1 milliGRAM(s) Oral daily  influenza   Vaccine 0.5 milliLiter(s) IntraMuscular once  lactated ringers. 1000 milliLiter(s) (250 mL/Hr) IV Continuous <Continuous>  LORazepam   Injectable 2 milliGRAM(s) IV Push every 4 hours  LORazepam   Injectable   IV Push   multivitamin 1 Tablet(s) Oral daily  NIFEdipine XL 30 milliGRAM(s) Oral daily  pantoprazole  Injectable 40 milliGRAM(s) IV Push daily  thiamine 100 milliGRAM(s) Oral daily    MEDICATIONS  (PRN):  HYDROmorphone  Injectable 2 milliGRAM(s) IV Push every 4 hours PRN Severe Pain (7 - 10)  LORazepam   Injectable 2 milliGRAM(s) IV Push every 4 hours PRN Agitation  ondansetron Injectable 4 milliGRAM(s) IV Push once PRN Nausea and/or Vomiting      Allergies  No Known Allergies      Review of Systems:   Constitutional:  No Fever, No Chills  ENT/Mouth:  No Hearing Changes,  No Difficulty Swallowing  Eyes:  No Eye Pain, No Vision Changes  Cardiovascular:  No Chest Pain, No Palpitations  Respiratory:  No Cough, No Dyspnea  Gastrointestinal:  As described in HPI  Musculoskeletal:  No Joint Swelling, No Back Pain  Skin:  No Skin Lesions, No Jaundice  Neuro:  No Syncope, No Dizziness  Heme/Lymph:  No Bruising, No Bleeding.          Physical Examination:  T(C): 36.6 (12-02-20 @ 08:00), Max: 36.6 (12-02-20 @ 05:00)  HR: 106 (12-02-20 @ 11:00) (82 - 109)  BP: 190/95 (12-02-20 @ 11:00) (151/86 - 208/109)  RR: 27 (12-02-20 @ 11:00) (11 - 27)  SpO2: 98% (12-02-20 @ 11:00) (89% - 99%)  Height (cm): 182.9 (12-02-20 @ 05:00)  Weight (kg): 77 (12-02-20 @ 05:00)    12-01-20 @ 07:01  -  12-02-20 @ 07:00  --------------------------------------------------------  IN: 450 mL / OUT: 0 mL / NET: 450 mL    12-02-20 @ 07:01  -  12-02-20 @ 11:58  --------------------------------------------------------  IN: 1300 mL / OUT: 240 mL / NET: 1060 mL        Constitutional: No acute distress.  Eyes:. Conjunctivae are clear, Sclera is non-icteric.  Ears Nose and Throat: The external ears are normal appearing,  Oral mucosa is pink and moist.  Respiratory:  No signs of respiratory distress. Lung sounds are clear bilaterally.  Cardiovascular:  S1 S2, Regular rate and rhythm.  GI: Abdomen is soft, symmetric, and tender without distention.   Neuro: No Tremor, No involuntary movements  Skin: No rashes, No Jaundice.          Data: (reviewed by attending)                        15.0   12.65 )-----------( 276      ( 02 Dec 2020 04:19 )             43.8     Hgb Trend:  15.0  12-02-20 @ 04:19  17.4  12-01-20 @ 20:58        12-02    136  |  97<L>  |  9<L>  ----------------------------<  111<H>  4.3   |  29  |  0.9    Ca    9.8      02 Dec 2020 04:19  Mg     1.6     12-02    TPro  6.3  /  Alb  4.1  /  TBili  1.2  /  DBili  x   /  AST  47<H>  /  ALT  56<H>  /  AlkPhos  56  12-02    Liver panel trend:  TBili 1.2   /   AST 47   /   ALT 56   /   AlkP 56   /   Tptn 6.3   /   Alb 4.1    /   DBili --      12-02  TBili 1.2   /   AST 66   /   ALT 69   /   AlkP 68   /   Tptn 7.8   /   Alb 4.9    /   DBili 0.3      12-01      PT/INR - ( 02 Dec 2020 04:19 )   PT: 11.00 sec;   INR: 0.96 ratio         PTT - ( 02 Dec 2020 04:19 )  PTT:23.7 sec        Radiology:(reviewed by attending)  CT Abdomen and Pelvis w/ IV Cont:   EXAM:  CT ABDOMEN AND PELVIS IC            PROCEDURE DATE:  12/01/2020            INTERPRETATION:  CLINICAL STATEMENT: Epigastric pain.    TECHNIQUE: Contiguous axial CT images were obtained from the lower chest to the pubic symphysis following administration of 100cc Omnipaque 350 intravenous contrast.  Oral contrast was not administered.  Reformatted images in the coronal and sagittal planes were acquired.    COMPARISON CT: 7/11/2019.    OTHER STUDIES USED FOR CORRELATION: None.      FINDINGS:    LOWER CHEST: Unremarkable.    HEPATOBILIARY: Hepatic steatosis. Hepatomegaly. Focal fatty infiltration at the falciform ligament.    SPLEEN: Unremarkable.    PANCREAS: Inflammation surrounding the pancreas. There is also a new suspicious approximately 1.2 cm hypodensity in the pancreatic neck/body (series 5, image 161) with upstream ductal dilatation.    ADRENAL GLANDS: Unremarkable.    KIDNEYS: Unremarkable.    ABDOMINOPELVIC NODES: Unremarkable.    PELVIC ORGANS: Unremarkable.    PERITONEUM/MESENTERY/BOWEL: Unremarkable.    BONES/SOFT TISSUES: Unremarkable.    OTHER:      IMPRESSION:    Inflammation surrounding the pancreas, suspect pancreatitis. There is also a new suspicious approximately 1.2 cm hypodensity in the pancreatic neck/body with upstream ductal dilatation, rule out neoplasm.    Hepatic steatosis and hepatomegaly.

## 2020-12-03 LAB
ALBUMIN SERPL ELPH-MCNC: 3.6 G/DL — SIGNIFICANT CHANGE UP (ref 3.5–5.2)
ALP SERPL-CCNC: 48 U/L — SIGNIFICANT CHANGE UP (ref 30–115)
ALT FLD-CCNC: 39 U/L — SIGNIFICANT CHANGE UP (ref 0–41)
ANA TITR SER: NEGATIVE — SIGNIFICANT CHANGE UP
ANION GAP SERPL CALC-SCNC: 14 MMOL/L — SIGNIFICANT CHANGE UP (ref 7–14)
AST SERPL-CCNC: 31 U/L — SIGNIFICANT CHANGE UP (ref 0–41)
BASOPHILS # BLD AUTO: 0.02 K/UL — SIGNIFICANT CHANGE UP (ref 0–0.2)
BASOPHILS NFR BLD AUTO: 0.2 % — SIGNIFICANT CHANGE UP (ref 0–1)
BILIRUB SERPL-MCNC: 2.2 MG/DL — HIGH (ref 0.2–1.2)
BUN SERPL-MCNC: 7 MG/DL — LOW (ref 10–20)
CALCIUM SERPL-MCNC: 9 MG/DL — SIGNIFICANT CHANGE UP (ref 8.5–10.1)
CANCER AG19-9 SERPL-ACNC: 13 U/ML — SIGNIFICANT CHANGE UP
CEA SERPL-MCNC: 3 NG/ML — SIGNIFICANT CHANGE UP (ref 0–3.8)
CERULOPLASMIN SERPL-MCNC: 20 MG/DL — SIGNIFICANT CHANGE UP (ref 15–30)
CHLORIDE SERPL-SCNC: 93 MMOL/L — LOW (ref 98–110)
CO2 SERPL-SCNC: 25 MMOL/L — SIGNIFICANT CHANGE UP (ref 17–32)
CREAT SERPL-MCNC: 0.7 MG/DL — SIGNIFICANT CHANGE UP (ref 0.7–1.5)
EOSINOPHIL # BLD AUTO: 0.04 K/UL — SIGNIFICANT CHANGE UP (ref 0–0.7)
EOSINOPHIL NFR BLD AUTO: 0.3 % — SIGNIFICANT CHANGE UP (ref 0–8)
FERRITIN SERPL-MCNC: 808 NG/ML — HIGH (ref 30–400)
FOLATE SERPL-MCNC: >20 NG/ML — SIGNIFICANT CHANGE UP
GLUCOSE SERPL-MCNC: 102 MG/DL — HIGH (ref 70–99)
HAV IGG SER QL IA: SIGNIFICANT CHANGE UP
HAV IGM SER-ACNC: SIGNIFICANT CHANGE UP
HAV IGM SER-ACNC: SIGNIFICANT CHANGE UP
HBV CORE IGM SER-ACNC: SIGNIFICANT CHANGE UP
HBV SURFACE AG SER-ACNC: SIGNIFICANT CHANGE UP
HCT VFR BLD CALC: 41.5 % — LOW (ref 42–52)
HCV AB S/CO SERPL IA: 0.1 S/CO — SIGNIFICANT CHANGE UP (ref 0–0.99)
HCV AB SERPL-IMP: SIGNIFICANT CHANGE UP
HGB BLD-MCNC: 14.6 G/DL — SIGNIFICANT CHANGE UP (ref 14–18)
IMM GRANULOCYTES NFR BLD AUTO: 0.3 % — SIGNIFICANT CHANGE UP (ref 0.1–0.3)
LYMPHOCYTES # BLD AUTO: 0.99 K/UL — LOW (ref 1.2–3.4)
LYMPHOCYTES # BLD AUTO: 8.5 % — LOW (ref 20.5–51.1)
MAGNESIUM SERPL-MCNC: 1.5 MG/DL — LOW (ref 1.8–2.4)
MCHC RBC-ENTMCNC: 32.5 PG — HIGH (ref 27–31)
MCHC RBC-ENTMCNC: 35.2 G/DL — SIGNIFICANT CHANGE UP (ref 32–37)
MCV RBC AUTO: 92.4 FL — SIGNIFICANT CHANGE UP (ref 80–94)
MONOCYTES # BLD AUTO: 0.92 K/UL — HIGH (ref 0.1–0.6)
MONOCYTES NFR BLD AUTO: 7.9 % — SIGNIFICANT CHANGE UP (ref 1.7–9.3)
NEUTROPHILS # BLD AUTO: 9.67 K/UL — HIGH (ref 1.4–6.5)
NEUTROPHILS NFR BLD AUTO: 82.8 % — HIGH (ref 42.2–75.2)
NRBC # BLD: 0 /100 WBCS — SIGNIFICANT CHANGE UP (ref 0–0)
PHOSPHATE SERPL-MCNC: 3.8 MG/DL — SIGNIFICANT CHANGE UP (ref 2.1–4.9)
PLATELET # BLD AUTO: 235 K/UL — SIGNIFICANT CHANGE UP (ref 130–400)
POTASSIUM SERPL-MCNC: 4.2 MMOL/L — SIGNIFICANT CHANGE UP (ref 3.5–5)
POTASSIUM SERPL-SCNC: 4.2 MMOL/L — SIGNIFICANT CHANGE UP (ref 3.5–5)
PROT SERPL-MCNC: 5.8 G/DL — LOW (ref 6–8)
RBC # BLD: 4.49 M/UL — LOW (ref 4.7–6.1)
RBC # FLD: 15.1 % — HIGH (ref 11.5–14.5)
SMOOTH MUSCLE AB SER-ACNC: SIGNIFICANT CHANGE UP
SODIUM SERPL-SCNC: 132 MMOL/L — LOW (ref 135–146)
WBC # BLD: 11.67 K/UL — HIGH (ref 4.8–10.8)
WBC # FLD AUTO: 11.67 K/UL — HIGH (ref 4.8–10.8)

## 2020-12-03 PROCEDURE — 71045 X-RAY EXAM CHEST 1 VIEW: CPT | Mod: 26

## 2020-12-03 PROCEDURE — 93010 ELECTROCARDIOGRAM REPORT: CPT

## 2020-12-03 PROCEDURE — 99233 SBSQ HOSP IP/OBS HIGH 50: CPT

## 2020-12-03 RX ORDER — MAGNESIUM SULFATE 500 MG/ML
2 VIAL (ML) INJECTION
Refills: 0 | Status: COMPLETED | OUTPATIENT
Start: 2020-12-03 | End: 2020-12-03

## 2020-12-03 RX ORDER — HYDROMORPHONE HYDROCHLORIDE 2 MG/ML
2 INJECTION INTRAMUSCULAR; INTRAVENOUS; SUBCUTANEOUS ONCE
Refills: 0 | Status: DISCONTINUED | OUTPATIENT
Start: 2020-12-03 | End: 2020-12-03

## 2020-12-03 RX ORDER — SODIUM CHLORIDE 9 MG/ML
1000 INJECTION, SOLUTION INTRAVENOUS
Refills: 0 | Status: DISCONTINUED | OUTPATIENT
Start: 2020-12-03 | End: 2020-12-08

## 2020-12-03 RX ORDER — HYDROMORPHONE HYDROCHLORIDE 2 MG/ML
0.5 INJECTION INTRAMUSCULAR; INTRAVENOUS; SUBCUTANEOUS ONCE
Refills: 0 | Status: DISCONTINUED | OUTPATIENT
Start: 2020-12-03 | End: 2020-12-03

## 2020-12-03 RX ORDER — MAGNESIUM SULFATE 500 MG/ML
2 VIAL (ML) INJECTION ONCE
Refills: 0 | Status: COMPLETED | OUTPATIENT
Start: 2020-12-03 | End: 2020-12-03

## 2020-12-03 RX ADMIN — Medication 25 GRAM(S): at 09:23

## 2020-12-03 RX ADMIN — Medication 1.5 MILLIGRAM(S): at 10:13

## 2020-12-03 RX ADMIN — Medication 30 MILLILITER(S): at 08:33

## 2020-12-03 RX ADMIN — HYDROMORPHONE HYDROCHLORIDE 2 MILLIGRAM(S): 2 INJECTION INTRAMUSCULAR; INTRAVENOUS; SUBCUTANEOUS at 20:25

## 2020-12-03 RX ADMIN — Medication 1.5 MILLIGRAM(S): at 05:54

## 2020-12-03 RX ADMIN — HYDROMORPHONE HYDROCHLORIDE 2 MILLIGRAM(S): 2 INJECTION INTRAMUSCULAR; INTRAVENOUS; SUBCUTANEOUS at 17:00

## 2020-12-03 RX ADMIN — Medication 2 MILLIGRAM(S): at 02:57

## 2020-12-03 RX ADMIN — HYDROMORPHONE HYDROCHLORIDE 2 MILLIGRAM(S): 2 INJECTION INTRAMUSCULAR; INTRAVENOUS; SUBCUTANEOUS at 08:34

## 2020-12-03 RX ADMIN — HYDROMORPHONE HYDROCHLORIDE 0.5 MILLIGRAM(S): 2 INJECTION INTRAMUSCULAR; INTRAVENOUS; SUBCUTANEOUS at 16:16

## 2020-12-03 RX ADMIN — Medication 100 MILLIGRAM(S): at 11:13

## 2020-12-03 RX ADMIN — HYDROMORPHONE HYDROCHLORIDE 2 MILLIGRAM(S): 2 INJECTION INTRAMUSCULAR; INTRAVENOUS; SUBCUTANEOUS at 03:51

## 2020-12-03 RX ADMIN — Medication 1 TABLET(S): at 11:13

## 2020-12-03 RX ADMIN — HYDROMORPHONE HYDROCHLORIDE 0.5 MILLIGRAM(S): 2 INJECTION INTRAMUSCULAR; INTRAVENOUS; SUBCUTANEOUS at 15:49

## 2020-12-03 RX ADMIN — CHLORHEXIDINE GLUCONATE 1 APPLICATION(S): 213 SOLUTION TOPICAL at 05:49

## 2020-12-03 RX ADMIN — Medication 30 MILLIGRAM(S): at 05:49

## 2020-12-03 RX ADMIN — PANTOPRAZOLE SODIUM 40 MILLIGRAM(S): 20 TABLET, DELAYED RELEASE ORAL at 11:13

## 2020-12-03 RX ADMIN — Medication 25 GRAM(S): at 08:33

## 2020-12-03 RX ADMIN — Medication 1.5 MILLIGRAM(S): at 13:58

## 2020-12-03 RX ADMIN — Medication 1.5 MILLIGRAM(S): at 02:42

## 2020-12-03 RX ADMIN — ENOXAPARIN SODIUM 40 MILLIGRAM(S): 100 INJECTION SUBCUTANEOUS at 11:13

## 2020-12-03 RX ADMIN — HYDROMORPHONE HYDROCHLORIDE 2 MILLIGRAM(S): 2 INJECTION INTRAMUSCULAR; INTRAVENOUS; SUBCUTANEOUS at 09:00

## 2020-12-03 RX ADMIN — SENNA PLUS 2 TABLET(S): 8.6 TABLET ORAL at 21:51

## 2020-12-03 RX ADMIN — SODIUM CHLORIDE 150 MILLILITER(S): 9 INJECTION, SOLUTION INTRAVENOUS at 08:41

## 2020-12-03 RX ADMIN — HYDROMORPHONE HYDROCHLORIDE 2 MILLIGRAM(S): 2 INJECTION INTRAMUSCULAR; INTRAVENOUS; SUBCUTANEOUS at 07:10

## 2020-12-03 RX ADMIN — HYDROMORPHONE HYDROCHLORIDE 2 MILLIGRAM(S): 2 INJECTION INTRAMUSCULAR; INTRAVENOUS; SUBCUTANEOUS at 16:27

## 2020-12-03 RX ADMIN — Medication 1 MILLIGRAM(S): at 11:13

## 2020-12-03 RX ADMIN — HYDROMORPHONE HYDROCHLORIDE 2 MILLIGRAM(S): 2 INJECTION INTRAMUSCULAR; INTRAVENOUS; SUBCUTANEOUS at 04:15

## 2020-12-03 RX ADMIN — HYDROMORPHONE HYDROCHLORIDE 2 MILLIGRAM(S): 2 INJECTION INTRAMUSCULAR; INTRAVENOUS; SUBCUTANEOUS at 12:26

## 2020-12-03 RX ADMIN — Medication 50 GRAM(S): at 07:26

## 2020-12-03 RX ADMIN — Medication 30 MILLILITER(S): at 15:48

## 2020-12-03 RX ADMIN — HYDROMORPHONE HYDROCHLORIDE 2 MILLIGRAM(S): 2 INJECTION INTRAMUSCULAR; INTRAVENOUS; SUBCUTANEOUS at 06:54

## 2020-12-03 RX ADMIN — HYDROMORPHONE HYDROCHLORIDE 2 MILLIGRAM(S): 2 INJECTION INTRAMUSCULAR; INTRAVENOUS; SUBCUTANEOUS at 12:50

## 2020-12-03 RX ADMIN — Medication 1.5 MILLIGRAM(S): at 18:08

## 2020-12-03 RX ADMIN — Medication 2 MILLIGRAM(S): at 19:46

## 2020-12-03 RX ADMIN — Medication 2 MILLIGRAM(S): at 08:33

## 2020-12-03 RX ADMIN — Medication 1.5 MILLIGRAM(S): at 21:54

## 2020-12-03 RX ADMIN — HYDROMORPHONE HYDROCHLORIDE 2 MILLIGRAM(S): 2 INJECTION INTRAMUSCULAR; INTRAVENOUS; SUBCUTANEOUS at 23:18

## 2020-12-03 RX ADMIN — HYDROMORPHONE HYDROCHLORIDE 2 MILLIGRAM(S): 2 INJECTION INTRAMUSCULAR; INTRAVENOUS; SUBCUTANEOUS at 20:12

## 2020-12-03 RX ADMIN — Medication 2 MILLIGRAM(S): at 11:47

## 2020-12-03 RX ADMIN — Medication 5 MILLIGRAM(S): at 23:29

## 2020-12-03 RX ADMIN — HYDROMORPHONE HYDROCHLORIDE 2 MILLIGRAM(S): 2 INJECTION INTRAMUSCULAR; INTRAVENOUS; SUBCUTANEOUS at 00:00

## 2020-12-03 RX ADMIN — Medication 5 MILLIGRAM(S): at 10:41

## 2020-12-03 RX ADMIN — HYDROMORPHONE HYDROCHLORIDE 2 MILLIGRAM(S): 2 INJECTION INTRAMUSCULAR; INTRAVENOUS; SUBCUTANEOUS at 23:40

## 2020-12-03 NOTE — PROGRESS NOTE ADULT - ASSESSMENT
40 year old male with a hx of HTN,  ETOH abuse , previous episode of ETOH pancreatitis (7/2019) admitted for alcoholic pancreatitis.     #Alcoholic Pancreatitis   #Alcohol abuse   #Lactic Acidosis   #Hepatitic Steatosis   - CT abdomen: Inflammation surrounding the pancreas, suspect pancreatitis. There is also a new suspicious approximately 1.2 cm hypodensity in the pancreatic neck/bodywith upstream ductal dilatation, rule out neoplasm. Hepatic steatosis and hepatomegaly.  - Diluadid 2mg q4 prn for pain control  - Bowel Regime, senna. Will look to add one more today   - NPO, ADVANCE DIET TODAY to clear liquids   - maintenance fluids LR 150cc / hr   - Zofran prn   - CIWA protocol   - Protonix 40mg IV QD   - Consider GI consult appreciated-> CDL work up. F/u Labs     #HTN  - Nifedipine 30 mg daily   - Given IV Labetalol yesterday     #Lovenox: 40mg QD  #GI PPX: protonix   #activity: bedrest   #Diet: Will advanced today, Clear liquids   #DISPO: CCU

## 2020-12-03 NOTE — PROGRESS NOTE ADULT - SUBJECTIVE AND OBJECTIVE BOX
HPI:  40 year old male with a hx of HTN,  ETOH abuse , previous episode of ETOH pancreatitis (2019) presents to the ED for an evaluation of sharp intermittent epigastric pain which radiates across the stomach and too the back. Pain started this afternoon and quickly became unbearable so he came to the ED. He endorses that he was drinking all night the night before with his last drink around 3am . He states that he drinks every day afterwork. Abdominal pain is associated with nausea and NBNB vomiting Denies any CP, SOB, headache.  In ED vitals: Temp 97.5F, , / 83, RR 18, SpO2 96%. CT A + P: Inflammation surrounding the pancreas, suspect pancreatitis. There is also a new suspicious approximately 1.2 cm hypodensity in the pancreatic neck/body with upstream ductal dilatation, rule out neoplasm. lactate 9, Lipase 235, Anion gap 25.   Received 28mg of morphine in the ED, Zofran, and 2.5mg of Dilaudid Pain was uncontrolled with morphine, but controlled with Dilaudid. Given a Bolus with 3L of LR and on maintenance LR at 150cc/hr. NPO. Placed on CIWA protocol.    (02 Dec 2020 03:55)      INTERVAL HISTORY:    Patient was seen at bedside. Overnight patient's BP was in 200s, HR >100. He was given PO Labetalol 100mg. He was somnolent today, but once awake was able to respond to questions. He is able to follow commands. He complains of abdominal pain.  Denies any chest pain and headaches.     PAST MEDICAL & SURGICAL HISTORY  Asthma  no recent exacerbation, not using inhalers for a long time    Pancreatitis  2018 due to alcohol    No pertinent past medical history    H/O rotator cuff surgery        ALLERGIES:  No Known Allergies      MEDICATIONS:  MEDICATIONS  (STANDING):  chlorhexidine 4% Liquid 1 Application(s) Topical <User Schedule>  enoxaparin Injectable 40 milliGRAM(s) SubCutaneous daily  folic acid 1 milliGRAM(s) Oral daily  influenza   Vaccine 0.5 milliLiter(s) IntraMuscular once  lactated ringers. 1000 milliLiter(s) (150 mL/Hr) IV Continuous <Continuous>  LORazepam   Injectable 1.5 milliGRAM(s) IV Push every 4 hours  LORazepam   Injectable   IV Push   multivitamin 1 Tablet(s) Oral daily  NIFEdipine XL 30 milliGRAM(s) Oral daily  pantoprazole  Injectable 40 milliGRAM(s) IV Push daily  senna 2 Tablet(s) Oral at bedtime  thiamine 100 milliGRAM(s) Oral daily    MEDICATIONS  (PRN):  aluminum hydroxide/magnesium hydroxide/simethicone Suspension 30 milliLiter(s) Oral every 4 hours PRN Dyspepsia  bisacodyl 5 milliGRAM(s) Oral every 12 hours PRN Constipation  HYDROmorphone  Injectable 2 milliGRAM(s) IV Push every 4 hours PRN Severe Pain (7 - 10)  LORazepam   Injectable 2 milliGRAM(s) IV Push every 1 hour PRN Agitation      HOME MEDICATIONS:  Home Medications:        OBJECTIVE:  ICU Vital Signs Last 24 Hrs  T(C): 37.1 (03 Dec 2020 14:00), Max: 37.1 (03 Dec 2020 14:00)  T(F): 98.7 (03 Dec 2020 14:00), Max: 98.7 (03 Dec 2020 14:00)  HR: 100 (03 Dec 2020 14:00) (94 - 114)  BP: 135/79 (03 Dec 2020 14:00) (122/68 - 200/112)  BP(mean): 98 (03 Dec 2020 14:00) (84 - 147)  ABP: --  ABP(mean): --  RR: 23 (03 Dec 2020 14:00) (12 - 27)  SpO2: 98% (03 Dec 2020 14:00) (96% - 98%)        02 Dec 2020 07:01  -  03 Dec 2020 07:00  --------------------------------------------------------  IN: 6110 mL / OUT: 1140 mL / NET: 4970 mL    03 Dec 2020 07:01  -  03 Dec 2020 14:11  --------------------------------------------------------  IN: 1520 mL / OUT: 795 mL / NET: 725 mL      Daily     Daily Weight in k.5 (03 Dec 2020 06:00)    PHYSICAL EXAM:  NEURO: patient is awake , confused  GEN: Not in acute distress  NECK: no thyroid enlargement, no JVD  LUNGS: Clear to auscultation bilaterally   CARDIOVASCULAR: S1/S2 present, RRR , no murmurs or rubs, no carotid bruits,  + PP bilaterally  ABD: Soft, non-tender, non-distended, +BS  EXT: No SONIA      LABS:                        14.6   11.67 )-----------( 235      ( 03 Dec 2020 04:59 )             41.5     12-03    132<L>  |  93<L>  |  7<L>  ----------------------------<  102<H>  4.2   |  25  |  0.7    Ca    9.0      03 Dec 2020 04:59  Phos  3.8     12  Mg     1.5         TPro  5.8<L>  /  Alb  3.6  /  TBili  2.2<H>  /  DBili  x   /  AST  31  /  ALT  39  /  AlkPhos  48  12    PT/INR - ( 02 Dec 2020 04:19 )   PT: 11.00 sec;   INR: 0.96 ratio         PTT - ( 02 Dec 2020 04:19 )  PTT:23.7 sec  Lactate, Blood: 0.9 mmol/L (20 @ 17:08)    CARDIAC MARKERS ( 02 Dec 2020 04:19 )  x     / <0.01 ng/mL / x     / x     / x            Troponin trend:       Chol 229<H> LDL -- HDL 9<L> Trig 186<H>      RADIOLOGY:  -CXR:    EXAM:  XR CHEST PORTABLE ROUTINE 1V            PROCEDURE DATE:  2020            INTERPRETATION:  Clinical History / Reason for exam: Alcohol withdrawal.    Comparison : Chest radiograph 7/10/2019.    Technique/Positioning: Single frontal viewof the chest.    Findings:    Support devices: None.    Cardiac/mediastinum/hilum: Stable accounting for differences in technique.    Lung parenchyma/Pleura: No focal consolidation, pleural effusion or pneumothorax.    Skeleton/soft tissues: Unremarkable.    Impression:    Clear lungs.        ECG:    Ventricular Rate 94 BPM    Atrial Rate 94 BPM    P-R Interval 156 ms    QRS Duration 90 ms    Q-T Interval 362 ms    QTC Calculation(Bazett) 452 ms    P Axis 51 degrees    R Axis 45 degrees    T Axis 37 degrees    Diagnosis Line Normal sinus rhythm  Minimal voltage criteria for LVH, may be normal variant  Borderline ECG      TELEMETRY EVENTS:    none

## 2020-12-03 NOTE — PROGRESS NOTE ADULT - SUBJECTIVE AND OBJECTIVE BOX
COLE DENISSE  40y  Male    Patient is a 40y old  Male who presents with a chief complaint of Abdominal pain (02 Dec 2020 11:55)      SUBJECTIVE/OVERNIGHT EVENTS: More agitation and pain, given Dilaudid       PAST MEDICAL & SURGICAL HISTORY:  Asthma  no recent exacerbation, not using inhalers for a long time    Pancreatitis  2018 due to alcohol    No pertinent past medical history    H/O rotator cuff surgery      MEDICATIONS  (STANDING):  chlorhexidine 4% Liquid 1 Application(s) Topical <User Schedule>  enoxaparin Injectable 40 milliGRAM(s) SubCutaneous daily  folic acid 1 milliGRAM(s) Oral daily  influenza   Vaccine 0.5 milliLiter(s) IntraMuscular once  lactated ringers. 1000 milliLiter(s) (150 mL/Hr) IV Continuous <Continuous>  LORazepam   Injectable 1.5 milliGRAM(s) IV Push every 4 hours  LORazepam   Injectable   IV Push   multivitamin 1 Tablet(s) Oral daily  NIFEdipine XL 30 milliGRAM(s) Oral daily  pantoprazole  Injectable 40 milliGRAM(s) IV Push daily  senna 2 Tablet(s) Oral at bedtime  thiamine 100 milliGRAM(s) Oral daily    MEDICATIONS  (PRN):  aluminum hydroxide/magnesium hydroxide/simethicone Suspension 30 milliLiter(s) Oral every 4 hours PRN Dyspepsia  bisacodyl 5 milliGRAM(s) Oral every 12 hours PRN Constipation  HYDROmorphone  Injectable 2 milliGRAM(s) IV Push every 4 hours PRN Severe Pain (7 - 10)  LORazepam   Injectable 2 milliGRAM(s) IV Push every 1 hour PRN Agitation      OBJECTIVE:    Vital Signs Last 24 Hrs  T(C): 36.8 (03 Dec 2020 08:00), Max: 36.9 (02 Dec 2020 12:00)  T(F): 98.2 (03 Dec 2020 08:00), Max: 98.4 (02 Dec 2020 12:00)  HR: 98 (03 Dec 2020 10:00) (94 - 114)  BP: 139/85 (03 Dec 2020 10:00) (139/85 - 200/112)  BP(mean): 111 (03 Dec 2020 10:00) (102 - 147)  RR: 15 (03 Dec 2020 10:00) (12 - 27)  SpO2: 96% (03 Dec 2020 10:00) (96% - 98%)    General: In NAD  HEENT: RA               Neck: Supple.   No Cervical LN    Lungs: Bilateral BS  Cardiovascular: Regular   Abdomen: Soft. + BS  Extremities: No Clubbing   Skin: Intact   Neurological: non Focal     I&O's Summary    02 Dec 2020 07:01  -  03 Dec 2020 07:00  --------------------------------------------------------  IN: 6110 mL / OUT: 1140 mL / NET: 4970 mL    03 Dec 2020 07:01  -  03 Dec 2020 11:19  --------------------------------------------------------  IN: 950 mL / OUT: 470 mL / NET: 480 mL        BOWEL MOVEMENTS:    PRESSORS: no  SEDATION: no  VENTED: no       LABS:                          14.6   11.67 )-----------( 235      ( 03 Dec 2020 04:59 )             41.5                                               12-03    132<L>  |  93<L>  |  7<L>  ----------------------------<  102<H>  4.2   |  25  |  0.7    Ca    9.0      03 Dec 2020 04:59  Phos  3.8     12-03  Mg     1.5     12-03    TPro  5.8<L>  /  Alb  3.6  /  TBili  2.2<H>  /  DBili  x   /  AST  31  /  ALT  39  /  AlkPhos  48  12-03      PT/INR - ( 02 Dec 2020 04:19 )   PT: 11.00 sec;   INR: 0.96 ratio         PTT - ( 02 Dec 2020 04:19 )  PTT:23.7 sec                                       Urinalysis Basic - ( 01 Dec 2020 23:45 )    Color: Light Yellow / Appearance: Clear / S.023 / pH: x  Gluc: x / Ketone: Small  / Bili: Negative / Urobili: <2 mg/dL   Blood: x / Protein: Trace / Nitrite: Negative   Leuk Esterase: Negative / RBC: x / WBC x   Sq Epi: x / Non Sq Epi: x / Bacteria: x        CARDIAC MARKERS ( 02 Dec 2020 04:19 )  x     / <0.01 ng/mL / x     / x     / x                                                LIVER FUNCTIONS - ( 03 Dec 2020 04:59 )  Alb: 3.6 g/dL / Pro: 5.8 g/dL / ALK PHOS: 48 U/L / ALT: 39 U/L / AST: 31 U/L / GGT: x

## 2020-12-03 NOTE — PROGRESS NOTE ADULT - ASSESSMENT
40 year old male with a hx of HTN,  ETOH abuse , previous episode of ETOH pancreatitis (7/2019) admitted for alcoholic pancreatitis.     #Alcoholic Pancreatitis   #Alcohol abuse   #Lactic Acidosis   #Hepatitic Steatosis   - CT abdomen: Inflammation surrounding the pancreas, suspect pancreatitis. There is also a new suspicious approximately 1.2 cm hypodensity in the pancreatic neck/body with upstream ductal dilatation, rule out neoplasm. Hepatic steatosis and hepatomegaly.  - Lipase: 235  - Lactate 9   - AST 66  ALT 69  - received 28mg morphine in ED, did not control pain  - 3L LR in ED   - Dilaudid 2mg q4 prn for pain control  - advance diet today to clear liquid, monitor tolerance  - maintenance fluids LR 150cc / hr   - Zofran prn   - CIWA protocol, ativan PRN  - Protonix 40mg IV QD   - f/u GI consult : CLD workup, CEA, , MRCP as outpatient   - CEA,  negative    #HTN - uncontrolled  - takes Nifedipine 30mg dose at home  - given Nifedipine IR 10mg overnight   - given PO Labetalol 100mg overnight  - continue with Nifedipine  - likely also related to pain, continue with pain control    #Lovenox: 40mg QD  #GI PPX: protonix   #activity: bedrest   #Diet:clear liquid diet   #DISPO: CCU

## 2020-12-03 NOTE — PROGRESS NOTE ADULT - ASSESSMENT
40 year old male with a hx of HTN,  ETOH abuse , previous episode of ETOH pancreatitis (7/2019) presents to the ED for an evaluation of sharp intermittent epigastric pain which radiates across the stomach and too the back. Pain started this afternoon and quickly became unbearable so he came to the ED.      # Abdominal pain: Acute interstitial pancreatitis secondary to alcohol   Ct abdomen: 1.2 cm hypodensity pancreatic neck/body with upstream ductal dilatation,  new compared to 2019 CT scan  WBC 12.65, lipase 242  Cr N, Geovany/ALP Normal  US abdomen shows possible partial thrombosis of portal vein/Splenic confluence but no SMV involvement   CEA, CA 19-9 negative    Rec:  clear liquids  c/w LR 150cc/hr and titrate  Pain control as needed  Monitor HCT/BUN/UO daily   Alcohol cessation.   Folate, thiamine   As patient cannot get MRI because of metal in his body, will consider EUS with FNA next week after the pancreatitis is resolved or as outpatient in 2 weeks.  In regards to portal vein/splenic vein thrombosis likely secondary to pancreatitis. No need for AC, please repeat US doppler as outpatient once pancreatitis is resolved.  if any signs of SMV involvement or extension or bowel ischemia then consider AC      Elevated LFTS hepatocellular: resolving  Likely fatty liver/alcoholic liver disease  INR normal  Alcohol cessation  CLD: ceruloplasmin neg, viral hep panel neg, Ferritin high  Please perform a CLD work up which includes Transferrin Saturation, MICAELA, SMA, gamma globulin, AMA.  Get fractionation of Bilirubin.     40 year old male with a hx of HTN,  ETOH abuse , previous episode of ETOH pancreatitis (7/2019) presents to the ED for an evaluation of sharp intermittent epigastric pain which radiates across the stomach and too the back. Pain started this afternoon and quickly became unbearable so he came to the ED.      # Abdominal pain: Acute interstitial pancreatitis secondary to alcohol   Ct abdomen: 1.2 cm hypodensity pancreatic neck/body with upstream ductal dilatation,  new compared to 2019 CT scan  WBC 12.65, lipase 242  Cr N, Geovnay/ALP Normal  US abdomen shows possible partial thrombosis of portal vein/Splenic confluence but no SMV involvement   CEA, CA 19-9 negative    Rec:  clear liquids  c/w LR 150cc/hr and titrate  Pain control as needed  Monitor HCT/BUN/UO daily   Alcohol cessation.   Folate, thiamine    will consider EUS with FNA next week after the pancreatitis is resolved or as outpatient in 2 weeks.  In regards to portal vein/splenic vein thrombosis likely secondary to pancreatitis. No need for AC, please repeat US doppler as outpatient once pancreatitis is resolved.  if any signs of SMV involvement or extension or bowel ischemia then consider AC      Elevated LFTS hepatocellular: resolving  Likely fatty liver/alcoholic liver disease  INR normal  Alcohol cessation  CLD: ceruloplasmin neg, viral hep panel neg, Ferritin high  Please perform a CLD work up which includes Transferrin Saturation, MICAELA, SMA, gamma globulin, AMA.  Get fractionation of Bilirubin.

## 2020-12-03 NOTE — PROGRESS NOTE ADULT - SUBJECTIVE AND OBJECTIVE BOX
Gastroenterology progress note:     Patient is a 40y old  Male who presents with a chief complaint of Abdominal pain (02 Dec 2020 11:55)       Admitted on: 12-02-20    We are following the patient for acute pancreatitis and pancreatic mass     Interval History: Patient's pain is less and feels better. Although the pain medication requirement is same. he was in alcohol withdrawal overnight and required high doses of lorazepam. he is tolerating clear liquids.     PAST MEDICAL & SURGICAL HISTORY:  Asthma  no recent exacerbation, not using inhalers for a long time    Pancreatitis  October 2018 due to alcohol    No pertinent past medical history    H/O rotator cuff surgery        MEDICATIONS  (STANDING):  chlorhexidine 4% Liquid 1 Application(s) Topical <User Schedule>  enoxaparin Injectable 40 milliGRAM(s) SubCutaneous daily  folic acid 1 milliGRAM(s) Oral daily  influenza   Vaccine 0.5 milliLiter(s) IntraMuscular once  lactated ringers. 1000 milliLiter(s) (150 mL/Hr) IV Continuous <Continuous>  LORazepam   Injectable 1.5 milliGRAM(s) IV Push every 4 hours  LORazepam   Injectable   IV Push   multivitamin 1 Tablet(s) Oral daily  NIFEdipine XL 30 milliGRAM(s) Oral daily  pantoprazole  Injectable 40 milliGRAM(s) IV Push daily  senna 2 Tablet(s) Oral at bedtime  thiamine 100 milliGRAM(s) Oral daily    MEDICATIONS  (PRN):  aluminum hydroxide/magnesium hydroxide/simethicone Suspension 30 milliLiter(s) Oral every 4 hours PRN Dyspepsia  bisacodyl 5 milliGRAM(s) Oral every 12 hours PRN Constipation  HYDROmorphone  Injectable 2 milliGRAM(s) IV Push every 4 hours PRN Severe Pain (7 - 10)  LORazepam   Injectable 2 milliGRAM(s) IV Push every 1 hour PRN Agitation      Allergies  No Known Allergies      Review of Systems:   Cardiovascular:  No Chest Pain, No Palpitations  Respiratory:  No Cough, No Dyspnea  Gastrointestinal:  As described in HPI    Physical Examination:  T(C): 36.7 (12-03-20 @ 16:00), Max: 37.1 (12-03-20 @ 14:00)  HR: 90 (12-03-20 @ 18:00) (90 - 102)  BP: 130/79 (12-03-20 @ 18:00) (122/68 - 186/110)  RR: 22 (12-03-20 @ 18:00) (12 - 27)  SpO2: 95% (12-03-20 @ 18:00) (95% - 98%)      12-02-20 @ 07:01  -  12-03-20 @ 07:00  --------------------------------------------------------  IN: 6110 mL / OUT: 1140 mL / NET: 4970 mL    12-03-20 @ 07:01  -  12-03-20 @ 18:32  --------------------------------------------------------  IN: 2670 mL / OUT: 1275 mL / NET: 1395 mL      Constitutional: No acute distress.  Respiratory:  No signs of respiratory distress. Lung sounds are clear bilaterally.  Cardiovascular:  S1 S2, Regular rate and rhythm.  Abdominal: Abdomen is soft, symmetric, and epigastric tenderness +  Skin: No rashes, No Jaundice.        Data: (reviewed by attending)                        14.6   11.67 )-----------( 235      ( 03 Dec 2020 04:59 )             41.5     Hgb trend:  14.6  12-03-20 @ 04:59  15.0  12-02-20 @ 04:19  17.4  12-01-20 @ 20:58        12-03    132<L>  |  93<L>  |  7<L>  ----------------------------<  102<H>  4.2   |  25  |  0.7    Ca    9.0      03 Dec 2020 04:59  Phos  3.8     12-03  Mg     1.5     12-03    TPro  5.8<L>  /  Alb  3.6  /  TBili  2.2<H>  /  DBili  x   /  AST  31  /  ALT  39  /  AlkPhos  48  12-03    Liver panel trend:  TBili 2.2   /   AST 31   /   ALT 39   /   AlkP 48   /   Tptn 5.8   /   Alb 3.6    /   DBili --      12-03  TBili 1.2   /   AST 47   /   ALT 56   /   AlkP 56   /   Tptn 6.3   /   Alb 4.1    /   DBili --      12-02  TBili 1.2   /   AST 66   /   ALT 69   /   AlkP 68   /   Tptn 7.8   /   Alb 4.9    /   DBili 0.3      12-01      PT/INR - ( 02 Dec 2020 04:19 )   PT: 11.00 sec;   INR: 0.96 ratio         PTT - ( 02 Dec 2020 04:19 )  PTT:23.7 sec       Radiology: (reviewed by attending)    US Abdomen Doppler:   EXAM:  US DPLX ABDOMEN        EXAM:  US ABDOMEN LIMITED            PROCEDURE DATE:  12/02/2020            INTERPRETATION:  CLINICAL HISTORY: Pancreatitis. Evaluate for gallstones, and for portal splenic vein thrombosis    COMPARISON: CT abdomen pelvis December 1, 2020, and abdominal sonogram July 8, 2019    PROCEDURE: Ultrasound of the right upper quadrant was performed. Duplex Doppler images of the hepatic veins, portal veins and splenic vein were obtained.    FINDINGS:    LIVER:  Normal in contour and increased echogenicity, large in size measuring 21.1 cm in length. No focal mass.    The right, main, and left portal veins are patent. The right, middle and left hepatic veins are patent.    The portal splenic confluence is diminutive, including the remainder of the visualized splenic vein to the splenic hilum, with dampened waveform at the splenic hilum. Findings suggestive of partial thrombosis or compression by inflamed pancreas.    GALLBLADDER/BILIARY TREE:  No evidence of cholelithiasis. No wall thickening or pericholecystic fluid. Negative sonographic Marquez's sign. No intrahepatic biliary ductal dilatation. The common bile duct measures 5 mm, which is normal.    PANCREAS:  Visualized head and body demonstrates peripancreatic inflammation/trace fluid consistent with pancreatitis. The suspicious mass in the pancreatic neck/body seen on the CT exam is not well visualized on the ultrasound. Remainder obscured by overlying bowel gas.    KIDNEY:  Right kidney measures 11.7 cm in length. No hydronephrosis, calculi or solid mass.    AORTA/IVC:  Visualized proximal portions unremarkable.    ASCITES:  None.    IMPRESSION:    Echogenic liver, consistent with hepatic steatosis.    Hepatomegaly.    Acute pancreatitis.    Diminutive portal splenic confluence, and the remainder of the visualized splenic vein to the splenic hilum with dampened waveform at the splenic hilum. Findings suggestive of partial thrombosis or compression by inflamed pancreas.                RYLEE VARELA MD; Attending Radiologist  This document has been electronically signed. Dec  2 2020  3:39PM (12-02-20 @ 14:37)  US Abdomen Limited:   EXAM:  US DPLX ABDOMEN        EXAM:  US ABDOMEN LIMITED            PROCEDURE DATE:  12/02/2020            INTERPRETATION:  CLINICAL HISTORY: Pancreatitis. Evaluate for gallstones, and for portal splenic vein thrombosis    COMPARISON: CT abdomen pelvis December 1, 2020, and abdominal sonogram July 8, 2019    PROCEDURE: Ultrasound of the right upper quadrant was performed. Duplex Doppler images of the hepatic veins, portal veins and splenic vein were obtained.    FINDINGS:    LIVER:  Normal in contour and increased echogenicity, large in size measuring 21.1 cm in length. No focal mass.    The right, main, and left portal veins are patent. The right, middle and left hepatic veins are patent.    The portal splenic confluence is diminutive, including the remainder of the visualized splenic vein to the splenic hilum, with dampened waveform at the splenic hilum. Findings suggestive of partial thrombosis or compression by inflamed pancreas.    GALLBLADDER/BILIARY TREE:  No evidence of cholelithiasis. No wall thickening or pericholecystic fluid. Negative sonographic Marquez's sign. No intrahepatic biliary ductal dilatation. The common bile duct measures 5 mm, which is normal.    PANCREAS:  Visualized head and body demonstrates peripancreatic inflammation/trace fluid consistent with pancreatitis. The suspicious mass in the pancreatic neck/body seen on the CT exam is not well visualized on the ultrasound. Remainder obscured by overlying bowel gas.    KIDNEY:  Right kidney measures 11.7 cm in length. No hydronephrosis, calculi or solid mass.    AORTA/IVC:  Visualized proximal portions unremarkable.    ASCITES:  None.    IMPRESSION:    Echogenic liver, consistent with hepatic steatosis.    Hepatomegaly.    Acute pancreatitis.    Diminutive portal splenic confluence, and the remainder of the visualized splenic vein to the splenic hilum with dampened waveform at the splenic hilum. Findings suggestive of partial thrombosis or compression by inflamed pancreas.                RYLEE VARELA MD; Attending Radiologist  This document has been electronically signed. Dec  2 2020  3:39PM (12-02-20 @ 14:36)

## 2020-12-04 LAB
ALBUMIN SERPL ELPH-MCNC: 3.4 G/DL — LOW (ref 3.5–5.2)
ALP SERPL-CCNC: 42 U/L — SIGNIFICANT CHANGE UP (ref 30–115)
ALT FLD-CCNC: 37 U/L — SIGNIFICANT CHANGE UP (ref 0–41)
ANION GAP SERPL CALC-SCNC: 10 MMOL/L — SIGNIFICANT CHANGE UP (ref 7–14)
AST SERPL-CCNC: 36 U/L — SIGNIFICANT CHANGE UP (ref 0–41)
BASOPHILS # BLD AUTO: 0.05 K/UL — SIGNIFICANT CHANGE UP (ref 0–0.2)
BASOPHILS NFR BLD AUTO: 0.6 % — SIGNIFICANT CHANGE UP (ref 0–1)
BILIRUB DIRECT SERPL-MCNC: 0.3 MG/DL — HIGH (ref 0–0.2)
BILIRUB INDIRECT FLD-MCNC: 0.7 MG/DL — SIGNIFICANT CHANGE UP (ref 0.2–1.2)
BILIRUB SERPL-MCNC: 1 MG/DL — SIGNIFICANT CHANGE UP (ref 0.2–1.2)
BILIRUB SERPL-MCNC: 1.1 MG/DL — SIGNIFICANT CHANGE UP (ref 0.2–1.2)
BUN SERPL-MCNC: 7 MG/DL — LOW (ref 10–20)
CALCIUM SERPL-MCNC: 8.8 MG/DL — SIGNIFICANT CHANGE UP (ref 8.5–10.1)
CHLORIDE SERPL-SCNC: 100 MMOL/L — SIGNIFICANT CHANGE UP (ref 98–110)
CO2 SERPL-SCNC: 26 MMOL/L — SIGNIFICANT CHANGE UP (ref 17–32)
CREAT SERPL-MCNC: 0.8 MG/DL — SIGNIFICANT CHANGE UP (ref 0.7–1.5)
EOSINOPHIL # BLD AUTO: 0.09 K/UL — SIGNIFICANT CHANGE UP (ref 0–0.7)
EOSINOPHIL NFR BLD AUTO: 1.1 % — SIGNIFICANT CHANGE UP (ref 0–8)
GLUCOSE SERPL-MCNC: 90 MG/DL — SIGNIFICANT CHANGE UP (ref 70–99)
HCT VFR BLD CALC: 38.3 % — LOW (ref 42–52)
HGB BLD-MCNC: 13 G/DL — LOW (ref 14–18)
IMM GRANULOCYTES NFR BLD AUTO: 0.4 % — HIGH (ref 0.1–0.3)
LYMPHOCYTES # BLD AUTO: 1.32 K/UL — SIGNIFICANT CHANGE UP (ref 1.2–3.4)
LYMPHOCYTES # BLD AUTO: 16.5 % — LOW (ref 20.5–51.1)
MAGNESIUM SERPL-MCNC: 2 MG/DL — SIGNIFICANT CHANGE UP (ref 1.8–2.4)
MCHC RBC-ENTMCNC: 32.3 PG — HIGH (ref 27–31)
MCHC RBC-ENTMCNC: 33.9 G/DL — SIGNIFICANT CHANGE UP (ref 32–37)
MCV RBC AUTO: 95.3 FL — HIGH (ref 80–94)
MONOCYTES # BLD AUTO: 0.75 K/UL — HIGH (ref 0.1–0.6)
MONOCYTES NFR BLD AUTO: 9.4 % — HIGH (ref 1.7–9.3)
NEUTROPHILS # BLD AUTO: 5.76 K/UL — SIGNIFICANT CHANGE UP (ref 1.4–6.5)
NEUTROPHILS NFR BLD AUTO: 72 % — SIGNIFICANT CHANGE UP (ref 42.2–75.2)
NRBC # BLD: 0 /100 WBCS — SIGNIFICANT CHANGE UP (ref 0–0)
PHOSPHATE SERPL-MCNC: 2.6 MG/DL — SIGNIFICANT CHANGE UP (ref 2.1–4.9)
PLATELET # BLD AUTO: 222 K/UL — SIGNIFICANT CHANGE UP (ref 130–400)
POTASSIUM SERPL-MCNC: 4.4 MMOL/L — SIGNIFICANT CHANGE UP (ref 3.5–5)
POTASSIUM SERPL-SCNC: 4.4 MMOL/L — SIGNIFICANT CHANGE UP (ref 3.5–5)
PROT SERPL-MCNC: 5.5 G/DL — LOW (ref 6–8)
RBC # BLD: 4.02 M/UL — LOW (ref 4.7–6.1)
RBC # FLD: 15.2 % — HIGH (ref 11.5–14.5)
SODIUM SERPL-SCNC: 136 MMOL/L — SIGNIFICANT CHANGE UP (ref 135–146)
WBC # BLD: 8 K/UL — SIGNIFICANT CHANGE UP (ref 4.8–10.8)
WBC # FLD AUTO: 8 K/UL — SIGNIFICANT CHANGE UP (ref 4.8–10.8)

## 2020-12-04 PROCEDURE — 71045 X-RAY EXAM CHEST 1 VIEW: CPT | Mod: 26

## 2020-12-04 PROCEDURE — 93010 ELECTROCARDIOGRAM REPORT: CPT

## 2020-12-04 PROCEDURE — 99233 SBSQ HOSP IP/OBS HIGH 50: CPT

## 2020-12-04 RX ORDER — PANTOPRAZOLE SODIUM 20 MG/1
40 TABLET, DELAYED RELEASE ORAL
Refills: 0 | Status: DISCONTINUED | OUTPATIENT
Start: 2020-12-04 | End: 2020-12-08

## 2020-12-04 RX ADMIN — HYDROMORPHONE HYDROCHLORIDE 2 MILLIGRAM(S): 2 INJECTION INTRAMUSCULAR; INTRAVENOUS; SUBCUTANEOUS at 08:50

## 2020-12-04 RX ADMIN — Medication 1 MILLIGRAM(S): at 11:40

## 2020-12-04 RX ADMIN — HYDROMORPHONE HYDROCHLORIDE 2 MILLIGRAM(S): 2 INJECTION INTRAMUSCULAR; INTRAVENOUS; SUBCUTANEOUS at 17:13

## 2020-12-04 RX ADMIN — Medication 1 MILLIGRAM(S): at 02:37

## 2020-12-04 RX ADMIN — Medication 30 MILLIGRAM(S): at 05:53

## 2020-12-04 RX ADMIN — HYDROMORPHONE HYDROCHLORIDE 2 MILLIGRAM(S): 2 INJECTION INTRAMUSCULAR; INTRAVENOUS; SUBCUTANEOUS at 04:15

## 2020-12-04 RX ADMIN — HYDROMORPHONE HYDROCHLORIDE 2 MILLIGRAM(S): 2 INJECTION INTRAMUSCULAR; INTRAVENOUS; SUBCUTANEOUS at 12:45

## 2020-12-04 RX ADMIN — Medication 1 MILLIGRAM(S): at 19:09

## 2020-12-04 RX ADMIN — Medication 1 MILLIGRAM(S): at 05:56

## 2020-12-04 RX ADMIN — PANTOPRAZOLE SODIUM 40 MILLIGRAM(S): 20 TABLET, DELAYED RELEASE ORAL at 11:41

## 2020-12-04 RX ADMIN — ENOXAPARIN SODIUM 40 MILLIGRAM(S): 100 INJECTION SUBCUTANEOUS at 11:41

## 2020-12-04 RX ADMIN — HYDROMORPHONE HYDROCHLORIDE 2 MILLIGRAM(S): 2 INJECTION INTRAMUSCULAR; INTRAVENOUS; SUBCUTANEOUS at 16:58

## 2020-12-04 RX ADMIN — Medication 100 MILLIGRAM(S): at 11:40

## 2020-12-04 RX ADMIN — HYDROMORPHONE HYDROCHLORIDE 2 MILLIGRAM(S): 2 INJECTION INTRAMUSCULAR; INTRAVENOUS; SUBCUTANEOUS at 21:35

## 2020-12-04 RX ADMIN — HYDROMORPHONE HYDROCHLORIDE 2 MILLIGRAM(S): 2 INJECTION INTRAMUSCULAR; INTRAVENOUS; SUBCUTANEOUS at 03:52

## 2020-12-04 RX ADMIN — SENNA PLUS 2 TABLET(S): 8.6 TABLET ORAL at 22:41

## 2020-12-04 RX ADMIN — Medication 1 TABLET(S): at 11:40

## 2020-12-04 RX ADMIN — Medication 1 MILLIGRAM(S): at 22:37

## 2020-12-04 RX ADMIN — Medication 1 MILLIGRAM(S): at 09:58

## 2020-12-04 RX ADMIN — HYDROMORPHONE HYDROCHLORIDE 2 MILLIGRAM(S): 2 INJECTION INTRAMUSCULAR; INTRAVENOUS; SUBCUTANEOUS at 08:19

## 2020-12-04 RX ADMIN — Medication 1 MILLIGRAM(S): at 14:04

## 2020-12-04 RX ADMIN — HYDROMORPHONE HYDROCHLORIDE 2 MILLIGRAM(S): 2 INJECTION INTRAMUSCULAR; INTRAVENOUS; SUBCUTANEOUS at 12:14

## 2020-12-04 RX ADMIN — HYDROMORPHONE HYDROCHLORIDE 2 MILLIGRAM(S): 2 INJECTION INTRAMUSCULAR; INTRAVENOUS; SUBCUTANEOUS at 20:57

## 2020-12-04 RX ADMIN — CHLORHEXIDINE GLUCONATE 1 APPLICATION(S): 213 SOLUTION TOPICAL at 05:51

## 2020-12-04 NOTE — CHART NOTE - NSCHARTNOTEFT_GEN_A_CORE
CCU DOWNGRADE NOTE:    40y Male transferred to floor from CCU    Patient is a 40y old Male who presents with a chief complaint of Acute pancreatitis (03 Dec 2020 18:31)    The patient is currently admitted for the primary diagnosis of Acute alcoholic pancreatitis      The patient was admitted to the unit for 2 Days.    The patient was never intubated, and was never on pressors.      Code Status: FULL CODE    CCU COURSE OF EVENTS:  -------------------------------------------------------------------------------------------  40 year old male with a hx of HTN,  ETOH abuse , previous episode of ETOH pancreatitis (7/2019) presents to the ED for an evaluation of sharp intermittent epigastric pain which radiates across the stomach and to the back.  He endorsed that he was drinking all night the night before with his last drink around 3am 12/1. He stated that he drinks every day afterwork. Abdominal pain is associated with nausea and NBNB vomiting Denies any CP, SOB, headache.  In ED vitals: Temp 97.5F, , / 83, RR 18, SpO2 96%. CT A + P: Inflammation surrounding the pancreas, suspect pancreatitis. There is also a new suspicious approximately 1.2 cm hypodensity in the pancreatic neck/body with upstream ductal dilatation, rule out neoplasm. lactate 9, Lipase 235, Anion gap 25.   Received 28mg of morphine in the ED, Zofran, and 2.5mg of Dilaudid Pain was uncontrolled with morphine, but controlled with Dilaudid. Given a Bolus with 3L of LR and on maintenance LR at 150cc/hr. NPO. Placed on CIWA protocol.     Patient sent to the unit for likely alcoholic pancreatitis and alcohol withdrawal.      IMPRESSION:    Alcoholic Pancreatitis   Alcohol abuse   Lactic Acidosis   Hepatitic Steatosis   HTN    PLAN:    CNS: CIWA protocol. Diluadid 2mg q4 prn for pain control    HEENT: Oral care    PULMONARY:  Room air, saturating well     CARDIOVASCULAR: Nifedipine 30 mg daily     GI: CT abdomen: New suspicious approximately 1.2 cm hypodensity in the pancreatic neck/body with upstream ductal dilatation, rule out neoplasm. F/u CDL workup. Regular Diet. Protonix. Zofran. Bowel Regime     RENAL:  Follow up lytes.  Correct as needed.  cc/hr     INFECTIOUS DISEASE: Follow up cultures    HEMATOLOGICAL:  DVT prophylaxis. Lovenox 40 mg QD     ENDOCRINE:  Follow up FS.  Insulin protocol if needed.    MUSCULOSKELETAL: Bedrest     Full Code    Downgrade to floor, transfer to hospitalist         -------------------------------------------------------------------------------------------    Current workup in progress:    SIGN OUT AT 12-04-20 @ 10:50 GIVEN TO: CCU DOWNGRADE NOTE:    40y Male transferred to floor from CCU    Patient is a 40y old Male who presents with a chief complaint of Acute pancreatitis (03 Dec 2020 18:31)    The patient is currently admitted for the primary diagnosis of Acute alcoholic pancreatitis      The patient was admitted to the unit for 2 Days.    The patient was never intubated, and was never on pressors.      Code Status: FULL CODE    CCU COURSE OF EVENTS:  -------------------------------------------------------------------------------------------  40 year old male with a hx of HTN,  ETOH abuse , previous episode of ETOH pancreatitis (7/2019) presents to the ED for an evaluation of sharp intermittent epigastric pain which radiates across the stomach and to the back.  He endorsed that he was drinking all night the night before with his last drink around 3am 12/1. He stated that he drinks every day afterwork. Abdominal pain is associated with nausea and NBNB vomiting Denies any CP, SOB, headache.  In ED vitals: Temp 97.5F, , / 83, RR 18, SpO2 96%. CT A + P: Inflammation surrounding the pancreas, suspect pancreatitis. There is also a new suspicious approximately 1.2 cm hypodensity in the pancreatic neck/body with upstream ductal dilatation, rule out neoplasm. lactate 9, Lipase 235, Anion gap 25.   Received 28mg of morphine in the ED, Zofran, and 2.5mg of Dilaudid Pain was uncontrolled with morphine, but controlled with Dilaudid. Given a Bolus with 3L of LR and on maintenance LR at 150cc/hr. NPO. Placed on CIWA protocol.     Patient sent to the unit for likely alcoholic pancreatitis and alcohol withdrawal    #Alcoholic Pancreatitis  - CT findings consistent with acute pancreatitis  - Lipase 235, Lactate 9, AST 66, ALT 69  - received 28mg morphine in ED, did not control pain  - 3L LR in ED   - Diet has been advanced to low fat diet, has been tolerating it well   - stop IVF now that patient is tolerating diet   - Dilaudid 2mg q4 PRN for pain  - f/u GI consult    #Alcohol Withdrawal/Abuse  - continue with thiamine, folate supplements  - CIWA score 0 currently, continue with ativan taper, ativan PRN   - spoke to patient about importance of alcohol cessation    #Suspicious New Pancreatic Hypodensity  #?questionable Portal Vein Thrombosis  - CT shows new 1.2cm hypodensity in pancreatic neck/body with upstream ductal dilation, r/o neoplasm   - RUQ US with doppler showed hepatic steatosis, hepatomegaly. Diminutive portal splenic confluence, findings suggestive of partial thrombosis or compression by inflamed pancreas  - Advanced GI following: consider EUS with FNA next week after pancreatitis is resolved or as outpatient in 2 weeks. Hold off on anticoagulation for portal vein/splenic vein thrombosis (likely due to pancreatitis) no need for AC, repeat US doppler as outpatient , if SMV involvement or extension; or bowel ischemia then reconsider A/C    #Elevated LFTs  - likely from fatty liver/alcoholic liver disease; US showed hepatic steatosis   - INR normal, f/u Chronic liver diease work up  - Ceruloplasmin negative, Hepatitis panel negative, Ferritin elevated, MICAELA negative, SMA negative  - Direct Bili 0.3, Indirect Bili 0.7  - f/u transferrin, gamma globulin      #HTN - improving  - takes Nifedipine 30mg dose at home, uncontrolled on admission  - given Nifedipine IR 10mg overnight   - given PO Labetalol 100mg overnight  - continue with Nifedipine 30mg  - likely also related to pain, continue with pain control  - BP is better controlled now              -------------------------------------------------------------------------------------------    Current workup in progress:  - f/u CLD workup  - f/u Advance GI recs  - Fully taper off Ativan    SIGN OUT AT 12-04-20 @ 10:50 GIVEN TO:      Dr. Bustamante

## 2020-12-04 NOTE — PROGRESS NOTE ADULT - ASSESSMENT
40 year old male with a hx of HTN,  ETOH abuse , previous episode of ETOH pancreatitis (7/2019) admitted for alcoholic pancreatitis.     IMPRESSION:    Alcoholic Pancreatitis   Alcohol abuse   Lactic Acidosis   Hepatitic Steatosis   HTN    PLAN:    CNS: CIWA protocol. Diluadid 2mg q4 prn for pain control    HEENT: Oral care    PULMONARY:  Room air, saturating well     CARDIOVASCULAR: Nifedipine 30 mg daily     GI: CT abdomen: New suspicious approximately 1.2 cm hypodensity in the pancreatic neck/body with upstream ductal dilatation, rule out neoplasm. F/u CDL workup. Clear Diet. Protonix. Zofran. Bowel Regime     RENAL:  Follow up lytes.  Correct as needed.  cc/hr     INFECTIOUS DISEASE: Follow up cultures    HEMATOLOGICAL:  DVT prophylaxis. Lovenox 40 mg QD     ENDOCRINE:  Follow up FS.  Insulin protocol if needed.    MUSCULOSKELETAL: Bedrest     Full Code    Dispo: CCU                                    40 year old male with a hx of HTN,  ETOH abuse , previous episode of ETOH pancreatitis (7/2019) admitted for alcoholic pancreatitis.     IMPRESSION:    Alcoholic Pancreatitis   Alcohol abuse   Lactic Acidosis   Hepatitic Steatosis   HTN    PLAN:    CNS: CIWA protocol. Diluadid 2mg q4 prn for pain control    HEENT: Oral care    PULMONARY:  Room air, saturating well     CARDIOVASCULAR: Nifedipine 30 mg daily     GI: CT abdomen: New suspicious approximately 1.2 cm hypodensity in the pancreatic neck/body with upstream ductal dilatation, rule out neoplasm. F/u CDL workup. Regular Diet. Protonix. Zofran. Bowel Regime     RENAL:  Follow up lytes.  Correct as needed.  cc/hr     INFECTIOUS DISEASE: Follow up cultures    HEMATOLOGICAL:  DVT prophylaxis. Lovenox 40 mg QD     ENDOCRINE:  Follow up FS.  Insulin protocol if needed.    MUSCULOSKELETAL: Bedrest     Full Code    Downgrade to floor, transfer to hospitalist

## 2020-12-04 NOTE — PROGRESS NOTE ADULT - SUBJECTIVE AND OBJECTIVE BOX
COLE DENISSE  40y  Male    Patient is a 40y old  Male who presents with a chief complaint of Acute pancreatitis (03 Dec 2020 18:31)      SUBJECTIVE/OVERNIGHT EVENTS:      PAST MEDICAL & SURGICAL HISTORY:  Asthma  no recent exacerbation, not using inhalers for a long time    Pancreatitis  October 2018 due to alcohol    No pertinent past medical history    H/O rotator cuff surgery      MEDICATIONS  (STANDING):  chlorhexidine 4% Liquid 1 Application(s) Topical <User Schedule>  enoxaparin Injectable 40 milliGRAM(s) SubCutaneous daily  folic acid 1 milliGRAM(s) Oral daily  influenza   Vaccine 0.5 milliLiter(s) IntraMuscular once  lactated ringers. 1000 milliLiter(s) (150 mL/Hr) IV Continuous <Continuous>  LORazepam   Injectable 1 milliGRAM(s) IV Push every 4 hours  LORazepam   Injectable   IV Push   multivitamin 1 Tablet(s) Oral daily  NIFEdipine XL 30 milliGRAM(s) Oral daily  pantoprazole  Injectable 40 milliGRAM(s) IV Push daily  senna 2 Tablet(s) Oral at bedtime  thiamine 100 milliGRAM(s) Oral daily    MEDICATIONS  (PRN):  aluminum hydroxide/magnesium hydroxide/simethicone Suspension 30 milliLiter(s) Oral every 4 hours PRN Dyspepsia  bisacodyl 5 milliGRAM(s) Oral every 12 hours PRN Constipation  HYDROmorphone  Injectable 2 milliGRAM(s) IV Push every 4 hours PRN Severe Pain (7 - 10)  LORazepam   Injectable 2 milliGRAM(s) IV Push every 1 hour PRN Agitation      OBJECTIVE:    Vital Signs Last 24 Hrs  T(C): 36.3 (04 Dec 2020 08:00), Max: 37.1 (03 Dec 2020 14:00)  T(F): 97.4 (04 Dec 2020 08:00), Max: 98.7 (03 Dec 2020 14:00)  HR: 82 (04 Dec 2020 10:00) (82 - 100)  BP: 131/70 (04 Dec 2020 10:00) (118/75 - 151/85)  BP(mean): 87 (04 Dec 2020 10:00) (84 - 109)  RR: 12 (04 Dec 2020 10:00) (12 - 23)  SpO2: 96% (04 Dec 2020 10:00) (95% - 98%)    General: In NAD  HEENT: + trach               Neck: Supple.   No Cervical LN    Lungs: Bilateral BS  Cardiovascular: Regular   Abdomen: Soft. + BS  Extremities: No CLubbing   Skin:   Neurological: non Focal     I&O's Summary    03 Dec 2020 07:01  -  04 Dec 2020 07:00  --------------------------------------------------------  IN: 5430 mL / OUT: 3925 mL / NET: 1505 mL    04 Dec 2020 07:01  -  04 Dec 2020 10:14  --------------------------------------------------------  IN: 990 mL / OUT: 640 mL / NET: 350 mL        BOWEL MOVEMENTS:    PRESSORS:  SEDATION:  VENTED:      LABS:                          13.0   8.00  )-----------( 222      ( 04 Dec 2020 05:30 )             38.3                                               12-04    136  |  100  |  7<L>  ----------------------------<  90  4.4   |  26  |  0.8    Ca    8.8      04 Dec 2020 05:30  Phos  2.6     12-04  Mg     2.0     12-04    TPro  5.5<L>  /  Alb  3.4<L>  /  TBili  1.1  /  DBili  0.3<H>  /  AST  36  /  ALT  37  /  AlkPhos  42  12-04                                                                                           LIVER FUNCTIONS - ( 04 Dec 2020 05:30 )  Alb: 3.4 g/dL / Pro: 5.5 g/dL / ALK PHOS: 42 U/L / ALT: 37 U/L / AST: 36 U/L / GGT: x

## 2020-12-04 NOTE — PROGRESS NOTE ADULT - ASSESSMENT
40 year old male with a hx of HTN,  ETOH abuse , previous episode of ETOH pancreatitis (7/2019) presents to the ED for an evaluation of sharp intermittent epigastric pain which radiates across the stomach and too the back. Pain started this afternoon and quickly became unbearable so he came to the ED.      # Abdominal pain: Acute interstitial pancreatitis secondary to alcohol: resolving   Ct abdomen: 1.2 cm hypodensity pancreatic neck/body with upstream ductal dilatation,  new compared to 2019 CT scan  Cr N, Geovany/ALP Normal  US abdomen shows possible partial thrombosis of portal vein/Splenic confluence but no SMV involvement   CEA, CA 19-9 negative    Rec:  On Full liquids. Advance as tolerated to low fat low residue diet  c/w LR 150cc/hr and titrate  Pain control as needed  Monitor HCT/BUN/UO daily   Alcohol cessation.   Folate, thiamine  will consider EUS with FNA next week after the pancreatitis is resolved or as outpatient in 2 weeks.  In regards to portal vein/splenic vein thrombosis likely secondary to pancreatitis. No need for AC, please repeat US doppler as outpatient once pancreatitis is resolved.  if any signs of SMV involvement or extension or bowel ischemia then consider AC      Elevated LFTS hepatocellular: resolved completely  Likely fatty liver/alcoholic liver disease  INR normal  Alcohol cessation  CLD: ceruloplasmin neg, viral hep panel neg, Ferritin high  F/U Transferrin Saturation, MICAELA, SMA, gamma globulin, AMA.   40 year old male with a hx of HTN,  ETOH abuse , previous episode of ETOH pancreatitis (7/2019) presents to the ED for an evaluation of sharp intermittent epigastric pain which radiates across the stomach and too the back. Pain started this afternoon and quickly became unbearable so he came to the ED.      # Abdominal pain: Acute interstitial pancreatitis secondary to alcohol: resolving   Ct abdomen: 1.2 cm hypodensity pancreatic neck/body with upstream ductal dilatation,  new compared to 2019 CT scan  Cr N, Geovany/ALP Normal  US abdomen shows possible partial thrombosis of portal vein/Splenic confluence but no SMV involvement   CEA, CA 19-9 negative    Rec:  On Full liquids. Advance as tolerated to low fat low residue diet  c/w LR 150cc/hr and titrate  Pain control as needed  Monitor HCT/BUN/UO daily   Alcohol cessation.   Folate, thiamine  will consider EUS with FNA next week after the pancreatitis is resolved or as outpatient in 2 weeks.  In regards to portal vein/splenic vein thrombosis likely secondary to pancreatitis. No need for AC, please repeat US doppler as outpatient once pancreatitis is resolved.  if any signs of SMV involvement or extension or bowel ischemia then consider AC      Elevated LFTS hepatocellular: resolved completely  Likely fatty liver/alcoholic liver disease  INR normal  Alcohol cessation  CLD: ceruloplasmin neg, viral hep panel neg, Ferritin high, MICAELA, SMA are neg  F/U Transferrin Saturation, gamma globulin, AMA.

## 2020-12-04 NOTE — PROGRESS NOTE ADULT - SUBJECTIVE AND OBJECTIVE BOX
Gastroenterology progress note:     Patient is a 40y old  Male who presents with a chief complaint of Acute pancreatitis       Admitted on: 12-02-20    We are following the patient for acute pancreatitis and mass     Interval History: Patient is doing better. On full liquids. NO overnight events. Abdominal pain is better. No vomiting.       PAST MEDICAL & SURGICAL HISTORY:  Asthma  no recent exacerbation, not using inhalers for a long time    Pancreatitis  October 2018 due to alcohol    No pertinent past medical history    H/O rotator cuff surgery        MEDICATIONS  (STANDING):  chlorhexidine 4% Liquid 1 Application(s) Topical <User Schedule>  enoxaparin Injectable 40 milliGRAM(s) SubCutaneous daily  folic acid 1 milliGRAM(s) Oral daily  influenza   Vaccine 0.5 milliLiter(s) IntraMuscular once  lactated ringers. 1000 milliLiter(s) (150 mL/Hr) IV Continuous <Continuous>  LORazepam   Injectable 1 milliGRAM(s) IV Push every 4 hours  LORazepam   Injectable   IV Push   multivitamin 1 Tablet(s) Oral daily  NIFEdipine XL 30 milliGRAM(s) Oral daily  pantoprazole  Injectable 40 milliGRAM(s) IV Push daily  senna 2 Tablet(s) Oral at bedtime  thiamine 100 milliGRAM(s) Oral daily    MEDICATIONS  (PRN):  aluminum hydroxide/magnesium hydroxide/simethicone Suspension 30 milliLiter(s) Oral every 4 hours PRN Dyspepsia  bisacodyl 5 milliGRAM(s) Oral every 12 hours PRN Constipation  HYDROmorphone  Injectable 2 milliGRAM(s) IV Push every 4 hours PRN Severe Pain (7 - 10)  LORazepam   Injectable 2 milliGRAM(s) IV Push every 1 hour PRN Agitation      Allergies  No Known Allergies      Review of Systems:   Cardiovascular:  No Chest Pain, No Palpitations  Respiratory:  No Cough, No Dyspnea  Gastrointestinal:  As described in HPI    Physical Examination:  T(C): 36.3 (12-04-20 @ 08:00), Max: 37.1 (12-03-20 @ 14:00)  HR: 82 (12-04-20 @ 10:00) (82 - 100)  BP: 131/70 (12-04-20 @ 10:00) (118/75 - 151/85)  RR: 12 (12-04-20 @ 10:00) (12 - 23)  SpO2: 96% (12-04-20 @ 10:00) (95% - 98%)      12-03-20 @ 07:01  -  12-04-20 @ 07:00  --------------------------------------------------------  IN: 5430 mL / OUT: 3925 mL / NET: 1505 mL    12-04-20 @ 07:01  -  12-04-20 @ 11:09  --------------------------------------------------------  IN: 990 mL / OUT: 640 mL / NET: 350 mL      Constitutional: No acute distress.  Respiratory:  No signs of respiratory distress. Lung sounds are clear bilaterally.  Cardiovascular:  S1 S2, Regular rate and rhythm.  Abdominal: Abdomen is soft, symmetric, epigastric tenderness+  Skin: No rashes, No Jaundice.        Data: (reviewed by attending)                        13.0   8.00  )-----------( 222      ( 04 Dec 2020 05:30 )             38.3     Hgb trend:  13.0  12-04-20 @ 05:30  14.6  12-03-20 @ 04:59  15.0  12-02-20 @ 04:19  17.4  12-01-20 @ 20:58        12-04    136  |  100  |  7<L>  ----------------------------<  90  4.4   |  26  |  0.8    Ca    8.8      04 Dec 2020 05:30  Phos  2.6     12-04  Mg     2.0     12-04    TPro  5.5<L>  /  Alb  3.4<L>  /  TBili  1.1  /  DBili  0.3<H>  /  AST  36  /  ALT  37  /  AlkPhos  42  12-04    Liver panel trend:  TBili 1.1   /   AST 36   /   ALT 37   /   AlkP 42   /   Tptn 5.5   /   Alb 3.4    /   DBili 0.3      12-04  TBili 2.2   /   AST 31   /   ALT 39   /   AlkP 48   /   Tptn 5.8   /   Alb 3.6    /   DBili --      12-03  TBili 1.2   /   AST 47   /   ALT 56   /   AlkP 56   /   Tptn 6.3   /   Alb 4.1    /   DBili --      12-02  TBili 1.2   /   AST 66   /   ALT 69   /   AlkP 68   /   Tptn 7.8   /   Alb 4.9    /   DBili 0.3      12-01             Radiology: (reviewed by attending)    US Abdomen Doppler:   EXAM:  US DPLX ABDOMEN        EXAM:  US ABDOMEN LIMITED            PROCEDURE DATE:  12/02/2020            INTERPRETATION:  CLINICAL HISTORY: Pancreatitis. Evaluate for gallstones, and for portal splenic vein thrombosis    COMPARISON: CT abdomen pelvis December 1, 2020, and abdominal sonogram July 8, 2019    PROCEDURE: Ultrasound of the right upper quadrant was performed. Duplex Doppler images of the hepatic veins, portal veins and splenic vein were obtained.    FINDINGS:    LIVER:  Normal in contour and increased echogenicity, large in size measuring 21.1 cm in length. No focal mass.    The right, main, and left portal veins are patent. The right, middle and left hepatic veins are patent.    The portal splenic confluence is diminutive, including the remainder of the visualized splenic vein to the splenic hilum, with dampened waveform at the splenic hilum. Findings suggestive of partial thrombosis or compression by inflamed pancreas.    GALLBLADDER/BILIARY TREE:  No evidence of cholelithiasis. No wall thickening or pericholecystic fluid. Negative sonographic Marquez's sign. No intrahepatic biliary ductal dilatation. The common bile duct measures 5 mm, which is normal.    PANCREAS:  Visualized head and body demonstrates peripancreatic inflammation/trace fluid consistent with pancreatitis. The suspicious mass in the pancreatic neck/body seen on the CT exam is not well visualized on the ultrasound. Remainder obscured by overlying bowel gas.    KIDNEY:  Right kidney measures 11.7 cm in length. No hydronephrosis, calculi or solid mass.    AORTA/IVC:  Visualized proximal portions unremarkable.    ASCITES:  None.    IMPRESSION:    Echogenic liver, consistent with hepatic steatosis.    Hepatomegaly.    Acute pancreatitis.    Diminutive portal splenic confluence, and the remainder of the visualized splenic vein to the splenic hilum with dampened waveform at the splenic hilum. Findings suggestive of partial thrombosis or compression by inflamed pancreas.                RYLEE VARELA MD; Attending Radiologist  This document has been electronically signed. Dec  2 2020  3:39PM (12-02-20 @ 14:37)  US Abdomen Limited:   EXAM:  US DPLX ABDOMEN        EXAM:  US ABDOMEN LIMITED            PROCEDURE DATE:  12/02/2020            INTERPRETATION:  CLINICAL HISTORY: Pancreatitis. Evaluate for gallstones, and for portal splenic vein thrombosis    COMPARISON: CT abdomen pelvis December 1, 2020, and abdominal sonogram July 8, 2019    PROCEDURE: Ultrasound of the right upper quadrant was performed. Duplex Doppler images of the hepatic veins, portal veins and splenic vein were obtained.    FINDINGS:    LIVER:  Normal in contour and increased echogenicity, large in size measuring 21.1 cm in length. No focal mass.    The right, main, and left portal veins are patent. The right, middle and left hepatic veins are patent.    The portal splenic confluence is diminutive, including the remainder of the visualized splenic vein to the splenic hilum, with dampened waveform at the splenic hilum. Findings suggestive of partial thrombosis or compression by inflamed pancreas.    GALLBLADDER/BILIARY TREE:  No evidence of cholelithiasis. No wall thickening or pericholecystic fluid. Negative sonographic Marquez's sign. No intrahepatic biliary ductal dilatation. The common bile duct measures 5 mm, which is normal.    PANCREAS:  Visualized head and body demonstrates peripancreatic inflammation/trace fluid consistent with pancreatitis. The suspicious mass in the pancreatic neck/body seen on the CT exam is not well visualized on the ultrasound. Remainder obscured by overlying bowel gas.    KIDNEY:  Right kidney measures 11.7 cm in length. No hydronephrosis, calculi or solid mass.    AORTA/IVC:  Visualized proximal portions unremarkable.    ASCITES:  None.    IMPRESSION:    Echogenic liver, consistent with hepatic steatosis.    Hepatomegaly.    Acute pancreatitis.    Diminutive portal splenic confluence, and the remainder of the visualized splenic vein to the splenic hilum with dampened waveform at the splenic hilum. Findings suggestive of partial thrombosis or compression by inflamed pancreas.                RYLEE VARELA MD; Attending Radiologist  This document has been electronically signed. Dec  2 2020  3:39PM (12-02-20 @ 14:36)

## 2020-12-05 LAB
ALBUMIN SERPL ELPH-MCNC: 4.2 G/DL — SIGNIFICANT CHANGE UP (ref 3.5–5.2)
ALP SERPL-CCNC: 54 U/L — SIGNIFICANT CHANGE UP (ref 30–115)
ALT FLD-CCNC: 79 U/L — HIGH (ref 0–41)
ANION GAP SERPL CALC-SCNC: 13 MMOL/L — SIGNIFICANT CHANGE UP (ref 7–14)
AST SERPL-CCNC: 91 U/L — HIGH (ref 0–41)
BASOPHILS # BLD AUTO: 0.06 K/UL — SIGNIFICANT CHANGE UP (ref 0–0.2)
BASOPHILS NFR BLD AUTO: 0.6 % — SIGNIFICANT CHANGE UP (ref 0–1)
BILIRUB SERPL-MCNC: 0.7 MG/DL — SIGNIFICANT CHANGE UP (ref 0.2–1.2)
BUN SERPL-MCNC: 10 MG/DL — SIGNIFICANT CHANGE UP (ref 10–20)
CALCIUM SERPL-MCNC: 9.6 MG/DL — SIGNIFICANT CHANGE UP (ref 8.5–10.1)
CHLORIDE SERPL-SCNC: 96 MMOL/L — LOW (ref 98–110)
CO2 SERPL-SCNC: 27 MMOL/L — SIGNIFICANT CHANGE UP (ref 17–32)
CREAT SERPL-MCNC: 0.8 MG/DL — SIGNIFICANT CHANGE UP (ref 0.7–1.5)
EOSINOPHIL # BLD AUTO: 0.09 K/UL — SIGNIFICANT CHANGE UP (ref 0–0.7)
EOSINOPHIL NFR BLD AUTO: 0.9 % — SIGNIFICANT CHANGE UP (ref 0–8)
GLUCOSE SERPL-MCNC: 104 MG/DL — HIGH (ref 70–99)
HCT VFR BLD CALC: 43 % — SIGNIFICANT CHANGE UP (ref 42–52)
HGB BLD-MCNC: 14.3 G/DL — SIGNIFICANT CHANGE UP (ref 14–18)
IGG SERPL-MCNC: 811 MG/DL — SIGNIFICANT CHANGE UP (ref 603–1613)
IGG1 SER-MCNC: 373 MG/DL — SIGNIFICANT CHANGE UP (ref 248–810)
IGG2 SER-MCNC: 313 MG/DL — SIGNIFICANT CHANGE UP (ref 130–555)
IGG3 SER-MCNC: 34 MG/DL — SIGNIFICANT CHANGE UP (ref 15–102)
IMM GRANULOCYTES NFR BLD AUTO: 0.5 % — HIGH (ref 0.1–0.3)
LYMPHOCYTES # BLD AUTO: 1.22 K/UL — SIGNIFICANT CHANGE UP (ref 1.2–3.4)
LYMPHOCYTES # BLD AUTO: 12 % — LOW (ref 20.5–51.1)
MAGNESIUM SERPL-MCNC: 2.1 MG/DL — SIGNIFICANT CHANGE UP (ref 1.8–2.4)
MCHC RBC-ENTMCNC: 32.4 PG — HIGH (ref 27–31)
MCHC RBC-ENTMCNC: 33.3 G/DL — SIGNIFICANT CHANGE UP (ref 32–37)
MCV RBC AUTO: 97.5 FL — HIGH (ref 80–94)
MONOCYTES # BLD AUTO: 0.91 K/UL — HIGH (ref 0.1–0.6)
MONOCYTES NFR BLD AUTO: 8.9 % — SIGNIFICANT CHANGE UP (ref 1.7–9.3)
NEUTROPHILS # BLD AUTO: 7.87 K/UL — HIGH (ref 1.4–6.5)
NEUTROPHILS NFR BLD AUTO: 77.1 % — HIGH (ref 42.2–75.2)
NRBC # BLD: 0 /100 WBCS — SIGNIFICANT CHANGE UP (ref 0–0)
PHOSPHATE SERPL-MCNC: 2.9 MG/DL — SIGNIFICANT CHANGE UP (ref 2.1–4.9)
PLATELET # BLD AUTO: 287 K/UL — SIGNIFICANT CHANGE UP (ref 130–400)
POTASSIUM SERPL-MCNC: 4.1 MMOL/L — SIGNIFICANT CHANGE UP (ref 3.5–5)
POTASSIUM SERPL-SCNC: 4.1 MMOL/L — SIGNIFICANT CHANGE UP (ref 3.5–5)
PROT SERPL-MCNC: 6.7 G/DL — SIGNIFICANT CHANGE UP (ref 6–8)
RBC # BLD: 4.41 M/UL — LOW (ref 4.7–6.1)
RBC # FLD: 14.6 % — HIGH (ref 11.5–14.5)
SODIUM SERPL-SCNC: 136 MMOL/L — SIGNIFICANT CHANGE UP (ref 135–146)
WBC # BLD: 10.2 K/UL — SIGNIFICANT CHANGE UP (ref 4.8–10.8)
WBC # FLD AUTO: 10.2 K/UL — SIGNIFICANT CHANGE UP (ref 4.8–10.8)

## 2020-12-05 PROCEDURE — 99233 SBSQ HOSP IP/OBS HIGH 50: CPT

## 2020-12-05 RX ORDER — ONDANSETRON 8 MG/1
4 TABLET, FILM COATED ORAL ONCE
Refills: 0 | Status: COMPLETED | OUTPATIENT
Start: 2020-12-05 | End: 2020-12-05

## 2020-12-05 RX ADMIN — Medication 0.5 MILLIGRAM(S): at 06:47

## 2020-12-05 RX ADMIN — Medication 30 MILLILITER(S): at 15:05

## 2020-12-05 RX ADMIN — HYDROMORPHONE HYDROCHLORIDE 2 MILLIGRAM(S): 2 INJECTION INTRAMUSCULAR; INTRAVENOUS; SUBCUTANEOUS at 23:38

## 2020-12-05 RX ADMIN — Medication 30 MILLIGRAM(S): at 06:47

## 2020-12-05 RX ADMIN — Medication 0.5 MILLIGRAM(S): at 21:56

## 2020-12-05 RX ADMIN — PANTOPRAZOLE SODIUM 40 MILLIGRAM(S): 20 TABLET, DELAYED RELEASE ORAL at 06:47

## 2020-12-05 RX ADMIN — HYDROMORPHONE HYDROCHLORIDE 2 MILLIGRAM(S): 2 INJECTION INTRAMUSCULAR; INTRAVENOUS; SUBCUTANEOUS at 19:01

## 2020-12-05 RX ADMIN — HYDROMORPHONE HYDROCHLORIDE 2 MILLIGRAM(S): 2 INJECTION INTRAMUSCULAR; INTRAVENOUS; SUBCUTANEOUS at 11:30

## 2020-12-05 RX ADMIN — Medication 1 TABLET(S): at 11:03

## 2020-12-05 RX ADMIN — Medication 30 MILLILITER(S): at 00:15

## 2020-12-05 RX ADMIN — Medication 0.5 MILLIGRAM(S): at 14:47

## 2020-12-05 RX ADMIN — HYDROMORPHONE HYDROCHLORIDE 2 MILLIGRAM(S): 2 INJECTION INTRAMUSCULAR; INTRAVENOUS; SUBCUTANEOUS at 01:32

## 2020-12-05 RX ADMIN — HYDROMORPHONE HYDROCHLORIDE 2 MILLIGRAM(S): 2 INJECTION INTRAMUSCULAR; INTRAVENOUS; SUBCUTANEOUS at 02:45

## 2020-12-05 RX ADMIN — SENNA PLUS 2 TABLET(S): 8.6 TABLET ORAL at 21:59

## 2020-12-05 RX ADMIN — HYDROMORPHONE HYDROCHLORIDE 2 MILLIGRAM(S): 2 INJECTION INTRAMUSCULAR; INTRAVENOUS; SUBCUTANEOUS at 07:57

## 2020-12-05 RX ADMIN — Medication 0.5 MILLIGRAM(S): at 10:09

## 2020-12-05 RX ADMIN — HYDROMORPHONE HYDROCHLORIDE 2 MILLIGRAM(S): 2 INJECTION INTRAMUSCULAR; INTRAVENOUS; SUBCUTANEOUS at 23:03

## 2020-12-05 RX ADMIN — Medication 0.5 MILLIGRAM(S): at 02:03

## 2020-12-05 RX ADMIN — HYDROMORPHONE HYDROCHLORIDE 2 MILLIGRAM(S): 2 INJECTION INTRAMUSCULAR; INTRAVENOUS; SUBCUTANEOUS at 10:58

## 2020-12-05 RX ADMIN — HYDROMORPHONE HYDROCHLORIDE 2 MILLIGRAM(S): 2 INJECTION INTRAMUSCULAR; INTRAVENOUS; SUBCUTANEOUS at 06:51

## 2020-12-05 RX ADMIN — ONDANSETRON 4 MILLIGRAM(S): 8 TABLET, FILM COATED ORAL at 19:01

## 2020-12-05 RX ADMIN — Medication 0.5 MILLIGRAM(S): at 18:01

## 2020-12-05 RX ADMIN — HYDROMORPHONE HYDROCHLORIDE 2 MILLIGRAM(S): 2 INJECTION INTRAMUSCULAR; INTRAVENOUS; SUBCUTANEOUS at 15:30

## 2020-12-05 RX ADMIN — HYDROMORPHONE HYDROCHLORIDE 2 MILLIGRAM(S): 2 INJECTION INTRAMUSCULAR; INTRAVENOUS; SUBCUTANEOUS at 15:01

## 2020-12-05 RX ADMIN — Medication 1 MILLIGRAM(S): at 11:03

## 2020-12-05 RX ADMIN — ENOXAPARIN SODIUM 40 MILLIGRAM(S): 100 INJECTION SUBCUTANEOUS at 11:03

## 2020-12-05 NOTE — PROGRESS NOTE ADULT - SUBJECTIVE AND OBJECTIVE BOX
DENISSE GALVAN 40y Male  MRN#: [id number]   Hospital Day: 3d    SUBJECTIVE  Patient is a 40y old Male who presents with a chief complaint of Acute pancreatitis (04 Dec 2020 11:09)  Currently admitted to medicine with the primary diagnosis of Acute alcoholic pancreatitis      INTERVAL HPI AND OVERNIGHT EVENTS:  Patient was examined and seen at bedside. This morning he is resting comfortably in bed and reports no issues or overnight events.    REVIEW OF SYMPTOMS:  CONSTITUTIONAL: No weakness, fevers or chills; No headaches  EYES: No visual changes, eye pain, or discharge  ENT: No vertigo; No ear pain or change in hearing; No sore throat or difficulty swallowing  NECK: No pain or stiffness  RESPIRATORY: No cough, wheezing, or hemoptysis; No shortness of breath  CARDIOVASCULAR: No chest pain or palpitations  GASTROINTESTINAL: No abdominal or epigastric pain; No nausea, vomiting, or hematemesis; No diarrhea or constipation; No melena or hematochezia  GENITOURINARY: No dysuria, frequency or hematuria  MUSCULOSKELETAL: No joint pain, no muscle pain, no weakness  NEUROLOGICAL: No numbness or weakness  SKIN: No itching or rashes    OBJECTIVE  PAST MEDICAL & SURGICAL HISTORY  Asthma  no recent exacerbation, not using inhalers for a long time    Pancreatitis  October 2018 due to alcohol    No pertinent past medical history    H/O rotator cuff surgery      ALLERGIES:  No Known Allergies    MEDICATIONS:  STANDING MEDICATIONS  chlorhexidine 4% Liquid 1 Application(s) Topical <User Schedule>  enoxaparin Injectable 40 milliGRAM(s) SubCutaneous daily  folic acid 1 milliGRAM(s) Oral daily  influenza   Vaccine 0.5 milliLiter(s) IntraMuscular once  lactated ringers. 1000 milliLiter(s) IV Continuous <Continuous>  LORazepam   Injectable 0.5 milliGRAM(s) IV Push every 4 hours  LORazepam   Injectable   IV Push   multivitamin 1 Tablet(s) Oral daily  NIFEdipine XL 30 milliGRAM(s) Oral daily  pantoprazole    Tablet 40 milliGRAM(s) Oral before breakfast  senna 2 Tablet(s) Oral at bedtime    PRN MEDICATIONS  aluminum hydroxide/magnesium hydroxide/simethicone Suspension 30 milliLiter(s) Oral every 4 hours PRN  bisacodyl 5 milliGRAM(s) Oral every 12 hours PRN  HYDROmorphone  Injectable 2 milliGRAM(s) IV Push every 4 hours PRN  LORazepam   Injectable 2 milliGRAM(s) IV Push every 1 hour PRN      VITAL SIGNS: Last 24 Hours  T(C): 36.4 (05 Dec 2020 04:42), Max: 36.5 (04 Dec 2020 12:00)  T(F): 97.5 (05 Dec 2020 04:42), Max: 97.7 (04 Dec 2020 12:00)  HR: 91 (05 Dec 2020 04:42) (88 - 96)  BP: 134/74 (05 Dec 2020 04:42) (134/74 - 159/92)  BP(mean): 99 (04 Dec 2020 12:00) (99 - 99)  RR: 18 (05 Dec 2020 04:42) (18 - 24)  SpO2: 97% (04 Dec 2020 21:18) (97% - 97%)    LABS:                        14.3   10.20 )-----------( 287      ( 05 Dec 2020 07:07 )             43.0     12-05    136  |  96<L>  |  10  ----------------------------<  104<H>  4.1   |  27  |  0.8    Ca    9.6      05 Dec 2020 07:07  Phos  2.9     12-05  Mg     2.1     12-05    TPro  6.7  /  Alb  4.2  /  TBili  0.7  /  DBili  x   /  AST  91<H>  /  ALT  79<H>  /  AlkPhos  54  12-05          PHYSICAL EXAM:  CONSTITUTIONAL: No acute distress, well-developed, well-groomed, AAOx3  HEAD: Atraumatic, normocephalic  EYES: EOM intact, PERRLA, conjunctiva and sclera clear  ENT: Supple, no masses, no thyromegaly, no bruits, no JVD; moist mucous membranes  PULMONARY: Clear to auscultation bilaterally; no wheezes, rales, or rhonchi  CARDIOVASCULAR: Regular rate and rhythm; no murmurs, rubs, or gallops  GASTROINTESTINAL: minimal abdominal pain   MUSCULOSKELETAL: 2+ peripheral pulses; no clubbing, no cyanosis, no edema  NEUROLOGY: non-focal  SKIN: No rashes or lesions; warm and dry        ASSESSMENT & PLAN  #Alcoholic Pancreatitis  - CT findings consistent with acute pancreatitis  - Lipase 235, Lactate 9  - ALT/ AST slightly uptrending today, patient with increased pain   - Diet has been advanced to low fat diet, has been tolerating minimally   - Dilaudid 2mg q4 PRN for pain   - monitor HCT, UO, BUN daily       #Alcohol Withdrawal/Abuse  - continue with thiamine, folate supplements  - CIWA score 0 currently, continue with ativan taper, ativan PRN   - spoke to patient about importance of alcohol cessation      #Suspicious New Pancreatic Hypodensity  #?questionable Portal Vein Thrombosis  - CT shows new 1.2cm hypodensity in pancreatic neck/body with upstream ductal dilation, r/o neoplasm   - RUQ US with doppler showed hepatic steatosis, hepatomegaly. Diminutive portal splenic confluence, findings suggestive of partial thrombosis or compression by inflamed pancreas  - Advanced GI following: consider EUS with FNA next week after pancreatitis is resolved or as outpatient in 2 weeks. Hold off on anticoagulation for portal vein/splenic vein thrombosis (likely due to pancreatitis) no need for AC, repeat US doppler as outpatient , if SMV involvement or extension; or bowel ischemia then reconsider A/C      #Elevated LFTs  - likely from fatty liver/alcoholic liver disease; US showed hepatic steatosis   - INR normal, f/u Chronic liver diease work up  - Ceruloplasmin negative, Hepatitis panel negative, Ferritin elevated, MICAELA negative, SMA negative  - Direct Bili 0.3, Indirect Bili 0.7  - f/u transferrin, gamma globulin      #HTN - improving  - takes Nifedipine 30mg dose at home, uncontrolled on admission  - given Nifedipine IR 10mg overnight   - given PO Labetalol 100mg overnight  - continue with Nifedipine 30mg  - likely also related to pain, continue with pain control  - BP is better controlled now

## 2020-12-06 LAB
ALBUMIN SERPL ELPH-MCNC: 4 G/DL — SIGNIFICANT CHANGE UP (ref 3.5–5.2)
ALP SERPL-CCNC: 49 U/L — SIGNIFICANT CHANGE UP (ref 30–115)
ALT FLD-CCNC: 84 U/L — HIGH (ref 0–41)
ANION GAP SERPL CALC-SCNC: 12 MMOL/L — SIGNIFICANT CHANGE UP (ref 7–14)
AST SERPL-CCNC: 62 U/L — HIGH (ref 0–41)
BILIRUB SERPL-MCNC: 0.6 MG/DL — SIGNIFICANT CHANGE UP (ref 0.2–1.2)
BUN SERPL-MCNC: 11 MG/DL — SIGNIFICANT CHANGE UP (ref 10–20)
CALCIUM SERPL-MCNC: 9.2 MG/DL — SIGNIFICANT CHANGE UP (ref 8.5–10.1)
CHLORIDE SERPL-SCNC: 99 MMOL/L — SIGNIFICANT CHANGE UP (ref 98–110)
CO2 SERPL-SCNC: 26 MMOL/L — SIGNIFICANT CHANGE UP (ref 17–32)
CREAT SERPL-MCNC: 0.7 MG/DL — SIGNIFICANT CHANGE UP (ref 0.7–1.5)
GLUCOSE SERPL-MCNC: 96 MG/DL — SIGNIFICANT CHANGE UP (ref 70–99)
HCT VFR BLD CALC: 43.3 % — SIGNIFICANT CHANGE UP (ref 42–52)
HGB BLD-MCNC: 14.6 G/DL — SIGNIFICANT CHANGE UP (ref 14–18)
MCHC RBC-ENTMCNC: 32.2 PG — HIGH (ref 27–31)
MCHC RBC-ENTMCNC: 33.7 G/DL — SIGNIFICANT CHANGE UP (ref 32–37)
MCV RBC AUTO: 95.6 FL — HIGH (ref 80–94)
NRBC # BLD: 0 /100 WBCS — SIGNIFICANT CHANGE UP (ref 0–0)
PLATELET # BLD AUTO: 327 K/UL — SIGNIFICANT CHANGE UP (ref 130–400)
POTASSIUM SERPL-MCNC: 4.4 MMOL/L — SIGNIFICANT CHANGE UP (ref 3.5–5)
POTASSIUM SERPL-SCNC: 4.4 MMOL/L — SIGNIFICANT CHANGE UP (ref 3.5–5)
PROT SERPL-MCNC: 6.5 G/DL — SIGNIFICANT CHANGE UP (ref 6–8)
RBC # BLD: 4.53 M/UL — LOW (ref 4.7–6.1)
RBC # FLD: 14.5 % — SIGNIFICANT CHANGE UP (ref 11.5–14.5)
SODIUM SERPL-SCNC: 137 MMOL/L — SIGNIFICANT CHANGE UP (ref 135–146)
WBC # BLD: 8.78 K/UL — SIGNIFICANT CHANGE UP (ref 4.8–10.8)
WBC # FLD AUTO: 8.78 K/UL — SIGNIFICANT CHANGE UP (ref 4.8–10.8)

## 2020-12-06 PROCEDURE — 99232 SBSQ HOSP IP/OBS MODERATE 35: CPT

## 2020-12-06 RX ORDER — MORPHINE SULFATE 50 MG/1
2 CAPSULE, EXTENDED RELEASE ORAL EVERY 4 HOURS
Refills: 0 | Status: DISCONTINUED | OUTPATIENT
Start: 2020-12-06 | End: 2020-12-08

## 2020-12-06 RX ORDER — ONDANSETRON 8 MG/1
4 TABLET, FILM COATED ORAL EVERY 6 HOURS
Refills: 0 | Status: DISCONTINUED | OUTPATIENT
Start: 2020-12-06 | End: 2020-12-08

## 2020-12-06 RX ADMIN — Medication 0.5 MILLIGRAM(S): at 17:19

## 2020-12-06 RX ADMIN — SENNA PLUS 2 TABLET(S): 8.6 TABLET ORAL at 21:27

## 2020-12-06 RX ADMIN — MORPHINE SULFATE 2 MILLIGRAM(S): 50 CAPSULE, EXTENDED RELEASE ORAL at 16:30

## 2020-12-06 RX ADMIN — Medication 1 TABLET(S): at 12:09

## 2020-12-06 RX ADMIN — HYDROMORPHONE HYDROCHLORIDE 2 MILLIGRAM(S): 2 INJECTION INTRAMUSCULAR; INTRAVENOUS; SUBCUTANEOUS at 08:30

## 2020-12-06 RX ADMIN — HYDROMORPHONE HYDROCHLORIDE 2 MILLIGRAM(S): 2 INJECTION INTRAMUSCULAR; INTRAVENOUS; SUBCUTANEOUS at 07:58

## 2020-12-06 RX ADMIN — Medication 2 MILLIGRAM(S): at 02:08

## 2020-12-06 RX ADMIN — ONDANSETRON 4 MILLIGRAM(S): 8 TABLET, FILM COATED ORAL at 08:01

## 2020-12-06 RX ADMIN — HYDROMORPHONE HYDROCHLORIDE 2 MILLIGRAM(S): 2 INJECTION INTRAMUSCULAR; INTRAVENOUS; SUBCUTANEOUS at 12:08

## 2020-12-06 RX ADMIN — MORPHINE SULFATE 2 MILLIGRAM(S): 50 CAPSULE, EXTENDED RELEASE ORAL at 21:27

## 2020-12-06 RX ADMIN — Medication 30 MILLILITER(S): at 07:58

## 2020-12-06 RX ADMIN — Medication 1 MILLIGRAM(S): at 12:09

## 2020-12-06 RX ADMIN — MORPHINE SULFATE 2 MILLIGRAM(S): 50 CAPSULE, EXTENDED RELEASE ORAL at 20:14

## 2020-12-06 RX ADMIN — HYDROMORPHONE HYDROCHLORIDE 2 MILLIGRAM(S): 2 INJECTION INTRAMUSCULAR; INTRAVENOUS; SUBCUTANEOUS at 03:35

## 2020-12-06 RX ADMIN — ENOXAPARIN SODIUM 40 MILLIGRAM(S): 100 INJECTION SUBCUTANEOUS at 12:09

## 2020-12-06 RX ADMIN — HYDROMORPHONE HYDROCHLORIDE 2 MILLIGRAM(S): 2 INJECTION INTRAMUSCULAR; INTRAVENOUS; SUBCUTANEOUS at 12:30

## 2020-12-06 RX ADMIN — HYDROMORPHONE HYDROCHLORIDE 2 MILLIGRAM(S): 2 INJECTION INTRAMUSCULAR; INTRAVENOUS; SUBCUTANEOUS at 03:40

## 2020-12-06 RX ADMIN — Medication 2 MILLIGRAM(S): at 06:33

## 2020-12-06 RX ADMIN — MORPHINE SULFATE 2 MILLIGRAM(S): 50 CAPSULE, EXTENDED RELEASE ORAL at 15:57

## 2020-12-06 RX ADMIN — Medication 30 MILLILITER(S): at 13:10

## 2020-12-06 RX ADMIN — PANTOPRAZOLE SODIUM 40 MILLIGRAM(S): 20 TABLET, DELAYED RELEASE ORAL at 06:29

## 2020-12-06 RX ADMIN — Medication 30 MILLIGRAM(S): at 06:29

## 2020-12-06 NOTE — PROGRESS NOTE ADULT - SUBJECTIVE AND OBJECTIVE BOX
Pt seen and examined at bedside. Abd pain overnight. No SOB, CP.     VITAL SIGNS (Last 24 hrs):  T(C): 35.8 (12-06-20 @ 12:43), Max: 36.5 (12-05-20 @ 21:08)  HR: 89 (12-06-20 @ 12:43) (86 - 94)  BP: 119/73 (12-06-20 @ 12:43) (119/73 - 148/81)  RR: 18 (12-06-20 @ 12:43) (18 - 19)  SpO2: --  Wt(kg): --  Daily     Daily     I&O's Summary      PHYSICAL EXAM:  GENERAL: NAD, well-developed  HEAD:  Atraumatic, Normocephalic  EYES: EOMI, PERRLA, conjunctiva and sclera clear  NECK: Supple, No JVD  CHEST/LUNG: Clear to auscultation bilaterally; No wheeze  HEART: Regular rate and rhythm; No murmurs, rubs, or gallops  ABDOMEN: Soft, Nontender, Nondistended; Bowel sounds present  EXTREMITIES:  2+ Peripheral Pulses, No clubbing, cyanosis, or edema  PSYCH: AAOx3  NEUROLOGY: non-focal  SKIN: No rashes or lesions    Labs Reviewed  Spoke to patient in regards to abnormal labs.    CBC Full  -  ( 06 Dec 2020 06:03 )  WBC Count : 8.78 K/uL  Hemoglobin : 14.6 g/dL  Hematocrit : 43.3 %  Platelet Count - Automated : 327 K/uL  Mean Cell Volume : 95.6 fL  Mean Cell Hemoglobin : 32.2 pg  Mean Cell Hemoglobin Concentration : 33.7 g/dL  Auto Neutrophil # : x  Auto Lymphocyte # : x  Auto Monocyte # : x  Auto Eosinophil # : x  Auto Basophil # : x  Auto Neutrophil % : x  Auto Lymphocyte % : x  Auto Monocyte % : x  Auto Eosinophil % : x  Auto Basophil % : x    BMP:    12-06 @ 06:03    Blood Urea Nitrogen - 11  Calcium - 9.2  Carbond Dioxide - 26  Chloride - 99  Creatinine - 0.7  Glucose - 96  Potassium - 4.4  Sodium - 137      Hemoglobin A1c -     Urine Culture:         MEDICATIONS  (STANDING):  chlorhexidine 4% Liquid 1 Application(s) Topical <User Schedule>  enoxaparin Injectable 40 milliGRAM(s) SubCutaneous daily  folic acid 1 milliGRAM(s) Oral daily  influenza   Vaccine 0.5 milliLiter(s) IntraMuscular once  lactated ringers. 1000 milliLiter(s) (150 mL/Hr) IV Continuous <Continuous>  LORazepam   Injectable 0.5 milliGRAM(s) IV Push every 12 hours  LORazepam   Injectable   IV Push   multivitamin 1 Tablet(s) Oral daily  NIFEdipine XL 30 milliGRAM(s) Oral daily  pantoprazole    Tablet 40 milliGRAM(s) Oral before breakfast  senna 2 Tablet(s) Oral at bedtime    MEDICATIONS  (PRN):  aluminum hydroxide/magnesium hydroxide/simethicone Suspension 30 milliLiter(s) Oral every 4 hours PRN Dyspepsia  bisacodyl 5 milliGRAM(s) Oral every 12 hours PRN Constipation  LORazepam   Injectable 2 milliGRAM(s) IV Push every 1 hour PRN Agitation  morphine  - Injectable 2 milliGRAM(s) IV Push every 4 hours PRN Severe Pain (7 - 10)  ondansetron Injectable 4 milliGRAM(s) IV Push every 6 hours PRN Nausea and/or Vomiting      ASSESSMENT & PLAN  #Alcoholic Pancreatitis  - CT findings consistent with acute pancreatitis  - Diet has been advanced to low fat diet, has been tolerating   - Morphine 2mg q4 PRN for pain   - off IVF     #Alcohol Withdrawal/Abuse  #Suspected thiamine and folate deficiencies   - continue with thiamine, folate supplements  - ativan taper     #Suspicious New Pancreatic Hypodensity  #Portal Vein Thrombosis  - CT shows new 1.2cm hypodensity in pancreatic neck/body with upstream ductal dilation, r/o neoplasm   - RUQ US with doppler showed hepatic steatosis, hepatomegaly. Diminutive portal splenic confluence, findings suggestive of partial thrombosis or compression by inflamed pancreas  - Advanced GI following: consider EUS with FNA next week after pancreatitis is resolved or as outpatient in 2 weeks. Hold off on anticoagulation for portal vein/splenic vein thrombosis (likely due to pancreatitis) no need for AC, repeat US doppler as outpatient , if SMV involvement or extension; or bowel ischemia then reconsider A/C    #Transaminitis   - likely from fatty liver/alcoholic liver disease; US showed hepatic steatosis   - f/u Chronic liver diease work up      #HTN - controlled     dvt ppx     #Progress Note Handoff  Pending (specify):  EUS   Family discussion: dw pt plan as above   Disposition: EUS next week

## 2020-12-07 LAB
ALBUMIN SERPL ELPH-MCNC: 4 G/DL — SIGNIFICANT CHANGE UP (ref 3.5–5.2)
ALP SERPL-CCNC: 58 U/L — SIGNIFICANT CHANGE UP (ref 30–115)
ALT FLD-CCNC: 95 U/L — HIGH (ref 0–41)
ANION GAP SERPL CALC-SCNC: 10 MMOL/L — SIGNIFICANT CHANGE UP (ref 7–14)
AST SERPL-CCNC: 63 U/L — HIGH (ref 0–41)
BILIRUB SERPL-MCNC: 0.7 MG/DL — SIGNIFICANT CHANGE UP (ref 0.2–1.2)
BUN SERPL-MCNC: 13 MG/DL — SIGNIFICANT CHANGE UP (ref 10–20)
CALCIUM SERPL-MCNC: 9.8 MG/DL — SIGNIFICANT CHANGE UP (ref 8.5–10.1)
CHLORIDE SERPL-SCNC: 97 MMOL/L — LOW (ref 98–110)
CO2 SERPL-SCNC: 29 MMOL/L — SIGNIFICANT CHANGE UP (ref 17–32)
CREAT SERPL-MCNC: 1 MG/DL — SIGNIFICANT CHANGE UP (ref 0.7–1.5)
GLUCOSE SERPL-MCNC: 105 MG/DL — HIGH (ref 70–99)
HCT VFR BLD CALC: 43.8 % — SIGNIFICANT CHANGE UP (ref 42–52)
HGB BLD-MCNC: 14.6 G/DL — SIGNIFICANT CHANGE UP (ref 14–18)
MAGNESIUM SERPL-MCNC: 2.5 MG/DL — HIGH (ref 1.8–2.4)
MCHC RBC-ENTMCNC: 32.2 PG — HIGH (ref 27–31)
MCHC RBC-ENTMCNC: 33.3 G/DL — SIGNIFICANT CHANGE UP (ref 32–37)
MCV RBC AUTO: 96.5 FL — HIGH (ref 80–94)
NRBC # BLD: 0 /100 WBCS — SIGNIFICANT CHANGE UP (ref 0–0)
PLATELET # BLD AUTO: 333 K/UL — SIGNIFICANT CHANGE UP (ref 130–400)
POTASSIUM SERPL-MCNC: 5 MMOL/L — SIGNIFICANT CHANGE UP (ref 3.5–5)
POTASSIUM SERPL-SCNC: 5 MMOL/L — SIGNIFICANT CHANGE UP (ref 3.5–5)
PROT SERPL-MCNC: 6.4 G/DL — SIGNIFICANT CHANGE UP (ref 6–8)
RBC # BLD: 4.54 M/UL — LOW (ref 4.7–6.1)
RBC # FLD: 14.2 % — SIGNIFICANT CHANGE UP (ref 11.5–14.5)
SODIUM SERPL-SCNC: 136 MMOL/L — SIGNIFICANT CHANGE UP (ref 135–146)
WBC # BLD: 7.04 K/UL — SIGNIFICANT CHANGE UP (ref 4.8–10.8)
WBC # FLD AUTO: 7.04 K/UL — SIGNIFICANT CHANGE UP (ref 4.8–10.8)

## 2020-12-07 PROCEDURE — 99232 SBSQ HOSP IP/OBS MODERATE 35: CPT

## 2020-12-07 RX ADMIN — MORPHINE SULFATE 2 MILLIGRAM(S): 50 CAPSULE, EXTENDED RELEASE ORAL at 11:32

## 2020-12-07 RX ADMIN — MORPHINE SULFATE 2 MILLIGRAM(S): 50 CAPSULE, EXTENDED RELEASE ORAL at 05:10

## 2020-12-07 RX ADMIN — Medication 2 MILLIGRAM(S): at 00:03

## 2020-12-07 RX ADMIN — Medication 2 MILLIGRAM(S): at 16:13

## 2020-12-07 RX ADMIN — Medication 2 MILLIGRAM(S): at 20:07

## 2020-12-07 RX ADMIN — Medication 1 TABLET(S): at 11:32

## 2020-12-07 RX ADMIN — Medication 0.5 MILLIGRAM(S): at 05:30

## 2020-12-07 RX ADMIN — MORPHINE SULFATE 2 MILLIGRAM(S): 50 CAPSULE, EXTENDED RELEASE ORAL at 16:13

## 2020-12-07 RX ADMIN — Medication 30 MILLILITER(S): at 00:40

## 2020-12-07 RX ADMIN — MORPHINE SULFATE 2 MILLIGRAM(S): 50 CAPSULE, EXTENDED RELEASE ORAL at 20:07

## 2020-12-07 RX ADMIN — MORPHINE SULFATE 2 MILLIGRAM(S): 50 CAPSULE, EXTENDED RELEASE ORAL at 05:52

## 2020-12-07 RX ADMIN — Medication 30 MILLILITER(S): at 21:11

## 2020-12-07 RX ADMIN — Medication 1 MILLIGRAM(S): at 11:32

## 2020-12-07 RX ADMIN — MORPHINE SULFATE 2 MILLIGRAM(S): 50 CAPSULE, EXTENDED RELEASE ORAL at 20:25

## 2020-12-07 RX ADMIN — PANTOPRAZOLE SODIUM 40 MILLIGRAM(S): 20 TABLET, DELAYED RELEASE ORAL at 06:20

## 2020-12-07 RX ADMIN — MORPHINE SULFATE 2 MILLIGRAM(S): 50 CAPSULE, EXTENDED RELEASE ORAL at 13:13

## 2020-12-07 RX ADMIN — Medication 30 MILLIGRAM(S): at 05:08

## 2020-12-07 RX ADMIN — Medication 2 MILLIGRAM(S): at 11:32

## 2020-12-07 RX ADMIN — MORPHINE SULFATE 2 MILLIGRAM(S): 50 CAPSULE, EXTENDED RELEASE ORAL at 00:36

## 2020-12-07 NOTE — PROGRESS NOTE ADULT - SUBJECTIVE AND OBJECTIVE BOX
Gastroenterology progress note:     Patient is a 40y old  Male who presents with a chief complaint of alcoholic pancreatitis (06 Dec 2020 14:28)       Admitted on: 12-02-20    We are following the patient for alcohol pancreatitis and mass     Interval History: Patient is tolerating regular diet. The abdominal pain is much better. No vomiting still on lorazepam for alcohol withdrawal    PAST MEDICAL & SURGICAL HISTORY:  Asthma  no recent exacerbation, not using inhalers for a long time    Pancreatitis  October 2018 due to alcohol    No pertinent past medical history    H/O rotator cuff surgery        MEDICATIONS  (STANDING):  chlorhexidine 4% Liquid 1 Application(s) Topical <User Schedule>  enoxaparin Injectable 40 milliGRAM(s) SubCutaneous daily  folic acid 1 milliGRAM(s) Oral daily  influenza   Vaccine 0.5 milliLiter(s) IntraMuscular once  lactated ringers. 1000 milliLiter(s) (150 mL/Hr) IV Continuous <Continuous>  LORazepam   Injectable 0.5 milliGRAM(s) IV Push every 12 hours  LORazepam   Injectable   IV Push   multivitamin 1 Tablet(s) Oral daily  NIFEdipine XL 30 milliGRAM(s) Oral daily  pantoprazole    Tablet 40 milliGRAM(s) Oral before breakfast  senna 2 Tablet(s) Oral at bedtime    MEDICATIONS  (PRN):  aluminum hydroxide/magnesium hydroxide/simethicone Suspension 30 milliLiter(s) Oral every 4 hours PRN Dyspepsia  bisacodyl 5 milliGRAM(s) Oral every 12 hours PRN Constipation  LORazepam   Injectable 2 milliGRAM(s) IV Push every 1 hour PRN Agitation  morphine  - Injectable 2 milliGRAM(s) IV Push every 4 hours PRN Severe Pain (7 - 10)  ondansetron Injectable 4 milliGRAM(s) IV Push every 6 hours PRN Nausea and/or Vomiting      Allergies  No Known Allergies      Review of Systems:   Cardiovascular:  No Chest Pain, No Palpitations  Respiratory:  No Cough, No Dyspnea  Gastrointestinal:  As described in HPI    Physical Examination:  T(C): 36.6 (12-07-20 @ 05:37), Max: 36.6 (12-07-20 @ 05:37)  HR: 96 (12-07-20 @ 05:37) (92 - 96)  BP: 135/79 (12-07-20 @ 05:37) (135/79 - 135/83)  RR: 18 (12-07-20 @ 05:37) (18 - 18)  SpO2: --      Constitutional: No acute distress.  Respiratory:  No signs of respiratory distress. Lung sounds are clear bilaterally.  Cardiovascular:  S1 S2, Regular rate and rhythm.  Abdominal: Abdomen is soft, symmetric, and non-tender without distention.   Skin: No rashes, No Jaundice.        Data: (reviewed by attending)                        14.6   7.04  )-----------( 333      ( 07 Dec 2020 05:45 )             43.8     Hgb trend:  14.6  12-07-20 @ 05:45  14.6  12-06-20 @ 06:03  14.3  12-05-20 @ 07:07        12-07    136  |  97<L>  |  13  ----------------------------<  105<H>  5.0   |  29  |  1.0    Ca    9.8      07 Dec 2020 05:45  Mg     2.5     12-07    TPro  6.4  /  Alb  4.0  /  TBili  0.7  /  DBili  x   /  AST  63<H>  /  ALT  95<H>  /  AlkPhos  58  12-07    Liver panel trend:  TBili 0.7   /   AST 63   /   ALT 95   /   AlkP 58   /   Tptn 6.4   /   Alb 4.0    /   DBili --      12-07  TBili 0.6   /   AST 62   /   ALT 84   /   AlkP 49   /   Tptn 6.5   /   Alb 4.0    /   DBili --      12-06  TBili 0.7   /   AST 91   /   ALT 79   /   AlkP 54   /   Tptn 6.7   /   Alb 4.2    /   DBili --      12-05  TBili 1.1   /   AST 36   /   ALT 37   /   AlkP 42   /   Tptn 5.5   /   Alb 3.4    /   DBili 0.3      12-04  TBili 2.2   /   AST 31   /   ALT 39   /   AlkP 48   /   Tptn 5.8   /   Alb 3.6    /   DBili --      12-03  TBili 1.2   /   AST 47   /   ALT 56   /   AlkP 56   /   Tptn 6.3   /   Alb 4.1    /   DBili --      12-02  TBili 1.2   /   AST 66   /   ALT 69   /   AlkP 68   /   Tptn 7.8   /   Alb 4.9    /   DBili 0.3      12-01             Radiology: (reviewed by attending)

## 2020-12-07 NOTE — PROGRESS NOTE ADULT - ASSESSMENT
40 year old male with a hx of HTN,  ETOH abuse , previous episode of ETOH pancreatitis (7/2019) presents to the ED for an evaluation of sharp intermittent epigastric pain which radiates across the stomach and too the back. Pain started this afternoon and quickly became unbearable so he came to the ED.      # Abdominal pain: Acute interstitial pancreatitis secondary to alcohol: resolving   Ct abdomen: 1.2 cm hypodensity pancreatic neck/body with upstream ductal dilatation,  new compared to 2019 CT scan  US abdomen shows possible partial thrombosis of portal vein/Splenic confluence but no SMV involvement   CEA, CA 19-9 negative    Rec:   low fat low residue diet  Pain control as needed  Alcohol cessation.   Folate, thiamine  will consider EUS with FNA next Wednesday or as outpatient. Will follow up tomorrow  In regards to portal vein/splenic vein thrombosis likely secondary to pancreatitis. No need for AC, please repeat US doppler as outpatient once pancreatitis is resolved.  if any signs of SMV involvement or extension or bowel ischemia then consider AC      Elevated LFTS hepatocellular: resolved completely  Likely fatty liver/alcoholic liver disease  INR normal  Alcohol cessation  CLD: ceruloplasmin neg, viral hep panel neg, Ferritin high, MICAELA, SMA are neg  F/U Transferrin Saturation, gamma globulin, AMA.

## 2020-12-07 NOTE — PROGRESS NOTE ADULT - ASSESSMENT
#Alcoholic Pancreatitis  - CT findings consistent with acute pancreatitis  - Lipase 235, Lactate 9  - ALT/ AST mildly uptrending today, patient complains of pain although his pain is inconsistent with physical exam  - Diet has been advanced to low fat diet, tolerates it well   - Morphine 2mg q4 PRN for pain   - monitor HCT, UO, BUN daily       #Alcohol Withdrawal/Abuse  - continue with thiamine, folate supplements  - CIWA score 0 currently, continue with ativan taper, ativan PRN   - spoke to patient about importance of alcohol cessation      #Suspicious New Pancreatic Hypodensity  #?questionable Portal Vein Thrombosis  - CT shows new 1.2cm hypodensity in pancreatic neck/body with upstream ductal dilation, r/o neoplasm   - RUQ US with doppler showed hepatic steatosis, hepatomegaly. Diminutive portal splenic confluence, findings suggestive of partial thrombosis or compression by inflamed pancreas  - Advanced GI following: consider EUS with FNA next week after pancreatitis is resolved or as outpatient in 2 weeks. Hold off on anticoagulation for portal vein/splenic vein thrombosis (likely due to pancreatitis) no need for AC, repeat US doppler as outpatient , if SMV involvement or extension; or bowel ischemia then reconsider A/C  - f/u Advanced GI for EUS now that pancreatitis has resolved      #Elevated LFTs  - likely from fatty liver/alcoholic liver disease; US showed hepatic steatosis   - INR normal, f/u Chronic liver diease work up  - Ceruloplasmin negative, Hepatitis panel negative, Ferritin elevated, MICAELA negative, SMA negative  - Direct Bili 0.3, Indirect Bili 0.7  - transferrin 296, globulins negative      #HTN - improving  - takes Nifedipine 30mg dose at home, uncontrolled on admission  - given Nifedipine IR 10mg overnight   - given PO Labetalol 100mg overnight  - continue with Nifedipine 30mg  - likely also related to pain, continue with pain control  - BP is better controlled now

## 2020-12-07 NOTE — PROGRESS NOTE ADULT - ATTENDING COMMENTS
#Alcoholic Pancreatitis  - CT findings consistent with acute pancreatitis  - Diet has been advanced to low fat diet, has been tolerating   - Morphine 2mg q4 PRN for pain   - off IVF     #Alcohol Withdrawal/Abuse  #Suspected thiamine and folate deficiencies   - continue with thiamine, folate supplements  - ativan taper     #Suspicious New Pancreatic Hypodensity  #Portal Vein Thrombosis  - CT shows new 1.2cm hypodensity in pancreatic neck/body with upstream ductal dilation, r/o neoplasm   - RUQ US with doppler showed hepatic steatosis, hepatomegaly. Diminutive portal splenic confluence, findings suggestive of partial thrombosis or compression by inflamed pancreas  - Advanced GI following: consider EUS with FNA next week after pancreatitis is resolved or as outpatient in 2 weeks. Hold off on anticoagulation for portal vein/splenic vein thrombosis (likely due to pancreatitis) no need for AC, repeat US doppler as outpatient , if SMV involvement or extension; or bowel ischemia then reconsider A/C    #Transaminitis   - likely from fatty liver/alcoholic liver disease; US showed hepatic steatosis   - f/u Chronic liver diease work up    #HTN - controlled     dvt ppx     #Progress Note Handoff  Pending (specify):  EUS   Family discussion: d/w pt plan as above   Disposition: EUS possible
Patient seen and examined. Tolerating diet. C/w pain control. Plan for EUS next week.   Rest of plan as above. Plan of care discussed with patient.
Patient seen and examined 12/3/2020

## 2020-12-07 NOTE — PROGRESS NOTE ADULT - REASON FOR ADMISSION
alcoholic pancreatitis
alcoholic pancreatitis
Acute pancreatitis

## 2020-12-07 NOTE — PROGRESS NOTE ADULT - SUBJECTIVE AND OBJECTIVE BOX
HPI  Patient is a 40y old Male who presents with a chief complaint of alcoholic pancreatitis (06 Dec 2020 14:28)    Currently admitted to medicine with the primary diagnosis of Acute alcoholic pancreatitis       Today is hospital day 5d.     INTERVAL HPI / OVERNIGHT EVENTS:  Patient was examined and seen at bedside. This morning he is resting comfortably in bed and reports no new issues or overnight events. He has been tolerating his diet, no nausea, no vomiting. He reports having abdominal pain.     ROS: Otherwise unremarkable     PAST MEDICAL & SURGICAL HISTORY  Asthma  no recent exacerbation, not using inhalers for a long time    Pancreatitis  October 2018 due to alcohol    No pertinent past medical history    H/O rotator cuff surgery      ALLERGIES  No Known Allergies    MEDICATIONS  STANDING MEDICATIONS  chlorhexidine 4% Liquid 1 Application(s) Topical <User Schedule>  enoxaparin Injectable 40 milliGRAM(s) SubCutaneous daily  folic acid 1 milliGRAM(s) Oral daily  influenza   Vaccine 0.5 milliLiter(s) IntraMuscular once  lactated ringers. 1000 milliLiter(s) IV Continuous <Continuous>  LORazepam   Injectable 0.5 milliGRAM(s) IV Push every 12 hours  LORazepam   Injectable   IV Push   multivitamin 1 Tablet(s) Oral daily  NIFEdipine XL 30 milliGRAM(s) Oral daily  pantoprazole    Tablet 40 milliGRAM(s) Oral before breakfast  senna 2 Tablet(s) Oral at bedtime    PRN MEDICATIONS  aluminum hydroxide/magnesium hydroxide/simethicone Suspension 30 milliLiter(s) Oral every 4 hours PRN  bisacodyl 5 milliGRAM(s) Oral every 12 hours PRN  LORazepam   Injectable 2 milliGRAM(s) IV Push every 1 hour PRN  morphine  - Injectable 2 milliGRAM(s) IV Push every 4 hours PRN  ondansetron Injectable 4 milliGRAM(s) IV Push every 6 hours PRN    VITALS:  T(F): 97.8  HR: 96  BP: 135/79  RR: 18  SpO2: --    PHYSICAL EXAM  GEN: NAD, Resting comfortably in bed  PULM: Clear to auscultation bilaterally, No wheezes  CVS: Regular rate and rhythm, S1-S2, no murmurs  ABD: Soft, non-tender, non-distended, no guarding  EXT: No edema  NEURO: A&Ox3, no focal deficits    LABS                        14.6   7.04  )-----------( 333      ( 07 Dec 2020 05:45 )             43.8     12-07    136  |  97<L>  |  13  ----------------------------<  105<H>  5.0   |  29  |  1.0    Ca    9.8      07 Dec 2020 05:45    TPro  6.4  /  Alb  4.0  /  TBili  0.7  /  DBili  x   /  AST  63<H>  /  ALT  95<H>  /  AlkPhos  58  12-07                  RADIOLOGY    EXAM:  CT ABDOMEN AND PELVIS IC            PROCEDURE DATE:  12/01/2020            INTERPRETATION:  CLINICAL STATEMENT: Epigastric pain.    TECHNIQUE: Contiguous axial CT images were obtained from the lower chest to the pubic symphysis following administration of 100cc Omnipaque 350 intravenous contrast.  Oral contrast was not administered.  Reformatted images in the coronal and sagittal planes were acquired.    COMPARISON CT: 7/11/2019.    OTHER STUDIES USED FOR CORRELATION: None.      FINDINGS:    LOWER CHEST: Unremarkable.    HEPATOBILIARY: Hepatic steatosis. Hepatomegaly. Focal fatty infiltration at the falciform ligament.    SPLEEN: Unremarkable.    PANCREAS: Inflammation surrounding the pancreas. There is also a new suspicious approximately 1.2 cm hypodensity in the pancreatic neck/body (series 5, image 161) with upstream ductal dilatation.    ADRENAL GLANDS: Unremarkable.    KIDNEYS: Unremarkable.    ABDOMINOPELVIC NODES: Unremarkable.    PELVIC ORGANS: Unremarkable.    PERITONEUM/MESENTERY/BOWEL: Unremarkable.    BONES/SOFT TISSUES: Unremarkable.    OTHER:      IMPRESSION:    Inflammation surrounding the pancreas, suspect pancreatitis. There is also a new suspicious approximately 1.2 cm hypodensity in the pancreatic neck/bodywith upstream ductal dilatation, rule out neoplasm.    Hepatic steatosis and hepatomegaly.        EXAM:  US DPLX ABDOMEN        EXAM:  US ABDOMEN LIMITED            PROCEDURE DATE:  12/02/2020            INTERPRETATION:  CLINICAL HISTORY: Pancreatitis. Evaluate for gallstones, and for portal splenic vein thrombosis    COMPARISON: CT abdomen pelvis December 1, 2020, and abdominal sonogram July 8, 2019    PROCEDURE: Ultrasound of the right upper quadrant was performed. Duplex Doppler images of the hepatic veins, portal veins and splenic vein were obtained.    FINDINGS:    LIVER:  Normal in contour and increased echogenicity, large in size measuring 21.1 cm in length. No focal mass.    The right, main, and left portal veins are patent. The right, middle and left hepatic veins are patent.    The portal splenic confluence is diminutive, including the remainder of the visualized splenic vein to the splenic hilum, with dampened waveform at the splenic hilum. Findings suggestive of partial thrombosis or compression by inflamed pancreas.    GALLBLADDER/BILIARY TREE:  No evidence of cholelithiasis. No wall thickening or pericholecystic fluid. Negative sonographic Marquez's sign. No intrahepatic biliary ductal dilatation. The common bile duct measures 5 mm, which is normal.    PANCREAS:  Visualized head and body demonstrates peripancreatic inflammation/trace fluid consistent with pancreatitis. The suspicious mass in the pancreatic neck/body seen on the CT exam is not well visualized on the ultrasound. Remainder obscured by overlying bowel gas.    KIDNEY:  Right kidney measures 11.7 cm in length. No hydronephrosis, calculi or solid mass.    AORTA/IVC:  Visualized proximal portions unremarkable.    ASCITES:  None.    IMPRESSION:    Echogenic liver, consistent with hepatic steatosis.    Hepatomegaly.    Acute pancreatitis.    Diminutive portal splenic confluence, and the remainder of the visualized splenic vein to the splenic hilum with dampened waveform at the splenic hilum. Findings suggestive of partial thrombosis or compression by inflamed pancreas.

## 2020-12-08 ENCOUNTER — TRANSCRIPTION ENCOUNTER (OUTPATIENT)
Age: 40
End: 2020-12-08

## 2020-12-08 VITALS
SYSTOLIC BLOOD PRESSURE: 147 MMHG | TEMPERATURE: 97 F | DIASTOLIC BLOOD PRESSURE: 89 MMHG | HEART RATE: 74 BPM | RESPIRATION RATE: 18 BRPM

## 2020-12-08 LAB
ALBUMIN SERPL ELPH-MCNC: 3.8 G/DL — SIGNIFICANT CHANGE UP (ref 3.5–5.2)
ALP SERPL-CCNC: 51 U/L — SIGNIFICANT CHANGE UP (ref 30–115)
ALT FLD-CCNC: 104 U/L — HIGH (ref 0–41)
ANION GAP SERPL CALC-SCNC: 12 MMOL/L — SIGNIFICANT CHANGE UP (ref 7–14)
AST SERPL-CCNC: 68 U/L — HIGH (ref 0–41)
BASOPHILS # BLD AUTO: 0.05 K/UL — SIGNIFICANT CHANGE UP (ref 0–0.2)
BASOPHILS NFR BLD AUTO: 0.7 % — SIGNIFICANT CHANGE UP (ref 0–1)
BILIRUB SERPL-MCNC: 0.6 MG/DL — SIGNIFICANT CHANGE UP (ref 0.2–1.2)
BUN SERPL-MCNC: 13 MG/DL — SIGNIFICANT CHANGE UP (ref 10–20)
CALCIUM SERPL-MCNC: 9 MG/DL — SIGNIFICANT CHANGE UP (ref 8.5–10.1)
CHLORIDE SERPL-SCNC: 98 MMOL/L — SIGNIFICANT CHANGE UP (ref 98–110)
CO2 SERPL-SCNC: 26 MMOL/L — SIGNIFICANT CHANGE UP (ref 17–32)
CREAT SERPL-MCNC: 1.1 MG/DL — SIGNIFICANT CHANGE UP (ref 0.7–1.5)
EOSINOPHIL # BLD AUTO: 0.06 K/UL — SIGNIFICANT CHANGE UP (ref 0–0.7)
EOSINOPHIL NFR BLD AUTO: 0.8 % — SIGNIFICANT CHANGE UP (ref 0–8)
GLUCOSE SERPL-MCNC: 128 MG/DL — HIGH (ref 70–99)
HCT VFR BLD CALC: 40.9 % — LOW (ref 42–52)
HGB BLD-MCNC: 13.8 G/DL — LOW (ref 14–18)
IMM GRANULOCYTES NFR BLD AUTO: 0.3 % — SIGNIFICANT CHANGE UP (ref 0.1–0.3)
LYMPHOCYTES # BLD AUTO: 1.35 K/UL — SIGNIFICANT CHANGE UP (ref 1.2–3.4)
LYMPHOCYTES # BLD AUTO: 17.9 % — LOW (ref 20.5–51.1)
MAGNESIUM SERPL-MCNC: 2 MG/DL — SIGNIFICANT CHANGE UP (ref 1.8–2.4)
MCHC RBC-ENTMCNC: 32.3 PG — HIGH (ref 27–31)
MCHC RBC-ENTMCNC: 33.7 G/DL — SIGNIFICANT CHANGE UP (ref 32–37)
MCV RBC AUTO: 95.8 FL — HIGH (ref 80–94)
MONOCYTES # BLD AUTO: 0.92 K/UL — HIGH (ref 0.1–0.6)
MONOCYTES NFR BLD AUTO: 12.2 % — HIGH (ref 1.7–9.3)
NEUTROPHILS # BLD AUTO: 5.14 K/UL — SIGNIFICANT CHANGE UP (ref 1.4–6.5)
NEUTROPHILS NFR BLD AUTO: 68.1 % — SIGNIFICANT CHANGE UP (ref 42.2–75.2)
NRBC # BLD: 0 /100 WBCS — SIGNIFICANT CHANGE UP (ref 0–0)
PLATELET # BLD AUTO: 351 K/UL — SIGNIFICANT CHANGE UP (ref 130–400)
POTASSIUM SERPL-MCNC: 4.6 MMOL/L — SIGNIFICANT CHANGE UP (ref 3.5–5)
POTASSIUM SERPL-SCNC: 4.6 MMOL/L — SIGNIFICANT CHANGE UP (ref 3.5–5)
PROT SERPL-MCNC: 6 G/DL — SIGNIFICANT CHANGE UP (ref 6–8)
RBC # BLD: 4.27 M/UL — LOW (ref 4.7–6.1)
RBC # FLD: 13.8 % — SIGNIFICANT CHANGE UP (ref 11.5–14.5)
SODIUM SERPL-SCNC: 136 MMOL/L — SIGNIFICANT CHANGE UP (ref 135–146)
WBC # BLD: 7.54 K/UL — SIGNIFICANT CHANGE UP (ref 4.8–10.8)
WBC # FLD AUTO: 7.54 K/UL — SIGNIFICANT CHANGE UP (ref 4.8–10.8)

## 2020-12-08 PROCEDURE — 99238 HOSP IP/OBS DSCHRG MGMT 30/<: CPT

## 2020-12-08 RX ORDER — NIFEDIPINE 30 MG
1 TABLET, EXTENDED RELEASE 24 HR ORAL
Qty: 30 | Refills: 0
Start: 2020-12-08 | End: 2021-01-06

## 2020-12-08 RX ADMIN — Medication 1 MILLIGRAM(S): at 11:22

## 2020-12-08 RX ADMIN — MORPHINE SULFATE 2 MILLIGRAM(S): 50 CAPSULE, EXTENDED RELEASE ORAL at 01:10

## 2020-12-08 RX ADMIN — Medication 2 MILLIGRAM(S): at 01:09

## 2020-12-08 RX ADMIN — Medication 2 MILLIGRAM(S): at 11:22

## 2020-12-08 RX ADMIN — PANTOPRAZOLE SODIUM 40 MILLIGRAM(S): 20 TABLET, DELAYED RELEASE ORAL at 05:24

## 2020-12-08 RX ADMIN — MORPHINE SULFATE 2 MILLIGRAM(S): 50 CAPSULE, EXTENDED RELEASE ORAL at 01:40

## 2020-12-08 RX ADMIN — Medication 30 MILLIGRAM(S): at 05:24

## 2020-12-08 RX ADMIN — Medication 1 TABLET(S): at 11:22

## 2020-12-08 RX ADMIN — Medication 0.5 MILLIGRAM(S): at 05:23

## 2020-12-08 RX ADMIN — MORPHINE SULFATE 2 MILLIGRAM(S): 50 CAPSULE, EXTENDED RELEASE ORAL at 11:22

## 2020-12-08 NOTE — PROGRESS NOTE ADULT - ASSESSMENT
39 y/o male with PMHx of asthma, HTN, ETOH abuse, previous episode of pancreatitis (2019) presented with intermittent epigastric pain.     #Alcoholic Pancreatitis  - CT findings consistent with acute pancreatitis  - Lipase 235, Lactate 9  - ALT/ AST continues to uptrend  - Diet has been advanced to low fat diet, tolerates it well   - Morphine 2mg q4 PRN for pain   - monitor HCT, UO, BUN daily       #Alcohol Withdrawal/Abuse  - continue with thiamine, folate supplements  - CIWA score 0 currently,  ativan PRN   - completely off ativan taper  - spoke to patient about importance of alcohol cessation      #Suspicious New Pancreatic Hypodensity  #?questionable Portal Vein Thrombosis  - CT shows new 1.2cm hypodensity in pancreatic neck/body with upstream ductal dilation, r/o neoplasm   - RUQ US with doppler showed hepatic steatosis, hepatomegaly. Diminutive portal splenic confluence, findings suggestive of partial thrombosis or compression by inflamed pancreas  - Advanced GI following: consider EUS with FNA next week after pancreatitis is resolved or as outpatient in 2 weeks. Hold off on anticoagulation for portal vein/splenic vein thrombosis (likely due to pancreatitis) no need for AC, repeat US doppler as outpatient , if SMV involvement or extension; or bowel ischemia then reconsider A/C  - f/u Advanced GI for EUS: planning for Wed or Thurs this week, if not, as outpatient      #Elevated LFTs  - likely from fatty liver/alcoholic liver disease; US showed hepatic steatosis   - INR normal, f/u Chronic liver diease work up  - Ceruloplasmin negative, Hepatitis panel negative, Ferritin elevated, MICAELA negative, SMA negative  - Direct Bili 0.3, Indirect Bili 0.7  - transferrin 296, globulins negative      #HTN - improving  - takes Nifedipine 30mg dose at home, uncontrolled on admission  - given Nifedipine IR 10mg overnight   - given PO Labetalol 100mg overnight  - continue with Nifedipine 30mg  - BP is better controlled now

## 2020-12-08 NOTE — DISCHARGE NOTE PROVIDER - CARE PROVIDER_API CALL
Chaparrita Farias)  Internal Medicine  [address] [place] [address]  Phone: [phone]  Fax: [phone]  Follow Up Time: 1 week   Chaparrita Farias)  Internal Medicine  [address] [place] [address]  Phone: [phone]  Fax: [phone]  Scheduled Appointment: 01/05/2021 02:30 PM    Michi Colón  [address] [facility]  [address] [place] [address]  Phone: [phone]  Fax: [phone]  Follow Up Time: 2 weeks

## 2020-12-08 NOTE — DISCHARGE NOTE PROVIDER - NSDCMRMEDTOKEN_GEN_ALL_CORE_FT
folic acid 1 mg oral tablet: 1 tab(s) orally once a day  thiamine 100 mg oral tablet: 1 tab(s) orally once a day   Multiple Vitamins oral tablet: 1 tab(s) orally once a day  NIFEdipine 30 mg oral tablet, extended release: 1 tab(s) orally once a day   Ativan 2 mg oral tablet: 1 tab(s) orally every 12 hours, As Needed -for ANXIETY MDD:2 tabs  Multiple Vitamins oral tablet: 1 tab(s) orally once a day  NIFEdipine 30 mg oral tablet, extended release: 1 tab(s) orally once a day

## 2020-12-08 NOTE — DISCHARGE NOTE PROVIDER - CARE PROVIDERS DIRECT ADDRESSES
,DirectAddress_Unknown ,DirectAddress_Unknown,[email or web address]

## 2020-12-08 NOTE — DISCHARGE NOTE PROVIDER - HOSPITAL COURSE
40 year old male with a hx of HTN,  ETOH abuse , previous episode of ETOH pancreatitis (7/2019) presents to the ED for an evaluation of sharp intermittent epigastric pain which radiates across the stomach and too the back. He endorses that he was drinking all night the night before with his last drink around 3am 12/1. He states that he drinks every day afterwork. Abdominal pain is associated with nausea and NBNB vomiting Denies any CP, SOB, headache.  In ED vitals: Temp 97.5F, , / 83, RR 18, SpO2 96%. CT A + P: Inflammation surrounding the pancreas, suspect pancreatitis. There is also a new suspicious approximately 1.2 cm hypodensity in the pancreatic neck/body with upstream ductal dilatation, rule out neoplasm. lactate 9, Lipase 235, Anion gap 25.   Received 28mg of morphine in the ED, Zofran, and 2.5mg of Dilaudid Pain was uncontrolled with morphine, but controlled with Dilaudid. Given a Bolus with 3L of LR and on maintenance LR at 150cc/hr. NPO. Placed on CIWA protocol.     PAtient was admitted to ICU for alcohol withdrawal and alcoholic pancreatitis. He was tapered off Ativan, diet advanced to low fat diet and tolerating well. He was switched back to morphine for pain control.   Patient has suspicious new pancreatic hypodensity, with questionable portal vein thrombosis on RUQ ultrasound. Advanced GI following, consider EUS with FNA next week after pancreatitis is resolved or as outpatient in 2 weeks. Hold off on anticoagulation for portal vein/splenic vein thrombosis (likely due to pancreatitis) no need for AC, repeat US doppler as outpatient , if SMV involvement or extension; or bowel ischemia then reconsider A/C  Chronic liver disease work up was done for elevated LFTs, Ceruloplasmin negative, Hepatitis panel negative, Ferritin elevated, MICAELA negative, SMA negative, transferrin 296, globulins negative    Patient was also treated for hypertension, required nifedipine and labetalol to achieve adequate control. He is currently on Nifedipine 30mg.     Patient is medically stable and safe to be discharged home.

## 2020-12-08 NOTE — PROGRESS NOTE ADULT - SUBJECTIVE AND OBJECTIVE BOX
HPI  Patient is a 40y old Male who presents with a chief complaint of Acute pancreatitis (07 Dec 2020 17:00)    Currently admitted to medicine with the primary diagnosis of Acute alcoholic pancreatitis       Today is hospital day 6d.     INTERVAL HPI / OVERNIGHT EVENTS:  Patient was examined and seen at bedside. This morning he is resting comfortably in bed and reports no new issues or overnight events. He still reports having some abdominal pain, and would like morphine for the pain. Currently tolerating his diet, denies any nausea, vomiting, diarrhea, constipation.     ROS: Otherwise unremarkable     PAST MEDICAL & SURGICAL HISTORY  Asthma  no recent exacerbation, not using inhalers for a long time    Pancreatitis  October 2018 due to alcohol    No pertinent past medical history    H/O rotator cuff surgery      ALLERGIES  No Known Allergies    MEDICATIONS  STANDING MEDICATIONS  chlorhexidine 4% Liquid 1 Application(s) Topical <User Schedule>  enoxaparin Injectable 40 milliGRAM(s) SubCutaneous daily  folic acid 1 milliGRAM(s) Oral daily  influenza   Vaccine 0.5 milliLiter(s) IntraMuscular once  lactated ringers. 1000 milliLiter(s) IV Continuous <Continuous>  multivitamin 1 Tablet(s) Oral daily  NIFEdipine XL 30 milliGRAM(s) Oral daily  pantoprazole    Tablet 40 milliGRAM(s) Oral before breakfast  senna 2 Tablet(s) Oral at bedtime    PRN MEDICATIONS  aluminum hydroxide/magnesium hydroxide/simethicone Suspension 30 milliLiter(s) Oral every 4 hours PRN  bisacodyl 5 milliGRAM(s) Oral every 12 hours PRN  LORazepam   Injectable 2 milliGRAM(s) IV Push every 1 hour PRN  morphine  - Injectable 2 milliGRAM(s) IV Push every 4 hours PRN  ondansetron Injectable 4 milliGRAM(s) IV Push every 6 hours PRN    VITALS:  T(F): 97.4  HR: 74  BP: 147/89  RR: 18  SpO2: --    PHYSICAL EXAM  GEN: NAD, Resting comfortably in bed  PULM: Clear to auscultation bilaterally, No wheezes  CVS: Regular rate and rhythm, S1-S2, no murmurs  ABD: Soft, non-tender, non-distended, no guarding  EXT: No edema  NEURO: A&Ox3, no focal deficits    LABS                        13.8   7.54  )-----------( 351      ( 08 Dec 2020 06:50 )             40.9     12-08    136  |  98  |  13  ----------------------------<  128<H>  4.6   |  26  |  1.1    Ca    9.0      08 Dec 2020 06:50  Mg     2.0     12-08    TPro  6.0  /  Alb  3.8  /  TBili  0.6  /  DBili  x   /  AST  68<H>  /  ALT  104<H>  /  AlkPhos  51  12-08

## 2020-12-08 NOTE — DISCHARGE NOTE NURSING/CASE MANAGEMENT/SOCIAL WORK - PATIENT PORTAL LINK FT
You can access the FollowMyHealth Patient Portal offered by [facility] by registering at the following website: [email or web address]. By joining [employer]’s FollowMyHealth portal, you will also be able to view your health information using other applications (apps) compatible with our system.

## 2020-12-08 NOTE — DISCHARGE NOTE PROVIDER - PROVIDER TOKENS
PROVIDER:[TOKEN:[[id number]],FOLLOWUP:[1 week]] PROVIDER:[TOKEN:[[id number]],SCHEDULEDAPPT:[01/05/2021],SCHEDULEDAPPTTIME:[02:30 PM]],PROVIDER:[TOKEN:[[id number]],FOLLOWUP:[2 weeks]]

## 2020-12-08 NOTE — DISCHARGE NOTE NURSING/CASE MANAGEMENT/SOCIAL WORK - HAS THE PATIENT RECEIVED THE INFLUENZA VACCINE THIS SEASON?
[facility]    [address] [address]    Phone:  [phone]                                       Gunnar Roman   MRN: [id number]    Department:  [facility]   Date of Visit:  1/15/2017           After Visit Summary Signature Page     I have received my discharge instructions, and my questions have been answered. I have discussed any challenges I see with this plan with the nurse or doctor.    ..........................................................................................................................................  Patient/Patient Representative Signature      ..........................................................................................................................................  Patient Representative Print Name and Relationship to Patient    ..................................................               ................................................  Date                                            Time    ..........................................................................................................................................  Reviewed by Signature/Title    ...................................................              ..............................................  Date                                                            Time           no...

## 2020-12-08 NOTE — DISCHARGE NOTE PROVIDER - NSDCCPCAREPLAN_GEN_ALL_CORE_FT
PRINCIPAL DISCHARGE DIAGNOSIS  Diagnosis: Acute alcoholic pancreatitis  Assessment and Plan of Treatment: You had a repeat episode of pancreatits. You have a prior history of pancreatitis in the past. You will need to stop drinking in order to prevent a future episode of pancreatitis. Please follow up with your primary care doctor.      SECONDARY DISCHARGE DIAGNOSES  Diagnosis: Pancreatic mass  Assessment and Plan of Treatment: You have a suspicious pancreatic mass, this could be malignancy. You need to have an outpatient EUS with biopsy done with advanced GI to rule out any cancer. Please follow up with Gastroenterologist Dr. Rashard Andrade as soon as possible.     PRINCIPAL DISCHARGE DIAGNOSIS  Diagnosis: Acute alcoholic pancreatitis  Assessment and Plan of Treatment: You had a repeat episode of pancreatits. You have a prior history of pancreatitis in the past. You will need to stop drinking in order to prevent a future episode of pancreatitis. Please follow up with your primary care doctor.      SECONDARY DISCHARGE DIAGNOSES  Diagnosis: Hypertension  Assessment and Plan of Treatment: Continue with Nifedipine for your elevated blood pressure. Follow up with your primary care doctor    Diagnosis: Pancreatic mass  Assessment and Plan of Treatment: You have a suspicious pancreatic mass, this could be malignancy. You need to have an outpatient EUS with biopsy done with advanced GI to rule out any cancer. Please follow up with Gastroenterologist Dr. Rashard Andrade as soon as possible.

## 2020-12-10 DIAGNOSIS — I10 ESSENTIAL (PRIMARY) HYPERTENSION: ICD-10-CM

## 2020-12-10 DIAGNOSIS — J45.909 UNSPECIFIED ASTHMA, UNCOMPLICATED: ICD-10-CM

## 2020-12-10 DIAGNOSIS — I81 PORTAL VEIN THROMBOSIS: ICD-10-CM
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
## 2020-12-10 DIAGNOSIS — E87.2 ACIDOSIS: ICD-10-CM

## 2020-12-10 DIAGNOSIS — K86.9 DISEASE OF PANCREAS, UNSPECIFIED: ICD-10-CM

## 2020-12-10 DIAGNOSIS — R16.0 HEPATOMEGALY, NOT ELSEWHERE CLASSIFIED: ICD-10-CM

## 2020-12-10 DIAGNOSIS — E51.9 THIAMINE DEFICIENCY, UNSPECIFIED: ICD-10-CM

## 2020-12-10 DIAGNOSIS — R10.9 UNSPECIFIED ABDOMINAL PAIN: ICD-10-CM

## 2020-12-10 DIAGNOSIS — F10.130 ALCOHOL ABUSE WITH WITHDRAWAL, UNCOMPLICATED: ICD-10-CM

## 2020-12-10 DIAGNOSIS — R79.89 OTHER SPECIFIED ABNORMAL FINDINGS OF BLOOD CHEMISTRY: ICD-10-CM

## 2020-12-10 DIAGNOSIS — E53.8 DEFICIENCY OF OTHER SPECIFIED B GROUP VITAMINS: ICD-10-CM

## 2020-12-10 DIAGNOSIS — R74.01 ELEVATION OF LEVELS OF LIVER TRANSAMINASE LEVELS: ICD-10-CM

## 2020-12-10 DIAGNOSIS — F17.210 NICOTINE DEPENDENCE, CIGARETTES, UNCOMPLICATED: ICD-10-CM

## 2020-12-10 DIAGNOSIS — K76.0 FATTY (CHANGE OF) LIVER, NOT ELSEWHERE CLASSIFIED: ICD-10-CM

## 2020-12-10 DIAGNOSIS — K85.20 ALCOHOL INDUCED ACUTE PANCREATITIS WITHOUT NECROSIS OR INFECTION: ICD-10-CM

## 2020-12-10 PROBLEM — Z00.00 ENCOUNTER FOR PREVENTIVE HEALTH EXAMINATION: Status: ACTIVE | Noted: 2020-12-10

## 2020-12-17 LAB — IGG4 SER-MCNC: 9 MG/DL — SIGNIFICANT CHANGE UP (ref 2–96)

## 2021-01-05 ENCOUNTER — OUTPATIENT (OUTPATIENT)
Dept: OUTPATIENT SERVICES | Facility: HOSPITAL | Age: 41
LOS: 1 days | Discharge: HOME | End: 2021-01-05

## 2021-01-05 ENCOUNTER — APPOINTMENT (OUTPATIENT)
Dept: GASTROENTEROLOGY | Facility: CLINIC | Age: 41
End: 2021-01-05

## 2021-01-05 DIAGNOSIS — Z98.890 OTHER SPECIFIED POSTPROCEDURAL STATES: Chronic | ICD-10-CM

## 2021-03-03 ENCOUNTER — INPATIENT (INPATIENT)
Facility: HOSPITAL | Age: 41
LOS: 1 days | Discharge: LEFT BEFORE DISCHARGE | End: 2021-03-05
Attending: HOSPITALIST | Admitting: HOSPITALIST
Payer: MEDICAID

## 2021-03-03 VITALS
RESPIRATION RATE: 18 BRPM | OXYGEN SATURATION: 99 % | DIASTOLIC BLOOD PRESSURE: 99 MMHG | SYSTOLIC BLOOD PRESSURE: 146 MMHG | HEIGHT: 72 IN | TEMPERATURE: 98 F | HEART RATE: 103 BPM

## 2021-03-03 DIAGNOSIS — Z98.890 OTHER SPECIFIED POSTPROCEDURAL STATES: Chronic | ICD-10-CM

## 2021-03-03 LAB
ALBUMIN SERPL ELPH-MCNC: 4.8 G/DL — SIGNIFICANT CHANGE UP (ref 3.5–5.2)
ALP SERPL-CCNC: 94 U/L — SIGNIFICANT CHANGE UP (ref 30–115)
ALT FLD-CCNC: 334 U/L — HIGH (ref 0–41)
ANION GAP SERPL CALC-SCNC: 20 MMOL/L — HIGH (ref 7–14)
AST SERPL-CCNC: 200 U/L — HIGH (ref 0–41)
BASOPHILS # BLD AUTO: 0.05 K/UL — SIGNIFICANT CHANGE UP (ref 0–0.2)
BASOPHILS NFR BLD AUTO: 0.5 % — SIGNIFICANT CHANGE UP (ref 0–1)
BILIRUB DIRECT SERPL-MCNC: 0.4 MG/DL — HIGH (ref 0–0.2)
BILIRUB INDIRECT FLD-MCNC: 1.2 MG/DL — SIGNIFICANT CHANGE UP (ref 0.2–1.2)
BILIRUB SERPL-MCNC: 1.6 MG/DL — HIGH (ref 0.2–1.2)
BUN SERPL-MCNC: 15 MG/DL — SIGNIFICANT CHANGE UP (ref 10–20)
CALCIUM SERPL-MCNC: 10.4 MG/DL — HIGH (ref 8.5–10.1)
CHLORIDE SERPL-SCNC: 95 MMOL/L — LOW (ref 98–110)
CO2 SERPL-SCNC: 20 MMOL/L — SIGNIFICANT CHANGE UP (ref 17–32)
CREAT SERPL-MCNC: 0.9 MG/DL — SIGNIFICANT CHANGE UP (ref 0.7–1.5)
EOSINOPHIL # BLD AUTO: 0.01 K/UL — SIGNIFICANT CHANGE UP (ref 0–0.7)
EOSINOPHIL NFR BLD AUTO: 0.1 % — SIGNIFICANT CHANGE UP (ref 0–8)
ETHANOL SERPL-MCNC: <10 MG/DL — SIGNIFICANT CHANGE UP
GLUCOSE SERPL-MCNC: 135 MG/DL — HIGH (ref 70–99)
HCT VFR BLD CALC: 43.3 % — SIGNIFICANT CHANGE UP (ref 42–52)
HGB BLD-MCNC: 15.6 G/DL — SIGNIFICANT CHANGE UP (ref 14–18)
IMM GRANULOCYTES NFR BLD AUTO: 0.4 % — HIGH (ref 0.1–0.3)
LIDOCAIN IGE QN: 236 U/L — HIGH (ref 7–60)
LYMPHOCYTES # BLD AUTO: 1.16 K/UL — LOW (ref 1.2–3.4)
LYMPHOCYTES # BLD AUTO: 11.1 % — LOW (ref 20.5–51.1)
MAGNESIUM SERPL-MCNC: 1.5 MG/DL — LOW (ref 1.8–2.4)
MCHC RBC-ENTMCNC: 31.8 PG — HIGH (ref 27–31)
MCHC RBC-ENTMCNC: 36 G/DL — SIGNIFICANT CHANGE UP (ref 32–37)
MCV RBC AUTO: 88.4 FL — SIGNIFICANT CHANGE UP (ref 80–94)
MONOCYTES # BLD AUTO: 0.73 K/UL — HIGH (ref 0.1–0.6)
MONOCYTES NFR BLD AUTO: 7 % — SIGNIFICANT CHANGE UP (ref 1.7–9.3)
NEUTROPHILS # BLD AUTO: 8.42 K/UL — HIGH (ref 1.4–6.5)
NEUTROPHILS NFR BLD AUTO: 80.9 % — HIGH (ref 42.2–75.2)
NRBC # BLD: 0 /100 WBCS — SIGNIFICANT CHANGE UP (ref 0–0)
PLATELET # BLD AUTO: 253 K/UL — SIGNIFICANT CHANGE UP (ref 130–400)
POTASSIUM SERPL-MCNC: 4.1 MMOL/L — SIGNIFICANT CHANGE UP (ref 3.5–5)
POTASSIUM SERPL-SCNC: 4.1 MMOL/L — SIGNIFICANT CHANGE UP (ref 3.5–5)
PROT SERPL-MCNC: 7.7 G/DL — SIGNIFICANT CHANGE UP (ref 6–8)
RBC # BLD: 4.9 M/UL — SIGNIFICANT CHANGE UP (ref 4.7–6.1)
RBC # FLD: 13.6 % — SIGNIFICANT CHANGE UP (ref 11.5–14.5)
SARS-COV-2 RNA SPEC QL NAA+PROBE: SIGNIFICANT CHANGE UP
SODIUM SERPL-SCNC: 135 MMOL/L — SIGNIFICANT CHANGE UP (ref 135–146)
TROPONIN T SERPL-MCNC: <0.01 NG/ML — SIGNIFICANT CHANGE UP
WBC # BLD: 10.41 K/UL — SIGNIFICANT CHANGE UP (ref 4.8–10.8)
WBC # FLD AUTO: 10.41 K/UL — SIGNIFICANT CHANGE UP (ref 4.8–10.8)

## 2021-03-03 PROCEDURE — 99408 AUDIT/DAST 15-30 MIN: CPT

## 2021-03-03 PROCEDURE — 99223 1ST HOSP IP/OBS HIGH 75: CPT | Mod: 25

## 2021-03-03 PROCEDURE — 71045 X-RAY EXAM CHEST 1 VIEW: CPT | Mod: 26

## 2021-03-03 PROCEDURE — 74177 CT ABD & PELVIS W/CONTRAST: CPT | Mod: 26

## 2021-03-03 PROCEDURE — 93010 ELECTROCARDIOGRAM REPORT: CPT

## 2021-03-03 PROCEDURE — 99285 EMERGENCY DEPT VISIT HI MDM: CPT

## 2021-03-03 RX ORDER — MAGNESIUM SULFATE 500 MG/ML
2 VIAL (ML) INJECTION ONCE
Refills: 0 | Status: COMPLETED | OUTPATIENT
Start: 2021-03-03 | End: 2021-03-03

## 2021-03-03 RX ORDER — ONDANSETRON 8 MG/1
4 TABLET, FILM COATED ORAL ONCE
Refills: 0 | Status: COMPLETED | OUTPATIENT
Start: 2021-03-03 | End: 2021-03-03

## 2021-03-03 RX ORDER — SODIUM CHLORIDE 9 MG/ML
1000 INJECTION INTRAMUSCULAR; INTRAVENOUS; SUBCUTANEOUS ONCE
Refills: 0 | Status: COMPLETED | OUTPATIENT
Start: 2021-03-03 | End: 2021-03-03

## 2021-03-03 RX ORDER — NIFEDIPINE 30 MG
60 TABLET, EXTENDED RELEASE 24 HR ORAL DAILY
Refills: 0 | Status: DISCONTINUED | OUTPATIENT
Start: 2021-03-03 | End: 2021-03-05

## 2021-03-03 RX ORDER — INFLUENZA VIRUS VACCINE 15; 15; 15; 15 UG/.5ML; UG/.5ML; UG/.5ML; UG/.5ML
0.5 SUSPENSION INTRAMUSCULAR ONCE
Refills: 0 | Status: DISCONTINUED | OUTPATIENT
Start: 2021-03-03 | End: 2021-03-05

## 2021-03-03 RX ORDER — MORPHINE SULFATE 50 MG/1
6 CAPSULE, EXTENDED RELEASE ORAL ONCE
Refills: 0 | Status: DISCONTINUED | OUTPATIENT
Start: 2021-03-03 | End: 2021-03-03

## 2021-03-03 RX ORDER — METOCLOPRAMIDE HCL 10 MG
10 TABLET ORAL ONCE
Refills: 0 | Status: COMPLETED | OUTPATIENT
Start: 2021-03-03 | End: 2021-03-03

## 2021-03-03 RX ORDER — SODIUM CHLORIDE 9 MG/ML
1000 INJECTION, SOLUTION INTRAVENOUS
Refills: 0 | Status: DISCONTINUED | OUTPATIENT
Start: 2021-03-03 | End: 2021-03-05

## 2021-03-03 RX ORDER — THIAMINE MONONITRATE (VIT B1) 100 MG
100 TABLET ORAL DAILY
Refills: 0 | Status: DISCONTINUED | OUTPATIENT
Start: 2021-03-03 | End: 2021-03-05

## 2021-03-03 RX ORDER — FAMOTIDINE 10 MG/ML
20 INJECTION INTRAVENOUS ONCE
Refills: 0 | Status: COMPLETED | OUTPATIENT
Start: 2021-03-03 | End: 2021-03-03

## 2021-03-03 RX ORDER — CHLORHEXIDINE GLUCONATE 213 G/1000ML
1 SOLUTION TOPICAL DAILY
Refills: 0 | Status: DISCONTINUED | OUTPATIENT
Start: 2021-03-03 | End: 2021-03-05

## 2021-03-03 RX ORDER — PANTOPRAZOLE SODIUM 20 MG/1
40 TABLET, DELAYED RELEASE ORAL
Refills: 0 | Status: DISCONTINUED | OUTPATIENT
Start: 2021-03-03 | End: 2021-03-05

## 2021-03-03 RX ORDER — THIAMINE MONONITRATE (VIT B1) 100 MG
100 TABLET ORAL ONCE
Refills: 0 | Status: COMPLETED | OUTPATIENT
Start: 2021-03-03 | End: 2021-03-03

## 2021-03-03 RX ORDER — ENOXAPARIN SODIUM 100 MG/ML
40 INJECTION SUBCUTANEOUS DAILY
Refills: 0 | Status: DISCONTINUED | OUTPATIENT
Start: 2021-03-03 | End: 2021-03-05

## 2021-03-03 RX ORDER — MORPHINE SULFATE 50 MG/1
2 CAPSULE, EXTENDED RELEASE ORAL
Refills: 0 | Status: DISCONTINUED | OUTPATIENT
Start: 2021-03-03 | End: 2021-03-04

## 2021-03-03 RX ORDER — FOLIC ACID 0.8 MG
1 TABLET ORAL DAILY
Refills: 0 | Status: DISCONTINUED | OUTPATIENT
Start: 2021-03-03 | End: 2021-03-05

## 2021-03-03 RX ORDER — MORPHINE SULFATE 50 MG/1
4 CAPSULE, EXTENDED RELEASE ORAL ONCE
Refills: 0 | Status: DISCONTINUED | OUTPATIENT
Start: 2021-03-03 | End: 2021-03-03

## 2021-03-03 RX ORDER — HYDROMORPHONE HYDROCHLORIDE 2 MG/ML
1 INJECTION INTRAMUSCULAR; INTRAVENOUS; SUBCUTANEOUS ONCE
Refills: 0 | Status: DISCONTINUED | OUTPATIENT
Start: 2021-03-03 | End: 2021-03-03

## 2021-03-03 RX ADMIN — ONDANSETRON 4 MILLIGRAM(S): 8 TABLET, FILM COATED ORAL at 19:09

## 2021-03-03 RX ADMIN — FAMOTIDINE 20 MILLIGRAM(S): 10 INJECTION INTRAVENOUS at 11:28

## 2021-03-03 RX ADMIN — MORPHINE SULFATE 2 MILLIGRAM(S): 50 CAPSULE, EXTENDED RELEASE ORAL at 16:34

## 2021-03-03 RX ADMIN — MORPHINE SULFATE 2 MILLIGRAM(S): 50 CAPSULE, EXTENDED RELEASE ORAL at 22:40

## 2021-03-03 RX ADMIN — MORPHINE SULFATE 2 MILLIGRAM(S): 50 CAPSULE, EXTENDED RELEASE ORAL at 20:34

## 2021-03-03 RX ADMIN — SODIUM CHLORIDE 1000 MILLILITER(S): 9 INJECTION INTRAMUSCULAR; INTRAVENOUS; SUBCUTANEOUS at 12:29

## 2021-03-03 RX ADMIN — Medication 104 MILLIGRAM(S): at 12:28

## 2021-03-03 RX ADMIN — Medication 2 MILLIGRAM(S): at 21:13

## 2021-03-03 RX ADMIN — ONDANSETRON 4 MILLIGRAM(S): 8 TABLET, FILM COATED ORAL at 11:27

## 2021-03-03 RX ADMIN — Medication 25 GRAM(S): at 14:34

## 2021-03-03 RX ADMIN — SODIUM CHLORIDE 200 MILLILITER(S): 9 INJECTION, SOLUTION INTRAVENOUS at 14:43

## 2021-03-03 RX ADMIN — MORPHINE SULFATE 2 MILLIGRAM(S): 50 CAPSULE, EXTENDED RELEASE ORAL at 19:05

## 2021-03-03 RX ADMIN — SODIUM CHLORIDE 1000 MILLILITER(S): 9 INJECTION INTRAMUSCULAR; INTRAVENOUS; SUBCUTANEOUS at 10:31

## 2021-03-03 RX ADMIN — MORPHINE SULFATE 2 MILLIGRAM(S): 50 CAPSULE, EXTENDED RELEASE ORAL at 18:34

## 2021-03-03 RX ADMIN — MORPHINE SULFATE 2 MILLIGRAM(S): 50 CAPSULE, EXTENDED RELEASE ORAL at 14:34

## 2021-03-03 RX ADMIN — Medication 2 MILLIGRAM(S): at 14:34

## 2021-03-03 RX ADMIN — Medication 50 GRAM(S): at 12:16

## 2021-03-03 RX ADMIN — SODIUM CHLORIDE 1000 MILLILITER(S): 9 INJECTION INTRAMUSCULAR; INTRAVENOUS; SUBCUTANEOUS at 14:34

## 2021-03-03 RX ADMIN — ONDANSETRON 4 MILLIGRAM(S): 8 TABLET, FILM COATED ORAL at 10:30

## 2021-03-03 RX ADMIN — MORPHINE SULFATE 2 MILLIGRAM(S): 50 CAPSULE, EXTENDED RELEASE ORAL at 17:09

## 2021-03-03 RX ADMIN — MORPHINE SULFATE 4 MILLIGRAM(S): 50 CAPSULE, EXTENDED RELEASE ORAL at 11:28

## 2021-03-03 RX ADMIN — MORPHINE SULFATE 6 MILLIGRAM(S): 50 CAPSULE, EXTENDED RELEASE ORAL at 10:29

## 2021-03-03 RX ADMIN — MORPHINE SULFATE 2 MILLIGRAM(S): 50 CAPSULE, EXTENDED RELEASE ORAL at 15:05

## 2021-03-03 RX ADMIN — Medication 100 MILLIGRAM(S): at 11:27

## 2021-03-03 RX ADMIN — HYDROMORPHONE HYDROCHLORIDE 1 MILLIGRAM(S): 2 INJECTION INTRAMUSCULAR; INTRAVENOUS; SUBCUTANEOUS at 12:29

## 2021-03-03 RX ADMIN — Medication 2 MILLIGRAM(S): at 18:04

## 2021-03-03 NOTE — ED ADULT NURSE NOTE - NSIMPLEMENTINTERV_GEN_ALL_ED
Implemented All Fall with Harm Risk Interventions:  [place] to call system. Call bell, personal items and telephone within reach. Instruct patient to call for assistance. Room bathroom lighting operational. Non-slip footwear when patient is off stretcher. Physically safe environment: no spills, clutter or unnecessary equipment. Stretcher in lowest position, wheels locked, appropriate side rails in place. Provide visual cue, wrist band, yellow gown, etc. Monitor gait and stability. Monitor for mental status changes and reorient to person, place, and time. Review medications for side effects contributing to fall risk. Reinforce activity limits and safety measures with patient and family. Provide visual clues: red socks.

## 2021-03-03 NOTE — ED ADULT NURSE NOTE - OBJECTIVE STATEMENT
patient alert and oriented x4, complaining of RUQ pain that began this morning. states he has history of pancreatitis. states he does not drink alcohol or use any recreational drugs

## 2021-03-03 NOTE — H&P ADULT - NSHPLABSRESULTS_GEN_ALL_CORE
LABS:                        15.6   10.41 )-----------( 253      ( 03 Mar 2021 10:10 )             43.3     03-03    135  |  95<L>  |  15  ----------------------------<  135<H>  4.1   |  20  |  0.9    Ca    10.4<H>      03 Mar 2021 10:10  Mg     1.5     03-03    TPro  7.7  /  Alb  4.8  /  TBili  1.6<H>  /  DBili  0.4<H>  /  AST  200<H>  /  ALT  334<H>  /  AlkPhos  94  03-03          Troponin T, Serum: <0.01 ng/mL (03-03-21 @ 10:10)      CARDIAC MARKERS ( 03 Mar 2021 10:10 )  x     / <0.01 ng/mL / x     / x     / x            Cultures:      RADIOLOGY:    < from: CT Abdomen and Pelvis w/ IV Cont (03.03.21 @ 11:16) >    IMPRESSION:    Acute pancreatitis. 1.2 cm probable pseudocyst along the pancreatic body is unchanged.    < end of copied text >    < from: CT Abdomen and Pelvis w/ IV Cont (03.03.21 @ 11:16) >      HEPATOBILIARY: Hepatic steatosis. 1.5 cm hypodensity along the falciform ligament, stable since 2018, measuring cystic densit

## 2021-03-03 NOTE — ED PROVIDER NOTE - OBJECTIVE STATEMENT
41 y/o male with hx HTN, Alcohol abuse, Pancreatitis presents to the ED c/o "I have right sided and upper abdominal pain with nausea and vomiting x 15 since this morning. I usually drink on the weekends." no fever/ chills

## 2021-03-03 NOTE — ED PROVIDER NOTE - ATTENDING CONTRIBUTION TO CARE
I personally evaluated the patient. I reviewed the Resident’s or Physician Assistant’s note (as assigned above), and agree with the findings and plan except as documented in my note.  41 yo man presentd to ED with upper abd pain and vomiting.  Workup revealed acute pancreatitis likely related to recent zoraida drinking this weekend which was about 4 days ago.  no evidence of acute withdrawal at this time.  Will need IVF, analgesia and admission to medicine.

## 2021-03-03 NOTE — ED PROVIDER NOTE - PMH
Asthma  no recent exacerbation, not using inhalers for a long time  No pertinent past medical history    Pancreatitis  October 2018 due to alcohol

## 2021-03-03 NOTE — ED PROVIDER NOTE - INTERPRETATION
sinus arrythmia;  otherwise normal/normal sinus rhythm, Normal axis, Normal NV interval and QRS complex. There are no acute ischemic ST or T-wave changes.

## 2021-03-03 NOTE — H&P ADULT - ATTENDING COMMENTS
HPI: 40/M with HTN, opoid use disorder, active alcohol use (drinks bottle of vodka daily on weekends), COVID in Dec 2020, Pancreatitis in 2018, 2019 and 2020 admitted with right upper quadrant pain radiating to back. Patient reports pain similar to his previous pancreatitis episodes.     ROS: negaive except above     Vital Signs Last 24 Hrs  T(C): 36.4 (03 Mar 2021 09:38), Max: 36.4 (03 Mar 2021 09:38)  T(F): 97.6 (03 Mar 2021 09:38), Max: 97.6 (03 Mar 2021 09:38)  HR: 103 (03 Mar 2021 09:38) (103 - 103)  BP: 146/99 (03 Mar 2021 09:38) (146/99 - 146/99)  RR: 18 (03 Mar 2021 09:38) (18 - 18)  SpO2: 99% (03 Mar 2021 09:38) (99% - 99%)                            15.6   10.41 )-----------( 253      ( 03 Mar 2021 10:10 )             43.3       03-03    135  |  95<L>  |  15  ----------------------------<  135<H>  4.1   |  20  |  0.9    Ca    10.4<H>      03 Mar 2021 10:10  Mg     1.5     03-03    TPro  7.7  /  Alb  4.8  /  TBili  1.6<H>  /  DBili  0.4<H>  /  AST  200<H>  /  ALT  334<H>  /  AlkPhos  94  03-03    CARDIAC MARKERS ( 03 Mar 2021 10:10 )  x     / <0.01 ng/mL / x     / x     / x        Plan:     1. Acute pancreatitis with pseudocyst   2. alcohol abuse - withdrawal   3. essential HTN   4. active vaping and marijuana use   5. GERD   6. suspected thiamine and folic acid deficiency     - recurrent episodes of pancreatitis in setting of active heavy alcohol use   - reports drinking 1 bottle of vodka on weekends (use to drink heavier amount daily)  - CT A/P: acute pancreatitis with 1.2 cm pseudocyst   - no need for antibiotics at this time   - last drink over weekend - 48 hours  - will start on CIWA protocol   - trend liver enzymes   - pain control   - aggressive fluid hydration  - patient counselled about alcohol and marijuana cessation  - he understands he needs to stop it   - catch and addiction consult for further resources- 20  mins spent    - continue thiamine and folate supplement   - continue home nifedipine and protonix

## 2021-03-03 NOTE — H&P ADULT - HISTORY OF PRESENT ILLNESS
39 y/o male with hx HTN, Alcohol and opiod use disorder, COVID in Dec 2020, Pancreatitis in 2018, 2019 and 2020 now presents to the ED c/o "I have right sided and upper abdominal pain with nausea and vomiting x 15 since this morning. Pain started today in epigastric region, described as sharp and same as he previously had with pancreatitis, 10/10 continous, radiates to back and right side of the belly, associated with nausea and vomiting. He states he drinks 1 bottle of vodka three times a week. Last time he drank on Frinday, Saturday, Sunday with last drink on Sunday afternoon. Pt has previous hx of alcohol withdrawal. Denies Chest pain, headache,     In ED pt was received vitally stable except HR was 103. EKG showed no ischemic changes, Trops <0.01.   Labs significant for AST//334, Lipase 236, Alcohol <10  CT abdomen pelvis showed: Acute pancreatitis. 1.2 cm probable pseudocyst along the pancreatic body is unchanged.  Pt was started on IV fluids, CIWA protocol, Mag was repleted and admitted for further management.

## 2021-03-03 NOTE — ED PROVIDER NOTE - CLINICAL SUMMARY MEDICAL DECISION MAKING FREE TEXT BOX
39 yo man with acute upper abd pain similar to prior episodes of pancreatitis.  CT reveals acute on chronic pancreatitis with pseudocyst.  Requires analgesia and IVF and admission.

## 2021-03-04 LAB
A1C WITH ESTIMATED AVERAGE GLUCOSE RESULT: 5.3 % — SIGNIFICANT CHANGE UP (ref 4–5.6)
ALBUMIN SERPL ELPH-MCNC: 4.4 G/DL — SIGNIFICANT CHANGE UP (ref 3.5–5.2)
ALP SERPL-CCNC: 86 U/L — SIGNIFICANT CHANGE UP (ref 30–115)
ALT FLD-CCNC: 237 U/L — HIGH (ref 0–41)
ANION GAP SERPL CALC-SCNC: 16 MMOL/L — HIGH (ref 7–14)
AST SERPL-CCNC: 117 U/L — HIGH (ref 0–41)
BASOPHILS # BLD AUTO: 0.03 K/UL — SIGNIFICANT CHANGE UP (ref 0–0.2)
BASOPHILS NFR BLD AUTO: 0.4 % — SIGNIFICANT CHANGE UP (ref 0–1)
BILIRUB SERPL-MCNC: 1.2 MG/DL — SIGNIFICANT CHANGE UP (ref 0.2–1.2)
BUN SERPL-MCNC: 8 MG/DL — LOW (ref 10–20)
CALCIUM SERPL-MCNC: 9.5 MG/DL — SIGNIFICANT CHANGE UP (ref 8.5–10.1)
CHLORIDE SERPL-SCNC: 96 MMOL/L — LOW (ref 98–110)
CHOLEST SERPL-MCNC: 186 MG/DL — SIGNIFICANT CHANGE UP
CO2 SERPL-SCNC: 22 MMOL/L — SIGNIFICANT CHANGE UP (ref 17–32)
CREAT SERPL-MCNC: 0.7 MG/DL — SIGNIFICANT CHANGE UP (ref 0.7–1.5)
EOSINOPHIL # BLD AUTO: 0.04 K/UL — SIGNIFICANT CHANGE UP (ref 0–0.7)
EOSINOPHIL NFR BLD AUTO: 0.5 % — SIGNIFICANT CHANGE UP (ref 0–8)
ESTIMATED AVERAGE GLUCOSE: 105 MG/DL — SIGNIFICANT CHANGE UP (ref 68–114)
GLUCOSE SERPL-MCNC: 114 MG/DL — HIGH (ref 70–99)
HCT VFR BLD CALC: 42.8 % — SIGNIFICANT CHANGE UP (ref 42–52)
HDLC SERPL-MCNC: 23 MG/DL — LOW
HGB BLD-MCNC: 15.1 G/DL — SIGNIFICANT CHANGE UP (ref 14–18)
IMM GRANULOCYTES NFR BLD AUTO: 0.4 % — HIGH (ref 0.1–0.3)
LACTATE SERPL-SCNC: 1 MMOL/L — SIGNIFICANT CHANGE UP (ref 0.7–2)
LIPID PNL WITH DIRECT LDL SERPL: 165 MG/DL — HIGH
LYMPHOCYTES # BLD AUTO: 1.1 K/UL — LOW (ref 1.2–3.4)
LYMPHOCYTES # BLD AUTO: 12.8 % — LOW (ref 20.5–51.1)
MAGNESIUM SERPL-MCNC: 2 MG/DL — SIGNIFICANT CHANGE UP (ref 1.8–2.4)
MCHC RBC-ENTMCNC: 32 PG — HIGH (ref 27–31)
MCHC RBC-ENTMCNC: 35.3 G/DL — SIGNIFICANT CHANGE UP (ref 32–37)
MCV RBC AUTO: 90.7 FL — SIGNIFICANT CHANGE UP (ref 80–94)
MONOCYTES # BLD AUTO: 0.81 K/UL — HIGH (ref 0.1–0.6)
MONOCYTES NFR BLD AUTO: 9.5 % — HIGH (ref 1.7–9.3)
NEUTROPHILS # BLD AUTO: 6.56 K/UL — HIGH (ref 1.4–6.5)
NEUTROPHILS NFR BLD AUTO: 76.4 % — HIGH (ref 42.2–75.2)
NON HDL CHOLESTEROL: 163 MG/DL — HIGH
NRBC # BLD: 0 /100 WBCS — SIGNIFICANT CHANGE UP (ref 0–0)
PHOSPHATE SERPL-MCNC: 3.5 MG/DL — SIGNIFICANT CHANGE UP (ref 2.1–4.9)
PLATELET # BLD AUTO: 238 K/UL — SIGNIFICANT CHANGE UP (ref 130–400)
POTASSIUM SERPL-MCNC: 4 MMOL/L — SIGNIFICANT CHANGE UP (ref 3.5–5)
POTASSIUM SERPL-SCNC: 4 MMOL/L — SIGNIFICANT CHANGE UP (ref 3.5–5)
PROT SERPL-MCNC: 7.1 G/DL — SIGNIFICANT CHANGE UP (ref 6–8)
RBC # BLD: 4.72 M/UL — SIGNIFICANT CHANGE UP (ref 4.7–6.1)
RBC # FLD: 13.8 % — SIGNIFICANT CHANGE UP (ref 11.5–14.5)
SODIUM SERPL-SCNC: 134 MMOL/L — LOW (ref 135–146)
TRIGL SERPL-MCNC: 115 MG/DL — SIGNIFICANT CHANGE UP
WBC # BLD: 8.57 K/UL — SIGNIFICANT CHANGE UP (ref 4.8–10.8)
WBC # FLD AUTO: 8.57 K/UL — SIGNIFICANT CHANGE UP (ref 4.8–10.8)

## 2021-03-04 PROCEDURE — 99233 SBSQ HOSP IP/OBS HIGH 50: CPT

## 2021-03-04 PROCEDURE — 99223 1ST HOSP IP/OBS HIGH 75: CPT

## 2021-03-04 RX ORDER — ONDANSETRON 8 MG/1
4 TABLET, FILM COATED ORAL EVERY 6 HOURS
Refills: 0 | Status: DISCONTINUED | OUTPATIENT
Start: 2021-03-04 | End: 2021-03-05

## 2021-03-04 RX ORDER — HYDROMORPHONE HYDROCHLORIDE 2 MG/ML
2 INJECTION INTRAMUSCULAR; INTRAVENOUS; SUBCUTANEOUS EVERY 4 HOURS
Refills: 0 | Status: DISCONTINUED | OUTPATIENT
Start: 2021-03-04 | End: 2021-03-05

## 2021-03-04 RX ADMIN — HYDROMORPHONE HYDROCHLORIDE 2 MILLIGRAM(S): 2 INJECTION INTRAMUSCULAR; INTRAVENOUS; SUBCUTANEOUS at 16:32

## 2021-03-04 RX ADMIN — Medication 100 MILLIGRAM(S): at 12:33

## 2021-03-04 RX ADMIN — CHLORHEXIDINE GLUCONATE 1 APPLICATION(S): 213 SOLUTION TOPICAL at 11:36

## 2021-03-04 RX ADMIN — MORPHINE SULFATE 2 MILLIGRAM(S): 50 CAPSULE, EXTENDED RELEASE ORAL at 02:40

## 2021-03-04 RX ADMIN — SODIUM CHLORIDE 200 MILLILITER(S): 9 INJECTION, SOLUTION INTRAVENOUS at 16:34

## 2021-03-04 RX ADMIN — SODIUM CHLORIDE 200 MILLILITER(S): 9 INJECTION, SOLUTION INTRAVENOUS at 09:05

## 2021-03-04 RX ADMIN — HYDROMORPHONE HYDROCHLORIDE 2 MILLIGRAM(S): 2 INJECTION INTRAMUSCULAR; INTRAVENOUS; SUBCUTANEOUS at 20:17

## 2021-03-04 RX ADMIN — Medication 2 MILLIGRAM(S): at 05:54

## 2021-03-04 RX ADMIN — MORPHINE SULFATE 2 MILLIGRAM(S): 50 CAPSULE, EXTENDED RELEASE ORAL at 09:30

## 2021-03-04 RX ADMIN — Medication 60 MILLIGRAM(S): at 05:54

## 2021-03-04 RX ADMIN — MORPHINE SULFATE 2 MILLIGRAM(S): 50 CAPSULE, EXTENDED RELEASE ORAL at 09:01

## 2021-03-04 RX ADMIN — Medication 2 MILLIGRAM(S): at 10:24

## 2021-03-04 RX ADMIN — SODIUM CHLORIDE 200 MILLILITER(S): 9 INJECTION, SOLUTION INTRAVENOUS at 04:03

## 2021-03-04 RX ADMIN — Medication 1.5 MILLIGRAM(S): at 21:26

## 2021-03-04 RX ADMIN — ENOXAPARIN SODIUM 40 MILLIGRAM(S): 100 INJECTION SUBCUTANEOUS at 11:35

## 2021-03-04 RX ADMIN — HYDROMORPHONE HYDROCHLORIDE 2 MILLIGRAM(S): 2 INJECTION INTRAMUSCULAR; INTRAVENOUS; SUBCUTANEOUS at 11:55

## 2021-03-04 RX ADMIN — PANTOPRAZOLE SODIUM 40 MILLIGRAM(S): 20 TABLET, DELAYED RELEASE ORAL at 05:54

## 2021-03-04 RX ADMIN — ONDANSETRON 4 MILLIGRAM(S): 8 TABLET, FILM COATED ORAL at 04:45

## 2021-03-04 RX ADMIN — Medication 2 MILLIGRAM(S): at 01:43

## 2021-03-04 RX ADMIN — MORPHINE SULFATE 2 MILLIGRAM(S): 50 CAPSULE, EXTENDED RELEASE ORAL at 00:43

## 2021-03-04 RX ADMIN — MORPHINE SULFATE 2 MILLIGRAM(S): 50 CAPSULE, EXTENDED RELEASE ORAL at 04:45

## 2021-03-04 RX ADMIN — Medication 1 MILLIGRAM(S): at 11:35

## 2021-03-04 RX ADMIN — HYDROMORPHONE HYDROCHLORIDE 2 MILLIGRAM(S): 2 INJECTION INTRAMUSCULAR; INTRAVENOUS; SUBCUTANEOUS at 11:36

## 2021-03-04 RX ADMIN — MORPHINE SULFATE 2 MILLIGRAM(S): 50 CAPSULE, EXTENDED RELEASE ORAL at 06:56

## 2021-03-04 RX ADMIN — Medication 1.5 MILLIGRAM(S): at 18:20

## 2021-03-04 RX ADMIN — HYDROMORPHONE HYDROCHLORIDE 2 MILLIGRAM(S): 2 INJECTION INTRAMUSCULAR; INTRAVENOUS; SUBCUTANEOUS at 16:17

## 2021-03-04 NOTE — PROGRESS NOTE ADULT - SUBJECTIVE AND OBJECTIVE BOX
40y old Male who presents with a chief complaint of Abdominal pain , admitted to medicine with the primary diagnosis of Acute pancreatitis, pt has h/o Etoh abuse and recurrent pancreatitis  Today he is c/o upper abdominal pain ( in epigastric area and RUQ, radiating to the back)     PAST MEDICAL & SURGICAL HISTORY  Asthma, no recent exacerbation, not using inhalers for a long time  HTN  Alcohol and opioid use disorder  Pancreatitis x 2  H/O rotator cuff surgery    ALLERGIES:  No Known Allergies    VITALS:   T(C): 35.8 (04 Mar 2021 12:32), Max: 36.4 (04 Mar 2021 04:50)  T(F): 96.4 (04 Mar 2021 12:32), Max: 97.6 (04 Mar 2021 04:50)  HR: 91 (04 Mar 2021 12:32) (87 - 95)  BP: 117/69 (04 Mar 2021 12:32) (117/69 - 152/98)  BP(mean): --  RR: 18 (04 Mar 2021 12:32) (18 - 18)  SpO2: 98% (03 Mar 2021 20:04) (98% - 98%)    PHYSICAL EXAM:  GEN: NAD with resting tremor and flushed face    NECK: supple, no JVD   LUNGS: Clear to auscultation bilaterally. No rales, rhonchi, or wheezing.  HEART: S1/S2 present. RRR.   ABD: Soft, tender in RUQ and  epigastric region on deep palpation, no rebound, no guarding, non-distended. Bowel sounds present.  EXT: No edema.   NEURO: AAOX3. Non-focal.  Skin: No rashes or lesions.     LABS:                        15.1   8.57  )-----------( 238      ( 04 Mar 2021 06:40 )             42.8     03-04    134<L>  |  96<L>  |  8<L>  ----------------------------<  114<H>  4.0   |  22  |  0.7    Ca    9.5      04 Mar 2021 06:40  Phos  3.5     03-04  Mg     2.0     03-04    TPro  7.1  /  Alb  4.4  /  TBili  1.2  /  DBili  x   /  AST  117<H>  /  ALT  237<H>  /  AlkPhos  86  03-04    Lactate, Blood: 1.0 mmol/L (03-04-21 @ 06:40)  Troponin T, Serum: <0.01 ng/mL (03-03-21 @ 10:10)      CARDIAC MARKERS ( 03 Mar 2021 10:10 )  x     / <0.01 ng/mL / x     / x     / x          RADIOLOGY:  FINDINGS:    LOWER CHEST: Unremarkable.  IMPRESSION:    Acute pancreatitis. 1.2 cm probable pseudocyst along the pancreatic body is unchanged.      < from: Xray Chest 1 View- PORTABLE-Urgent (03.03.21 @ 11:47) >  Impression:    No radiographic evidence of acute cardiopulmonary disease.    < end of copied text >    MEDICATIONS  (STANDING):  chlorhexidine 4% Liquid 1 Application(s) Topical daily  enoxaparin Injectable 40 milliGRAM(s) SubCutaneous daily  folic acid 1 milliGRAM(s) Oral daily  influenza   Vaccine 0.5 milliLiter(s) IntraMuscular once  lactated ringers. 1000 milliLiter(s) (200 mL/Hr) IV Continuous <Continuous>  LORazepam   Injectable   IV Push   LORazepam   Injectable 1.5 milliGRAM(s) IV Push every 4 hours  NIFEdipine XL 60 milliGRAM(s) Oral daily  pantoprazole    Tablet 40 milliGRAM(s) Oral before breakfast  thiamine Injectable 100 milliGRAM(s) IV Push daily    MEDICATIONS  (PRN):  HYDROmorphone  Injectable 2 milliGRAM(s) IV Push every 4 hours PRN Severe Pain (7 - 10)  ondansetron Injectable 4 milliGRAM(s) IV Push every 6 hours PRN Nausea and/or Vomiting

## 2021-03-04 NOTE — CONSULT NOTE ADULT - SUBJECTIVE AND OBJECTIVE BOX
Patient is a 41 y/o male with hx HTN, Alcohol and opiod use disorder, COVID in Dec 2020, Pancreatitis in 2018, 2019 and 2020 now presents to the ED with complaints of abdominal pain and nausea. Patient has had prior admission for Pancreatitis from ETOH use. He did not keep outpatient follow up with GI and has been drinking since last discharge. Currently he feels well this morning. Pain located in upper abdomen with some radiation to the back was initially sharp and severe. Patient had 15 episodes of emesis with it.  Notes pain was bad overnight but now he is comfortable.         PAST MEDICAL & SURGICAL HISTORY:  Asthma  Pancreatitis  ETOH abuse, prior Opiod use/abuse   H/O rotator cuff surgery          MEDICATIONS  (STANDING):  chlorhexidine 4% Liquid 1 Application(s) Topical daily  enoxaparin Injectable 40 milliGRAM(s) SubCutaneous daily  folic acid 1 milliGRAM(s) Oral daily  influenza   Vaccine 0.5 milliLiter(s) IntraMuscular once  lactated ringers. 1000 milliLiter(s) (200 mL/Hr) IV Continuous <Continuous>  LORazepam   Injectable   IV Push   LORazepam   Injectable 2 milliGRAM(s) IV Push every 4 hours  LORazepam   Injectable 1.5 milliGRAM(s) IV Push every 4 hours  NIFEdipine XL 60 milliGRAM(s) Oral daily  pantoprazole    Tablet 40 milliGRAM(s) Oral before breakfast  thiamine Injectable 100 milliGRAM(s) IV Push daily    MEDICATIONS  (PRN):  HYDROmorphone  Injectable 2 milliGRAM(s) IV Push every 4 hours PRN Severe Pain (7 - 10)  ondansetron Injectable 4 milliGRAM(s) IV Push every 6 hours PRN Nausea and/or Vomiting      Allergies  No Known Allergies      Review of Systems  General: See HPI  HEENT: Denies Trouble Swallowing ,Denies  Sore Throat , Denies Change in hearing/vision/speech ,Denies Dizziness    Cardio: Denies  Chest Pain , Palpitations    Respiratory: Denies worsening of SOB, Denies Cough  Abdomen: See detailed HPI  Neuro: Denies Headache Denies Dizziness, Denies Paresthesias  MSK: Denies pain in Bones/Joints/Muscles   Psych: Patient denies depression  Integ: Patient Denies rash, or new skin lesions       Vital Signs   T(F): 97.6 (04 Mar 2021 04:50), Max: 97.6 (04 Mar 2021 04:50)  HR: 87 (04 Mar 2021 04:50) (72 - 95)  BP: 152/98 (04 Mar 2021 04:50) (121/57 - 167/97)  RR: 18 (04 Mar 2021 04:50) (16 - 18)  Physical Exam  Gen: NAD  HEENT: NC/AT, Mucosal Membranes Moist  Cardio: S1/S2  Resp: CTA B/L  Abdomen: Soft, ND/ TTP on palpation  Neuro: AAOx3  Extremities: FROM x 4  Skin: No jaundice         LABS:               15.1   8.57  )-----------( 238      ( 04 Mar 2021 06:40 )             42.8       Auto Immature Granulocyte %: 0.4 % (03-04-21 @ 06:40)  Auto Neutrophil %: 76.4 % (03-04-21 @ 06:40)  Auto Neutrophil %: 80.9 % (03-03-21 @ 10:10)  Auto Immature Granulocyte %: 0.4 % (03-03-21 @ 10:10)    03-04    134<L>  |  96<L>  |  8<L>  ----------------------------<  114<H>  4.0   |  22  |  0.7      Calcium, Total Serum: 9.5 mg/dL (03-04-21 @ 06:40)      LFTs:             7.1  | 1.2  | 117      ------------------[86      ( 04 Mar 2021 06:40 )  4.4  | x    | 237         Lipase: 236    Lactate, Blood: 1.0 mmol/L (03-04-21 @ 06:40)    Alcohol, Blood: <10 mg/dL (03-03-21 @ 10:10)    RADIOLOGY & ADDITIONAL STUDIES:  CT Abdomen and Pelvis w/ IV Cont 03.03.21   IMPRESSION:    Acute pancreatitis. 1.2 cm probable pseudocyst along the pancreatic body is unchanged.      
  39 y/o male with hx HTN, alcohol and opoid use disorder, pancreatitis, admitted to medicine for epigastric pain with associate nausea and vomiting secondary to acute on chronic pancreatitis        Spoke with Dr. Esqueda ([phone]) who reports that patient withdrawal symptoms are being managed with Ativan taper, not in acute withdrawal, with last reported drink Sunday. He is additionally on librium taper. Confirms that consult place was for [facility] team social work services who are aware of case.     Please recall PRN.

## 2021-03-04 NOTE — PROGRESS NOTE ADULT - ASSESSMENT
39 y/o male with hx HTN, Alcohol and opoid use disorder, Pancreatitis in 2018, 2019 and 2020 now presents to the ED c/o "I have right sided and upper abdominal pain with nausea and vomiting x 15 since this morning.    # Abd pain secondary to acute on chronic pancreatitis  - 3/3 criteria (epigastric pain, CT evidence of pancreatic fat stranding, elevated lipase)  - Cause: Likely alcohol related, no evidence of gallstones on CT abd, .  - CT abd showed: Acute pancreatitis. 1.2 cm probable pseudocyst along the pancreatic body is unchanged.  PLAN:  - IV fluids as LR bolus of 15-20ml/kg - then 3ml/kg for 12- 24 hours for maintenance.  - Pain control with morphine PRN.  - NPO.  - Advanced GI following: considered EUS with FNA in Dec 2020 after pancreatitis as outpatient. But pt got COVID and couldn't make the appointment.    #Alcohol use disorder  - Last drink on 2/28/2021 afternoon.  - CIWA protocol with ativan taper started. Current CIWA score of 2.  - C/w thiamine and folic acid.  - CATCH team on board.     #Elevated LFTs  - Likely from fatty liver/alcoholic liver disease; CT abd showed hepatic steatosis.   - Trend INR.  - Chronic liver disease work up was done in last admission.  - Ceruloplasmin negative, Hepatitis panel negative, Ferritin elevated, MICAELA negative, SMA negative, transferrin 296, globulins negative.    #HTN  - Takes Nifedipine 60mg dose at home.  - BP on admission is 146/99. Current 152/98.  - Likely also related to pain, continue with pain control.    Diet: NPO  Activity: IAT  DVT Prophylaxis: Lovenox  GI Prophylaxis: Protonix  CHG Ordered  Code Status: FULL   41 y/o male with hx HTN,aAlcohol and opoid use disorder, pancreatitis, admitted to floor due to epigastric pain with associated nausea and vomiting. Hx of pancreatitis in the past x 2. Recent alcohol use 2/28/21.     # Abd pain secondary to acute on chronic pancreatitis  - 3/3 criteria (epigastric pain, CT evidence of pancreatic fat stranding, elevated lipase)  - Cause: Likely alcohol related, no evidence of gallstones on CT abd, .  - CT abd showed: Acute pancreatitis. 1.2 cm probable pseudocyst along the pancreatic body is unchanged.  PLAN:  - IV fluids as LR bolus of 15-20ml/kg - then 3ml/kg for 12- 24 hours for maintenance.  - Pain control with morphine PRN.  - NPO.  - Advanced GI following: considered EUS with FNA in Dec 2020 after pancreatitis as outpatient. But pt got COVID and couldn't make the appointment.    #Alcohol use disorder  - Last drink on 2/28/2021 afternoon.  - CIWA protocol with ativan taper started. Current CIWA score of 2.  - C/w thiamine and folic acid.  - CATCH team on board.     #Elevated LFTs  - Likely from fatty liver/alcoholic liver disease; CT abd showed hepatic steatosis.   - Trend INR.  - Chronic liver disease work up was done in last admission.  - Ceruloplasmin negative, Hepatitis panel negative, Ferritin elevated, MICAELA negative, SMA negative, transferrin 296, globulins negative.    #HTN  - Takes Nifedipine 60mg dose at home.  - BP on admission is 146/99. Current 152/98.  - Likely also related to pain, continue with pain control.    Diet: NPO  Activity: IAT  DVT Prophylaxis: Lovenox  GI Prophylaxis: Protonix  CHG Ordered  Code Status: FULL   41 y/o male with hx HTN, alcohol and opoid use disorder, pancreatitis, admitted to floor due to epigastric pain with associated nausea and vomiting. Hx of pancreatitis in the past x 2. Recent alcohol use 2/28/21.     # Abd pain secondary to acute on chronic pancreatitis  - 3/3 criteria (epigastric pain, CT evidence of pancreatic fat stranding, elevated lipase)  - Cause: Likely alcohol related, no evidence of gallstones on CT abd, .  - CT abd showed: Acute pancreatitis. 1.2 cm probable pseudocyst along the pancreatic body is unchanged.  PLAN:  - IV fluids as LR bolus of 15-20ml/kg - then 3ml/kg for 12- 24 hours for maintenance.  - Pain control with morphine PRN.  - NPO.  - Advanced GI following: considered EUS with FNA in Dec 2020 after pancreatitis as outpatient. But pt got COVID and couldn't make the appointment.    #Alcohol use disorder  - Last drink on 2/28/2021 afternoon.  - CIWA protocol with ativan taper started. Current CIWA score of 2.  - C/w thiamine and folic acid.  - CATCH team on board.     #Elevated LFTs  - Likely from fatty liver/alcoholic liver disease; CT abd showed hepatic steatosis.   - Trend INR.  - Chronic liver disease work up was done in last admission.  - Ceruloplasmin negative, Hepatitis panel negative, Ferritin elevated, MICAELA negative, SMA negative, transferrin 296, globulins negative.    #HTN  - Takes Nifedipine 60mg dose at home.  - BP on admission is 146/99. Current 152/98.  - Likely also related to pain, continue with pain control.    Diet: NPO  Activity: IAT  DVT Prophylaxis: Lovenox  GI Prophylaxis: Protonix  CHG Ordered  Code Status: FULL   41 y/o male with hx HTN, alcohol and opoid use disorder, pancreatitis, admitted to floor due to epigastric pain with associated nausea and vomiting. Hx of pancreatitis in the past x 2. Recent alcohol use 2/28/21.     # Abd pain secondary to acute on chronic pancreatitis  - 3/3 criteria (epigastric pain, CT evidence of pancreatic fat stranding, elevated lipase)  - Cause: Likely alcohol related, no evidence of gallstones on CT abd, .  - CT abd showed: Acute pancreatitis. 1.2 cm probable pseudocyst along the pancreatic body is unchanged.  PLAN:  - IV fluids as LR bolus of 15-20ml/kg - then 3ml/kg for 12- 24 hours for maintenance.  - Pain control with Dilaudid 2 mg q4h prn.  - NPO.  - Advanced GI following: considered EUS with FNA in Dec 2020 after pancreatitis as outpatient. But pt got COVID and couldn't make the appointment.    #Alcohol use disorder  - Last drink on 2/28/2021 afternoon.  - CIWA protocol with ativan taper started. Current CIWA score of 2.  - C/w thiamine and folic acid.  - CATCH team on board.     #Elevated LFTs  - Likely from fatty liver/alcoholic liver disease; CT abd showed hepatic steatosis.   - Trend INR.  - Chronic liver disease work up was done in last admission.  - Ceruloplasmin negative, Hepatitis panel negative, Ferritin elevated, MICAELA negative, SMA negative, transferrin 296, globulins negative.    #HTN  - C/w Nifedipine at home dose.   - BP on admission is 146/99. Current 152/98.  - Likely also related to pain, continue with pain control.    Diet: NPO  Activity: IAT  DVT Prophylaxis: Lovenox  GI Prophylaxis: Protonix  CHG Ordered  Code Status: FULL   41 y/o male with hx HTN, alcohol and opoid use disorder, pancreatitis, admitted to floor due to epigastric pain with associated nausea and vomiting. Hx of pancreatitis in the past x 2. Recent alcohol use 2/28/21.     # Abd pain secondary to acute on chronic pancreatitis  - 3/3 criteria (epigastric pain, CT evidence of pancreatic fat stranding, elevated lipase)  - Cause: Likely alcohol related, no evidence of gallstones on CT abd, .  - CT abd showed: Acute pancreatitis. 1.2 cm probable pseudocyst along the pancreatic body is unchanged.  PLAN:  - IV fluids as LR bolus of 15-20ml/kg - then 3ml/kg for 12- 24 hours for maintenance.  - Pain control with Dilaudid 2 mg q4h prn.  - NPO.  - Advanced GI following: considered EUS with FNA in Dec 2020 after pancreatitis as outpatient. But pt got COVID and couldn't make the appointment.    #Alcohol use disorder  - Last drink on 2/28/2021 afternoon.  - CIWA protocol with ativan taper started. Current CIWA score of 2.  - C/w thiamine and folic acid.  - Mg repleted. Continue to monitor.  - CATCH team on board.     #Elevated LFTs  - Likely from fatty liver/alcoholic liver disease; CT abd showed hepatic steatosis.   - Trend INR.  - Chronic liver disease work up was done in last admission.  - Ceruloplasmin negative, Hepatitis panel negative, Ferritin elevated, MICAELA negative, SMA negative, transferrin 296, globulins negative.    #HTN  - C/w Nifedipine at home dose.   - BP on admission is 146/99. Current 152/98.  - Likely also related to pain, continue with pain control.    Diet: NPO  Activity: IAT  DVT Prophylaxis: Lovenox  GI Prophylaxis: Protonix  CHG Ordered  Code Status: FULL

## 2021-03-04 NOTE — PROGRESS NOTE ADULT - ASSESSMENT
41 y/o male with hx HTN, alcohol and opoid use disorder, pancreatitis, admitted to floor due to epigastric pain with associated nausea and vomiting. Hx of pancreatitis in the past x 2. Recent alcohol use 2/28/21.     # Recurrent pancreatitis/ abdominal pain    - likely due to alcoholism   - start Dilaudid 2 mg Q 4 hours PRN for better pain control   - keep NPO, will consider clear liquid if pt'll feel better this afternoon   - pt was extensively consulted regarding Etoh abstinence ( he has a strong intention to stop drinking)   - CT abd showed: Acute pancreatitis. 1.2 cm probable pseudocyst along the pancreatic body is unchanged.  - aggressive IV hydration   - consulted by GI, will follow up recommendations     #Alcohol abuse   - Last drink on 2/28/2021 afternoon.  - CIWA protocol with ativan taper started. Current CIWA score of 2.  - C/w thiamine and folic acid.  - Mg repleted. Continue to monitor.  - CATCH team on board.   - pt was consulted regarding abstinence     # Etoh induced liver injury   - monitor LFTs   - avoid hepatotoxic medications     #HTN  - C/w Nifedipine   - DASH diet       Diet: NPO  Activity: IAT  DVT Prophylaxis: Lovenox  GI Prophylaxis: Protonix    #Progress Note Handoff  Pending (specify): d/c Morphine, start Dilaudid 2 mg Q 4 hours PRN, IV hydration, advance diet as tolerated in 24 hours, f/u GI recommendations, monitor LFTs and electrolytes   Family discussion: I spoke with pt, he agreed with a plan of care   Disposition: Home_x __/SNF___/Other________/Unknown at this time________

## 2021-03-04 NOTE — CONSULT NOTE ADULT - ASSESSMENT
Patient is a 39 y/o male with hx HTN, Alcohol and opiod use disorder, COVID in Dec 2020, Pancreatitis in 2018, 2019 and 2020 now presents to the ED with complaints of abdominal pain and nausea. Patient has had prior admission for Pancreatitis from ETOH use. He did not keep outpatient follow up with GI and has been drinking since last discharge. Currently he feels well this morning. Labwork notable for elevations of LFT which are improved along with a Lipase in the 200's. Imaging notes a 1.2cm likely Pancreatic Body Pseudocyst without PD dilation.  Treat acute Pancreatitis.    Pancreatitis from ETOH use/

## 2021-03-04 NOTE — PROGRESS NOTE ADULT - SUBJECTIVE AND OBJECTIVE BOX
SUBJECTIVE:    Patient is a 40y old Male who presents with a chief complaint of Abdominal pain (03 Mar 2021 14:32)    Currently admitted to medicine with the primary diagnosis of Acute pancreatitis       Today is hospital day 1d. This morning he is resting comfortably in bed and reports no new issues or overnight events. No fevers, chills. Denies CP or SOB. No N/V/D. Has been eating well. No dysuria. Last BM was. Adequate sleep. Ambulating.      PAST MEDICAL & SURGICAL HISTORY  Asthma  no recent exacerbation, not using inhalers for a long time    Pancreatitis  October 2018 due to alcohol    No pertinent past medical history    H/O rotator cuff surgery    ALLERGIES:  No Known Allergies    MEDICATIONS:  STANDING MEDICATIONS  chlorhexidine 4% Liquid 1 Application(s) Topical daily  enoxaparin Injectable 40 milliGRAM(s) SubCutaneous daily  folic acid 1 milliGRAM(s) Oral daily  influenza   Vaccine 0.5 milliLiter(s) IntraMuscular once  lactated ringers. 1000 milliLiter(s) IV Continuous <Continuous>  LORazepam   Injectable   IV Push   LORazepam   Injectable 2 milliGRAM(s) IV Push every 4 hours  LORazepam   Injectable 1.5 milliGRAM(s) IV Push every 4 hours  NIFEdipine XL 60 milliGRAM(s) Oral daily  pantoprazole    Tablet 40 milliGRAM(s) Oral before breakfast  thiamine Injectable 100 milliGRAM(s) IV Push daily    PRN MEDICATIONS  morphine  - Injectable 2 milliGRAM(s) IV Push every 2 hours PRN  ondansetron Injectable 4 milliGRAM(s) IV Push every 6 hours PRN    VITALS:   T(F): 97.6  HR: 87  BP: 152/98  RR: 18  SpO2: 98% RA    PHYSICAL EXAM:  GEN: Lying bed in mild-to-moderate distress clutching onto stomach   LUNGS: Clear to auscultation bilaterally. No rales, rhonchi, or wheezing.  HEART: S1/S2 present. RRR.   ABD: Soft, TTP in epigastric region, non-distended. Bowel sounds present.  EXT: No edema.   NEURO: AAOX3. Non-focal.  Skin: No rashes or lesions.     LABS:                        15.1   8.57  )-----------( 238      ( 04 Mar 2021 06:40 )             42.8     03-04    134<L>  |  96<L>  |  8<L>  ----------------------------<  114<H>  4.0   |  22  |  0.7    Ca    9.5      04 Mar 2021 06:40  Phos  3.5     03-04  Mg     2.0     03-04    TPro  7.1  /  Alb  4.4  /  TBili  1.2  /  DBili  x   /  AST  117<H>  /  ALT  237<H>  /  AlkPhos  86  03-04    Lactate, Blood: 1.0 mmol/L (03-04-21 @ 06:40)  Troponin T, Serum: <0.01 ng/mL (03-03-21 @ 10:10)      CARDIAC MARKERS ( 03 Mar 2021 10:10 )  x     / <0.01 ng/mL / x     / x     / x          RADIOLOGY:     SUBJECTIVE:    Patient is a 40y old Male who presents with a chief complaint of Abdominal pain (03 Mar 2021 14:32)    Currently admitted to medicine with the primary diagnosis of Acute pancreatitis     Today is hospital day 1d.     As per overnight nurse, pt had 2 episodes of bilious vomiting last night. CIWA 2. Pt reports continued epigastric pain radiating to back c/w his prior episodes of pancreatitis in the past, uncomfortable appearing secondary to pain. Requesting something longer lasting than morphine.    PAST MEDICAL & SURGICAL HISTORY  Asthma  no recent exacerbation, not using inhalers for a long time    Pancreatitis  October 2018 due to alcohol    No pertinent past medical history    H/O rotator cuff surgery    ALLERGIES:  No Known Allergies    MEDICATIONS:  STANDING MEDICATIONS  chlorhexidine 4% Liquid 1 Application(s) Topical daily  enoxaparin Injectable 40 milliGRAM(s) SubCutaneous daily  folic acid 1 milliGRAM(s) Oral daily  influenza   Vaccine 0.5 milliLiter(s) IntraMuscular once  lactated ringers. 1000 milliLiter(s) IV Continuous <Continuous>  LORazepam   Injectable   IV Push   LORazepam   Injectable 2 milliGRAM(s) IV Push every 4 hours  LORazepam   Injectable 1.5 milliGRAM(s) IV Push every 4 hours  NIFEdipine XL 60 milliGRAM(s) Oral daily  pantoprazole    Tablet 40 milliGRAM(s) Oral before breakfast  thiamine Injectable 100 milliGRAM(s) IV Push daily    PRN MEDICATIONS  morphine  - Injectable 2 milliGRAM(s) IV Push every 2 hours PRN  ondansetron Injectable 4 milliGRAM(s) IV Push every 6 hours PRN    VITALS:   T(F): 97.6  HR: 87  BP: 152/98  RR: 18  SpO2: 98% RA    PHYSICAL EXAM:  GEN: Lying bed in mild-to-moderate distress clutching onto stomach   LUNGS: Clear to auscultation bilaterally. No rales, rhonchi, or wheezing.  HEART: S1/S2 present. RRR.   ABD: Soft, TTP in epigastric region, non-distended. Bowel sounds present.  EXT: No edema.   NEURO: AAOX3. Non-focal.  Skin: No rashes or lesions.     LABS:                        15.1   8.57  )-----------( 238      ( 04 Mar 2021 06:40 )             42.8     03-04    134<L>  |  96<L>  |  8<L>  ----------------------------<  114<H>  4.0   |  22  |  0.7    Ca    9.5      04 Mar 2021 06:40  Phos  3.5     03-04  Mg     2.0     03-04    TPro  7.1  /  Alb  4.4  /  TBili  1.2  /  DBili  x   /  AST  117<H>  /  ALT  237<H>  /  AlkPhos  86  03-04    Lactate, Blood: 1.0 mmol/L (03-04-21 @ 06:40)  Troponin T, Serum: <0.01 ng/mL (03-03-21 @ 10:10)      CARDIAC MARKERS ( 03 Mar 2021 10:10 )  x     / <0.01 ng/mL / x     / x     / x          RADIOLOGY:     SUBJECTIVE:    Patient is a 40y old Male who presents with a chief complaint of Abdominal pain (03 Mar 2021 14:32)    Currently admitted to medicine with the primary diagnosis of Acute pancreatitis     Today is hospital day 1d.     As per overnight nurse, pt had 2 episodes of bilious vomiting last night. CIWA 2. Pt reports continued epigastric pain radiating to back c/w his prior episodes of pancreatitis in the past, uncomfortable appearing secondary to pain. Requesting something longer lasting than morphine.    PAST MEDICAL & SURGICAL HISTORY  Asthma, no recent exacerbation, not using inhalers for a long time  Pancreatitis  H/O rotator cuff surgery    ALLERGIES:  No Known Allergies    MEDICATIONS:  STANDING MEDICATIONS  chlorhexidine 4% Liquid 1 Application(s) Topical daily  enoxaparin Injectable 40 milliGRAM(s) SubCutaneous daily  folic acid 1 milliGRAM(s) Oral daily  influenza   Vaccine 0.5 milliLiter(s) IntraMuscular once  lactated ringers. 1000 milliLiter(s) IV Continuous <Continuous>  LORazepam   Injectable   IV Push   LORazepam   Injectable 2 milliGRAM(s) IV Push every 4 hours  LORazepam   Injectable 1.5 milliGRAM(s) IV Push every 4 hours  NIFEdipine XL 60 milliGRAM(s) Oral daily  pantoprazole    Tablet 40 milliGRAM(s) Oral before breakfast  thiamine Injectable 100 milliGRAM(s) IV Push daily    PRN MEDICATIONS  morphine  - Injectable 2 milliGRAM(s) IV Push every 2 hours PRN  ondansetron Injectable 4 milliGRAM(s) IV Push every 6 hours PRN    VITALS:   T(F): 97.6  HR: 87  BP: 152/98  RR: 18  SpO2: 98% RA    PHYSICAL EXAM:  GEN: Lying bed in mild-to-moderate distress clutching onto stomach   LUNGS: Clear to auscultation bilaterally. No rales, rhonchi, or wheezing.  HEART: S1/S2 present. RRR.   ABD: Soft, TTP in epigastric region, non-distended. Bowel sounds present.  EXT: No edema.   NEURO: AAOX3. Non-focal.  Skin: No rashes or lesions.     LABS:                        15.1   8.57  )-----------( 238      ( 04 Mar 2021 06:40 )             42.8     03-04    134<L>  |  96<L>  |  8<L>  ----------------------------<  114<H>  4.0   |  22  |  0.7    Ca    9.5      04 Mar 2021 06:40  Phos  3.5     03-04  Mg     2.0     03-04    TPro  7.1  /  Alb  4.4  /  TBili  1.2  /  DBili  x   /  AST  117<H>  /  ALT  237<H>  /  AlkPhos  86  03-04    Lactate, Blood: 1.0 mmol/L (03-04-21 @ 06:40)  Troponin T, Serum: <0.01 ng/mL (03-03-21 @ 10:10)      CARDIAC MARKERS ( 03 Mar 2021 10:10 )  x     / <0.01 ng/mL / x     / x     / x          RADIOLOGY:     SUBJECTIVE:    Patient is a 40y old Male who presents with a chief complaint of Abdominal pain (03 Mar 2021 14:32)    Currently admitted to medicine with the primary diagnosis of Acute pancreatitis     Today is hospital day 1d.     As per overnight nurse, pt had 2 episodes of bilious vomiting last night. CIWA 2. Pt reports continued epigastric pain radiating to back c/w his prior episodes of pancreatitis in the past, uncomfortable appearing secondary to pain. Requesting something longer lasting than morphine, stating that got only 15 min of sleep last night because of the pain.    PAST MEDICAL & SURGICAL HISTORY  Asthma, no recent exacerbation, not using inhalers for a long time  Pancreatitis  H/O rotator cuff surgery    ALLERGIES:  No Known Allergies    MEDICATIONS:  STANDING MEDICATIONS  chlorhexidine 4% Liquid 1 Application(s) Topical daily  enoxaparin Injectable 40 milliGRAM(s) SubCutaneous daily  folic acid 1 milliGRAM(s) Oral daily  influenza   Vaccine 0.5 milliLiter(s) IntraMuscular once  lactated ringers. 1000 milliLiter(s) IV Continuous <Continuous>  LORazepam   Injectable   IV Push   LORazepam   Injectable 2 milliGRAM(s) IV Push every 4 hours  LORazepam   Injectable 1.5 milliGRAM(s) IV Push every 4 hours  NIFEdipine XL 60 milliGRAM(s) Oral daily  pantoprazole    Tablet 40 milliGRAM(s) Oral before breakfast  thiamine Injectable 100 milliGRAM(s) IV Push daily    PRN MEDICATIONS  morphine  - Injectable 2 milliGRAM(s) IV Push every 2 hours PRN  ondansetron Injectable 4 milliGRAM(s) IV Push every 6 hours PRN    VITALS:   T(F): 97.6  HR: 87  BP: 152/98  RR: 18  SpO2: 98% RA    PHYSICAL EXAM:  GEN: Lying bed in mild-to-moderate distress clutching onto stomach   LUNGS: Clear to auscultation bilaterally. No rales, rhonchi, or wheezing.  HEART: S1/S2 present. RRR.   ABD: Soft, TTP in epigastric region, non-distended. Bowel sounds present.  EXT: No edema.   NEURO: AAOX3. Non-focal.  Skin: No rashes or lesions.     LABS:                        15.1   8.57  )-----------( 238      ( 04 Mar 2021 06:40 )             42.8     03-04    134<L>  |  96<L>  |  8<L>  ----------------------------<  114<H>  4.0   |  22  |  0.7    Ca    9.5      04 Mar 2021 06:40  Phos  3.5     03-04  Mg     2.0     03-04    TPro  7.1  /  Alb  4.4  /  TBili  1.2  /  DBili  x   /  AST  117<H>  /  ALT  237<H>  /  AlkPhos  86  03-04    Lactate, Blood: 1.0 mmol/L (03-04-21 @ 06:40)  Troponin T, Serum: <0.01 ng/mL (03-03-21 @ 10:10)      CARDIAC MARKERS ( 03 Mar 2021 10:10 )  x     / <0.01 ng/mL / x     / x     / x          RADIOLOGY:     SUBJECTIVE:    Patient is a 40y old Male who presents with a chief complaint of Abdominal pain (03 Mar 2021 14:32)    Currently admitted to medicine with the primary diagnosis of Acute pancreatitis     Today is hospital day 1d.     As per overnight nurse, pt had 2 episodes of bilious vomiting last night. CIWA 2. Pt reports continued epigastric pain radiating to back c/w his prior episodes of pancreatitis in the past, uncomfortable appearing secondary to pain. Requesting something longer lasting than morphine, stating that he got only 15 min of sleep last night because of the pain.    PAST MEDICAL & SURGICAL HISTORY  Asthma, no recent exacerbation, not using inhalers for a long time  Pancreatitis  H/O rotator cuff surgery    ALLERGIES:  No Known Allergies    MEDICATIONS:  STANDING MEDICATIONS  chlorhexidine 4% Liquid 1 Application(s) Topical daily  enoxaparin Injectable 40 milliGRAM(s) SubCutaneous daily  folic acid 1 milliGRAM(s) Oral daily  influenza   Vaccine 0.5 milliLiter(s) IntraMuscular once  lactated ringers. 1000 milliLiter(s) IV Continuous <Continuous>  LORazepam   Injectable   IV Push   LORazepam   Injectable 2 milliGRAM(s) IV Push every 4 hours  LORazepam   Injectable 1.5 milliGRAM(s) IV Push every 4 hours  NIFEdipine XL 60 milliGRAM(s) Oral daily  pantoprazole    Tablet 40 milliGRAM(s) Oral before breakfast  thiamine Injectable 100 milliGRAM(s) IV Push daily    PRN MEDICATIONS  morphine  - Injectable 2 milliGRAM(s) IV Push every 2 hours PRN  ondansetron Injectable 4 milliGRAM(s) IV Push every 6 hours PRN    VITALS:   T(F): 97.6  HR: 87  BP: 152/98  RR: 18  SpO2: 98% RA    PHYSICAL EXAM:  GEN: Lying bed in mild-to-moderate distress clutching onto stomach   LUNGS: Clear to auscultation bilaterally. No rales, rhonchi, or wheezing.  HEART: S1/S2 present. RRR.   ABD: Soft, TTP in epigastric region, non-distended. Bowel sounds present.  EXT: No edema.   NEURO: AAOX3. Non-focal.  Skin: No rashes or lesions.     LABS:                        15.1   8.57  )-----------( 238      ( 04 Mar 2021 06:40 )             42.8     03-04    134<L>  |  96<L>  |  8<L>  ----------------------------<  114<H>  4.0   |  22  |  0.7    Ca    9.5      04 Mar 2021 06:40  Phos  3.5     03-04  Mg     2.0     03-04    TPro  7.1  /  Alb  4.4  /  TBili  1.2  /  DBili  x   /  AST  117<H>  /  ALT  237<H>  /  AlkPhos  86  03-04    Lactate, Blood: 1.0 mmol/L (03-04-21 @ 06:40)  Troponin T, Serum: <0.01 ng/mL (03-03-21 @ 10:10)      CARDIAC MARKERS ( 03 Mar 2021 10:10 )  x     / <0.01 ng/mL / x     / x     / x          RADIOLOGY:     SUBJECTIVE:    Patient is a 40y old Male who presents with a chief complaint of Abdominal pain (03 Mar 2021 14:32)    Currently admitted to medicine with the primary diagnosis of Acute pancreatitis     Today is hospital day 1d.     As per overnight nurse, pt had 2 episodes of bilious vomiting last night. CIWA 2. Pt reports continued epigastric pain radiating to back c/w his prior episodes of pancreatitis in the past, uncomfortable appearing secondary to pain. Requesting something longer lasting than morphine, stating that he got only 15 min of sleep last night because of the pain.    PAST MEDICAL & SURGICAL HISTORY  Asthma, no recent exacerbation, not using inhalers for a long time  Pancreatitis  H/O rotator cuff surgery    ALLERGIES:  No Known Allergies    MEDICATIONS:  STANDING MEDICATIONS  chlorhexidine 4% Liquid 1 Application(s) Topical daily  enoxaparin Injectable 40 milliGRAM(s) SubCutaneous daily  folic acid 1 milliGRAM(s) Oral daily  influenza   Vaccine 0.5 milliLiter(s) IntraMuscular once  lactated ringers. 1000 milliLiter(s) IV Continuous <Continuous>  LORazepam   Injectable   IV Push   LORazepam   Injectable 2 milliGRAM(s) IV Push every 4 hours  LORazepam   Injectable 1.5 milliGRAM(s) IV Push every 4 hours  NIFEdipine XL 60 milliGRAM(s) Oral daily  pantoprazole    Tablet 40 milliGRAM(s) Oral before breakfast  thiamine Injectable 100 milliGRAM(s) IV Push daily    PRN MEDICATIONS  morphine  - Injectable 2 milliGRAM(s) IV Push every 2 hours PRN  ondansetron Injectable 4 milliGRAM(s) IV Push every 6 hours PRN    VITALS:   T(F): 97.6  HR: 87  BP: 152/98  RR: 18  SpO2: 98% RA    PHYSICAL EXAM:  GEN: Lying in bed in mild-to-moderate distress clutching onto stomach   LUNGS: Clear to auscultation bilaterally. No rales, rhonchi, or wheezing.  HEART: S1/S2 present. RRR.   ABD: Soft, TTP in epigastric region, non-distended. Bowel sounds present.  EXT: No edema.   NEURO: AAOX3. Non-focal.  Skin: No rashes or lesions.     LABS:                        15.1   8.57  )-----------( 238      ( 04 Mar 2021 06:40 )             42.8     03-04    134<L>  |  96<L>  |  8<L>  ----------------------------<  114<H>  4.0   |  22  |  0.7    Ca    9.5      04 Mar 2021 06:40  Phos  3.5     03-04  Mg     2.0     03-04    TPro  7.1  /  Alb  4.4  /  TBili  1.2  /  DBili  x   /  AST  117<H>  /  ALT  237<H>  /  AlkPhos  86  03-04    Lactate, Blood: 1.0 mmol/L (03-04-21 @ 06:40)  Troponin T, Serum: <0.01 ng/mL (03-03-21 @ 10:10)      CARDIAC MARKERS ( 03 Mar 2021 10:10 )  x     / <0.01 ng/mL / x     / x     / x          RADIOLOGY:     SUBJECTIVE:    Patient is a 40y old Male who presents with a chief complaint of Abdominal pain (03 Mar 2021 14:32)    Currently admitted to medicine with the primary diagnosis of Acute pancreatitis     Today is hospital day 1d.     As per overnight nurse, pt had 2 episodes of bilious vomiting last night. CIWA 2. Pt reports continued epigastric pain radiating to back c/w his prior episodes of pancreatitis in the past, uncomfortable appearing secondary to pain. Requesting something longer lasting than morphine, stating that he got only 15 min of sleep last night because of the pain.    PAST MEDICAL & SURGICAL HISTORY  Asthma, no recent exacerbation, not using inhalers for a long time  HTN  Alcohol and opioid use disorder  Pancreatitis x 3  H/O rotator cuff surgery    ALLERGIES:  No Known Allergies    MEDICATIONS:  STANDING MEDICATIONS  chlorhexidine 4% Liquid 1 Application(s) Topical daily  enoxaparin Injectable 40 milliGRAM(s) SubCutaneous daily  folic acid 1 milliGRAM(s) Oral daily  influenza   Vaccine 0.5 milliLiter(s) IntraMuscular once  lactated ringers. 1000 milliLiter(s) IV Continuous <Continuous>  LORazepam   Injectable   IV Push   LORazepam   Injectable 2 milliGRAM(s) IV Push every 4 hours  LORazepam   Injectable 1.5 milliGRAM(s) IV Push every 4 hours  NIFEdipine XL 60 milliGRAM(s) Oral daily  pantoprazole    Tablet 40 milliGRAM(s) Oral before breakfast  thiamine Injectable 100 milliGRAM(s) IV Push daily    PRN MEDICATIONS  morphine  - Injectable 2 milliGRAM(s) IV Push every 2 hours PRN  ondansetron Injectable 4 milliGRAM(s) IV Push every 6 hours PRN    VITALS:   T(F): 97.6  HR: 87  BP: 152/98  RR: 18  SpO2: 98% RA    PHYSICAL EXAM:  GEN: Lying in bed in mild-to-moderate distress clutching onto stomach   LUNGS: Clear to auscultation bilaterally. No rales, rhonchi, or wheezing.  HEART: S1/S2 present. RRR.   ABD: Soft, TTP in epigastric region, non-distended. Bowel sounds present.  EXT: No edema.   NEURO: AAOX3. Non-focal.  Skin: No rashes or lesions.     LABS:                        15.1   8.57  )-----------( 238      ( 04 Mar 2021 06:40 )             42.8     03-04    134<L>  |  96<L>  |  8<L>  ----------------------------<  114<H>  4.0   |  22  |  0.7    Ca    9.5      04 Mar 2021 06:40  Phos  3.5     03-04  Mg     2.0     03-04    TPro  7.1  /  Alb  4.4  /  TBili  1.2  /  DBili  x   /  AST  117<H>  /  ALT  237<H>  /  AlkPhos  86  03-04    Lactate, Blood: 1.0 mmol/L (03-04-21 @ 06:40)  Troponin T, Serum: <0.01 ng/mL (03-03-21 @ 10:10)      CARDIAC MARKERS ( 03 Mar 2021 10:10 )  x     / <0.01 ng/mL / x     / x     / x          RADIOLOGY:     SUBJECTIVE:    Patient is a 40y old Male who presents with a chief complaint of Abdominal pain (03 Mar 2021 14:32)    Currently admitted to medicine with the primary diagnosis of Acute pancreatitis     Today is hospital day 1d.     As per overnight nurse, pt had 2 episodes of bilious vomiting last night. CIWA 2. Pt reports continued epigastric pain radiating to back c/w his prior episodes of pancreatitis in the past, uncomfortable appearing secondary to pain. Requesting something longer lasting than morphine, stating that he got only 15 min of sleep last night because of the pain.    PAST MEDICAL & SURGICAL HISTORY  Asthma, no recent exacerbation, not using inhalers for a long time  HTN  Alcohol and opioid use disorder  Pancreatitis x 2  H/O rotator cuff surgery    ALLERGIES:  No Known Allergies    MEDICATIONS:  STANDING MEDICATIONS  chlorhexidine 4% Liquid 1 Application(s) Topical daily  enoxaparin Injectable 40 milliGRAM(s) SubCutaneous daily  folic acid 1 milliGRAM(s) Oral daily  influenza   Vaccine 0.5 milliLiter(s) IntraMuscular once  lactated ringers. 1000 milliLiter(s) IV Continuous <Continuous>  LORazepam   Injectable   IV Push   LORazepam   Injectable 2 milliGRAM(s) IV Push every 4 hours  LORazepam   Injectable 1.5 milliGRAM(s) IV Push every 4 hours  NIFEdipine XL 60 milliGRAM(s) Oral daily  pantoprazole    Tablet 40 milliGRAM(s) Oral before breakfast  thiamine Injectable 100 milliGRAM(s) IV Push daily    PRN MEDICATIONS  morphine  - Injectable 2 milliGRAM(s) IV Push every 2 hours PRN  ondansetron Injectable 4 milliGRAM(s) IV Push every 6 hours PRN    VITALS:   T(F): 97.6  HR: 87  BP: 152/98  RR: 18  SpO2: 98% RA    PHYSICAL EXAM:  GEN: Lying in bed in mild-to-moderate distress clutching onto stomach   LUNGS: Clear to auscultation bilaterally. No rales, rhonchi, or wheezing.  HEART: S1/S2 present. RRR.   ABD: Soft, TTP in epigastric region, non-distended. Bowel sounds present.  EXT: No edema.   NEURO: AAOX3. Non-focal.  Skin: No rashes or lesions.     LABS:                        15.1   8.57  )-----------( 238      ( 04 Mar 2021 06:40 )             42.8     03-04    134<L>  |  96<L>  |  8<L>  ----------------------------<  114<H>  4.0   |  22  |  0.7    Ca    9.5      04 Mar 2021 06:40  Phos  3.5     03-04  Mg     2.0     03-04    TPro  7.1  /  Alb  4.4  /  TBili  1.2  /  DBili  x   /  AST  117<H>  /  ALT  237<H>  /  AlkPhos  86  03-04    Lactate, Blood: 1.0 mmol/L (03-04-21 @ 06:40)  Troponin T, Serum: <0.01 ng/mL (03-03-21 @ 10:10)      CARDIAC MARKERS ( 03 Mar 2021 10:10 )  x     / <0.01 ng/mL / x     / x     / x          RADIOLOGY:     SUBJECTIVE:    Patient is a 40y old Male who presents with a chief complaint of Abdominal pain (03 Mar 2021 14:32)    Currently admitted to medicine with the primary diagnosis of Acute pancreatitis     Today is hospital day 1d.     As per overnight nurse, pt had 2 episodes of bilious vomiting last night. CIWA 2. Pt reports continued epigastric pain radiating to back c/w his prior episodes of pancreatitis in the past, uncomfortable appearing secondary to pain. Requesting something longer lasting than morphine, stating that he got only 15 min of sleep last night because of the pain.    PAST MEDICAL & SURGICAL HISTORY  Asthma, no recent exacerbation, not using inhalers for a long time  HTN  Alcohol and opioid use disorder  Pancreatitis x 2  H/O rotator cuff surgery    ALLERGIES:  No Known Allergies    MEDICATIONS:  STANDING MEDICATIONS  chlorhexidine 4% Liquid 1 Application(s) Topical daily  enoxaparin Injectable 40 milliGRAM(s) SubCutaneous daily  folic acid 1 milliGRAM(s) Oral daily  influenza   Vaccine 0.5 milliLiter(s) IntraMuscular once  lactated ringers. 1000 milliLiter(s) IV Continuous <Continuous>  LORazepam   Injectable   IV Push   LORazepam   Injectable 2 milliGRAM(s) IV Push every 4 hours  LORazepam   Injectable 1.5 milliGRAM(s) IV Push every 4 hours  NIFEdipine XL 60 milliGRAM(s) Oral daily  pantoprazole    Tablet 40 milliGRAM(s) Oral before breakfast  thiamine Injectable 100 milliGRAM(s) IV Push daily    PRN MEDICATIONS  morphine  - Injectable 2 milliGRAM(s) IV Push every 2 hours PRN  ondansetron Injectable 4 milliGRAM(s) IV Push every 6 hours PRN    VITALS:   T(F): 97.6  HR: 87  BP: 152/98  RR: 18  SpO2: 98% RA    PHYSICAL EXAM:  GEN: Lying in bed in mild-to-moderate distress clutching onto stomach   LUNGS: Clear to auscultation bilaterally. No rales, rhonchi, or wheezing.  HEART: S1/S2 present. RRR.   ABD: Soft, TTP in epigastric region, non-distended. Bowel sounds present.  EXT: No edema.   NEURO: AAOX3. Non-focal.  Skin: No rashes or lesions.     LABS:                        15.1   8.57  )-----------( 238      ( 04 Mar 2021 06:40 )             42.8     03-04    134<L>  |  96<L>  |  8<L>  ----------------------------<  114<H>  4.0   |  22  |  0.7    Ca    9.5      04 Mar 2021 06:40  Phos  3.5     03-04  Mg     2.0     03-04    TPro  7.1  /  Alb  4.4  /  TBili  1.2  /  DBili  x   /  AST  117<H>  /  ALT  237<H>  /  AlkPhos  86  03-04    Lactate, Blood: 1.0 mmol/L (03-04-21 @ 06:40)  Troponin T, Serum: <0.01 ng/mL (03-03-21 @ 10:10)      CARDIAC MARKERS ( 03 Mar 2021 10:10 )  x     / <0.01 ng/mL / x     / x     / x          RADIOLOGY:  FINDINGS:    LOWER CHEST: Unremarkable.    HEPATOBILIARY: Hepatic steatosis. 1.5 cm hypodensity along the falciform ligament, stable since 2018, measuring cystic density.    SPLEEN: Unremarkable.    PANCREAS: Peripancreatic fat stranding. 1.2 cm hypodensity in the pancreatic body may represent a pseudocyst. Normal enhancement of the pancreas. No significant pancreatic ductal dilation.    ADRENAL GLANDS: Unremarkable.    KIDNEYS: Symmetric renal enhancement. No hydronephrosis.    ABDOMINOPELVIC NODES: A few subcentimeter mesenteric lymph nodes are noted in the right upper quadrant.    PELVIC ORGANS: Urinary bladder is unremarkable.    PERITONEUM/MESENTERY/BOWEL: No evidence of bowel obstruction, free air, or ascites. The appendix appears unremarkable.    BONES/SOFT TISSUES: Unremarkable.    OTHER: Abdominal aortic caliber is within normal limits.      IMPRESSION:    Acute pancreatitis. 1.2 cm probable pseudocyst along the pancreatic body is unchanged.      FLOR LNAZA M.D., RESIDENT RADIOLOGIST  This document has been electronically signed.  LYLA ABEBE MD; Attending Radiologist  This document has been electronically signed. Mar  3 2021 11:47AM       SUBJECTIVE:    Patient is a 40y old Male who presents with a chief complaint of Abdominal pain (03 Mar 2021 14:32)    Currently admitted to medicine with the primary diagnosis of Acute pancreatitis     Today is hospital day 1d.     As per overnight nurse, pt had 2 episodes of bilious vomiting last night. CIWA 2. Pt reports continued epigastric pain radiating to back c/w his prior episodes of pancreatitis in the past, uncomfortable appearing secondary to pain. Requesting something longer lasting than morphine, stating that he got only 15 min of sleep last night because of the pain.    PAST MEDICAL & SURGICAL HISTORY  Asthma, no recent exacerbation, not using inhalers for a long time  HTN  Alcohol and opioid use disorder  Pancreatitis x 2  H/O rotator cuff surgery    ALLERGIES:  No Known Allergies    MEDICATIONS:  STANDING MEDICATIONS  chlorhexidine 4% Liquid 1 Application(s) Topical daily  enoxaparin Injectable 40 milliGRAM(s) SubCutaneous daily  folic acid 1 milliGRAM(s) Oral daily  influenza   Vaccine 0.5 milliLiter(s) IntraMuscular once  lactated ringers. 1000 milliLiter(s) IV Continuous <Continuous>  LORazepam   Injectable   IV Push   LORazepam   Injectable 2 milliGRAM(s) IV Push every 4 hours  LORazepam   Injectable 1.5 milliGRAM(s) IV Push every 4 hours  NIFEdipine XL 60 milliGRAM(s) Oral daily  pantoprazole    Tablet 40 milliGRAM(s) Oral before breakfast  thiamine Injectable 100 milliGRAM(s) IV Push daily    PRN MEDICATIONS  morphine  - Injectable 2 milliGRAM(s) IV Push every 2 hours PRN  ondansetron Injectable 4 milliGRAM(s) IV Push every 6 hours PRN    VITALS:   T(F): 97.6  HR: 87  BP: 152/98  RR: 18  SpO2: 98% RA    PHYSICAL EXAM:  GEN: Lying in bed in mild-to-moderate distress clutching onto stomach   LUNGS: Clear to auscultation bilaterally. No rales, rhonchi, or wheezing.  HEART: S1/S2 present. RRR.   ABD: Soft, moderate TTP in epigastric region, non-distended. Bowel sounds present.  EXT: No edema.   NEURO: AAOX3. Non-focal.  Skin: No rashes or lesions.     LABS:                        15.1   8.57  )-----------( 238      ( 04 Mar 2021 06:40 )             42.8     03-04    134<L>  |  96<L>  |  8<L>  ----------------------------<  114<H>  4.0   |  22  |  0.7    Ca    9.5      04 Mar 2021 06:40  Phos  3.5     03-04  Mg     2.0     03-04    TPro  7.1  /  Alb  4.4  /  TBili  1.2  /  DBili  x   /  AST  117<H>  /  ALT  237<H>  /  AlkPhos  86  03-04    Lactate, Blood: 1.0 mmol/L (03-04-21 @ 06:40)  Troponin T, Serum: <0.01 ng/mL (03-03-21 @ 10:10)      CARDIAC MARKERS ( 03 Mar 2021 10:10 )  x     / <0.01 ng/mL / x     / x     / x          RADIOLOGY:  FINDINGS:    LOWER CHEST: Unremarkable.    HEPATOBILIARY: Hepatic steatosis. 1.5 cm hypodensity along the falciform ligament, stable since 2018, measuring cystic density.    SPLEEN: Unremarkable.    PANCREAS: Peripancreatic fat stranding. 1.2 cm hypodensity in the pancreatic body may represent a pseudocyst. Normal enhancement of the pancreas. No significant pancreatic ductal dilation.    ADRENAL GLANDS: Unremarkable.    KIDNEYS: Symmetric renal enhancement. No hydronephrosis.    ABDOMINOPELVIC NODES: A few subcentimeter mesenteric lymph nodes are noted in the right upper quadrant.    PELVIC ORGANS: Urinary bladder is unremarkable.    PERITONEUM/MESENTERY/BOWEL: No evidence of bowel obstruction, free air, or ascites. The appendix appears unremarkable.    BONES/SOFT TISSUES: Unremarkable.    OTHER: Abdominal aortic caliber is within normal limits.      IMPRESSION:    Acute pancreatitis. 1.2 cm probable pseudocyst along the pancreatic body is unchanged.      FLOR LANZA M.D., RESIDENT RADIOLOGIST  This document has been electronically signed.  LYLA ABEBE MD; Attending Radiologist  This document has been electronically signed. Mar  3 2021 11:47AM

## 2021-03-05 VITALS
RESPIRATION RATE: 20 BRPM | TEMPERATURE: 97 F | SYSTOLIC BLOOD PRESSURE: 128 MMHG | DIASTOLIC BLOOD PRESSURE: 76 MMHG | HEART RATE: 89 BPM

## 2021-03-05 PROCEDURE — 99232 SBSQ HOSP IP/OBS MODERATE 35: CPT

## 2021-03-05 RX ORDER — HYDROMORPHONE HYDROCHLORIDE 2 MG/ML
1 INJECTION INTRAMUSCULAR; INTRAVENOUS; SUBCUTANEOUS EVERY 4 HOURS
Refills: 0 | Status: DISCONTINUED | OUTPATIENT
Start: 2021-03-05 | End: 2021-03-05

## 2021-03-05 RX ADMIN — Medication 1.5 MILLIGRAM(S): at 01:27

## 2021-03-05 RX ADMIN — HYDROMORPHONE HYDROCHLORIDE 2 MILLIGRAM(S): 2 INJECTION INTRAMUSCULAR; INTRAVENOUS; SUBCUTANEOUS at 13:15

## 2021-03-05 RX ADMIN — PANTOPRAZOLE SODIUM 40 MILLIGRAM(S): 20 TABLET, DELAYED RELEASE ORAL at 05:20

## 2021-03-05 RX ADMIN — HYDROMORPHONE HYDROCHLORIDE 1 MILLIGRAM(S): 2 INJECTION INTRAMUSCULAR; INTRAVENOUS; SUBCUTANEOUS at 17:25

## 2021-03-05 RX ADMIN — CHLORHEXIDINE GLUCONATE 1 APPLICATION(S): 213 SOLUTION TOPICAL at 11:35

## 2021-03-05 RX ADMIN — ENOXAPARIN SODIUM 40 MILLIGRAM(S): 100 INJECTION SUBCUTANEOUS at 11:35

## 2021-03-05 RX ADMIN — HYDROMORPHONE HYDROCHLORIDE 2 MILLIGRAM(S): 2 INJECTION INTRAMUSCULAR; INTRAVENOUS; SUBCUTANEOUS at 12:36

## 2021-03-05 RX ADMIN — Medication 1.5 MILLIGRAM(S): at 05:19

## 2021-03-05 RX ADMIN — HYDROMORPHONE HYDROCHLORIDE 2 MILLIGRAM(S): 2 INJECTION INTRAMUSCULAR; INTRAVENOUS; SUBCUTANEOUS at 04:22

## 2021-03-05 RX ADMIN — HYDROMORPHONE HYDROCHLORIDE 2 MILLIGRAM(S): 2 INJECTION INTRAMUSCULAR; INTRAVENOUS; SUBCUTANEOUS at 00:05

## 2021-03-05 RX ADMIN — HYDROMORPHONE HYDROCHLORIDE 2 MILLIGRAM(S): 2 INJECTION INTRAMUSCULAR; INTRAVENOUS; SUBCUTANEOUS at 08:45

## 2021-03-05 RX ADMIN — Medication 1 MILLIGRAM(S): at 13:52

## 2021-03-05 RX ADMIN — Medication 1.5 MILLIGRAM(S): at 10:36

## 2021-03-05 RX ADMIN — Medication 1 MILLIGRAM(S): at 17:25

## 2021-03-05 RX ADMIN — Medication 100 MILLIGRAM(S): at 11:36

## 2021-03-05 RX ADMIN — HYDROMORPHONE HYDROCHLORIDE 2 MILLIGRAM(S): 2 INJECTION INTRAMUSCULAR; INTRAVENOUS; SUBCUTANEOUS at 08:29

## 2021-03-05 RX ADMIN — Medication 1 MILLIGRAM(S): at 11:35

## 2021-03-05 RX ADMIN — Medication 60 MILLIGRAM(S): at 05:20

## 2021-03-05 NOTE — PROGRESS NOTE ADULT - SUBJECTIVE AND OBJECTIVE BOX
40y old Male who presents with a chief complaint of Abdominal pain , admitted to medicine with the primary diagnosis of Acute pancreatitis, pt has h/o Etoh abuse and recurrent pancreatitis  Today pt feels better, still in pain but less than day before.     PAST MEDICAL & SURGICAL HISTORY  Asthma, no recent exacerbation, not using inhalers for a long time  HTN  Alcohol and opioid use disorder  Pancreatitis x 2  H/O rotator cuff surgery    ALLERGIES:  No Known Allergies    VITALS:   T(C): 36 (05 Mar 2021 14:52), Max: 36.2 (05 Mar 2021 05:35)  T(F): 96.8 (05 Mar 2021 14:52), Max: 97.2 (05 Mar 2021 05:35)  HR: 89 (05 Mar 2021 14:52) (76 - 89)  BP: 128/76 (05 Mar 2021 14:52) (128/76 - 135/82)  BP(mean): --  RR: 20 (05 Mar 2021 14:52) (18 - 20)  SpO2: --    PHYSICAL EXAM:  GEN: NAD with resting tremor and flushed face    NECK: supple, no JVD   LUNGS: Clear to auscultation bilaterally. No rales, rhonchi, or wheezing.  HEART: S1/S2 present. RRR.   ABD: Soft, tender in RUQ and  epigastric region on deep palpation, no rebound, no guarding, non-distended. Bowel sounds present.  EXT: No edema.   NEURO: AAOX3. Non-focal.  Skin: No rashes or lesions.     LABS:                                   15.1   8.57  )-----------( 238      ( 04 Mar 2021 06:40 )             42.8   03-04    134<L>  |  96<L>  |  8<L>  ----------------------------<  114<H>  4.0   |  22  |  0.7    Ca    9.5      04 Mar 2021 06:40  Phos  3.5     03-04  Mg     2.0     03-04    TPro  7.1  /  Alb  4.4  /  TBili  1.2  /  DBili  x   /  AST  117<H>  /  ALT  237<H>  /  AlkPhos  86  03-04      Lactate, Blood: 1.0 mmol/L (03-04-21 @ 06:40)  Troponin T, Serum: <0.01 ng/mL (03-03-21 @ 10:10)      CARDIAC MARKERS ( 03 Mar 2021 10:10 )  x     / <0.01 ng/mL / x     / x     / x          RADIOLOGY:  FINDINGS:    LOWER CHEST: Unremarkable.  IMPRESSION:    Acute pancreatitis. 1.2 cm probable pseudocyst along the pancreatic body is unchanged.      < from: Xray Chest 1 View- PORTABLE-Urgent (03.03.21 @ 11:47) >  Impression:    No radiographic evidence of acute cardiopulmonary disease.    < end of copied text >    MEDICATIONS  (STANDING):  chlorhexidine 4% Liquid 1 Application(s) Topical daily  enoxaparin Injectable 40 milliGRAM(s) SubCutaneous daily  folic acid 1 milliGRAM(s) Oral daily  influenza   Vaccine 0.5 milliLiter(s) IntraMuscular once  lactated ringers. 1000 milliLiter(s) (150 mL/Hr) IV Continuous <Continuous>  LORazepam   Injectable   IV Push   LORazepam   Injectable 1 milliGRAM(s) IV Push every 4 hours  NIFEdipine XL 60 milliGRAM(s) Oral daily  pantoprazole    Tablet 40 milliGRAM(s) Oral before breakfast  thiamine Injectable 100 milliGRAM(s) IV Push daily    MEDICATIONS  (PRN):  HYDROmorphone  Injectable 1 milliGRAM(s) IV Push every 4 hours PRN Severe Pain (7 - 10)  ondansetron Injectable 4 milliGRAM(s) IV Push every 6 hours PRN Nausea and/or Vomiting

## 2021-03-05 NOTE — PROGRESS NOTE ADULT - ASSESSMENT
41 y/o male with hx HTN, alcohol and opoid use disorder, pancreatitis, admitted to floor due to epigastric pain with associated nausea and vomiting. Hx of pancreatitis in the past x 2. Recent alcohol use 2/28/21.     # Abd pain secondary to acute on chronic pancreatitis  - 3/3 criteria (epigastric pain, CT evidence of pancreatic fat stranding, elevated lipase)  - Cause: Likely alcohol related, no evidence of gallstones on CT abd, .  - CT abd showed: Acute pancreatitis. 1.2 cm probable pseudocyst along the pancreatic body is unchanged.  PLAN:  - IV fluids as LR bolus of 15-20ml/kg - then 3ml/kg for 12- 24 hours for maintenance.  - Pain control with Dilaudid 2 mg q4h prn and morphine 4 mg prn -> wean off pain medications   - Advanced GI following: considered EUS with FNA in Dec 2020 after pancreatitis as outpatient. But pt got COVID and couldn't make the appointment.  - advanced diet overnight to clear liquid -> tolerating well -> advance to normal diet    #Alcohol use disorder  - Last drink on 2/28/2021 afternoon.  - CIWA protocol with ativan taper started. Current CIWA score of 2.  - C/w thiamine and folic acid.  - Mg repleted. Continue to monitor.  - CATCH team on board.     #Elevated LFTs  - Likely from fatty liver/alcoholic liver disease; CT abd showed hepatic steatosis.   - Trend INR.  - Chronic liver disease work up was done in last admission.  - Ceruloplasmin negative, Hepatitis panel negative, Ferritin elevated, MICAELA negative, SMA negative, transferrin 296, globulins negative.    #HTN  - C/w Nifedipine at home dose.   - BP on admission is 146/99. Current 152/98.  - Likely also related to pain, continue with pain control.    Diet: advance as tolerated   Activity: IAT  DVT Prophylaxis: Lovenox  GI Prophylaxis: Protonix  CHG Ordered  Code Status: FULL

## 2021-03-05 NOTE — PROGRESS NOTE ADULT - SUBJECTIVE AND OBJECTIVE BOX
----------Daily Progress Note----------    HISTORY OF PRESENT ILLNESS:  Patient is a 40y old Male who presents with a chief complaint of Abdominal pain (04 Mar 2021 17:59)    Currently admitted to medicine with the primary diagnosis of Acute pancreatitis       Today is hospital day 2d.     INTERVAL HOSPITAL COURSE / OVERNIGHT EVENTS:    Patient was examined and seen at bedside. He reports his pain was adequately controlled all of yesterday evening/night however today morning at bedside patient holding onto stomach reports in pain for past 30 minutes. Patient is tolerating clear liquids, no vomiting/nausea. On CIWA protocol, no withdrawal signs at bedside.     Review of Systems: Otherwise unremarkable     <<<<<PAST MEDICAL & SURGICAL HISTORY>>>>>  Asthma  no recent exacerbation, not using inhalers for a long time    Pancreatitis  October 2018 due to alcohol    No pertinent past medical history    H/O rotator cuff surgery      ALLERGIES  No Known Allergies    MEDICATIONS  STANDING MEDICATIONS  chlorhexidine 4% Liquid 1 Application(s) Topical daily  enoxaparin Injectable 40 milliGRAM(s) SubCutaneous daily  folic acid 1 milliGRAM(s) Oral daily  influenza   Vaccine 0.5 milliLiter(s) IntraMuscular once  lactated ringers. 1000 milliLiter(s) IV Continuous <Continuous>  LORazepam   Injectable   IV Push   LORazepam   Injectable 1 milliGRAM(s) IV Push every 4 hours  NIFEdipine XL 60 milliGRAM(s) Oral daily  pantoprazole    Tablet 40 milliGRAM(s) Oral before breakfast  thiamine Injectable 100 milliGRAM(s) IV Push daily    PRN MEDICATIONS  HYDROmorphone  Injectable 2 milliGRAM(s) IV Push every 4 hours PRN  ondansetron Injectable 4 milliGRAM(s) IV Push every 6 hours PRN    VITALS:  T(F): 97.2  HR: 86  BP: 135/82  RR: 18  SpO2: --    <<<<<LABS>>>>>                        15.1   8.57  )-----------( 238      ( 04 Mar 2021 06:40 )             42.8     03-04    134<L>  |  96<L>  |  8<L>  ----------------------------<  114<H>  4.0   |  22  |  0.7    Ca    9.5      04 Mar 2021 06:40  Phos  3.5     03-04  Mg     2.0     03-04    TPro  7.1  /  Alb  4.4  /  TBili  1.2  /  DBili  x   /  AST  117<H>  /  ALT  237<H>  /  AlkPhos  86  03-04            [id number]        <<<<<RADIOLOGY>>>>>    PHYSICAL EXAM  GEN: Lying in bed in mild-to-moderate distress clutching onto stomach   LUNGS: Clear to auscultation bilaterally. No rales, rhonchi, or wheezing.  HEART: S1/S2 present. RRR.   ABD: Soft, moderate TTP in epigastric region, non-distended. Bowel sounds present.  EXT: No edema.   NEURO: AAOX3. Non-focal.  Skin: No rashes or lesions.         -----------------------------------------------------------------------------------------------------------------------------------------------------------------------------------------------

## 2021-03-05 NOTE — PROGRESS NOTE ADULT - ASSESSMENT
39 y/o male with hx HTN, alcohol and opoid use disorder, pancreatitis, admitted to floor due to epigastric pain with associated nausea and vomiting. Hx of pancreatitis in the past x 2. Recent alcohol use 2/28/21.     # Recurrent pancreatitis/ abdominal pain    - likely due to alcoholism   - decrease  Dilaudid to 1  mg Q 4 hours PRN   - advance diet as tolerated   - pt was extensively consulted regarding Etoh abstinence ( he has a strong intention to stop drinking)   - CT abd showed: Acute pancreatitis. 1.2 cm probable pseudocyst along the pancreatic body is unchanged.  - decrease the rate of IV fluids to 150 cc/h   - consulted by GI, recommendations noted     #Alcohol abuse   - Last drink on 2/28/2021 afternoon.  - CIWA protocol with ativan taper started. Current CIWA score of 2.  - C/w thiamine and folic acid.  - monitor and replete electrolytes   - CATCH team on board.   - pt was consulted regarding abstinence     # Etoh induced liver injury   - monitor LFTs   - avoid hepatotoxic medications     #HTN  - C/w Nifedipine   - DASH diet       Diet: NPO  Activity: IAT  DVT Prophylaxis: Lovenox  GI Prophylaxis: Protonix    #Progress Note Handoff  Pending (specify): decrease the dose of Dilaudid, advance diet as tolerated, GI follow up, decrease the rate of IV fluids, monitor electrolytes and Na level     Family discussion: I spoke with pt, he agreed with a plan of care   Disposition: Home_x __/SNF___/Other________/Unknown at this time________

## 2021-03-05 NOTE — CHART NOTE - NSCHARTNOTEFT_GEN_A_CORE
Nurse calls me to let me know patient eloped. She spoke to patient on the phone and he reports family emergency and he removed his IV prior to eloping.

## 2021-03-12 DIAGNOSIS — K85.20 ALCOHOL INDUCED ACUTE PANCREATITIS WITHOUT NECROSIS OR INFECTION: ICD-10-CM

## 2021-03-12 DIAGNOSIS — K86.3 PSEUDOCYST OF PANCREAS: ICD-10-CM

## 2021-03-12 DIAGNOSIS — K76.0 FATTY (CHANGE OF) LIVER, NOT ELSEWHERE CLASSIFIED: ICD-10-CM

## 2021-03-12 DIAGNOSIS — K21.9 GASTRO-ESOPHAGEAL REFLUX DISEASE WITHOUT ESOPHAGITIS: ICD-10-CM

## 2021-03-12 DIAGNOSIS — J45.909 UNSPECIFIED ASTHMA, UNCOMPLICATED: ICD-10-CM

## 2021-03-12 DIAGNOSIS — F10.239 ALCOHOL DEPENDENCE WITH WITHDRAWAL, UNSPECIFIED: ICD-10-CM

## 2021-03-12 DIAGNOSIS — F12.90 CANNABIS USE, UNSPECIFIED, UNCOMPLICATED: ICD-10-CM

## 2021-03-12 DIAGNOSIS — K76.89 OTHER SPECIFIED DISEASES OF LIVER: ICD-10-CM

## 2021-03-12 DIAGNOSIS — R10.9 UNSPECIFIED ABDOMINAL PAIN: ICD-10-CM

## 2021-03-12 DIAGNOSIS — K86.1 OTHER CHRONIC PANCREATITIS: ICD-10-CM

## 2021-03-12 DIAGNOSIS — Z86.16 PERSONAL HISTORY OF COVID-19: ICD-10-CM

## 2021-03-12 DIAGNOSIS — I10 ESSENTIAL (PRIMARY) HYPERTENSION: ICD-10-CM

## 2021-03-12 DIAGNOSIS — F17.210 NICOTINE DEPENDENCE, CIGARETTES, UNCOMPLICATED: ICD-10-CM

## 2021-09-09 NOTE — DISCHARGE NOTE PROVIDER - CARE PROVIDER_API CALL
Pb Campuzano)  Gastroenterology  [address] [place] [address]  Phone: [phone]  Fax: [phone]  Follow Up Time: 2 weeks    Sherwin White MD  Directions  Address: [address] [place] [address]  Phone: [phone]  Phone: [phone]  Fax: (   )    -  Follow Up Time: 1-3 days
at home:

## 2021-09-23 ENCOUNTER — INPATIENT (INPATIENT)
Facility: HOSPITAL | Age: 41
LOS: 3 days | Discharge: HOME | End: 2021-09-27
Attending: HOSPITALIST | Admitting: HOSPITALIST
Payer: MEDICAID

## 2021-09-23 VITALS
OXYGEN SATURATION: 99 % | HEIGHT: 72 IN | WEIGHT: 175.05 LBS | RESPIRATION RATE: 18 BRPM | HEART RATE: 101 BPM | DIASTOLIC BLOOD PRESSURE: 100 MMHG | SYSTOLIC BLOOD PRESSURE: 165 MMHG | TEMPERATURE: 98 F

## 2021-09-23 DIAGNOSIS — Z98.890 OTHER SPECIFIED POSTPROCEDURAL STATES: Chronic | ICD-10-CM

## 2021-09-23 LAB
ALBUMIN SERPL ELPH-MCNC: 4.6 G/DL — SIGNIFICANT CHANGE UP (ref 3.5–5.2)
ALP SERPL-CCNC: 75 U/L — SIGNIFICANT CHANGE UP (ref 30–115)
ALT FLD-CCNC: 88 U/L — HIGH (ref 0–41)
ANION GAP SERPL CALC-SCNC: 21 MMOL/L — HIGH (ref 7–14)
AST SERPL-CCNC: 66 U/L — HIGH (ref 0–41)
BASOPHILS # BLD AUTO: 0.04 K/UL — SIGNIFICANT CHANGE UP (ref 0–0.2)
BASOPHILS NFR BLD AUTO: 0.4 % — SIGNIFICANT CHANGE UP (ref 0–1)
BILIRUB DIRECT SERPL-MCNC: 0.5 MG/DL — HIGH (ref 0–0.2)
BILIRUB INDIRECT FLD-MCNC: 1.4 MG/DL — HIGH (ref 0.2–1.2)
BILIRUB SERPL-MCNC: 1.9 MG/DL — HIGH (ref 0.2–1.2)
BUN SERPL-MCNC: 17 MG/DL — SIGNIFICANT CHANGE UP (ref 10–20)
CALCIUM SERPL-MCNC: 9.7 MG/DL — SIGNIFICANT CHANGE UP (ref 8.5–10.1)
CHLORIDE SERPL-SCNC: 100 MMOL/L — SIGNIFICANT CHANGE UP (ref 98–110)
CO2 SERPL-SCNC: 15 MMOL/L — LOW (ref 17–32)
CREAT SERPL-MCNC: 0.9 MG/DL — SIGNIFICANT CHANGE UP (ref 0.7–1.5)
EOSINOPHIL # BLD AUTO: 0 K/UL — SIGNIFICANT CHANGE UP (ref 0–0.7)
EOSINOPHIL NFR BLD AUTO: 0 % — SIGNIFICANT CHANGE UP (ref 0–8)
GLUCOSE SERPL-MCNC: 156 MG/DL — HIGH (ref 70–99)
HCT VFR BLD CALC: 48.1 % — SIGNIFICANT CHANGE UP (ref 42–52)
HGB BLD-MCNC: 16.7 G/DL — SIGNIFICANT CHANGE UP (ref 14–18)
IMM GRANULOCYTES NFR BLD AUTO: 0.2 % — SIGNIFICANT CHANGE UP (ref 0.1–0.3)
LACTATE SERPL-SCNC: 2.4 MMOL/L — HIGH (ref 0.7–2)
LIDOCAIN IGE QN: 370 U/L — HIGH (ref 7–60)
LYMPHOCYTES # BLD AUTO: 0.86 K/UL — LOW (ref 1.2–3.4)
LYMPHOCYTES # BLD AUTO: 8.9 % — LOW (ref 20.5–51.1)
MCHC RBC-ENTMCNC: 31.7 PG — HIGH (ref 27–31)
MCHC RBC-ENTMCNC: 34.7 G/DL — SIGNIFICANT CHANGE UP (ref 32–37)
MCV RBC AUTO: 91.4 FL — SIGNIFICANT CHANGE UP (ref 80–94)
MONOCYTES # BLD AUTO: 0.63 K/UL — HIGH (ref 0.1–0.6)
MONOCYTES NFR BLD AUTO: 6.5 % — SIGNIFICANT CHANGE UP (ref 1.7–9.3)
NEUTROPHILS # BLD AUTO: 8.09 K/UL — HIGH (ref 1.4–6.5)
NEUTROPHILS NFR BLD AUTO: 84 % — HIGH (ref 42.2–75.2)
NRBC # BLD: 0 /100 WBCS — SIGNIFICANT CHANGE UP (ref 0–0)
PLATELET # BLD AUTO: 361 K/UL — SIGNIFICANT CHANGE UP (ref 130–400)
POTASSIUM SERPL-MCNC: 3.6 MMOL/L — SIGNIFICANT CHANGE UP (ref 3.5–5)
POTASSIUM SERPL-SCNC: 3.6 MMOL/L — SIGNIFICANT CHANGE UP (ref 3.5–5)
PROT SERPL-MCNC: 7.4 G/DL — SIGNIFICANT CHANGE UP (ref 6–8)
RBC # BLD: 5.26 M/UL — SIGNIFICANT CHANGE UP (ref 4.7–6.1)
RBC # FLD: 14.4 % — SIGNIFICANT CHANGE UP (ref 11.5–14.5)
SARS-COV-2 RNA SPEC QL NAA+PROBE: SIGNIFICANT CHANGE UP
SODIUM SERPL-SCNC: 136 MMOL/L — SIGNIFICANT CHANGE UP (ref 135–146)
WBC # BLD: 9.64 K/UL — SIGNIFICANT CHANGE UP (ref 4.8–10.8)
WBC # FLD AUTO: 9.64 K/UL — SIGNIFICANT CHANGE UP (ref 4.8–10.8)

## 2021-09-23 PROCEDURE — 93010 ELECTROCARDIOGRAM REPORT: CPT

## 2021-09-23 PROCEDURE — 93010 ELECTROCARDIOGRAM REPORT: CPT | Mod: 77

## 2021-09-23 PROCEDURE — 74177 CT ABD & PELVIS W/CONTRAST: CPT | Mod: 26,MA

## 2021-09-23 PROCEDURE — 99285 EMERGENCY DEPT VISIT HI MDM: CPT

## 2021-09-23 RX ORDER — MORPHINE SULFATE 50 MG/1
4 CAPSULE, EXTENDED RELEASE ORAL
Refills: 0 | Status: DISCONTINUED | OUTPATIENT
Start: 2021-09-23 | End: 2021-09-23

## 2021-09-23 RX ORDER — SODIUM CHLORIDE 9 MG/ML
1000 INJECTION, SOLUTION INTRAVENOUS
Refills: 0 | Status: DISCONTINUED | OUTPATIENT
Start: 2021-09-23 | End: 2021-09-24

## 2021-09-23 RX ORDER — MORPHINE SULFATE 50 MG/1
2 CAPSULE, EXTENDED RELEASE ORAL
Refills: 0 | Status: DISCONTINUED | OUTPATIENT
Start: 2021-09-23 | End: 2021-09-23

## 2021-09-23 RX ORDER — SODIUM CHLORIDE 9 MG/ML
1000 INJECTION INTRAMUSCULAR; INTRAVENOUS; SUBCUTANEOUS ONCE
Refills: 0 | Status: COMPLETED | OUTPATIENT
Start: 2021-09-23 | End: 2021-09-23

## 2021-09-23 RX ORDER — METOPROLOL TARTRATE 50 MG
5 TABLET ORAL ONCE
Refills: 0 | Status: COMPLETED | OUTPATIENT
Start: 2021-09-23 | End: 2021-09-23

## 2021-09-23 RX ORDER — HYDROMORPHONE HYDROCHLORIDE 2 MG/ML
0.5 INJECTION INTRAMUSCULAR; INTRAVENOUS; SUBCUTANEOUS
Refills: 0 | Status: DISCONTINUED | OUTPATIENT
Start: 2021-09-23 | End: 2021-09-24

## 2021-09-23 RX ORDER — ONDANSETRON 8 MG/1
4 TABLET, FILM COATED ORAL ONCE
Refills: 0 | Status: COMPLETED | OUTPATIENT
Start: 2021-09-23 | End: 2021-09-23

## 2021-09-23 RX ORDER — NIFEDIPINE 30 MG
60 TABLET, EXTENDED RELEASE 24 HR ORAL DAILY
Refills: 0 | Status: DISCONTINUED | OUTPATIENT
Start: 2021-09-23 | End: 2021-09-27

## 2021-09-23 RX ORDER — CHLORHEXIDINE GLUCONATE 213 G/1000ML
1 SOLUTION TOPICAL
Refills: 0 | Status: DISCONTINUED | OUTPATIENT
Start: 2021-09-23 | End: 2021-09-27

## 2021-09-23 RX ORDER — FOLIC ACID 0.8 MG
1 TABLET ORAL DAILY
Refills: 0 | Status: DISCONTINUED | OUTPATIENT
Start: 2021-09-23 | End: 2021-09-27

## 2021-09-23 RX ORDER — OMEPRAZOLE 10 MG/1
1 CAPSULE, DELAYED RELEASE ORAL
Qty: 0 | Refills: 0 | DISCHARGE

## 2021-09-23 RX ORDER — METOCLOPRAMIDE HCL 10 MG
10 TABLET ORAL ONCE
Refills: 0 | Status: COMPLETED | OUTPATIENT
Start: 2021-09-23 | End: 2021-09-23

## 2021-09-23 RX ORDER — MORPHINE SULFATE 50 MG/1
6 CAPSULE, EXTENDED RELEASE ORAL ONCE
Refills: 0 | Status: DISCONTINUED | OUTPATIENT
Start: 2021-09-23 | End: 2021-09-23

## 2021-09-23 RX ORDER — THIAMINE MONONITRATE (VIT B1) 100 MG
100 TABLET ORAL DAILY
Refills: 0 | Status: COMPLETED | OUTPATIENT
Start: 2021-09-23 | End: 2021-09-26

## 2021-09-23 RX ORDER — HYDRALAZINE HCL 50 MG
25 TABLET ORAL ONCE
Refills: 0 | Status: COMPLETED | OUTPATIENT
Start: 2021-09-23 | End: 2021-09-24

## 2021-09-23 RX ORDER — HYDROMORPHONE HYDROCHLORIDE 2 MG/ML
1 INJECTION INTRAMUSCULAR; INTRAVENOUS; SUBCUTANEOUS ONCE
Refills: 0 | Status: DISCONTINUED | OUTPATIENT
Start: 2021-09-23 | End: 2021-09-23

## 2021-09-23 RX ORDER — MORPHINE SULFATE 50 MG/1
4 CAPSULE, EXTENDED RELEASE ORAL ONCE
Refills: 0 | Status: DISCONTINUED | OUTPATIENT
Start: 2021-09-23 | End: 2021-09-23

## 2021-09-23 RX ORDER — ENOXAPARIN SODIUM 100 MG/ML
40 INJECTION SUBCUTANEOUS AT BEDTIME
Refills: 0 | Status: DISCONTINUED | OUTPATIENT
Start: 2021-09-23 | End: 2021-09-27

## 2021-09-23 RX ORDER — ONDANSETRON 8 MG/1
4 TABLET, FILM COATED ORAL EVERY 4 HOURS
Refills: 0 | Status: DISCONTINUED | OUTPATIENT
Start: 2021-09-23 | End: 2021-09-27

## 2021-09-23 RX ADMIN — ONDANSETRON 4 MILLIGRAM(S): 8 TABLET, FILM COATED ORAL at 19:30

## 2021-09-23 RX ADMIN — Medication 104 MILLIGRAM(S): at 11:38

## 2021-09-23 RX ADMIN — SODIUM CHLORIDE 1000 MILLILITER(S): 9 INJECTION INTRAMUSCULAR; INTRAVENOUS; SUBCUTANEOUS at 10:49

## 2021-09-23 RX ADMIN — MORPHINE SULFATE 4 MILLIGRAM(S): 50 CAPSULE, EXTENDED RELEASE ORAL at 21:35

## 2021-09-23 RX ADMIN — MORPHINE SULFATE 6 MILLIGRAM(S): 50 CAPSULE, EXTENDED RELEASE ORAL at 10:49

## 2021-09-23 RX ADMIN — ONDANSETRON 4 MILLIGRAM(S): 8 TABLET, FILM COATED ORAL at 10:49

## 2021-09-23 RX ADMIN — MORPHINE SULFATE 2 MILLIGRAM(S): 50 CAPSULE, EXTENDED RELEASE ORAL at 19:29

## 2021-09-23 RX ADMIN — SODIUM CHLORIDE 250 MILLILITER(S): 9 INJECTION, SOLUTION INTRAVENOUS at 15:52

## 2021-09-23 RX ADMIN — SODIUM CHLORIDE 1000 MILLILITER(S): 9 INJECTION INTRAMUSCULAR; INTRAVENOUS; SUBCUTANEOUS at 15:51

## 2021-09-23 RX ADMIN — MORPHINE SULFATE 4 MILLIGRAM(S): 50 CAPSULE, EXTENDED RELEASE ORAL at 12:45

## 2021-09-23 RX ADMIN — Medication 60 MILLIGRAM(S): at 22:24

## 2021-09-23 RX ADMIN — Medication 1 MILLIGRAM(S): at 20:50

## 2021-09-23 RX ADMIN — Medication 5 MILLIGRAM(S): at 22:26

## 2021-09-23 RX ADMIN — HYDROMORPHONE HYDROCHLORIDE 1 MILLIGRAM(S): 2 INJECTION INTRAMUSCULAR; INTRAVENOUS; SUBCUTANEOUS at 15:50

## 2021-09-23 RX ADMIN — ENOXAPARIN SODIUM 40 MILLIGRAM(S): 100 INJECTION SUBCUTANEOUS at 22:24

## 2021-09-23 RX ADMIN — ONDANSETRON 4 MILLIGRAM(S): 8 TABLET, FILM COATED ORAL at 15:50

## 2021-09-23 RX ADMIN — SODIUM CHLORIDE 1000 MILLILITER(S): 9 INJECTION INTRAMUSCULAR; INTRAVENOUS; SUBCUTANEOUS at 11:39

## 2021-09-23 NOTE — ED PROVIDER NOTE - ATTENDING CONTRIBUTION TO CARE
40 yo m with pmh of etoh, pancreatitis, pancreatic pseduocyst, htn, and asthma, presents with 2 days of upper abd pain.  pt admits he had etoh binge 2 days ago.  +n/v.  no diarrhea.  no fever or chills.  no cp or sob.  no pmh of gallstones.  exam: nad, ncat, perrl, eomi, mmm, rrr, ctab, abd soft, ttp epig, nd aox3, imp: pt with upper abd pain after etoh binge, likely pancreatitis, will check labs and ct.  will admit for ivf and pain control

## 2021-09-23 NOTE — H&P ADULT - NSHPLABSRESULTS_GEN_ALL_CORE
16.7   9.64  )-----------( 361      ( 23 Sep 2021 10:58 )             48.1     09-23    136  |  100  |  17  ----------------------------<  156<H>  3.6   |  15<L>  |  0.9    Ca    9.7      23 Sep 2021 10:58    TPro  7.4  /  Alb  4.6  /  TBili  1.9<H>  /  DBili  0.5<H>  /  AST  66<H>  /  ALT  88<H>  /  AlkPhos  75  09-23    Lactate, Blood: 2.4: Elevated lactate. Consider ordering follow-up lactate to trend. mmol/L (09.23.21 @ 10:58)  Lipase, Serum: 370 U/L (09.23.21 @ 10:58)    < from: CT Abdomen and Pelvis w/ IV Cont (09.23.21 @ 12:32) >      IMPRESSION:    1.  Findings compatible with acute pancreatitis. No peripancreatic fluid collection.  2.  Ill-defined subcentimeter hypodensity in the pancreatic neck, unclear if representing focal area of heterogeneous pancreatic enhancement  if underlying lesion/cystic lesion is present. Recommend follow-up with outpatient contrast-enhanced MRI after resolution of symptoms.    < end of copied text >

## 2021-09-23 NOTE — H&P ADULT - NSICDXPASTMEDICALHX_GEN_ALL_CORE_FT
PAST MEDICAL HISTORY:  Asthma no recent exacerbation, not using inhalers for a long time    Hypertension     Pancreatitis due to alcohol

## 2021-09-23 NOTE — H&P ADULT - ATTENDING COMMENTS
41 Y M with PMH of HTN, asthma (not on meds), recurrent pancreatitis with pancreatic pseudocyst (last in March 2021) presents to ED with abdominal pain since last night. Found to have acute pancreatitis.     #Acute pancreatitis- likely due to alcohol abuse  -continue LR at 250cc/hr  -NPO, advance as tolerated   -Pain control  -Zofran as needed     #EtOH Abuse  #Suspected folate deficiency  #Suspected thiamine deficiency   -thiamine/folate/MVI    #Transaminitis  -likely from alcohol abuse    #HTN  -nifedipine xl 60mg qd    #HLD - start lipitor 80mg qhs once LFTs improve     #DVT PPx- LVX 40mg sq qhs

## 2021-09-23 NOTE — ED ADULT TRIAGE NOTE - CHIEF COMPLAINT QUOTE
Patient c/o RUQ pain x 1 day with N/V . Patient states he has hx pancreatitis  , as per EMS 4 Zofran and 1 L NS given prior to arrival

## 2021-09-23 NOTE — ED PROVIDER NOTE - OBJECTIVE STATEMENT
40 y/o M, PMHx Pancreatitis secondary to ETOH abuse, HTN & Asthma, presents to the ED with complaints of abdominal pain x two days. He states that he drank alcohol two days ago. He admits to accompanying nausea and vomiting; denies fever, chills, chest pain, dyspnea, and back pain. He last had CT-scan in March that revealed a '1.2 cm probable pseudocyst along the pancreatic body is unchanged.' He denies prior abdominal surgery.

## 2021-09-23 NOTE — ED ADULT NURSE REASSESSMENT NOTE - NS ED NURSE REASSESS COMMENT FT1
Pt assessed. In bed comfortably NAD at this time. Airway intact. Pt waiting for available bed, Pt AOx4 aware

## 2021-09-23 NOTE — H&P ADULT - NSHPSOCIALHISTORY_GEN_ALL_CORE
lives by self  works construction  current every day vape use  uses edible marijuana  h/o alcohol abuse, binge drinking, wants to stop

## 2021-09-23 NOTE — H&P ADULT - NSHPPHYSICALEXAM_GEN_ALL_CORE
PHYSICAL EXAM:  GENERAL: NAD, lying in bed comfortably  HEAD:  Atraumatic, Normocephalic  EYES: EOMI, PERRLA, conjunctiva and sclera clear  ENT: dry mucous membranes  NECK: Supple, No JVD  CHEST/LUNG: Clear to auscultation bilaterally; No rales, rhonchi, wheezing, or rubs. Unlabored respirations  HEART: tachycardic, regular rhythm; No murmurs, rubs, or gallops  ABDOMEN: Bowel sounds present; Soft, diffusely ttp, especially epigastric region with radiation to flanks  EXTREMITIES:  2+ Peripheral Pulses, brisk capillary refill. No clubbing, cyanosis, or edema  NERVOUS SYSTEM:  Alert & Oriented X3, speech clear. No deficits   MSK: FROM all 4 extremities, full and equal strength  SKIN: No rashes or lesions

## 2021-09-23 NOTE — H&P ADULT - HISTORY OF PRESENT ILLNESS
41 Y M with pmh of HTN, asthma (not on meds), recurrent pancreatitis with pancreatic pseudocyst (last in March 2021) presents to ED with abdominal pain since last night. Patient states that he was on vacation in [place] the past week, and has been drinking heavily. Last drink was Tuesday (said he drank a bottle of vodka that day) and then began having mid-abdominal pain last night, radiating to back, worse with eating, 10/10, associated with nausea and multiple episodes of NBNB vomiting. Denies fevers, chills, chest pain, shortness of breath, sick contacts.    ED Course:  T 97.8F, P 101, /100, R 18, S 98% on RA at rest. Lipase elevated, CT abd showing acute pancreatitis. Got 3L IVF, pain meds.

## 2021-09-23 NOTE — H&P ADULT - ASSESSMENT
41 Y M with pmh of HTN, asthma (not on meds), recurrent pancreatitis with pancreatic pseudocyst (last in March 2021) presents to ED with abdominal pain since last night. Found to have acute pancreatitis.    #Acute pancreatitis- likely due to alcohol abuse  -SIRS not present on admission  -QUINTIN score <2 (don't have LDH)  -BISAP score 0  -send lipid panel  -s/p 3L IVF in ED  -continue LR at 250cc/hr  -GI consult (h/o pancreatic pseudocyst, recurrent pancreatitis)  -pain control  -zofran prn (get EKG)    #HTN  -nifedipine xl 60mg qd    #Asthma  -stable  -not on meds    #DVT PPx- LVX 40mg sq qhs  #GI PPx- None  #Diet- NPO for now  #CHG  #Activity- AAT  #Dispo- Acute  #Code- FULL   41 Y M with pmh of HTN, asthma (not on meds), recurrent pancreatitis with pancreatic pseudocyst (last in March 2021) presents to ED with abdominal pain since last night. Found to have acute pancreatitis.    #Acute pancreatitis- likely due to alcohol abuse  -SIRS not present on admission  -QUINTIN score <2 (don't have LDH)  -BISAP score 0  -send lipid panel  -s/p 3L IVF in ED  -continue LR at 250cc/hr  -GI consult (h/o pancreatic pseudocyst, recurrent pancreatitis)  -pain control  -zofran prn (get EKG)    #EtOH Abuse  -last drink Tuesday  -no signs of withdrawal  -CIWA protocol (symptom triggered)  -thiamine/folate/MVI    #HAGMA  -pure HAGMA (delta ratio 1.0)  -likely due to lactic acidosis  -continue IVF  -repeat lactate and BMP at 4pm    #HTN  -nifedipine xl 60mg qd    #Asthma  -stable  -not on meds    #DVT PPx- LVX 40mg sq qhs  #GI PPx- None  #Diet- NPO for now  #CHG  #Activity- AAT  #Dispo- Acute  #Code- FULL   41 Y M with pmh of HTN, asthma (not on meds), recurrent pancreatitis with pancreatic pseudocyst (last in March 2021) presents to ED with abdominal pain since last night. Found to have acute pancreatitis.    #Acute pancreatitis- likely due to alcohol abuse  -SIRS not present on admission  -QUINTIN score <2 (don't have LDH)  -BISAP score 0  -send lipid panel  -s/p 3L IVF in ED  -continue LR at 250cc/hr  -GI consult (h/o pancreatic pseudocyst, recurrent pancreatitis)  -pain control  -zofran prn (get EKG)    #EtOH Abuse  -last drink Tuesday  -no signs of withdrawal  -CIWA protocol (symptom triggered)  -thiamine/folate/MVI  -CATCH team consult    #Transaminitis  -likely from alcohol abuse, pancreatitis  -trend LFT's    #HAGMA  -pure HAGMA (delta ratio 1.0)  -likely due to lactic acidosis  -continue IVF  -repeat lactate and BMP at 4pm    #HTN  -nifedipine xl 60mg qd    #Asthma  -stable  -not on meds    #DVT PPx- LVX 40mg sq qhs  #GI PPx- None  #Diet- NPO for now  #CHG  #Activity- AAT  #Dispo- Acute  #Code- FULL

## 2021-09-24 LAB
ALBUMIN SERPL ELPH-MCNC: 4.5 G/DL — SIGNIFICANT CHANGE UP (ref 3.5–5.2)
ALBUMIN SERPL ELPH-MCNC: 4.7 G/DL — SIGNIFICANT CHANGE UP (ref 3.5–5.2)
ALP SERPL-CCNC: 67 U/L — SIGNIFICANT CHANGE UP (ref 30–115)
ALP SERPL-CCNC: 70 U/L — SIGNIFICANT CHANGE UP (ref 30–115)
ALT FLD-CCNC: 70 U/L — HIGH (ref 0–41)
ALT FLD-CCNC: 72 U/L — HIGH (ref 0–41)
ANION GAP SERPL CALC-SCNC: 21 MMOL/L — HIGH (ref 7–14)
ANION GAP SERPL CALC-SCNC: 21 MMOL/L — HIGH (ref 7–14)
AST SERPL-CCNC: 45 U/L — HIGH (ref 0–41)
AST SERPL-CCNC: 47 U/L — HIGH (ref 0–41)
BASOPHILS # BLD AUTO: 0.05 K/UL — SIGNIFICANT CHANGE UP (ref 0–0.2)
BASOPHILS NFR BLD AUTO: 0.4 % — SIGNIFICANT CHANGE UP (ref 0–1)
BILIRUB DIRECT SERPL-MCNC: 0.4 MG/DL — HIGH (ref 0–0.2)
BILIRUB INDIRECT FLD-MCNC: 1.5 MG/DL — HIGH (ref 0.2–1.2)
BILIRUB SERPL-MCNC: 1.5 MG/DL — HIGH (ref 0.2–1.2)
BILIRUB SERPL-MCNC: 1.9 MG/DL — HIGH (ref 0.2–1.2)
BUN SERPL-MCNC: 11 MG/DL — SIGNIFICANT CHANGE UP (ref 10–20)
BUN SERPL-MCNC: 11 MG/DL — SIGNIFICANT CHANGE UP (ref 10–20)
CALCIUM SERPL-MCNC: 9.6 MG/DL — SIGNIFICANT CHANGE UP (ref 8.5–10.1)
CALCIUM SERPL-MCNC: 9.9 MG/DL — SIGNIFICANT CHANGE UP (ref 8.5–10.1)
CHLORIDE SERPL-SCNC: 95 MMOL/L — LOW (ref 98–110)
CHLORIDE SERPL-SCNC: 96 MMOL/L — LOW (ref 98–110)
CHOLEST SERPL-MCNC: 251 MG/DL — HIGH
CO2 SERPL-SCNC: 19 MMOL/L — SIGNIFICANT CHANGE UP (ref 17–32)
CO2 SERPL-SCNC: 20 MMOL/L — SIGNIFICANT CHANGE UP (ref 17–32)
COVID-19 SPIKE DOMAIN AB INTERP: POSITIVE
COVID-19 SPIKE DOMAIN ANTIBODY RESULT: >250 U/ML — HIGH
CREAT SERPL-MCNC: 0.8 MG/DL — SIGNIFICANT CHANGE UP (ref 0.7–1.5)
CREAT SERPL-MCNC: 0.8 MG/DL — SIGNIFICANT CHANGE UP (ref 0.7–1.5)
EOSINOPHIL # BLD AUTO: 0.04 K/UL — SIGNIFICANT CHANGE UP (ref 0–0.7)
EOSINOPHIL NFR BLD AUTO: 0.3 % — SIGNIFICANT CHANGE UP (ref 0–8)
GLUCOSE SERPL-MCNC: 91 MG/DL — SIGNIFICANT CHANGE UP (ref 70–99)
GLUCOSE SERPL-MCNC: 98 MG/DL — SIGNIFICANT CHANGE UP (ref 70–99)
HCT VFR BLD CALC: 47.2 % — SIGNIFICANT CHANGE UP (ref 42–52)
HDLC SERPL-MCNC: 6 MG/DL — LOW
HGB BLD-MCNC: 16.2 G/DL — SIGNIFICANT CHANGE UP (ref 14–18)
IMM GRANULOCYTES NFR BLD AUTO: 1 % — HIGH (ref 0.1–0.3)
LACTATE SERPL-SCNC: 0.7 MMOL/L — SIGNIFICANT CHANGE UP (ref 0.7–2)
LIPID PNL WITH DIRECT LDL SERPL: 210 MG/DL — HIGH
LYMPHOCYTES # BLD AUTO: 1.71 K/UL — SIGNIFICANT CHANGE UP (ref 1.2–3.4)
LYMPHOCYTES # BLD AUTO: 12.1 % — LOW (ref 20.5–51.1)
MCHC RBC-ENTMCNC: 32.3 PG — HIGH (ref 27–31)
MCHC RBC-ENTMCNC: 34.3 G/DL — SIGNIFICANT CHANGE UP (ref 32–37)
MCV RBC AUTO: 94.2 FL — HIGH (ref 80–94)
MONOCYTES # BLD AUTO: 1.42 K/UL — HIGH (ref 0.1–0.6)
MONOCYTES NFR BLD AUTO: 10.1 % — HIGH (ref 1.7–9.3)
NEUTROPHILS # BLD AUTO: 10.76 K/UL — HIGH (ref 1.4–6.5)
NEUTROPHILS NFR BLD AUTO: 76.1 % — HIGH (ref 42.2–75.2)
NON HDL CHOLESTEROL: 245 MG/DL — HIGH
NRBC # BLD: 0 /100 WBCS — SIGNIFICANT CHANGE UP (ref 0–0)
PLATELET # BLD AUTO: 361 K/UL — SIGNIFICANT CHANGE UP (ref 130–400)
POTASSIUM SERPL-MCNC: 3.9 MMOL/L — SIGNIFICANT CHANGE UP (ref 3.5–5)
POTASSIUM SERPL-MCNC: 4.3 MMOL/L — SIGNIFICANT CHANGE UP (ref 3.5–5)
POTASSIUM SERPL-SCNC: 3.9 MMOL/L — SIGNIFICANT CHANGE UP (ref 3.5–5)
POTASSIUM SERPL-SCNC: 4.3 MMOL/L — SIGNIFICANT CHANGE UP (ref 3.5–5)
PROT SERPL-MCNC: 6.9 G/DL — SIGNIFICANT CHANGE UP (ref 6–8)
PROT SERPL-MCNC: 7.3 G/DL — SIGNIFICANT CHANGE UP (ref 6–8)
RBC # BLD: 5.01 M/UL — SIGNIFICANT CHANGE UP (ref 4.7–6.1)
RBC # FLD: 14.6 % — HIGH (ref 11.5–14.5)
SARS-COV-2 IGG+IGM SERPL QL IA: >250 U/ML — HIGH
SARS-COV-2 IGG+IGM SERPL QL IA: POSITIVE
SODIUM SERPL-SCNC: 136 MMOL/L — SIGNIFICANT CHANGE UP (ref 135–146)
SODIUM SERPL-SCNC: 136 MMOL/L — SIGNIFICANT CHANGE UP (ref 135–146)
TRIGL SERPL-MCNC: 227 MG/DL — HIGH
WBC # BLD: 14.12 K/UL — HIGH (ref 4.8–10.8)
WBC # FLD AUTO: 14.12 K/UL — HIGH (ref 4.8–10.8)

## 2021-09-24 PROCEDURE — 99222 1ST HOSP IP/OBS MODERATE 55: CPT

## 2021-09-24 PROCEDURE — 99223 1ST HOSP IP/OBS HIGH 75: CPT

## 2021-09-24 PROCEDURE — 99233 SBSQ HOSP IP/OBS HIGH 50: CPT

## 2021-09-24 RX ORDER — INFLUENZA VIRUS VACCINE 15; 15; 15; 15 UG/.5ML; UG/.5ML; UG/.5ML; UG/.5ML
0.5 SUSPENSION INTRAMUSCULAR ONCE
Refills: 0 | Status: DISCONTINUED | OUTPATIENT
Start: 2021-09-24 | End: 2021-09-27

## 2021-09-24 RX ORDER — SODIUM CHLORIDE 9 MG/ML
1000 INJECTION, SOLUTION INTRAVENOUS
Refills: 0 | Status: DISCONTINUED | OUTPATIENT
Start: 2021-09-24 | End: 2021-09-25

## 2021-09-24 RX ORDER — HYDROMORPHONE HYDROCHLORIDE 2 MG/ML
1 INJECTION INTRAMUSCULAR; INTRAVENOUS; SUBCUTANEOUS EVERY 4 HOURS
Refills: 0 | Status: DISCONTINUED | OUTPATIENT
Start: 2021-09-24 | End: 2021-09-26

## 2021-09-24 RX ORDER — HYDROMORPHONE HYDROCHLORIDE 2 MG/ML
1 INJECTION INTRAMUSCULAR; INTRAVENOUS; SUBCUTANEOUS
Refills: 0 | Status: DISCONTINUED | OUTPATIENT
Start: 2021-09-24 | End: 2021-09-24

## 2021-09-24 RX ORDER — METOPROLOL TARTRATE 50 MG
5 TABLET ORAL ONCE
Refills: 0 | Status: COMPLETED | OUTPATIENT
Start: 2021-09-24 | End: 2021-09-24

## 2021-09-24 RX ORDER — SODIUM CHLORIDE 9 MG/ML
1000 INJECTION, SOLUTION INTRAVENOUS
Refills: 0 | Status: DISCONTINUED | OUTPATIENT
Start: 2021-09-24 | End: 2021-09-24

## 2021-09-24 RX ADMIN — SODIUM CHLORIDE 250 MILLILITER(S): 9 INJECTION, SOLUTION INTRAVENOUS at 22:29

## 2021-09-24 RX ADMIN — Medication 5 MILLIGRAM(S): at 00:54

## 2021-09-24 RX ADMIN — HYDROMORPHONE HYDROCHLORIDE 0.5 MILLIGRAM(S): 2 INJECTION INTRAMUSCULAR; INTRAVENOUS; SUBCUTANEOUS at 00:38

## 2021-09-24 RX ADMIN — ONDANSETRON 4 MILLIGRAM(S): 8 TABLET, FILM COATED ORAL at 21:29

## 2021-09-24 RX ADMIN — HYDROMORPHONE HYDROCHLORIDE 0.5 MILLIGRAM(S): 2 INJECTION INTRAMUSCULAR; INTRAVENOUS; SUBCUTANEOUS at 09:03

## 2021-09-24 RX ADMIN — HYDROMORPHONE HYDROCHLORIDE 1 MILLIGRAM(S): 2 INJECTION INTRAMUSCULAR; INTRAVENOUS; SUBCUTANEOUS at 22:29

## 2021-09-24 RX ADMIN — SODIUM CHLORIDE 250 MILLILITER(S): 9 INJECTION, SOLUTION INTRAVENOUS at 06:21

## 2021-09-24 RX ADMIN — Medication 1 MILLIGRAM(S): at 11:06

## 2021-09-24 RX ADMIN — ONDANSETRON 4 MILLIGRAM(S): 8 TABLET, FILM COATED ORAL at 10:59

## 2021-09-24 RX ADMIN — HYDROMORPHONE HYDROCHLORIDE 1 MILLIGRAM(S): 2 INJECTION INTRAMUSCULAR; INTRAVENOUS; SUBCUTANEOUS at 14:25

## 2021-09-24 RX ADMIN — HYDROMORPHONE HYDROCHLORIDE 0.5 MILLIGRAM(S): 2 INJECTION INTRAMUSCULAR; INTRAVENOUS; SUBCUTANEOUS at 03:05

## 2021-09-24 RX ADMIN — SODIUM CHLORIDE 50 MILLILITER(S): 9 INJECTION, SOLUTION INTRAVENOUS at 02:21

## 2021-09-24 RX ADMIN — HYDROMORPHONE HYDROCHLORIDE 0.5 MILLIGRAM(S): 2 INJECTION INTRAMUSCULAR; INTRAVENOUS; SUBCUTANEOUS at 01:00

## 2021-09-24 RX ADMIN — Medication 25 MILLIGRAM(S): at 00:09

## 2021-09-24 RX ADMIN — Medication 1 MILLIGRAM(S): at 00:40

## 2021-09-24 RX ADMIN — ONDANSETRON 4 MILLIGRAM(S): 8 TABLET, FILM COATED ORAL at 05:03

## 2021-09-24 RX ADMIN — HYDROMORPHONE HYDROCHLORIDE 1 MILLIGRAM(S): 2 INJECTION INTRAMUSCULAR; INTRAVENOUS; SUBCUTANEOUS at 11:06

## 2021-09-24 RX ADMIN — ENOXAPARIN SODIUM 40 MILLIGRAM(S): 100 INJECTION SUBCUTANEOUS at 22:28

## 2021-09-24 RX ADMIN — Medication 60 MILLIGRAM(S): at 05:38

## 2021-09-24 RX ADMIN — SODIUM CHLORIDE 250 MILLILITER(S): 9 INJECTION, SOLUTION INTRAVENOUS at 09:04

## 2021-09-24 RX ADMIN — HYDROMORPHONE HYDROCHLORIDE 0.5 MILLIGRAM(S): 2 INJECTION INTRAMUSCULAR; INTRAVENOUS; SUBCUTANEOUS at 05:38

## 2021-09-24 RX ADMIN — SODIUM CHLORIDE 250 MILLILITER(S): 9 INJECTION, SOLUTION INTRAVENOUS at 20:13

## 2021-09-24 RX ADMIN — Medication 1 TABLET(S): at 11:06

## 2021-09-24 RX ADMIN — HYDROMORPHONE HYDROCHLORIDE 0.5 MILLIGRAM(S): 2 INJECTION INTRAMUSCULAR; INTRAVENOUS; SUBCUTANEOUS at 02:50

## 2021-09-24 RX ADMIN — Medication 100 MILLIGRAM(S): at 11:06

## 2021-09-24 RX ADMIN — HYDROMORPHONE HYDROCHLORIDE 0.5 MILLIGRAM(S): 2 INJECTION INTRAMUSCULAR; INTRAVENOUS; SUBCUTANEOUS at 05:56

## 2021-09-24 RX ADMIN — HYDROMORPHONE HYDROCHLORIDE 1 MILLIGRAM(S): 2 INJECTION INTRAMUSCULAR; INTRAVENOUS; SUBCUTANEOUS at 22:45

## 2021-09-24 RX ADMIN — HYDROMORPHONE HYDROCHLORIDE 1 MILLIGRAM(S): 2 INJECTION INTRAMUSCULAR; INTRAVENOUS; SUBCUTANEOUS at 18:05

## 2021-09-24 NOTE — CONSULT NOTE ADULT - SUBJECTIVE AND OBJECTIVE BOX
Addiction medicine consult placed for DENISSE GALVAN. Per resident, Dr. Boyle, no detox recommendations necessary at this time, patient's symptoms are currently controlled, though patient does require referral for services.    [facility] team social workers aware, will follow patient.
    Patient is a 41y old  Male who presents with a chief complaint of abdominal pain (23 Sep 2021 15:28)      Admitted on: 09-23-21    HPI:    41 Y M with pmh of HTN, asthma (not on meds), recurrent lacoholic pancreatitis with pancreatic pseudocyst (last in March 2021), with current alcohol abuse presents to ED with abdominal pain since last night. Patient states that he was on vacation in [place] the past week, and has been drinking heavily. Last drink was Tuesday (said he drank a bottle of vodka that day) and then began having mid-abdominal pain last night, radiating to back, worse with eating, 10/10, associated with nausea and multiple episodes of NBNB vomiting. Denies fevers, chills, chest pain, shortness of breath, sick contacts.    ED Course:  T 97.8F, P 101, /100, R 18, S 98% on RA at rest. Lipase elevated, CT abd showing acute pancreatitis. Got 3L IVF, pain meds.   (23 Sep 2021 15:28)      COVID 19: neg        PAST MEDICAL & SURGICAL HISTORY:  Pancreatitis  due to alcohol    Asthma  no recent exacerbation, not using inhalers for a long time    Hypertension    H/O rotator cuff surgery        FAMILY HISTORY:  Family history of pancreatic cancer (Father)    Alcohol use  sister, with admission due to pancreatitis and GI Bleed        Social History:  Heavy alcohol abuse    Home Medications:  NIFEdipine 60 mg oral tablet, extended release: 1 tab(s) orally once a day (23 Sep 2021 15:32)    MEDICATIONS  (STANDING):  chlorhexidine 4% Liquid 1 Application(s) Topical <User Schedule>  enoxaparin Injectable 40 milliGRAM(s) SubCutaneous at bedtime  folic acid 1 milliGRAM(s) Oral daily  influenza   Vaccine 0.5 milliLiter(s) IntraMuscular once  lactated ringers. 1000 milliLiter(s) (250 mL/Hr) IV Continuous <Continuous>  multivitamin 1 Tablet(s) Oral daily  NIFEdipine XL 60 milliGRAM(s) Oral daily  thiamine 100 milliGRAM(s) Oral daily    MEDICATIONS  (PRN):  HYDROmorphone  Injectable 0.5 milliGRAM(s) IV Push every 2 hours PRN Severe Pain (7 - 10)  LORazepam   Injectable 1 milliGRAM(s) IV Push every 2 hours PRN CIWA-Ar score increase by 2 points and a total score of 7 or less  ondansetron Injectable 4 milliGRAM(s) IV Push every 4 hours PRN Nausea and/or Vomiting      Allergies  No Known Allergies      Review of Systems:   Constitutional:  No Fever, No Chills  ENT/Mouth:  No Hearing Changes,  No Difficulty Swallowing  Eyes:  No Eye Pain, No Vision Changes  Cardiovascular:  No Chest Pain, No Palpitations  Respiratory:  No Cough, No Dyspnea  Gastrointestinal:  As described in HPI  Musculoskeletal:  No Joint Swelling, No Back Pain  Skin:   Jaundice  Neuro:  No Syncope, No Dizziness  Heme/Lymph:  No Bruising, No Bleeding.          Physical Examination:  T(C): 37.4 (09-24-21 @ 05:43), Max: 37.4 (09-24-21 @ 05:43)  HR: 108 (09-24-21 @ 05:43) (65 - 110)  BP: 136/90 (09-24-21 @ 05:43) (136/90 - 204/115)  RR: 18 (09-24-21 @ 05:43) (18 - 18)  SpO2: 98% (09-23-21 @ 22:06) (98% - 99%)  Height (cm): 182.9 (09-24-21 @ 00:20)  Weight (kg): 83.9 (09-24-21 @ 00:20)    09-23-21 @ 07:01  -  09-24-21 @ 07:00  --------------------------------------------------------  IN: 700 mL / OUT: 0 mL / NET: 700 mL        Constitutional: No acute distress.  Eyes:. Conjunctivae are clear, Sclera is icteric.  Ears Nose and Throat: The external ears are normal appearing,  Oral mucosa is pink and moist.  Respiratory:  No signs of respiratory distress. Lung sounds are clear bilaterally.  Cardiovascular:  S1 S2, Regular rate and rhythm.  GI: Abdomen is soft, symmetric, and epigastric tenderness  Neuro: No Tremor, No involuntary movements  Skin: Jaundice.          Data: (reviewed by attending)                        16.7   9.64  )-----------( 361      ( 23 Sep 2021 10:58 )             48.1     Hgb Trend:  16.7  09-23-21 @ 10:58        09-24    136  |  96<L>  |  11  ----------------------------<  91  4.3   |  19  |  0.8    Ca    9.6      24 Sep 2021 01:15    TPro  6.9  /  Alb  4.5  /  TBili  1.5<H>  /  DBili  x   /  AST  47<H>  /  ALT  72<H>  /  AlkPhos  67  09-24    Liver panel trend:  TBili 1.5   /   AST 47   /   ALT 72   /   AlkP 67   /   Tptn 6.9   /   Alb 4.5    /   DBili --      09-24  TBili 1.9   /   AST 66   /   ALT 88   /   AlkP 75   /   Tptn 7.4   /   Alb 4.6    /   DBili 0.5      09-23              Radiology:(reviewed by attending)  CT Abdomen and Pelvis w/ IV Cont:   EXAM:  CT ABDOMEN AND PELVIS IC            PROCEDURE DATE:  09/23/2021            INTERPRETATION:  CLINICAL HISTORY: Abdominal pain.    TECHNIQUE: Contiguous axial CT images were obtained from the lower chest to the pubic symphysis following administration of Optiray intravenous contrast. Oral contrast was not administered. Reformatted images in the coronal and sagittal planes were acquired.    COMPARISON: CT abdomen pelvis dated 3/3/2021.      FINDINGS:    LOWER CHEST: Bibasilar atelectasis. Emphysematous changes.    HEPATOBILIARY: Hepatomegaly with steatosis. Stable hypoattenuating focus along the falciform ligament. No biliary ductal dilatation.    SPLEEN: Unremarkable.    PANCREAS: Peripancreatic edema, compatible with acute pancreatitis. No peripancreatic fluid collection. Ill-defined subcentimeter hypodensity in the pancreatic neck, unclear if representing focal area of heterogeneous pancreatic enhancement or if underlying lesion/cystic lesion is present.    ADRENAL GLANDS: Unremarkable.    KIDNEYS: No hydronephrosis    ABDOMINOPELVIC NODES: Mildly prominent periportal lymph nodes, may be reactive.    PELVIC ORGANS: Unremarkable.    PERITONEUM/MESENTERY/BOWEL: No evidence of bowel obstruction or free air. Unremarkable appendix.    BONES/SOFT TISSUES: Mild degenerative changes of the spine.    OTHER: Atherosclerotic calcifications      IMPRESSION:    1.  Findings compatible with acute pancreatitis. No peripancreatic fluid collection.  2.  Ill-defined subcentimeter hypodensity in the pancreatic neck, unclear if representing focal area of heterogeneous pancreatic enhancement  if underlying lesion/cystic lesion is present. Recommend follow-up with outpatient contrast-enhanced MRI after resolution of symptoms.    --- End ofReport ---              KASH MARCELO MD; Attending Radiologist  This document has been electronically signed. Sep 23 2021  2:06PM (09-23-21 @ 12:32)

## 2021-09-24 NOTE — CONSULT NOTE ADULT - ASSESSMENT
41 Y M with pmh of HTN, asthma (not on meds), recurrent lacoholic pancreatitis with pancreatic pseudocyst (last in March 2021), with current alcohol abuse presents to ED with abdominal pain since last night and diagnosed with acute pancreatitis.    #Acute recurrent interstitial pancreatitis secondary to alcohol.   Lipase 307, CT abdomen with cont: shows peripancreatic edema, subcentimeter hypodensity possible pseudocyst.  136/90 mm HG, /min.  Current alcohol abuse      Rec:  Keep NPO  RL at 250 cc/hr  Monitor daily HCT/BUN/CR/urine output.  Monitor pain scale everyday and give pain medications accordingly.   Avoid alcohol and pancreatitis causing medications. 41 Y M with pmh of HTN, asthma (not on meds), recurrent lacoholic pancreatitis with pancreatic pseudocyst (last in March 2021), with current alcohol abuse presents to ED with abdominal pain since last night and diagnosed with acute pancreatitis.    #Acute recurrent interstitial pancreatitis secondary to alcohol.   Alcohol related liver diease  Lipase 307, CT abdomen with cont: shows peripancreatic edema, subcentimeter hypodensity possible pseudocyst, hepatic steatosis and hepatomegaly.  US abdomen in 2020 December no GS  136/90 mm HG, /min.  Current alcohol abuse  Dyslipidemia        Rec:  keep  RL at 250 cc/hr  Monitor daily HCT/BUN/CR/urine output.  Monitor pain scale everyday and give pain medications accordingly.   Avoid alcohol and pancreatitis causing medications.  Please start clear liquids tomorrow and advance diet as tolerated.  get INR  get acute viral hepatitis panel  trend daily LFTS while hospital.  Needs to follow up in [facility] in 1 month for repeat LFTS and pancreatic imaging with MRI/MRCP for R/O malignancy.

## 2021-09-24 NOTE — PROGRESS NOTE ADULT - SUBJECTIVE AND OBJECTIVE BOX
----------Daily Progress Note----------    HISTORY OF PRESENT ILLNESS:  Patient is a 41y old Male who presents with a chief complaint of abdominal pain (23 Sep 2021 15:28)    Currently admitted to medicine with the primary diagnosis of Pancreatitis    41 Y M with pmh of HTN, asthma (not on meds), recurrent pancreatitis with pancreatic pseudocyst (last in March 2021) presents to ED with abdominal pain since last night. Patient states that he was on vacation in [place] the past week, and has been drinking heavily. Last drink was Tuesday (said he drank a bottle of vodka that day) and then began having mid-abdominal pain last night, radiating to back, worse with eating, 10/10, associated with nausea and multiple episodes of NBNB vomiting. Denies fevers, chills, chest pain, shortness of breath, sick contacts.    ED Course:  T 97.8F, P 101, /100, R 18, S 98% on RA at rest. Lipase elevated, CT abd showing acute pancreatitis. Got 3L IVF, pain meds.       Today is hospital day 1d.     INTERVAL HOSPITAL COURSE / OVERNIGHT EVENTS:    Patient was examined and seen at bedside. This morning he is resting comfortably in bed and reports abdominal pain radiating to the back and feeling nauseas. Patient had 1 episode of NBNB emesis. Current CIWA score 4.     Review of System Endorses abdominal pain, nausea vomiting. Patient denies headache, dizziness, chest pain, palpitation, SOB.     <<<<<PAST MEDICAL & SURGICAL HISTORY>>>>>  Pancreatitis  due to alcohol    Asthma  no recent exacerbation, not using inhalers for a long time    Hypertension    H/O rotator cuff surgery      ALLERGIES  No Known Allergies      Home Medications:  NIFEdipine 60 mg oral tablet, extended release: 1 tab(s) orally once a day (23 Sep 2021 15:32)        MEDICATIONS  STANDING MEDICATIONS  chlorhexidine 4% Liquid 1 Application(s) Topical <User Schedule>  enoxaparin Injectable 40 milliGRAM(s) SubCutaneous at bedtime  folic acid 1 milliGRAM(s) Oral daily  influenza   Vaccine 0.5 milliLiter(s) IntraMuscular once  lactated ringers. 1000 milliLiter(s) IV Continuous <Continuous>  multivitamin 1 Tablet(s) Oral daily  NIFEdipine XL 60 milliGRAM(s) Oral daily  thiamine 100 milliGRAM(s) Oral daily    PRN MEDICATIONS  HYDROmorphone  Injectable 0.5 milliGRAM(s) IV Push every 2 hours PRN  LORazepam   Injectable 1 milliGRAM(s) IV Push every 2 hours PRN  ondansetron Injectable 4 milliGRAM(s) IV Push every 4 hours PRN    VITALS:  T(F): 99.3  HR: 108  BP: 136/90  RR: 18  SpO2: 98%    <<<<<LABS>>>>>                        16.7   9.64  )-----------( 361      ( 23 Sep 2021 10:58 )             48.1     09-24    136  |  96<L>  |  11  ----------------------------<  91  4.3   |  19  |  0.8    Ca    9.6      24 Sep 2021 01:15    TPro  6.9  /  Alb  4.5  /  TBili  1.5<H>  /  DBili  x   /  AST  47<H>  /  ALT  72<H>  /  AlkPhos  67  09-24          Lactate, Blood: 0.7 mmol/L (09-24-21 @ 01:15)  Lactate, Blood: 2.4 mmol/L *H* (09-23-21 @ 10:58)    [id number]        <<<<<RADIOLOGY>>>>>  < from: CT Abdomen and Pelvis w/ IV Cont (09.23.21 @ 12:32) >    INTERPRETATION:  CLINICAL HISTORY: Abdominal pain.    TECHNIQUE: Contiguous axial CT images were obtained from the lower chest to the pubic symphysis following administration of Optiray intravenous contrast. Oral contrast was not administered. Reformatted images in the coronal and sagittal planes were acquired.    COMPARISON: CT abdomen pelvis dated 3/3/2021.      FINDINGS:    LOWER CHEST: Bibasilar atelectasis. Emphysematous changes.    HEPATOBILIARY: Hepatomegaly with steatosis. Stable hypoattenuating focus along the falciform ligament. No biliary ductal dilatation.    SPLEEN: Unremarkable.    PANCREAS: Peripancreatic edema, compatible with acute pancreatitis. No peripancreatic fluid collection. Ill-defined subcentimeter hypodensity in the pancreatic neck, unclear if representing focal area of heterogeneous pancreatic enhancement or if underlying lesion/cystic lesion is present.    ADRENAL GLANDS: Unremarkable.    KIDNEYS: No hydronephrosis    ABDOMINOPELVIC NODES: Mildly prominent periportal lymph nodes, may be reactive.    PELVIC ORGANS: Unremarkable.    PERITONEUM/MESENTERY/BOWEL: No evidence of bowel obstruction or free air. Unremarkable appendix.    BONES/SOFT TISSUES: Mild degenerative changes of the spine.    OTHER: Atherosclerotic calcifications      IMPRESSION:    1.  Findings compatible with acute pancreatitis. No peripancreatic fluid collection.  2.  Ill-defined subcentimeter hypodensity in the pancreatic neck, unclear if representing focal area of heterogeneous pancreatic enhancement  if underlying lesion/cystic lesion is present. Recommend follow-up with outpatient contrast-enhanced MRI after resolution of symptoms.    --- End ofReport ---    < end of copied text >      <<<<<PHYSICAL EXAM>>>>>  GENERAL: Well developed, well nourished and in no acute distress. Resting comfortably in bed.  HEENT: Normocephalic, atraumatic, mucous membranes moist, EOMI, PERRLA, bilateral sclera anicteric, no conjunctival injection  PULMONARY: Clear to auscultation bilaterally. No rales, rhonchi, or wheezing.  CARDIOVASCULAR: Regular rate and rhythm, S1-S2, no murmurs  GASTROINTESTINAL: Soft, +tender to palpation, non-distended, no guarding.  SKIN/EXTREMITIES: No clubbing or edema  NEUROLOGIC/MUSCULOSKELETAL: AOx4, grossly moving all extremities, no focal deficits.      ----------------------------------------------------------------------------------------------------------------------------------------------------------------------------------------------- ----------Daily Progress Note----------    HISTORY OF PRESENT ILLNESS:  Patient is a 41y old Male who presents with a chief complaint of abdominal pain (23 Sep 2021 15:28)    Currently admitted to medicine with the primary diagnosis of Pancreatitis    41 Y M with pmh of HTN, asthma (not on meds), recurrent pancreatitis with pancreatic pseudocyst (last in March 2021) presents to ED with abdominal pain since last night. Patient states that he was on vacation in [place] the past week, and has been drinking heavily. Last drink was Tuesday (said he drank a bottle of vodka that day) and then began having mid-abdominal pain last night, radiating to back, worse with eating, 10/10, associated with nausea and multiple episodes of NBNB vomiting. Denies fevers, chills, chest pain, shortness of breath, sick contacts.    ED Course:  T 97.8F, P 101, /100, R 18, S 98% on RA at rest. Lipase elevated, CT abd showing acute pancreatitis. Got 3L IVF, pain meds.       Today is hospital day 1d.     INTERVAL HOSPITAL COURSE / OVERNIGHT EVENTS:    Patient was examined and seen at bedside. This morning he is resting comfortably in bed and reports abdominal pain radiating to the back and feeling nauseas. Patient had 1 episode of NBNB emesis. Current CIWA score 4. Over night patient also found to be in hypertensive urgencies and received 5mg metoprolol.      Review of System Endorses abdominal pain, nausea vomiting. Patient denies headache, dizziness, chest pain, palpitation, SOB.     <<<<<PAST MEDICAL & SURGICAL HISTORY>>>>>  Pancreatitis  due to alcohol    Asthma  no recent exacerbation, not using inhalers for a long time    Hypertension    H/O rotator cuff surgery      ALLERGIES  No Known Allergies      Home Medications:  NIFEdipine 60 mg oral tablet, extended release: 1 tab(s) orally once a day (23 Sep 2021 15:32)        MEDICATIONS  STANDING MEDICATIONS  chlorhexidine 4% Liquid 1 Application(s) Topical <User Schedule>  enoxaparin Injectable 40 milliGRAM(s) SubCutaneous at bedtime  folic acid 1 milliGRAM(s) Oral daily  influenza   Vaccine 0.5 milliLiter(s) IntraMuscular once  lactated ringers. 1000 milliLiter(s) IV Continuous <Continuous>  multivitamin 1 Tablet(s) Oral daily  NIFEdipine XL 60 milliGRAM(s) Oral daily  thiamine 100 milliGRAM(s) Oral daily    PRN MEDICATIONS  HYDROmorphone  Injectable 0.5 milliGRAM(s) IV Push every 2 hours PRN  LORazepam   Injectable 1 milliGRAM(s) IV Push every 2 hours PRN  ondansetron Injectable 4 milliGRAM(s) IV Push every 4 hours PRN    VITALS:  T(F): 99.3  HR: 108  BP: 136/90  RR: 18  SpO2: 98%    <<<<<LABS>>>>>                        16.7   9.64  )-----------( 361      ( 23 Sep 2021 10:58 )             48.1     09-24    136  |  96<L>  |  11  ----------------------------<  91  4.3   |  19  |  0.8    Ca    9.6      24 Sep 2021 01:15    TPro  6.9  /  Alb  4.5  /  TBili  1.5<H>  /  DBili  x   /  AST  47<H>  /  ALT  72<H>  /  AlkPhos  67  09-24          Lactate, Blood: 0.7 mmol/L (09-24-21 @ 01:15)  Lactate, Blood: 2.4 mmol/L *H* (09-23-21 @ 10:58)    [id number]        <<<<<RADIOLOGY>>>>>  < from: CT Abdomen and Pelvis w/ IV Cont (09.23.21 @ 12:32) >    INTERPRETATION:  CLINICAL HISTORY: Abdominal pain.    TECHNIQUE: Contiguous axial CT images were obtained from the lower chest to the pubic symphysis following administration of Optiray intravenous contrast. Oral contrast was not administered. Reformatted images in the coronal and sagittal planes were acquired.    COMPARISON: CT abdomen pelvis dated 3/3/2021.      FINDINGS:    LOWER CHEST: Bibasilar atelectasis. Emphysematous changes.    HEPATOBILIARY: Hepatomegaly with steatosis. Stable hypoattenuating focus along the falciform ligament. No biliary ductal dilatation.    SPLEEN: Unremarkable.    PANCREAS: Peripancreatic edema, compatible with acute pancreatitis. No peripancreatic fluid collection. Ill-defined subcentimeter hypodensity in the pancreatic neck, unclear if representing focal area of heterogeneous pancreatic enhancement or if underlying lesion/cystic lesion is present.    ADRENAL GLANDS: Unremarkable.    KIDNEYS: No hydronephrosis    ABDOMINOPELVIC NODES: Mildly prominent periportal lymph nodes, may be reactive.    PELVIC ORGANS: Unremarkable.    PERITONEUM/MESENTERY/BOWEL: No evidence of bowel obstruction or free air. Unremarkable appendix.    BONES/SOFT TISSUES: Mild degenerative changes of the spine.    OTHER: Atherosclerotic calcifications      IMPRESSION:    1.  Findings compatible with acute pancreatitis. No peripancreatic fluid collection.  2.  Ill-defined subcentimeter hypodensity in the pancreatic neck, unclear if representing focal area of heterogeneous pancreatic enhancement  if underlying lesion/cystic lesion is present. Recommend follow-up with outpatient contrast-enhanced MRI after resolution of symptoms.    --- End ofReport ---    < end of copied text >      <<<<<PHYSICAL EXAM>>>>>  GENERAL: Well developed, well nourished and in no acute distress. Resting comfortably in bed.  HEENT: Normocephalic, atraumatic, mucous membranes moist, EOMI, PERRLA, bilateral sclera anicteric, no conjunctival injection  PULMONARY: Clear to auscultation bilaterally. No rales, rhonchi, or wheezing.  CARDIOVASCULAR: Regular rate and rhythm, S1-S2, no murmurs  GASTROINTESTINAL: Soft, +tender to palpation, non-distended, no guarding.  SKIN/EXTREMITIES: No clubbing or edema  NEUROLOGIC/MUSCULOSKELETAL: AOx4, grossly moving all extremities, no focal deficits.      -----------------------------------------------------------------------------------------------------------------------------------------------------------------------------------------------

## 2021-09-24 NOTE — PROGRESS NOTE ADULT - ASSESSMENT
40 y/o M with pmhx of HTN, asthma (not on meds), recurrent pancreatitis with pancreatic pseudocyst (last in March 2021) presents to ED with abdominal pain for 1 day. Found to have acute pancreatitis.    #Acute pancreatitis- likely due to alcohol abuse  - SIRS not present on admission  - QUINTIN score <2 (don't have LDH)  - BISAP score 0  - Lipase 370, amylase 145   - s/p 3L IVF in ED  - continue LR at 250cc/hr  - pain control  - zofran prn   - F/u GI consult (h/o pancreatic pseudocyst, recurrent pancreatitis)  - F/u lipid penal     #EtOH Abuse  - last drink Tuesday  - no signs of withdrawal  - CIWA protocol (symptom triggered)  - thiamine/folate/MVI  - CATCH team consult    #Transaminitis  - TPro  6.9  /  Alb  4.5  /  TBili  1.5<H>  /  DBili  x   /  AST  47<H>  /  ALT  72<H>  /  AlkPhos  67  09-24  - likely from alcohol abuse, pancreatitis  - trend LFT's    #HAGMA  - pure HAGMA (delta ratio 1.0)  - likely due to lactic acidosis  - continue IVF  - monitor BMP    #HTN  - nifedipine xl 60mg qd    #Asthma  - stable  - not on meds    #DVT PPx- LVX 40mg sq qhs  #GI PPx- None  #Diet- NPO for now  #CHG  #Activity- AAT  #Dispo- Acute  #Code- FULL 42 y/o M with pmhx of HTN, asthma (not on meds), recurrent pancreatitis with pancreatic pseudocyst (last in March 2021) presents to ED with abdominal pain for 1 day. Found to have acute pancreatitis.    #Acute pancreatitis- likely due to alcohol abuse  - SIRS not present on admission  - QUINTIN score <2 (don't have LDH)  - BISAP score 0  - Lipase 370, amylase 145   - s/p 3L IVF in ED  - continue LR at 250cc/hr  - pain control  - zofran prn   - Monitor daily HCT/BUN/CR/urine output  - GI consult recs appreciated   - F/u lipid penal     #EtOH Abuse  - last drink Tuesday  - no signs of withdrawal  - CIWA protocol (symptom triggered)  - thiamine/folate/MVI  - CATCH team consult    #Transaminitis  - TPro  6.9  /  Alb  4.5  /  TBili  1.5<H>  /  DBili  x   /  AST  47<H>  /  ALT  72<H>  /  AlkPhos  67  09-24  - likely from alcohol abuse, pancreatitis  - trend LFT's    #HAGMA  - pure HAGMA (delta ratio 1.0)  - likely due to lactic acidosis  - continue IVF  - monitor BMP    #HTN  - nifedipine xl 60mg qd    #Asthma  - stable  - not on meds    #DVT PPx- LVX 40mg sq qhs  #GI PPx- None  #Diet- NPO for now  #CHG  #Activity- AAT  #Dispo- Acute  #Code- FULL 40 y/o M with pmhx of HTN, asthma (not on meds), recurrent pancreatitis with pancreatic pseudocyst (last in March 2021) presents to ED with abdominal pain for 1 day. Found to have acute pancreatitis.    #Acute pancreatitis- likely due to alcohol abuse  - SIRS not present on admission  - QUINTIN score <2 (don't have LDH)  - BISAP score 0  - Lipase 370, amylase 145   - s/p 3L IVF in ED  - continue LR at 250cc/hr  - pain control  - zofran prn   - Monitor daily HCT/BUN/CR/urine output  - GI consult recs appreciated   - Lipid penal: ASCVD 67.9%    Cholesterol, Serum: 251 mg/dL    Triglycerides, Serum: 227 mg/dL    HDL Cholesterol, Serum: 6 mg/dL    Non HDL Cholesterol: 245:     #EtOH Abuse  - last drink Tuesday  - no signs of withdrawal  - CIWA protocol (symptom triggered)  - thiamine/folate/MVI  - CATCH team consult    #Transaminitis  - TPro  6.9  /  Alb  4.5  /  TBili  1.5<H>  /  DBili  x   /  AST  47<H>  /  ALT  72<H>  /  AlkPhos  67  09-24  - likely from alcohol abuse, pancreatitis  - trend LFT's    #HAGMA  - pure HAGMA (delta ratio 1.0)  - likely due to lactic acidosis  - continue IVF  - monitor BMP    #HTN  - nifedipine xl 60mg qd    #Asthma  - stable  - not on meds    #DVT PPx- LVX 40mg sq qhs  #GI PPx- None  #Diet- NPO for now  #CHG  #Activity- AAT  #Dispo- Acute  #Code- FULL 40 y/o M with pmhx of HTN, asthma (not on meds), recurrent pancreatitis with pancreatic pseudocyst (last in March 2021) presents to ED with abdominal pain for 1 day. Found to have acute pancreatitis.    #Acute pancreatitis- likely due to alcohol abuse  - SIRS not present on admission  - QUINTIN score <2 (don't have LDH)  - BISAP score 0  - Lipase 370, amylase 145   - s/p 3L IVF in ED  - continue LR at 250cc/hr  - pain control  - zofran prn   - Monitor daily HCT/BUN/CR/urine output  - GI consult recs appreciated   - Lipid penal: ASCVD 67.9%    Cholesterol, Serum: 251 mg/dL    Triglycerides, Serum: 227 mg/dL    HDL Cholesterol, Serum: 6 mg/dL    Non HDL Cholesterol: 245    LDL: 210    #EtOH Abuse  - last drink Tuesday  - no signs of withdrawal  - CIWA protocol (symptom triggered)  - thiamine/folate/MVI  - CATCH team consult    #Transaminitis  - TPro  6.9  /  Alb  4.5  /  TBili  1.5<H>  /  DBili  x   /  AST  47<H>  /  ALT  72<H>  /  AlkPhos  67  09-24  - likely from alcohol abuse, pancreatitis  - trend LFT's    #HAGMA  - pure HAGMA (delta ratio 1.0)  - likely due to lactic acidosis  - continue IVF  - monitor BMP    #HTN  - nifedipine xl 60mg qd    #Asthma  - stable  - not on meds    #DVT PPx- LVX 40mg sq qhs  #GI PPx- None  #Diet- NPO for now  #CHG  #Activity- AAT  #Dispo- Acute  #Code- FULL

## 2021-09-25 LAB
ALBUMIN SERPL ELPH-MCNC: 4.2 G/DL — SIGNIFICANT CHANGE UP (ref 3.5–5.2)
ALP SERPL-CCNC: 60 U/L — SIGNIFICANT CHANGE UP (ref 30–115)
ALT FLD-CCNC: 50 U/L — HIGH (ref 0–41)
ANION GAP SERPL CALC-SCNC: 16 MMOL/L — HIGH (ref 7–14)
AST SERPL-CCNC: 36 U/L — SIGNIFICANT CHANGE UP (ref 0–41)
BILIRUB SERPL-MCNC: 1.3 MG/DL — HIGH (ref 0.2–1.2)
BUN SERPL-MCNC: 6 MG/DL — LOW (ref 10–20)
CALCIUM SERPL-MCNC: 9.5 MG/DL — SIGNIFICANT CHANGE UP (ref 8.5–10.1)
CHLORIDE SERPL-SCNC: 95 MMOL/L — LOW (ref 98–110)
CO2 SERPL-SCNC: 21 MMOL/L — SIGNIFICANT CHANGE UP (ref 17–32)
CREAT SERPL-MCNC: 0.8 MG/DL — SIGNIFICANT CHANGE UP (ref 0.7–1.5)
GLUCOSE SERPL-MCNC: 101 MG/DL — HIGH (ref 70–99)
HCT VFR BLD CALC: 42.9 % — SIGNIFICANT CHANGE UP (ref 42–52)
HGB BLD-MCNC: 14.4 G/DL — SIGNIFICANT CHANGE UP (ref 14–18)
MAGNESIUM SERPL-MCNC: 1.7 MG/DL — LOW (ref 1.8–2.4)
MCHC RBC-ENTMCNC: 32.1 PG — HIGH (ref 27–31)
MCHC RBC-ENTMCNC: 33.6 G/DL — SIGNIFICANT CHANGE UP (ref 32–37)
MCV RBC AUTO: 95.5 FL — HIGH (ref 80–94)
NRBC # BLD: 0 /100 WBCS — SIGNIFICANT CHANGE UP (ref 0–0)
PLATELET # BLD AUTO: 306 K/UL — SIGNIFICANT CHANGE UP (ref 130–400)
POTASSIUM SERPL-MCNC: 4.3 MMOL/L — SIGNIFICANT CHANGE UP (ref 3.5–5)
POTASSIUM SERPL-SCNC: 4.3 MMOL/L — SIGNIFICANT CHANGE UP (ref 3.5–5)
PROT SERPL-MCNC: 6.3 G/DL — SIGNIFICANT CHANGE UP (ref 6–8)
RBC # BLD: 4.49 M/UL — LOW (ref 4.7–6.1)
RBC # FLD: 14.6 % — HIGH (ref 11.5–14.5)
SODIUM SERPL-SCNC: 132 MMOL/L — LOW (ref 135–146)
WBC # BLD: 8.33 K/UL — SIGNIFICANT CHANGE UP (ref 4.8–10.8)
WBC # FLD AUTO: 8.33 K/UL — SIGNIFICANT CHANGE UP (ref 4.8–10.8)

## 2021-09-25 PROCEDURE — 99233 SBSQ HOSP IP/OBS HIGH 50: CPT

## 2021-09-25 RX ORDER — ATORVASTATIN CALCIUM 80 MG/1
80 TABLET, FILM COATED ORAL AT BEDTIME
Refills: 0 | Status: DISCONTINUED | OUTPATIENT
Start: 2021-09-25 | End: 2021-09-27

## 2021-09-25 RX ORDER — MAGNESIUM OXIDE 400 MG ORAL TABLET 241.3 MG
400 TABLET ORAL
Refills: 0 | Status: DISCONTINUED | OUTPATIENT
Start: 2021-09-25 | End: 2021-09-27

## 2021-09-25 RX ORDER — SODIUM CHLORIDE 9 MG/ML
1000 INJECTION, SOLUTION INTRAVENOUS
Refills: 0 | Status: DISCONTINUED | OUTPATIENT
Start: 2021-09-25 | End: 2021-09-25

## 2021-09-25 RX ORDER — SODIUM CHLORIDE 9 MG/ML
1000 INJECTION INTRAMUSCULAR; INTRAVENOUS; SUBCUTANEOUS
Refills: 0 | Status: DISCONTINUED | OUTPATIENT
Start: 2021-09-25 | End: 2021-09-26

## 2021-09-25 RX ADMIN — HYDROMORPHONE HYDROCHLORIDE 1 MILLIGRAM(S): 2 INJECTION INTRAMUSCULAR; INTRAVENOUS; SUBCUTANEOUS at 23:00

## 2021-09-25 RX ADMIN — HYDROMORPHONE HYDROCHLORIDE 1 MILLIGRAM(S): 2 INJECTION INTRAMUSCULAR; INTRAVENOUS; SUBCUTANEOUS at 18:56

## 2021-09-25 RX ADMIN — HYDROMORPHONE HYDROCHLORIDE 1 MILLIGRAM(S): 2 INJECTION INTRAMUSCULAR; INTRAVENOUS; SUBCUTANEOUS at 14:57

## 2021-09-25 RX ADMIN — HYDROMORPHONE HYDROCHLORIDE 1 MILLIGRAM(S): 2 INJECTION INTRAMUSCULAR; INTRAVENOUS; SUBCUTANEOUS at 10:30

## 2021-09-25 RX ADMIN — SODIUM CHLORIDE 250 MILLILITER(S): 9 INJECTION, SOLUTION INTRAVENOUS at 02:29

## 2021-09-25 RX ADMIN — ENOXAPARIN SODIUM 40 MILLIGRAM(S): 100 INJECTION SUBCUTANEOUS at 21:06

## 2021-09-25 RX ADMIN — HYDROMORPHONE HYDROCHLORIDE 1 MILLIGRAM(S): 2 INJECTION INTRAMUSCULAR; INTRAVENOUS; SUBCUTANEOUS at 06:43

## 2021-09-25 RX ADMIN — Medication 2 MILLIGRAM(S): at 21:06

## 2021-09-25 RX ADMIN — ONDANSETRON 4 MILLIGRAM(S): 8 TABLET, FILM COATED ORAL at 23:00

## 2021-09-25 RX ADMIN — Medication 100 MILLIGRAM(S): at 11:25

## 2021-09-25 RX ADMIN — SODIUM CHLORIDE 250 MILLILITER(S): 9 INJECTION, SOLUTION INTRAVENOUS at 06:11

## 2021-09-25 RX ADMIN — Medication 60 MILLIGRAM(S): at 11:25

## 2021-09-25 RX ADMIN — Medication 1 TABLET(S): at 11:25

## 2021-09-25 RX ADMIN — SODIUM CHLORIDE 150 MILLILITER(S): 9 INJECTION INTRAMUSCULAR; INTRAVENOUS; SUBCUTANEOUS at 22:39

## 2021-09-25 RX ADMIN — HYDROMORPHONE HYDROCHLORIDE 1 MILLIGRAM(S): 2 INJECTION INTRAMUSCULAR; INTRAVENOUS; SUBCUTANEOUS at 02:28

## 2021-09-25 RX ADMIN — MAGNESIUM OXIDE 400 MG ORAL TABLET 400 MILLIGRAM(S): 241.3 TABLET ORAL at 22:38

## 2021-09-25 RX ADMIN — HYDROMORPHONE HYDROCHLORIDE 1 MILLIGRAM(S): 2 INJECTION INTRAMUSCULAR; INTRAVENOUS; SUBCUTANEOUS at 11:15

## 2021-09-25 RX ADMIN — Medication 1 MILLIGRAM(S): at 11:25

## 2021-09-25 RX ADMIN — ATORVASTATIN CALCIUM 80 MILLIGRAM(S): 80 TABLET, FILM COATED ORAL at 21:06

## 2021-09-25 RX ADMIN — ONDANSETRON 4 MILLIGRAM(S): 8 TABLET, FILM COATED ORAL at 10:30

## 2021-09-25 RX ADMIN — ONDANSETRON 4 MILLIGRAM(S): 8 TABLET, FILM COATED ORAL at 15:37

## 2021-09-25 RX ADMIN — HYDROMORPHONE HYDROCHLORIDE 1 MILLIGRAM(S): 2 INJECTION INTRAMUSCULAR; INTRAVENOUS; SUBCUTANEOUS at 16:04

## 2021-09-25 RX ADMIN — SODIUM CHLORIDE 150 MILLILITER(S): 9 INJECTION, SOLUTION INTRAVENOUS at 15:36

## 2021-09-25 RX ADMIN — HYDROMORPHONE HYDROCHLORIDE 1 MILLIGRAM(S): 2 INJECTION INTRAMUSCULAR; INTRAVENOUS; SUBCUTANEOUS at 02:45

## 2021-09-25 RX ADMIN — Medication 2 MILLIGRAM(S): at 00:52

## 2021-09-25 NOTE — PROGRESS NOTE ADULT - SUBJECTIVE AND OBJECTIVE BOX
----------Daily Progress Note----------    HISTORY OF PRESENT ILLNESS:  Patient is a 41y old Male who presents with a chief complaint of abdominal pain (24 Sep 2021 10:11)    Currently admitted to medicine with the primary diagnosis of Pancreatitis     41 Y M with pmh of HTN, asthma (not on meds), recurrent pancreatitis with pancreatic pseudocyst (last in March 2021) presents to ED with abdominal pain since last night. Patient states that he was on vacation in [place] the past week, and has been drinking heavily. Last drink was Tuesday (said he drank a bottle of vodka that day) and then began having mid-abdominal pain last night, radiating to back, worse with eating, 10/10, associated with nausea and multiple episodes of NBNB vomiting. Denies fevers, chills, chest pain, shortness of breath, sick contacts.    ED Course:  T 97.8F, P 101, /100, R 18, S 98% on RA at rest. Lipase elevated, CT abd showing acute pancreatitis. Got 3L IVF, pain meds.      Today is hospital day 2d.     INTERVAL HOSPITAL COURSE / OVERNIGHT EVENTS:    Patient was examined and seen at bedside. This morning he is resting comfortably in bed and reports abdominal pain radiating to the back and feeling nauseas. Patient had 1 episode of NBNB emesis. Current CIWA score 4.     Review of System Endorses abdominal pain, nausea vomiting. Patient denies headache, dizziness, chest pain, palpitation, SOB.       <<<<<PAST MEDICAL & SURGICAL HISTORY>>>>>  Pancreatitis  due to alcohol    Asthma  no recent exacerbation, not using inhalers for a long time    Hypertension    H/O rotator cuff surgery      ALLERGIES  No Known Allergies      Home Medications:  NIFEdipine 60 mg oral tablet, extended release: 1 tab(s) orally once a day (23 Sep 2021 15:32)        MEDICATIONS  STANDING MEDICATIONS  chlorhexidine 4% Liquid 1 Application(s) Topical <User Schedule>  enoxaparin Injectable 40 milliGRAM(s) SubCutaneous at bedtime  folic acid 1 milliGRAM(s) Oral daily  influenza   Vaccine 0.5 milliLiter(s) IntraMuscular once  lactated ringers. 1000 milliLiter(s) IV Continuous <Continuous>  multivitamin 1 Tablet(s) Oral daily  NIFEdipine XL 60 milliGRAM(s) Oral daily  thiamine 100 milliGRAM(s) Oral daily    PRN MEDICATIONS  HYDROmorphone  Injectable 1 milliGRAM(s) IV Push every 4 hours PRN  ondansetron Injectable 4 milliGRAM(s) IV Push every 4 hours PRN    VITALS:  T(F): 97  HR: 88  BP: 116/84  RR: 18  SpO2: --    <<<<<LABS>>>>>                        14.4   8.33  )-----------( 306      ( 25 Sep 2021 04:30 )             42.9     09-25    132<L>  |  95<L>  |  6<L>  ----------------------------<  101<H>  4.3   |  21  |  0.8    Ca    9.5      25 Sep 2021 04:30  Mg     1.7     09-25    TPro  6.3  /  Alb  4.2  /  TBili  1.3<H>  /  DBili  x   /  AST  36  /  ALT  50<H>  /  AlkPhos  60  09-25            [id number]      <<<<<RADIOLOGY>>>>>  < from: CT Abdomen and Pelvis w/ IV Cont (09.23.21 @ 12:32) >    INTERPRETATION:  CLINICAL HISTORY: Abdominal pain.    TECHNIQUE: Contiguous axial CT images were obtained from the lower chest to the pubic symphysis following administration of Optiray intravenous contrast. Oral contrast was not administered. Reformatted images in the coronal and sagittal planes were acquired.    COMPARISON: CT abdomen pelvis dated 3/3/2021.      FINDINGS:    LOWER CHEST: Bibasilar atelectasis. Emphysematous changes.    HEPATOBILIARY: Hepatomegaly with steatosis. Stable hypoattenuating focus along the falciform ligament. No biliary ductal dilatation.    SPLEEN: Unremarkable.    PANCREAS: Peripancreatic edema, compatible with acute pancreatitis. No peripancreatic fluid collection. Ill-defined subcentimeter hypodensity in the pancreatic neck, unclear if representing focal area of heterogeneous pancreatic enhancement or if underlying lesion/cystic lesion is present.    ADRENAL GLANDS: Unremarkable.    KIDNEYS: No hydronephrosis    ABDOMINOPELVIC NODES: Mildly prominent periportal lymph nodes, may be reactive.    PELVIC ORGANS: Unremarkable.    PERITONEUM/MESENTERY/BOWEL: No evidence of bowel obstruction or free air. Unremarkable appendix.    BONES/SOFT TISSUES: Mild degenerative changes of the spine.    OTHER: Atherosclerotic calcifications      IMPRESSION:    1.  Findings compatible with acute pancreatitis. No peripancreatic fluid collection.  2.  Ill-defined subcentimeter hypodensity in the pancreatic neck, unclear if representing focal area of heterogeneous pancreatic enhancement  if underlying lesion/cystic lesion is present. Recommend follow-up with outpatient contrast-enhanced MRI after resolution of symptoms.    --- End ofReport ---    < end of copied text >      <<<<<PHYSICAL EXAM>>>>>  GENERAL: Well developed, well nourished and in no acute distress. Resting comfortably in bed.  HEENT: Normocephalic, atraumatic, mucous membranes moist, EOMI, PERRLA, bilateral sclera anicteric, no conjunctival injection  PULMONARY: Clear to auscultation bilaterally. No rales, rhonchi, or wheezing.  CARDIOVASCULAR: Regular rate and rhythm, S1-S2, no murmurs  GASTROINTESTINAL: Soft, +tender to palpation, non-distended, no guarding.  SKIN/EXTREMITIES: No clubbing or edema  NEUROLOGIC/MUSCULOSKELETAL: AOx4, grossly moving all extremities, no focal deficits.        -----------------------------------------------------------------------------------------------------------------------------------------------------------------------------------------------

## 2021-09-25 NOTE — PROGRESS NOTE ADULT - ATTENDING COMMENTS
41 Y M with PMH of HTN, asthma (not on meds), recurrent pancreatitis with pancreatic pseudocyst (last in March 2021) presents to ED with abdominal pain since last night. Found to have acute pancreatitis.     #Acute pancreatitis- likely due to alcohol abuse  -continue LR at 150cc/hr, lower from 250cc   -Start clears, advance as tolerated   -Pain control with Dilaudid 1mg q4h, hx of exhibiting drug seeking behaviour   -Zofran as needed     #EtOH Abuse  #Suspected folate deficiency  #Suspected thiamine deficiency   -thiamine/folate/MVI  -[facility] protocol     #Transaminitis  -likely from alcohol abuse    #HTN  -nifedipine xl 60mg qd    #HLD - start lipitor 80mg qhs      #DVT PPx- LVX 40mg sq qhs     #Progress Note Handoff  Pending (specify):  tolerates diet   Family discussion: d/w pt plan as above   Disposition: Home

## 2021-09-25 NOTE — PROGRESS NOTE ADULT - ASSESSMENT
40 y/o M with pmhx of HTN, asthma (not on meds), recurrent pancreatitis with pancreatic pseudocyst (last in March 2021) presents to ED with abdominal pain for 1 day. Found to have acute pancreatitis.    #Acute pancreatitis- likely due to alcohol abuse  - SIRS not present on admission  - QUINTIN score <2 (don't have LDH)  - BISAP score 0  - Lipase 370, amylase 145   - s/p 3L IVF in ED  - continue LR at 250cc/hr  - pain control  - zofran prn   - Monitor daily HCT/BUN/CR/urine output  - GI consult recs appreciated   - Lipid penal: ASCVD 67.9%    Cholesterol, Serum: 251 mg/dL    Triglycerides, Serum: 227 mg/dL    HDL Cholesterol, Serum: 6 mg/dL    Non HDL Cholesterol: 245    LDL: 210    #EtOH Abuse  - last drink Tuesday  - no signs of withdrawal  - CIWA protocol (symptom triggered)  - thiamine/folate/MVI  - CATCH team consult    #Transaminitis  - TPro  6.9  /  Alb  4.5  /  TBili  1.5<H>  /  DBili  x   /  AST  47<H>  /  ALT  72<H>  /  AlkPhos  67  09-24  TPro  6.3  /  Alb  4.2  /  TBili  1.3<H>  /  DBili  x   /  AST  36  /  ALT  50<H>  /  AlkPhos  60  09-25  - likely from alcohol abuse, pancreatitis  - trend LFT's    #HAGMA  - pure HAGMA (delta ratio 1.0)  - likely due to lactic acidosis  - continue IVF  - monitor BMP    #HTN  - nifedipine xl 60mg qd    #Asthma  - stable  - not on meds    #DVT PPx- LVX 40mg sq qhs  #GI PPx- None  #Diet- NPO for now  #CHG  #Activity- AAT  #Dispo- Acute  #Code- FULL

## 2021-09-26 LAB
ALBUMIN SERPL ELPH-MCNC: 4.3 G/DL — SIGNIFICANT CHANGE UP (ref 3.5–5.2)
ALP SERPL-CCNC: 60 U/L — SIGNIFICANT CHANGE UP (ref 30–115)
ALT FLD-CCNC: 51 U/L — HIGH (ref 0–41)
ANION GAP SERPL CALC-SCNC: 15 MMOL/L — HIGH (ref 7–14)
AST SERPL-CCNC: 36 U/L — SIGNIFICANT CHANGE UP (ref 0–41)
BILIRUB SERPL-MCNC: 1 MG/DL — SIGNIFICANT CHANGE UP (ref 0.2–1.2)
BUN SERPL-MCNC: 6 MG/DL — LOW (ref 10–20)
CALCIUM SERPL-MCNC: 9.5 MG/DL — SIGNIFICANT CHANGE UP (ref 8.5–10.1)
CHLORIDE SERPL-SCNC: 98 MMOL/L — SIGNIFICANT CHANGE UP (ref 98–110)
CO2 SERPL-SCNC: 25 MMOL/L — SIGNIFICANT CHANGE UP (ref 17–32)
CREAT SERPL-MCNC: 0.8 MG/DL — SIGNIFICANT CHANGE UP (ref 0.7–1.5)
GLUCOSE SERPL-MCNC: 114 MG/DL — HIGH (ref 70–99)
HCT VFR BLD CALC: 43.7 % — SIGNIFICANT CHANGE UP (ref 42–52)
HGB BLD-MCNC: 14.7 G/DL — SIGNIFICANT CHANGE UP (ref 14–18)
MAGNESIUM SERPL-MCNC: 1.9 MG/DL — SIGNIFICANT CHANGE UP (ref 1.8–2.4)
MCHC RBC-ENTMCNC: 32.2 PG — HIGH (ref 27–31)
MCHC RBC-ENTMCNC: 33.6 G/DL — SIGNIFICANT CHANGE UP (ref 32–37)
MCV RBC AUTO: 95.8 FL — HIGH (ref 80–94)
NRBC # BLD: 0 /100 WBCS — SIGNIFICANT CHANGE UP (ref 0–0)
PLATELET # BLD AUTO: 319 K/UL — SIGNIFICANT CHANGE UP (ref 130–400)
POTASSIUM SERPL-MCNC: 4.2 MMOL/L — SIGNIFICANT CHANGE UP (ref 3.5–5)
POTASSIUM SERPL-SCNC: 4.2 MMOL/L — SIGNIFICANT CHANGE UP (ref 3.5–5)
PROT SERPL-MCNC: 6.9 G/DL — SIGNIFICANT CHANGE UP (ref 6–8)
RBC # BLD: 4.56 M/UL — LOW (ref 4.7–6.1)
RBC # FLD: 14.2 % — SIGNIFICANT CHANGE UP (ref 11.5–14.5)
SODIUM SERPL-SCNC: 138 MMOL/L — SIGNIFICANT CHANGE UP (ref 135–146)
WBC # BLD: 7.52 K/UL — SIGNIFICANT CHANGE UP (ref 4.8–10.8)
WBC # FLD AUTO: 7.52 K/UL — SIGNIFICANT CHANGE UP (ref 4.8–10.8)

## 2021-09-26 PROCEDURE — 99232 SBSQ HOSP IP/OBS MODERATE 35: CPT

## 2021-09-26 RX ORDER — POLYETHYLENE GLYCOL 3350 17 G/17G
17 POWDER, FOR SOLUTION ORAL ONCE
Refills: 0 | Status: COMPLETED | OUTPATIENT
Start: 2021-09-26 | End: 2021-09-26

## 2021-09-26 RX ORDER — HYDROMORPHONE HYDROCHLORIDE 2 MG/ML
0.5 INJECTION INTRAMUSCULAR; INTRAVENOUS; SUBCUTANEOUS
Refills: 0 | Status: DISCONTINUED | OUTPATIENT
Start: 2021-09-26 | End: 2021-09-27

## 2021-09-26 RX ADMIN — HYDROMORPHONE HYDROCHLORIDE 0.5 MILLIGRAM(S): 2 INJECTION INTRAMUSCULAR; INTRAVENOUS; SUBCUTANEOUS at 14:26

## 2021-09-26 RX ADMIN — ONDANSETRON 4 MILLIGRAM(S): 8 TABLET, FILM COATED ORAL at 21:14

## 2021-09-26 RX ADMIN — Medication 60 MILLIGRAM(S): at 11:19

## 2021-09-26 RX ADMIN — HYDROMORPHONE HYDROCHLORIDE 1 MILLIGRAM(S): 2 INJECTION INTRAMUSCULAR; INTRAVENOUS; SUBCUTANEOUS at 08:39

## 2021-09-26 RX ADMIN — Medication 1 TABLET(S): at 11:19

## 2021-09-26 RX ADMIN — Medication 1 MILLIGRAM(S): at 11:19

## 2021-09-26 RX ADMIN — HYDROMORPHONE HYDROCHLORIDE 0.5 MILLIGRAM(S): 2 INJECTION INTRAMUSCULAR; INTRAVENOUS; SUBCUTANEOUS at 21:14

## 2021-09-26 RX ADMIN — HYDROMORPHONE HYDROCHLORIDE 0.5 MILLIGRAM(S): 2 INJECTION INTRAMUSCULAR; INTRAVENOUS; SUBCUTANEOUS at 11:21

## 2021-09-26 RX ADMIN — ONDANSETRON 4 MILLIGRAM(S): 8 TABLET, FILM COATED ORAL at 03:03

## 2021-09-26 RX ADMIN — HYDROMORPHONE HYDROCHLORIDE 0.5 MILLIGRAM(S): 2 INJECTION INTRAMUSCULAR; INTRAVENOUS; SUBCUTANEOUS at 18:23

## 2021-09-26 RX ADMIN — ENOXAPARIN SODIUM 40 MILLIGRAM(S): 100 INJECTION SUBCUTANEOUS at 21:15

## 2021-09-26 RX ADMIN — HYDROMORPHONE HYDROCHLORIDE 1 MILLIGRAM(S): 2 INJECTION INTRAMUSCULAR; INTRAVENOUS; SUBCUTANEOUS at 07:30

## 2021-09-26 RX ADMIN — MAGNESIUM OXIDE 400 MG ORAL TABLET 400 MILLIGRAM(S): 241.3 TABLET ORAL at 16:47

## 2021-09-26 RX ADMIN — Medication 2 MILLIGRAM(S): at 21:14

## 2021-09-26 RX ADMIN — Medication 100 MILLIGRAM(S): at 11:19

## 2021-09-26 RX ADMIN — POLYETHYLENE GLYCOL 3350 17 GRAM(S): 17 POWDER, FOR SOLUTION ORAL at 19:12

## 2021-09-26 RX ADMIN — ATORVASTATIN CALCIUM 80 MILLIGRAM(S): 80 TABLET, FILM COATED ORAL at 21:15

## 2021-09-26 RX ADMIN — MAGNESIUM OXIDE 400 MG ORAL TABLET 400 MILLIGRAM(S): 241.3 TABLET ORAL at 07:34

## 2021-09-26 RX ADMIN — HYDROMORPHONE HYDROCHLORIDE 1 MILLIGRAM(S): 2 INJECTION INTRAMUSCULAR; INTRAVENOUS; SUBCUTANEOUS at 03:03

## 2021-09-26 RX ADMIN — MAGNESIUM OXIDE 400 MG ORAL TABLET 400 MILLIGRAM(S): 241.3 TABLET ORAL at 11:21

## 2021-09-26 RX ADMIN — ONDANSETRON 4 MILLIGRAM(S): 8 TABLET, FILM COATED ORAL at 11:21

## 2021-09-26 RX ADMIN — HYDROMORPHONE HYDROCHLORIDE 0.5 MILLIGRAM(S): 2 INJECTION INTRAMUSCULAR; INTRAVENOUS; SUBCUTANEOUS at 15:00

## 2021-09-26 RX ADMIN — ONDANSETRON 4 MILLIGRAM(S): 8 TABLET, FILM COATED ORAL at 16:47

## 2021-09-26 RX ADMIN — HYDROMORPHONE HYDROCHLORIDE 0.5 MILLIGRAM(S): 2 INJECTION INTRAMUSCULAR; INTRAVENOUS; SUBCUTANEOUS at 18:08

## 2021-09-26 RX ADMIN — HYDROMORPHONE HYDROCHLORIDE 0.5 MILLIGRAM(S): 2 INJECTION INTRAMUSCULAR; INTRAVENOUS; SUBCUTANEOUS at 12:15

## 2021-09-26 NOTE — PROGRESS NOTE ADULT - ASSESSMENT
41 Y M with PMH of HTN, asthma (not on meds), recurrent pancreatitis with pancreatic pseudocyst (last in March 2021) presents to ED with abdominal pain since last night. Found to have acute pancreatitis.     #Acute pancreatitis- likely due to alcohol abuse  -dc IVF   -tolerated clears, advance to solids   -Pain control with Dilaudid 0.5 mg q4h, hx of exhibiting drug seeking behaviour   -Zofran as needed     #EtOH Abuse  #Suspected folate deficiency  #Suspected thiamine deficiency   -thiamine/folate/MVI  -CIWA protocol     #Hypomagnesemia - resolved   #Hyponatremia - resolved     #Transaminitis - resolved   -likely from alcohol abuse    #HTN  -nifedipine xl 60mg qd    #HLD - c/w lipitor 80mg qhs      #DVT PPx- LVX 40mg sq qhs     #Progress Note Handoff  Pending (specify):  tolerates diet   Family discussion: d/w pt plan as above   Disposition: Home

## 2021-09-26 NOTE — PROGRESS NOTE ADULT - SUBJECTIVE AND OBJECTIVE BOX
Pt seen and examined at bedside. Feels better, tolerating clears.     VITAL SIGNS (Last 24 hrs):  T(C): 35.9 (09-26-21 @ 05:25), Max: 36.3 (09-25-21 @ 21:29)  HR: 80 (09-26-21 @ 05:25) (77 - 80)  BP: 109/68 (09-26-21 @ 05:25) (109/68 - 148/87)  RR: 18 (09-26-21 @ 05:25) (18 - 18)  SpO2: --  Wt(kg): --  Daily     Daily     I&O's Summary    25 Sep 2021 07:01  -  26 Sep 2021 07:00  --------------------------------------------------------  IN: 1150 mL / OUT: 450 mL / NET: 700 mL        PHYSICAL EXAM:  GENERAL: NAD, well-developed  HEAD:  Atraumatic, Normocephalic  EYES: EOMI, PERRLA, conjunctiva and sclera clear  NECK: Supple, No JVD  CHEST/LUNG: Clear to auscultation bilaterally; No wheeze  HEART: Regular rate and rhythm; No murmurs, rubs, or gallops  ABDOMEN: Soft, Nontender, Nondistended; Bowel sounds present  EXTREMITIES:  2+ Peripheral Pulses, No clubbing, cyanosis, or edema  PSYCH: AAOx3  NEUROLOGY: non-focal  SKIN: No rashes or lesions    Labs Reviewed  Spoke to patient in regards to abnormal labs.    CBC Full  -  ( 26 Sep 2021 06:35 )  WBC Count : 7.52 K/uL  Hemoglobin : 14.7 g/dL  Hematocrit : 43.7 %  Platelet Count - Automated : 319 K/uL  Mean Cell Volume : 95.8 fL  Mean Cell Hemoglobin : 32.2 pg  Mean Cell Hemoglobin Concentration : 33.6 g/dL  Auto Neutrophil # : x  Auto Lymphocyte # : x  Auto Monocyte # : x  Auto Eosinophil # : x  Auto Basophil # : x  Auto Neutrophil % : x  Auto Lymphocyte % : x  Auto Monocyte % : x  Auto Eosinophil % : x  Auto Basophil % : x    BMP:    09-26 @ 06:35    Blood Urea Nitrogen - 6  Calcium - 9.5  Carbond Dioxide - 25  Chloride - 98  Creatinine - 0.8  Glucose - 114  Potassium - 4.2  Sodium - 138           MEDICATIONS  (STANDING):  atorvastatin 80 milliGRAM(s) Oral at bedtime  chlorhexidine 4% Liquid 1 Application(s) Topical <User Schedule>  enoxaparin Injectable 40 milliGRAM(s) SubCutaneous at bedtime  folic acid 1 milliGRAM(s) Oral daily  influenza   Vaccine 0.5 milliLiter(s) IntraMuscular once  LORazepam     Tablet 2 milliGRAM(s) Oral at bedtime  magnesium oxide 400 milliGRAM(s) Oral three times a day with meals  multivitamin 1 Tablet(s) Oral daily  NIFEdipine XL 60 milliGRAM(s) Oral daily  sodium chloride 0.9%. 1000 milliLiter(s) (150 mL/Hr) IV Continuous <Continuous>  thiamine 100 milliGRAM(s) Oral daily    MEDICATIONS  (PRN):  HYDROmorphone  Injectable 0.5 milliGRAM(s) IV Push every 3 hours PRN Severe Pain (7 - 10)  ondansetron Injectable 4 milliGRAM(s) IV Push every 4 hours PRN Nausea and/or Vomiting

## 2021-09-27 ENCOUNTER — TRANSCRIPTION ENCOUNTER (OUTPATIENT)
Age: 41
End: 2021-09-27

## 2021-09-27 VITALS
TEMPERATURE: 97 F | RESPIRATION RATE: 18 BRPM | DIASTOLIC BLOOD PRESSURE: 65 MMHG | SYSTOLIC BLOOD PRESSURE: 117 MMHG | HEART RATE: 70 BPM

## 2021-09-27 LAB
ALBUMIN SERPL ELPH-MCNC: 4.3 G/DL — SIGNIFICANT CHANGE UP (ref 3.5–5.2)
ALP SERPL-CCNC: 61 U/L — SIGNIFICANT CHANGE UP (ref 30–115)
ALT FLD-CCNC: 60 U/L — HIGH (ref 0–41)
ANION GAP SERPL CALC-SCNC: 14 MMOL/L — SIGNIFICANT CHANGE UP (ref 7–14)
AST SERPL-CCNC: 47 U/L — HIGH (ref 0–41)
BILIRUB SERPL-MCNC: 0.9 MG/DL — SIGNIFICANT CHANGE UP (ref 0.2–1.2)
BUN SERPL-MCNC: 8 MG/DL — LOW (ref 10–20)
CALCIUM SERPL-MCNC: 9.7 MG/DL — SIGNIFICANT CHANGE UP (ref 8.5–10.1)
CHLORIDE SERPL-SCNC: 96 MMOL/L — LOW (ref 98–110)
CO2 SERPL-SCNC: 25 MMOL/L — SIGNIFICANT CHANGE UP (ref 17–32)
CREAT SERPL-MCNC: 0.9 MG/DL — SIGNIFICANT CHANGE UP (ref 0.7–1.5)
GLUCOSE SERPL-MCNC: 97 MG/DL — SIGNIFICANT CHANGE UP (ref 70–99)
HCT VFR BLD CALC: 45.1 % — SIGNIFICANT CHANGE UP (ref 42–52)
HGB BLD-MCNC: 15.1 G/DL — SIGNIFICANT CHANGE UP (ref 14–18)
MAGNESIUM SERPL-MCNC: 2.2 MG/DL — SIGNIFICANT CHANGE UP (ref 1.8–2.4)
MCHC RBC-ENTMCNC: 32.3 PG — HIGH (ref 27–31)
MCHC RBC-ENTMCNC: 33.5 G/DL — SIGNIFICANT CHANGE UP (ref 32–37)
MCV RBC AUTO: 96.6 FL — HIGH (ref 80–94)
NRBC # BLD: 0 /100 WBCS — SIGNIFICANT CHANGE UP (ref 0–0)
PLATELET # BLD AUTO: 305 K/UL — SIGNIFICANT CHANGE UP (ref 130–400)
POTASSIUM SERPL-MCNC: 5.2 MMOL/L — HIGH (ref 3.5–5)
POTASSIUM SERPL-SCNC: 5.2 MMOL/L — HIGH (ref 3.5–5)
PROT SERPL-MCNC: 6.9 G/DL — SIGNIFICANT CHANGE UP (ref 6–8)
RBC # BLD: 4.67 M/UL — LOW (ref 4.7–6.1)
RBC # FLD: 14 % — SIGNIFICANT CHANGE UP (ref 11.5–14.5)
SODIUM SERPL-SCNC: 135 MMOL/L — SIGNIFICANT CHANGE UP (ref 135–146)
WBC # BLD: 6.69 K/UL — SIGNIFICANT CHANGE UP (ref 4.8–10.8)
WBC # FLD AUTO: 6.69 K/UL — SIGNIFICANT CHANGE UP (ref 4.8–10.8)

## 2021-09-27 PROCEDURE — 99238 HOSP IP/OBS DSCHRG MGMT 30/<: CPT

## 2021-09-27 RX ORDER — ATORVASTATIN CALCIUM 80 MG/1
1 TABLET, FILM COATED ORAL
Qty: 30 | Refills: 0
Start: 2021-09-27 | End: 2021-10-26

## 2021-09-27 RX ORDER — NIFEDIPINE 30 MG
1 TABLET, EXTENDED RELEASE 24 HR ORAL
Qty: 30 | Refills: 0
Start: 2021-09-27 | End: 2021-10-26

## 2021-09-27 RX ORDER — HYDROMORPHONE HYDROCHLORIDE 2 MG/ML
2 INJECTION INTRAMUSCULAR; INTRAVENOUS; SUBCUTANEOUS EVERY 4 HOURS
Refills: 0 | Status: DISCONTINUED | OUTPATIENT
Start: 2021-09-27 | End: 2021-09-27

## 2021-09-27 RX ORDER — SIMETHICONE 80 MG/1
80 TABLET, CHEWABLE ORAL ONCE
Refills: 0 | Status: COMPLETED | OUTPATIENT
Start: 2021-09-27 | End: 2021-09-27

## 2021-09-27 RX ORDER — HYDROMORPHONE HYDROCHLORIDE 2 MG/ML
1 INJECTION INTRAMUSCULAR; INTRAVENOUS; SUBCUTANEOUS EVERY 4 HOURS
Refills: 0 | Status: DISCONTINUED | OUTPATIENT
Start: 2021-09-27 | End: 2021-09-27

## 2021-09-27 RX ADMIN — Medication 1 TABLET(S): at 11:23

## 2021-09-27 RX ADMIN — ONDANSETRON 4 MILLIGRAM(S): 8 TABLET, FILM COATED ORAL at 04:15

## 2021-09-27 RX ADMIN — HYDROMORPHONE HYDROCHLORIDE 0.5 MILLIGRAM(S): 2 INJECTION INTRAMUSCULAR; INTRAVENOUS; SUBCUTANEOUS at 07:58

## 2021-09-27 RX ADMIN — Medication 1 MILLIGRAM(S): at 11:23

## 2021-09-27 RX ADMIN — HYDROMORPHONE HYDROCHLORIDE 2 MILLIGRAM(S): 2 INJECTION INTRAMUSCULAR; INTRAVENOUS; SUBCUTANEOUS at 11:32

## 2021-09-27 RX ADMIN — HYDROMORPHONE HYDROCHLORIDE 0.5 MILLIGRAM(S): 2 INJECTION INTRAMUSCULAR; INTRAVENOUS; SUBCUTANEOUS at 00:43

## 2021-09-27 RX ADMIN — HYDROMORPHONE HYDROCHLORIDE 0.5 MILLIGRAM(S): 2 INJECTION INTRAMUSCULAR; INTRAVENOUS; SUBCUTANEOUS at 04:15

## 2021-09-27 RX ADMIN — MAGNESIUM OXIDE 400 MG ORAL TABLET 400 MILLIGRAM(S): 241.3 TABLET ORAL at 11:33

## 2021-09-27 RX ADMIN — Medication 60 MILLIGRAM(S): at 05:56

## 2021-09-27 RX ADMIN — SIMETHICONE 80 MILLIGRAM(S): 80 TABLET, CHEWABLE ORAL at 05:40

## 2021-09-27 RX ADMIN — MAGNESIUM OXIDE 400 MG ORAL TABLET 400 MILLIGRAM(S): 241.3 TABLET ORAL at 07:59

## 2021-09-27 NOTE — DISCHARGE NOTE PROVIDER - CARE PROVIDER_API CALL
Michi Colón)  [id number]  [address] [place] [address]  Phone: [phone]  Fax: [phone]  Established Patient  Follow Up Time: 2 weeks    Parish Garnica  Gastroenterology  [address] [place] [address]  Phone: [phone]  Fax: [phone]  Established Patient  Follow Up Time: 1 month

## 2021-09-27 NOTE — DISCHARGE NOTE PROVIDER - CARE PROVIDERS DIRECT ADDRESSES
[email or web address]

## 2021-09-27 NOTE — DISCHARGE NOTE PROVIDER - NSDCCPCAREPLAN_GEN_ALL_CORE_FT
PRINCIPAL DISCHARGE DIAGNOSIS  Diagnosis: Pancreatitis  Assessment and Plan of Treatment: Pancreatitis  Pancreatitis is a condition in which the pancreas becomes irritated and swollen (inflammation). The pancreas is a gland that is located behind the stomach. It produces enzymes that help to digest food. Most acute attacks last a couple of days and can cause serious problems and even be life threatening. The most common causes are alcohol abuse and gallstones. Symptoms include pain in the upper abdomen that may radiate to the back, nausea, and vomiting. Diagnosis is made with a medical history and physical exam as well as additional diagnostic testing. At home, eat smaller, more frequent meals and avoid alcohol and fatty foods. Drink enough fluid to keep your urine clear or pale yellow.   SEEK IMMEDIATE MEDICAL CARE IF YOU HAVE ANY OF THE FOLLOWING SYMPTOMS: inability to keep fluids down, increasing pain, yellowing of your skin or eyes, or lightheadedness/dizziness.  Please follow up with GI in [facility] in 1 month.

## 2021-09-27 NOTE — DISCHARGE NOTE PROVIDER - HOSPITAL COURSE
41 Y M with pmh of HTN, asthma (not on meds), recurrent pancreatitis with pancreatic pseudocyst (last in March 2021) presents to ED with abdominal pain since last night. Patient states that he was on vacation in [place] the past week, and has been drinking heavily. Last drink was Tuesday (said he drank a bottle of vodka that day) and then began having mid-abdominal pain last night, radiating to back, worse with eating, 10/10, associated with nausea and multiple episodes of NBNB vomiting. Denies fevers, chills, chest pain, shortness of breath, sick contacts.    Patient was admitted for Acute pancreatitis- likely due to alcohol abuse. Lipase 370, amylase 145. Patient got 3L IVF in ED and was started on LR at 250cc/hr and pain was controlled with IV pain medication. Patient was evaluated by the CATCH team for ETOH abuse. Patient was started on Lipitor for abnormal lipid profile.  Patient is stable to be discharged. Pain is controlled and patient is tolerating po intake.      41 Y M with pmh of HTN, asthma (not on meds), recurrent pancreatitis with pancreatic pseudocyst (last in March 2021) presents to ED with abdominal pain since last night. Patient states that he was on vacation in [place] the past week, and has been drinking heavily. Last drink was Tuesday (said he drank a bottle of vodka that day) and then began having mid-abdominal pain last night, radiating to back, worse with eating, 10/10, associated with nausea and multiple episodes of NBNB vomiting. Denies fevers, chills, chest pain, shortness of breath, sick contacts.    Patient was admitted for Acute pancreatitis- likely due to alcohol abuse. Lipase 370, amylase 145. Patient got 3L IVF in ED and was started on LR at 250cc/hr and pain was controlled with IV pain medication. Patient was evaluated by the CATCH team for ETOH abuse. Patient was started on Lipitor for abnormal lipid profile.  Patient is stable to be discharged. Pain is controlled and patient is tolerating po intake.     Attending addendum: Patient seen and examined. Patient is stable for discharge. Med rec reviewed.

## 2021-09-27 NOTE — DISCHARGE NOTE NURSING/CASE MANAGEMENT/SOCIAL WORK - PATIENT PORTAL LINK FT
You can access the FollowMyHealth Patient Portal offered by [facility] by registering at the following website: [email or web address]. By joining [employer]’s FollowMyHealth portal, you will also be able to view your health information using other applications (apps) compatible with our system.

## 2021-09-27 NOTE — DISCHARGE NOTE PROVIDER - PROVIDER TOKENS
PROVIDER:[TOKEN:[[id number]],FOLLOWUP:[2 weeks],ESTABLISHEDPATIENT:[T]],PROVIDER:[TOKEN:[[id number]],FOLLOWUP:[1 month],ESTABLISHEDPATIENT:[T]]

## 2021-09-27 NOTE — DISCHARGE NOTE PROVIDER - NSDCMRMEDTOKEN_GEN_ALL_CORE_FT
atorvastatin 80 mg oral tablet: 1 tab(s) orally once a day (at bedtime)  NIFEdipine 60 mg oral tablet, extended release: 1 tab(s) orally once a day  NIFEdipine 60 mg oral tablet, extended release: 1 tab(s) orally once a day   atorvastatin 80 mg oral tablet: 1 tab(s) orally once a day (at bedtime)  NIFEdipine 60 mg oral tablet, extended release: 1 tab(s) orally once a day

## 2021-09-30 DIAGNOSIS — R16.0 HEPATOMEGALY, NOT ELSEWHERE CLASSIFIED: ICD-10-CM

## 2021-09-30 DIAGNOSIS — K86.3 PSEUDOCYST OF PANCREAS: ICD-10-CM

## 2021-09-30 DIAGNOSIS — K76.0 FATTY (CHANGE OF) LIVER, NOT ELSEWHERE CLASSIFIED: ICD-10-CM

## 2021-09-30 DIAGNOSIS — E83.42 HYPOMAGNESEMIA: ICD-10-CM

## 2021-09-30 DIAGNOSIS — I10 ESSENTIAL (PRIMARY) HYPERTENSION: ICD-10-CM

## 2021-09-30 DIAGNOSIS — R74.01 ELEVATION OF LEVELS OF LIVER TRANSAMINASE LEVELS: ICD-10-CM

## 2021-09-30 DIAGNOSIS — J45.909 UNSPECIFIED ASTHMA, UNCOMPLICATED: ICD-10-CM

## 2021-09-30 DIAGNOSIS — K70.9 ALCOHOLIC LIVER DISEASE, UNSPECIFIED: ICD-10-CM

## 2021-09-30 DIAGNOSIS — E87.2 ACIDOSIS: ICD-10-CM

## 2021-09-30 DIAGNOSIS — E51.9 THIAMINE DEFICIENCY, UNSPECIFIED: ICD-10-CM

## 2021-09-30 DIAGNOSIS — K85.20 ALCOHOL INDUCED ACUTE PANCREATITIS WITHOUT NECROSIS OR INFECTION: ICD-10-CM

## 2021-09-30 DIAGNOSIS — F10.10 ALCOHOL ABUSE, UNCOMPLICATED: ICD-10-CM

## 2021-09-30 DIAGNOSIS — E78.5 HYPERLIPIDEMIA, UNSPECIFIED: ICD-10-CM

## 2021-09-30 DIAGNOSIS — I16.0 HYPERTENSIVE URGENCY: ICD-10-CM

## 2021-09-30 DIAGNOSIS — E87.1 HYPO-OSMOLALITY AND HYPONATREMIA: ICD-10-CM

## 2021-09-30 DIAGNOSIS — E53.8 DEFICIENCY OF OTHER SPECIFIED B GROUP VITAMINS: ICD-10-CM

## 2021-09-30 DIAGNOSIS — F17.290 NICOTINE DEPENDENCE, OTHER TOBACCO PRODUCT, UNCOMPLICATED: ICD-10-CM

## 2022-03-31 NOTE — PATIENT PROFILE ADULT - DO YOU FEEL UNSAFE AT SCHOOL?
John Edmond)  Medicine  [address] [place] [address]  Phone: [phone]  Fax: [phone]  Follow Up Time: 1 month   no

## 2022-04-24 ENCOUNTER — INPATIENT (INPATIENT)
Facility: HOSPITAL | Age: 42
LOS: 2 days | Discharge: LEFT BEFORE DISCHARGE | End: 2022-04-27
Attending: INTERNAL MEDICINE | Admitting: INTERNAL MEDICINE
Payer: MEDICAID

## 2022-04-24 VITALS
SYSTOLIC BLOOD PRESSURE: 159 MMHG | TEMPERATURE: 97 F | HEART RATE: 120 BPM | DIASTOLIC BLOOD PRESSURE: 117 MMHG | RESPIRATION RATE: 20 BRPM | OXYGEN SATURATION: 98 %

## 2022-04-24 DIAGNOSIS — Z98.890 OTHER SPECIFIED POSTPROCEDURAL STATES: Chronic | ICD-10-CM

## 2022-04-24 PROBLEM — I10 ESSENTIAL (PRIMARY) HYPERTENSION: Chronic | Status: ACTIVE | Noted: 2021-09-23

## 2022-04-24 PROBLEM — K85.90 ACUTE PANCREATITIS WITHOUT NECROSIS OR INFECTION, UNSPECIFIED: Chronic | Status: ACTIVE | Noted: 2019-07-08

## 2022-04-24 LAB
ALBUMIN SERPL ELPH-MCNC: 4.9 G/DL — SIGNIFICANT CHANGE UP (ref 3.5–5.2)
ALP SERPL-CCNC: 111 U/L — SIGNIFICANT CHANGE UP (ref 30–115)
ALT FLD-CCNC: 111 U/L — HIGH (ref 0–41)
ANION GAP SERPL CALC-SCNC: 19 MMOL/L — HIGH (ref 7–14)
APPEARANCE UR: CLEAR — SIGNIFICANT CHANGE UP
APTT BLD: 29.4 SEC — SIGNIFICANT CHANGE UP (ref 27–39.2)
AST SERPL-CCNC: 69 U/L — HIGH (ref 0–41)
BACTERIA # UR AUTO: NEGATIVE — SIGNIFICANT CHANGE UP
BASOPHILS # BLD AUTO: 0.01 K/UL — SIGNIFICANT CHANGE UP (ref 0–0.2)
BASOPHILS NFR BLD AUTO: 0.1 % — SIGNIFICANT CHANGE UP (ref 0–1)
BILIRUB SERPL-MCNC: 1 MG/DL — SIGNIFICANT CHANGE UP (ref 0.2–1.2)
BILIRUB UR-MCNC: ABNORMAL
BLD GP AB SCN SERPL QL: SIGNIFICANT CHANGE UP
BUN SERPL-MCNC: 14 MG/DL — SIGNIFICANT CHANGE UP (ref 10–20)
CALCIUM SERPL-MCNC: 10.1 MG/DL — SIGNIFICANT CHANGE UP (ref 8.5–10.1)
CHLORIDE SERPL-SCNC: 93 MMOL/L — LOW (ref 98–110)
CO2 SERPL-SCNC: 22 MMOL/L — SIGNIFICANT CHANGE UP (ref 17–32)
COLOR SPEC: YELLOW — SIGNIFICANT CHANGE UP
CREAT SERPL-MCNC: 0.9 MG/DL — SIGNIFICANT CHANGE UP (ref 0.7–1.5)
DIFF PNL FLD: ABNORMAL
EGFR: 109 ML/MIN/1.73M2 — SIGNIFICANT CHANGE UP
EOSINOPHIL # BLD AUTO: 0 K/UL — SIGNIFICANT CHANGE UP (ref 0–0.7)
EOSINOPHIL NFR BLD AUTO: 0 % — SIGNIFICANT CHANGE UP (ref 0–8)
EPI CELLS # UR: 1 /HPF — SIGNIFICANT CHANGE UP (ref 0–5)
ETHANOL SERPL-MCNC: <10 MG/DL — SIGNIFICANT CHANGE UP
GLUCOSE SERPL-MCNC: 165 MG/DL — HIGH (ref 70–99)
GLUCOSE UR QL: SIGNIFICANT CHANGE UP
HCT VFR BLD CALC: 49.1 % — SIGNIFICANT CHANGE UP (ref 42–52)
HGB BLD-MCNC: 17.8 G/DL — SIGNIFICANT CHANGE UP (ref 14–18)
HYALINE CASTS # UR AUTO: 0 /LPF — SIGNIFICANT CHANGE UP (ref 0–7)
IMM GRANULOCYTES NFR BLD AUTO: 0.3 % — SIGNIFICANT CHANGE UP (ref 0.1–0.3)
INR BLD: 0.96 RATIO — SIGNIFICANT CHANGE UP (ref 0.65–1.3)
KETONES UR-MCNC: ABNORMAL
LACTATE SERPL-SCNC: 1.9 MMOL/L — SIGNIFICANT CHANGE UP (ref 0.7–2)
LEUKOCYTE ESTERASE UR-ACNC: NEGATIVE — SIGNIFICANT CHANGE UP
LIDOCAIN IGE QN: 440 U/L — HIGH (ref 7–60)
LYMPHOCYTES # BLD AUTO: 0.87 K/UL — LOW (ref 1.2–3.4)
LYMPHOCYTES # BLD AUTO: 7.5 % — LOW (ref 20.5–51.1)
MCHC RBC-ENTMCNC: 30.7 PG — SIGNIFICANT CHANGE UP (ref 27–31)
MCHC RBC-ENTMCNC: 36.3 G/DL — SIGNIFICANT CHANGE UP (ref 32–37)
MCV RBC AUTO: 84.7 FL — SIGNIFICANT CHANGE UP (ref 80–94)
MONOCYTES # BLD AUTO: 0.98 K/UL — HIGH (ref 0.1–0.6)
MONOCYTES NFR BLD AUTO: 8.4 % — SIGNIFICANT CHANGE UP (ref 1.7–9.3)
NEUTROPHILS # BLD AUTO: 9.76 K/UL — HIGH (ref 1.4–6.5)
NEUTROPHILS NFR BLD AUTO: 83.7 % — HIGH (ref 42.2–75.2)
NITRITE UR-MCNC: NEGATIVE — SIGNIFICANT CHANGE UP
NRBC # BLD: 0 /100 WBCS — SIGNIFICANT CHANGE UP (ref 0–0)
PH UR: 7 — SIGNIFICANT CHANGE UP (ref 5–8)
PLATELET # BLD AUTO: 226 K/UL — SIGNIFICANT CHANGE UP (ref 130–400)
POTASSIUM SERPL-MCNC: 3.8 MMOL/L — SIGNIFICANT CHANGE UP (ref 3.5–5)
POTASSIUM SERPL-SCNC: 3.8 MMOL/L — SIGNIFICANT CHANGE UP (ref 3.5–5)
PROT SERPL-MCNC: 7.8 G/DL — SIGNIFICANT CHANGE UP (ref 6–8)
PROT UR-MCNC: ABNORMAL
PROTHROM AB SERPL-ACNC: 11.1 SEC — SIGNIFICANT CHANGE UP (ref 9.95–12.87)
RBC # BLD: 5.8 M/UL — SIGNIFICANT CHANGE UP (ref 4.7–6.1)
RBC # FLD: 13.8 % — SIGNIFICANT CHANGE UP (ref 11.5–14.5)
RBC CASTS # UR COMP ASSIST: 33 /HPF — HIGH (ref 0–4)
SARS-COV-2 RNA SPEC QL NAA+PROBE: SIGNIFICANT CHANGE UP
SODIUM SERPL-SCNC: 134 MMOL/L — LOW (ref 135–146)
SP GR SPEC: 1.04 — HIGH (ref 1.01–1.03)
TRIGL SERPL-MCNC: 215 MG/DL — HIGH
TROPONIN T SERPL-MCNC: <0.01 NG/ML — SIGNIFICANT CHANGE UP
UROBILINOGEN FLD QL: ABNORMAL
WBC # BLD: 11.65 K/UL — HIGH (ref 4.8–10.8)
WBC # FLD AUTO: 11.65 K/UL — HIGH (ref 4.8–10.8)
WBC UR QL: 1 /HPF — SIGNIFICANT CHANGE UP (ref 0–5)

## 2022-04-24 PROCEDURE — 99253 IP/OBS CNSLTJ NEW/EST LOW 45: CPT

## 2022-04-24 PROCEDURE — 76705 ECHO EXAM OF ABDOMEN: CPT | Mod: 26

## 2022-04-24 PROCEDURE — 99232 SBSQ HOSP IP/OBS MODERATE 35: CPT

## 2022-04-24 PROCEDURE — 74177 CT ABD & PELVIS W/CONTRAST: CPT | Mod: 26,MA

## 2022-04-24 PROCEDURE — 99222 1ST HOSP IP/OBS MODERATE 55: CPT

## 2022-04-24 PROCEDURE — 93010 ELECTROCARDIOGRAM REPORT: CPT

## 2022-04-24 PROCEDURE — 99285 EMERGENCY DEPT VISIT HI MDM: CPT

## 2022-04-24 RX ORDER — NIFEDIPINE 30 MG
1 TABLET, EXTENDED RELEASE 24 HR ORAL
Qty: 0 | Refills: 0 | DISCHARGE

## 2022-04-24 RX ORDER — ONDANSETRON 8 MG/1
4 TABLET, FILM COATED ORAL ONCE
Refills: 0 | Status: COMPLETED | OUTPATIENT
Start: 2022-04-24 | End: 2022-04-24

## 2022-04-24 RX ORDER — CHLORHEXIDINE GLUCONATE 213 G/1000ML
1 SOLUTION TOPICAL
Refills: 0 | Status: DISCONTINUED | OUTPATIENT
Start: 2022-04-24 | End: 2022-04-27

## 2022-04-24 RX ORDER — ALPRAZOLAM 0.25 MG
2 TABLET ORAL AT BEDTIME
Refills: 0 | Status: DISCONTINUED | OUTPATIENT
Start: 2022-04-24 | End: 2022-04-25

## 2022-04-24 RX ORDER — NIFEDIPINE 30 MG
60 TABLET, EXTENDED RELEASE 24 HR ORAL DAILY
Refills: 0 | Status: DISCONTINUED | OUTPATIENT
Start: 2022-04-24 | End: 2022-04-27

## 2022-04-24 RX ORDER — ENOXAPARIN SODIUM 100 MG/ML
40 INJECTION SUBCUTANEOUS EVERY 24 HOURS
Refills: 0 | Status: DISCONTINUED | OUTPATIENT
Start: 2022-04-24 | End: 2022-04-27

## 2022-04-24 RX ORDER — ATORVASTATIN CALCIUM 80 MG/1
80 TABLET, FILM COATED ORAL AT BEDTIME
Refills: 0 | Status: DISCONTINUED | OUTPATIENT
Start: 2022-04-24 | End: 2022-04-27

## 2022-04-24 RX ORDER — HYDROMORPHONE HYDROCHLORIDE 2 MG/ML
0.5 INJECTION INTRAMUSCULAR; INTRAVENOUS; SUBCUTANEOUS ONCE
Refills: 0 | Status: DISCONTINUED | OUTPATIENT
Start: 2022-04-24 | End: 2022-04-24

## 2022-04-24 RX ORDER — SODIUM CHLORIDE 9 MG/ML
1000 INJECTION, SOLUTION INTRAVENOUS
Refills: 0 | Status: DISCONTINUED | OUTPATIENT
Start: 2022-04-24 | End: 2022-04-27

## 2022-04-24 RX ORDER — SODIUM CHLORIDE 9 MG/ML
1000 INJECTION INTRAMUSCULAR; INTRAVENOUS; SUBCUTANEOUS ONCE
Refills: 0 | Status: COMPLETED | OUTPATIENT
Start: 2022-04-24 | End: 2022-04-24

## 2022-04-24 RX ORDER — ONDANSETRON 8 MG/1
4 TABLET, FILM COATED ORAL EVERY 6 HOURS
Refills: 0 | Status: DISCONTINUED | OUTPATIENT
Start: 2022-04-24 | End: 2022-04-27

## 2022-04-24 RX ORDER — MORPHINE SULFATE 50 MG/1
2 CAPSULE, EXTENDED RELEASE ORAL ONCE
Refills: 0 | Status: DISCONTINUED | OUTPATIENT
Start: 2022-04-24 | End: 2022-04-24

## 2022-04-24 RX ORDER — NIFEDIPINE 30 MG
60 TABLET, EXTENDED RELEASE 24 HR ORAL ONCE
Refills: 0 | Status: COMPLETED | OUTPATIENT
Start: 2022-04-24 | End: 2022-04-24

## 2022-04-24 RX ORDER — FOLIC ACID 0.8 MG
1 TABLET ORAL DAILY
Refills: 0 | Status: DISCONTINUED | OUTPATIENT
Start: 2022-04-24 | End: 2022-04-27

## 2022-04-24 RX ORDER — THIAMINE MONONITRATE (VIT B1) 100 MG
100 TABLET ORAL DAILY
Refills: 0 | Status: DISCONTINUED | OUTPATIENT
Start: 2022-04-24 | End: 2022-04-27

## 2022-04-24 RX ORDER — MORPHINE SULFATE 50 MG/1
4 CAPSULE, EXTENDED RELEASE ORAL EVERY 6 HOURS
Refills: 0 | Status: DISCONTINUED | OUTPATIENT
Start: 2022-04-24 | End: 2022-04-26

## 2022-04-24 RX ORDER — MORPHINE SULFATE 50 MG/1
8 CAPSULE, EXTENDED RELEASE ORAL ONCE
Refills: 0 | Status: DISCONTINUED | OUTPATIENT
Start: 2022-04-24 | End: 2022-04-24

## 2022-04-24 RX ORDER — SODIUM CHLORIDE 9 MG/ML
1000 INJECTION, SOLUTION INTRAVENOUS
Refills: 0 | Status: DISCONTINUED | OUTPATIENT
Start: 2022-04-24 | End: 2022-04-24

## 2022-04-24 RX ADMIN — MORPHINE SULFATE 8 MILLIGRAM(S): 50 CAPSULE, EXTENDED RELEASE ORAL at 01:07

## 2022-04-24 RX ADMIN — ONDANSETRON 4 MILLIGRAM(S): 8 TABLET, FILM COATED ORAL at 04:01

## 2022-04-24 RX ADMIN — SODIUM CHLORIDE 1000 MILLILITER(S): 9 INJECTION INTRAMUSCULAR; INTRAVENOUS; SUBCUTANEOUS at 01:05

## 2022-04-24 RX ADMIN — SODIUM CHLORIDE 250 MILLILITER(S): 9 INJECTION, SOLUTION INTRAVENOUS at 20:23

## 2022-04-24 RX ADMIN — Medication 60 MILLIGRAM(S): at 04:37

## 2022-04-24 RX ADMIN — MORPHINE SULFATE 4 MILLIGRAM(S): 50 CAPSULE, EXTENDED RELEASE ORAL at 07:54

## 2022-04-24 RX ADMIN — Medication 100 MILLIGRAM(S): at 12:03

## 2022-04-24 RX ADMIN — ENOXAPARIN SODIUM 40 MILLIGRAM(S): 100 INJECTION SUBCUTANEOUS at 12:04

## 2022-04-24 RX ADMIN — ONDANSETRON 4 MILLIGRAM(S): 8 TABLET, FILM COATED ORAL at 01:07

## 2022-04-24 RX ADMIN — ONDANSETRON 4 MILLIGRAM(S): 8 TABLET, FILM COATED ORAL at 07:55

## 2022-04-24 RX ADMIN — HYDROMORPHONE HYDROCHLORIDE 0.5 MILLIGRAM(S): 2 INJECTION INTRAMUSCULAR; INTRAVENOUS; SUBCUTANEOUS at 04:01

## 2022-04-24 RX ADMIN — MORPHINE SULFATE 2 MILLIGRAM(S): 50 CAPSULE, EXTENDED RELEASE ORAL at 23:56

## 2022-04-24 RX ADMIN — Medication 2 MILLIGRAM(S): at 21:11

## 2022-04-24 RX ADMIN — Medication 1 TABLET(S): at 12:32

## 2022-04-24 RX ADMIN — ATORVASTATIN CALCIUM 80 MILLIGRAM(S): 80 TABLET, FILM COATED ORAL at 21:11

## 2022-04-24 RX ADMIN — MORPHINE SULFATE 4 MILLIGRAM(S): 50 CAPSULE, EXTENDED RELEASE ORAL at 13:57

## 2022-04-24 RX ADMIN — MORPHINE SULFATE 4 MILLIGRAM(S): 50 CAPSULE, EXTENDED RELEASE ORAL at 20:23

## 2022-04-24 RX ADMIN — Medication 1 MILLIGRAM(S): at 12:03

## 2022-04-24 RX ADMIN — SODIUM CHLORIDE 150 MILLILITER(S): 9 INJECTION, SOLUTION INTRAVENOUS at 05:29

## 2022-04-24 RX ADMIN — ONDANSETRON 4 MILLIGRAM(S): 8 TABLET, FILM COATED ORAL at 12:33

## 2022-04-24 RX ADMIN — SODIUM CHLORIDE 150 MILLILITER(S): 9 INJECTION, SOLUTION INTRAVENOUS at 04:38

## 2022-04-24 RX ADMIN — ONDANSETRON 4 MILLIGRAM(S): 8 TABLET, FILM COATED ORAL at 21:11

## 2022-04-24 NOTE — CONSULT NOTE ADULT - ATTENDING COMMENTS
Recurrent mild acute ETOH induced pancreatitis and AUD. Manage with LR at 250+cc/Hr. ETOH abstinence. MRI post resolution of edema is recommended.

## 2022-04-24 NOTE — H&P ADULT - NSHPLABSRESULTS_GEN_ALL_CORE
17.8   11.65 )-----------( 226      ( 24 Apr 2022 01:11 )             49.1     04-24    134<L>  |  93<L>  |  14  ----------------------------<  165<H>  3.8   |  22  |  0.9    Ca    10.1      24 Apr 2022 01:11    TPro  7.8  /  Alb  4.9  /  TBili  1.0  /  DBili  x   /  AST  69<H>  /  ALT  111<H>  /  AlkPhos  111  04-24    Triglycerides, Serum: 215 mg/dL (04.24.22 @ 01:11)    Lipase, Serum: 440 U/L (04.24.22 @ 01:11)    < from: CT Abdomen and Pelvis w/ IV Cont (04.24.22 @ 02:59) >    IMPRESSION:    Acute pancreatitis. No loculated peripancreatic fluid collections.    Diffuse hepatic steatosis.    < end of copied text >    < from: US Abdomen Upper Quadrant Right (04.24.22 @ 02:08) >    IMPRESSION:    Hepatic steatosis.    Pancreas is not well visualized on this examination.    < end of copied text >

## 2022-04-24 NOTE — H&P ADULT - NSHPPHYSICALEXAM_GEN_ALL_CORE
PHYSICAL EXAM:  GENERAL: slight painful distress  HEAD:  Atraumatic, Normocephalic  EYES: EOMI, PERRLA, conjunctiva and sclera clear  ENT: dry mucous membranes  NECK: Supple, No JVD  CHEST/LUNG: Clear to auscultation bilaterally; No rales, rhonchi, wheezing, or rubs. Unlabored respirations  HEART: tachycardic, regular rhythm;  No murmurs, rubs, or gallops  ABDOMEN: Bowel sounds present; Soft, ttp in epigastric region, Nondistended. No hepatomegaly  EXTREMITIES:  2+ Peripheral Pulses, brisk capillary refill. No clubbing, cyanosis, or edema  NERVOUS SYSTEM:  Alert & Oriented X3, speech clear. No deficits   MSK: FROM all 4 extremities, full and equal strength  SKIN: No rashes or lesions

## 2022-04-24 NOTE — ED PROVIDER NOTE - OBJECTIVE STATEMENT
41 Y M with pmh of HTN, asthma (not on meds), recurrent pancreatitis with pancreatic pseudocyst (last in March 2021) presents to ED with abdominal pain since 1am Friday. Last drink was2 weeks ago. Abdominal pain is epigastric radiating to back, worse with eating, 10/10, associated with nausea and multiple episodes of NBNB vomiting. Denies fevers, chills, chest pain, shortness of breath, sick contacts.

## 2022-04-24 NOTE — H&P ADULT - NSHPSOCIALHISTORY_GEN_ALL_CORE
works as   last drink 2 weeks ago, says he has cut back, usually drinks Sake, denies recent binge drinking  current every day vape, occasional cigarettes, smoking ~1 ppd for 20 years  takes xanax 2mg at bedtime as needed

## 2022-04-24 NOTE — H&P ADULT - ASSESSMENT
41 Y M with pmh of HTN, HLD, asthma (not on meds), recurrent pancreatitis with pancreatic pseudocyst (last in September 2021) presents to ED with abdominal pain and vomiting. Admitted for acute on chronic pancreatitis, likely due to alcohol.    #Acute on chronic pancreatitis, likely due to alcohol  -however, patient says he has not had a drink in 2 weeks  -no OTC or herbal supplements  -increase IVF to 250cc/hr  -i/o  -GI consult  -check EtOH level   -pain control (patient has history of drug-seeking, monitor)  -zofran prn    #EtOH abuse  #Suspected thiamine/folate deficiencies  -no signs of withdrawal  -thiamine/folate/MVI  -CATCH team consult  -send EtOH level    #Transaminitis  -possibly due to alcohol 41 Y M with pmh of HTN, HLD, asthma (not on meds), recurrent pancreatitis with pancreatic pseudocyst (last in September 2021) presents to ED with abdominal pain and vomiting. Admitted for acute on chronic pancreatitis, likely due to alcohol.    #Acute on chronic pancreatitis, likely due to alcohol  -however, patient says he has not had a drink in 2 weeks  -no OTC or herbal supplements  -increase IVF to 250cc/hr  -i/o  -GI consult  -check EtOH level   -pain control (patient has history of drug-seeking, monitor)  -zofran prn    #EtOH abuse  #Suspected thiamine/folate deficiencies  -no signs of withdrawal  -thiamine/folate/MVI  -CATCH team consult  -send EtOH level  -takes xanax 2mg po qhs prn, continue for now     #HTN  #HLD  -continue nifedipine  -continue statin  -send a1c  -send full lipid profile    #Transaminitis  -possibly due to alcohol (though AST:ALT not >2:1)  -diffuse hepatic steatosis on U/S  -GI consult  -trend liver function panel    #DVT PPx- LVX 40mg sq qd  #GI PPx- None  #Diet- NPO except meds  #CHG  #Activity- AAT  #Dispo- Acute  #Code- FULL

## 2022-04-24 NOTE — ED ADULT TRIAGE NOTE - CHIEF COMPLAINT QUOTE
Pt with hx of chronic pancreatitis, drinker, last time was drinking on Friday, came c/o abdominal pain and vomiting.

## 2022-04-24 NOTE — H&P ADULT - ATTENDING COMMENTS
# Acute on chronic pancreatitis  # History of ETOH dependance; not in withdrawal currently  # Transaminitis  - C/w Aggressive IVF hydration  - Pain control  - GI eval  - Zofran prn  - Thiamine/folate supplementation  - alcohol level  - CIWA monitoring  - Trend LFTs  - Keep NPO for now   - Repeat cbc and BMP    #HTN  #HLD  -continue nifedipine  -continue statin  -send a1c  -send full lipid profile

## 2022-04-24 NOTE — H&P ADULT - HISTORY OF PRESENT ILLNESS
41 Y M with pmh of HTN, HLD, asthma (not on meds), recurrent pancreatitis with pancreatic pseudocyst (last in September 2021) presents to ED with abdominal pain and vomiting. Patient states that on Friday, he began having NBNB vomiting, unable to tolerate PO diet. All day Saturday was having multiple episodes of NBNB vomiting, severe 10/10 epigastric pain radiating to back. Last drink per patient was 2 weeks ago. Says he has cut back on drinking a lot. No fevers or chills. No OTC meds. Denies diarrhea, chest pain, shortness of breath. No recent travel or sick contacts.     ED Course:  T 96.8F, P 120, /117, R 20, S 98% on RA at rest. CT abd showing acute on chronic pancreatitis. Given IVF, multiple doses of morphine for pain. Still feels nauseous and having abdominal pain.

## 2022-04-24 NOTE — ED PROVIDER NOTE - CLINICAL SUMMARY MEDICAL DECISION MAKING FREE TEXT BOX
41yoM with PMH of HTN, asthma (not on meds), recurrent pancreatitis with pancreatic pseudocyst Presents with abdominal pain.  Epigastric, radiates to back, associated with nausea and nonbloody, nonbilious emesis.  No bowel movement in a few days but passing flatus.  No fever.  No significant chest pain, fever, shortness of breath or cough.  On exam nontoxic, vital signs stable, abdomen soft, nondistended, positive epigastric tenderness, no rebound or guarding, mild diffuse tenderness otherwise.  Heart regular no murmurs, lungs clear bilaterally.  Lipase elevated and CT consistent with pancreatitis.  Given multiple rounds of IV pain medication with only some relief.  Requires admission for further treatment of pancreatitis and pain control.  Admitted to medicine in stable condition.

## 2022-04-24 NOTE — ED PROVIDER NOTE - PROGRESS NOTE DETAILS
Acute pancreatitis on CT. Pt placed on fluids, will admit to medicine. Did not take his BP med last night, will give it to him now -green Tachycardia improving. Acute pancreatitis on CT. Pt placed on fluids, will admit to medicine. Did not take his BP med last night, will give it to him now. Pain under control, pt resting comfortably. -peter

## 2022-04-24 NOTE — ED ADULT NURSE NOTE - HISTORY OF COVID-19 VACCINATION
Doc,    Pt has a scheduled appt with you on 7/19/18 but for headaches. Ok to fill this med or wait until seen? Yes

## 2022-04-24 NOTE — CONSULT NOTE ADULT - ASSESSMENT
41 Y M with pmh of HTN, asthma (not on meds), recurrent alcoholic pancreatitis with pancreatic pseudocyst (last in March 2021), with current alcohol abuse presents to ED with abdominal pain and vomiting since Friday.    #Acute recurrent interstitial pancreatitis secondary to alcohol.   Alcohol related liver disease  Lipase 440, CT abdomen with cont: shows diffuse peripancreatic edema, attentuations in protal confluence, PD 4-5, no cyst or obvious mass  US abdomen no GS  Current alcohol abuse  WBC 11.6, /min, BP stable      Rec:  keep NPO  RL at 250 cc/hr  Monitor daily HCT/BUN/CR/urine output.  Monitor pain scale everyday and give pain medications accordingly.   get INR  trend daily LFTS while hospital.  Needs to follow up in GI [facility] clinic in 2 weeks on DC for repeat LFTS and MRI abdomen pancreatic protocol for assessment once pancreatitis is resolved.  Complete alcohol cessation and needs rehab 41 Y M with pmh of HTN, asthma (not on meds), recurrent alcoholic pancreatitis with pancreatic pseudocyst (last in March 2021), with current alcohol abuse presents to ED with abdominal pain and vomiting since Friday.    #Acute recurrent interstitial pancreatitis secondary to alcohol.   Alcohol related liver disease  Lipase 440, CT abdomen with cont: shows diffuse peripancreatic edema, attentuations in protal confluence, PD 4-5mm, no cyst or obvious mass  US abdomen no GS  Current alcohol abuse  WBC 11.6, /min, BP stable      Rec:  keep NPO  RL at 250 cc/hr  Monitor daily HCT/BUN/CR/urine output.  Monitor pain scale everyday and give pain medications accordingly.   get INR  trend daily LFTS while hospital.  Needs to follow up in GI MAP clinic in 2 weeks on DC for repeat LFTS and MRI abdomen pancreatic protocol for assessment once pancreatitis is resolved.  Complete alcohol cessation and needs rehab

## 2022-04-24 NOTE — ED ADULT NURSE NOTE - NSIMPLEMENTINTERV_GEN_ALL_ED
Implemented All Universal Safety Interventions:  [place] to call system. Call bell, personal items and telephone within reach. Instruct patient to call for assistance. Room bathroom lighting operational. Non-slip footwear when patient is off stretcher. Physically safe environment: no spills, clutter or unnecessary equipment. Stretcher in lowest position, wheels locked, appropriate side rails in place.

## 2022-04-24 NOTE — ED PROVIDER NOTE - PHYSICAL EXAMINATION
CONSTITUTIONAL: Moderate distress 2/2 pain  SKIN: warm, dry  HEAD: Normocephalic; atraumatic.  EYES: no conjunctival injection. PERRL.   ENT: No nasal discharge; airway clear.  NECK: Supple; non tender.  CARD: + tachycardic  RESP: No wheezes, rales or rhonchi.  ABD: +epigastric ttp   EXT: Normal ROM.  No clubbing, cyanosis or edema.   LYMPH: No acute cervical adenopathy.  NEURO: Alert, oriented, grossly unremarkable  PSYCH: Cooperative, appropriate.

## 2022-04-24 NOTE — CONSULT NOTE ADULT - SUBJECTIVE AND OBJECTIVE BOX
Patient is a 41y old  Male who presents with a chief complaint of abdominal pain, vomiting (24 Apr 2022 09:29)      Admitted on: 04-24-22    HPI:    41 Y M with pmh of HTN, HLD, asthma (not on meds), multiple admissions with recurrent pancreatitis with pancreatic pseudocyst (last admission in September 2021), active alcohol abuse presents to ED with abdominal pain and vomiting. Patient states that on Friday, he began having NBNB vomiting, unable to tolerate PO diet. All day Saturday was having multiple episodes of NBNB vomiting, severe 10/10 epigastric pain radiating to back. Last drink per patient was 2 weeks ago. Says he has cut back on drinking a lot. No fevers or chills. No OTC meds. Denies diarrhea, chest pain, shortness of breath. No recent travel or sick contacts.     ED Course:  T 96.8F, P 120, /117, R 20, S 98% on RA at rest.     COVID 19: Negative      PAST MEDICAL & SURGICAL HISTORY:  Pancreatitis  due to alcohol    Asthma  no recent exacerbation, not using inhalers for a long time    Hypertension    H/O rotator cuff surgery        FAMILY HISTORY:  Family history of pancreatic cancer (Father)    Alcohol use  sister, with admission due to pancreatitis and GI Bleed        Social History:  Aheavy alcohol abuse    Home Medications:  ALPRAZolam 2 mg oral tablet: 1 tab(s) orally once a day (at bedtime), As Needed (24 Apr 2022 09:36)  NIFEdipine 60 mg oral tablet, extended release: 1 tab(s) orally once a day (24 Apr 2022 09:36)    MEDICATIONS  (STANDING):  atorvastatin 80 milliGRAM(s) Oral at bedtime  chlorhexidine 4% Liquid 1 Application(s) Topical <User Schedule>  enoxaparin Injectable 40 milliGRAM(s) SubCutaneous every 24 hours  folic acid 1 milliGRAM(s) Oral daily  lactated ringers. 1000 milliLiter(s) (250 mL/Hr) IV Continuous <Continuous>  multivitamin 1 Tablet(s) Oral daily  NIFEdipine XL 60 milliGRAM(s) Oral daily  thiamine 100 milliGRAM(s) Oral daily    MEDICATIONS  (PRN):  ALPRAZolam 2 milliGRAM(s) Oral at bedtime PRN anxiety  morphine  - Injectable 4 milliGRAM(s) IV Push every 6 hours PRN Moderate Pain (4 - 6)  ondansetron Injectable 4 milliGRAM(s) IV Push every 6 hours PRN Nausea and/or Vomiting      Allergies  No Known Allergies      Review of Systems:   Constitutional:  No Fever, No Chills  ENT/Mouth:  No Hearing Changes,  No Difficulty Swallowing  Eyes:  No Eye Pain, No Vision Changes  Cardiovascular:  No Chest Pain, No Palpitations  Respiratory:  No Cough, No Dyspnea  Gastrointestinal:  As described in HPI  Musculoskeletal:  No Joint Swelling, No Back Pain  Skin:  No Skin Lesions, No Jaundice  Neuro:  No Syncope, No Dizziness  Heme/Lymph:  No Bruising, No Bleeding.          Physical Examination:  T(C): 36.7 (04-24-22 @ 08:40), Max: 36.7 (04-24-22 @ 08:40)  HR: 101 (04-24-22 @ 08:40) (101 - 120)  BP: 142/96 (04-24-22 @ 08:40) (142/96 - 182/113)  RR: 18 (04-24-22 @ 08:40) (18 - 20)  SpO2: 99% (04-24-22 @ 08:40) (98% - 99%)  Height (cm): 182.9 (04-24-22 @ 00:31)  Weight (kg): 89.4 (04-24-22 @ 00:31)      Constitutional: No acute distress.  Eyes:. Conjunctivae are clear, Sclera is non-icteric.  Ears Nose and Throat: The external ears are normal appearing,  Oral mucosa is pink and moist.  Respiratory:  No signs of respiratory distress. Lung sounds are clear bilaterally.  Cardiovascular:  S1 S2, Regular rate and rhythm.  GI: Abdomen is soft, symmetric, and non-tender without distention.  Neuro: No Tremor, No involuntary movements  Skin: No rashes, No Jaundice.          Data: (reviewed by attending)                        17.8   11.65 )-----------( 226      ( 24 Apr 2022 01:11 )             49.1     Hgb Trend:  17.8  04-24-22 @ 01:11      04-24    134<L>  |  93<L>  |  14  ----------------------------<  165<H>  3.8   |  22  |  0.9    Ca    10.1      24 Apr 2022 01:11    TPro  7.8  /  Alb  4.9  /  TBili  1.0  /  DBili  x   /  AST  69<H>  /  ALT  111<H>  /  AlkPhos  111  04-24    Liver panel trend:  TBili 1.0   /   AST 69   /      /   AlkP 111   /   Tptn 7.8   /   Alb 4.9    /   DBili --      04-24              Radiology:(reviewed by attending)  CT Abdomen and Pelvis w/ IV Cont:   ACC: [id number] EXAM:  CT ABDOMEN AND PELVIS IC                          PROCEDURE DATE:  04/24/2022          INTERPRETATION:  CLINICAL STATEMENT: Epigastric pain.    TECHNIQUE: Contiguous axial CT images were obtained from the lower chest   to the pubic symphysis following administration of 100cc intravenous   contrast.  Oral contrast was not administered.  Reformatted images in the   coronal and sagittal planes were acquired.    COMPARISON CT: CT abdomen pelvis 9/23/2021.      FINDINGS:    LOWER CHEST: Unremarkable.    HEPATOBILIARY: Diffuse hepatic steatosis.    SPLEEN: Unremarkable.    PANCREAS: There is diffuse peripancreatic fat stranding and edema with   trace peripancreatic fluid. No evidence for peripancreatic loculated   collection.There is marked attenuation at the portal confluence with   surrounding collateral vessels noted around the pancreatic head, similar   to prior. Main pancreatic duct measures 4-5 mm in the pancreatic body,   similar to prior. Relatively uniform enhancement of the pancreas without   definite CT evidence for necrosis.    ADRENAL GLANDS: Unremarkable.    KIDNEYS: Symmetric bilateral renal enhancement. No hydronephrosis.    ABDOMINOPELVIC NODES: Unremarkable.    PELVIC ORGANS: Underdistended urinary bladder limits evaluation.    PERITONEUM/MESENTERY/BOWEL: No evidence of bowel obstruction or free air.   Small hiatal hernia. Unremarkable appendix. Motion artifact obscured   portions of the transverse colon. Reactive duodenal wall thickening.    BONES/SOFT TISSUES: No acute osseous abnormality.      IMPRESSION:    Acute pancreatitis. No loculated peripancreatic fluid collections.    Diffuse hepatic steatosis.    --- End of Report ---          JUANITO ANGULO MD; Resident Radiologist  This document has been electronically signed.  MAUREEN GUERRA MD; Attending Radiologist  This document has been electronically signed. Apr 24 2022  6:32AM (04-24-22 @ 02:59)    US Abdomen Upper Quadrant Right:   ACC: [id number] EXAM:  US ABDOMEN RT UPR QUADRANT                          PROCEDURE DATE:  04/24/2022          INTERPRETATION:  CLINICAL INFORMATION: Right upper quadrant pain.    COMPARISON: Abdominal ultrasound 12/2/2020.    TECHNIQUE: Sonography of the right upper quadrant.    FINDINGS:    Liver: Increased echogenicity.  Bile ducts: Normal caliber. Common bile duct measures 5 mm.  Gallbladder: Within normal limits. Negative sonographic Marquez's sign.  Pancreas: Poorly visualized.  Right kidney: 10.9 cm. No hydronephrosis.  Ascites: None.  IVC: Visualized portions are within normal limits.    IMPRESSION:    Hepatic steatosis.    Pancreas is not well visualized on this examination.    --- End of Report ---          JUANITO ANGULO MD; Resident Radiologist  This document has been electronically signed.  MAUREEN GUERRA MD; Attending Radiologist  This document has been electronically signed. Apr 24 2022  4:30AM (04-24-22 @ 02:08)

## 2022-04-25 LAB
A1C WITH ESTIMATED AVERAGE GLUCOSE RESULT: 6.1 % — HIGH (ref 4–5.6)
ALBUMIN SERPL ELPH-MCNC: 4.1 G/DL — SIGNIFICANT CHANGE UP (ref 3.5–5.2)
ALBUMIN SERPL ELPH-MCNC: 4.2 G/DL — SIGNIFICANT CHANGE UP (ref 3.5–5.2)
ALP SERPL-CCNC: 88 U/L — SIGNIFICANT CHANGE UP (ref 30–115)
ALP SERPL-CCNC: 89 U/L — SIGNIFICANT CHANGE UP (ref 30–115)
ALT FLD-CCNC: 59 U/L — HIGH (ref 0–41)
ALT FLD-CCNC: 60 U/L — HIGH (ref 0–41)
ANION GAP SERPL CALC-SCNC: 15 MMOL/L — HIGH (ref 7–14)
ANION GAP SERPL CALC-SCNC: 18 MMOL/L — HIGH (ref 7–14)
AST SERPL-CCNC: 34 U/L — SIGNIFICANT CHANGE UP (ref 0–41)
AST SERPL-CCNC: 41 U/L — SIGNIFICANT CHANGE UP (ref 0–41)
BASOPHILS # BLD AUTO: 0.02 K/UL — SIGNIFICANT CHANGE UP (ref 0–0.2)
BASOPHILS NFR BLD AUTO: 0.3 % — SIGNIFICANT CHANGE UP (ref 0–1)
BILIRUB DIRECT SERPL-MCNC: 0.2 MG/DL — SIGNIFICANT CHANGE UP (ref 0–0.3)
BILIRUB DIRECT SERPL-MCNC: 0.4 MG/DL — HIGH (ref 0–0.3)
BILIRUB INDIRECT FLD-MCNC: 0.8 MG/DL — SIGNIFICANT CHANGE UP (ref 0.2–1.2)
BILIRUB INDIRECT FLD-MCNC: 0.8 MG/DL — SIGNIFICANT CHANGE UP (ref 0.2–1.2)
BILIRUB SERPL-MCNC: 1 MG/DL — SIGNIFICANT CHANGE UP (ref 0.2–1.2)
BILIRUB SERPL-MCNC: 1.2 MG/DL — SIGNIFICANT CHANGE UP (ref 0.2–1.2)
BUN SERPL-MCNC: 11 MG/DL — SIGNIFICANT CHANGE UP (ref 10–20)
BUN SERPL-MCNC: 12 MG/DL — SIGNIFICANT CHANGE UP (ref 10–20)
CALCIUM SERPL-MCNC: 9.2 MG/DL — SIGNIFICANT CHANGE UP (ref 8.5–10.1)
CALCIUM SERPL-MCNC: 9.5 MG/DL — SIGNIFICANT CHANGE UP (ref 8.5–10.1)
CHLORIDE SERPL-SCNC: 96 MMOL/L — LOW (ref 98–110)
CHLORIDE SERPL-SCNC: 97 MMOL/L — LOW (ref 98–110)
CHOLEST SERPL-MCNC: 206 MG/DL — HIGH
CO2 SERPL-SCNC: 24 MMOL/L — SIGNIFICANT CHANGE UP (ref 17–32)
CO2 SERPL-SCNC: 26 MMOL/L — SIGNIFICANT CHANGE UP (ref 17–32)
CREAT SERPL-MCNC: 0.8 MG/DL — SIGNIFICANT CHANGE UP (ref 0.7–1.5)
CREAT SERPL-MCNC: 0.8 MG/DL — SIGNIFICANT CHANGE UP (ref 0.7–1.5)
CULTURE RESULTS: SIGNIFICANT CHANGE UP
EGFR: 113 ML/MIN/1.73M2 — SIGNIFICANT CHANGE UP
EGFR: 113 ML/MIN/1.73M2 — SIGNIFICANT CHANGE UP
EOSINOPHIL # BLD AUTO: 0.02 K/UL — SIGNIFICANT CHANGE UP (ref 0–0.7)
EOSINOPHIL NFR BLD AUTO: 0.3 % — SIGNIFICANT CHANGE UP (ref 0–8)
ESTIMATED AVERAGE GLUCOSE: 128 MG/DL — HIGH (ref 68–114)
GLUCOSE SERPL-MCNC: 103 MG/DL — HIGH (ref 70–99)
GLUCOSE SERPL-MCNC: 99 MG/DL — SIGNIFICANT CHANGE UP (ref 70–99)
HCT VFR BLD CALC: 43.2 % — SIGNIFICANT CHANGE UP (ref 42–52)
HDLC SERPL-MCNC: 46 MG/DL — SIGNIFICANT CHANGE UP
HGB BLD-MCNC: 15.5 G/DL — SIGNIFICANT CHANGE UP (ref 14–18)
IMM GRANULOCYTES NFR BLD AUTO: 0.3 % — SIGNIFICANT CHANGE UP (ref 0.1–0.3)
LIPID PNL WITH DIRECT LDL SERPL: 118 MG/DL — HIGH
LYMPHOCYTES # BLD AUTO: 1.55 K/UL — SIGNIFICANT CHANGE UP (ref 1.2–3.4)
LYMPHOCYTES # BLD AUTO: 19.7 % — LOW (ref 20.5–51.1)
MAGNESIUM SERPL-MCNC: 1.7 MG/DL — LOW (ref 1.8–2.4)
MAGNESIUM SERPL-MCNC: 1.8 MG/DL — SIGNIFICANT CHANGE UP (ref 1.8–2.4)
MCHC RBC-ENTMCNC: 31.2 PG — HIGH (ref 27–31)
MCHC RBC-ENTMCNC: 35.9 G/DL — SIGNIFICANT CHANGE UP (ref 32–37)
MCV RBC AUTO: 86.9 FL — SIGNIFICANT CHANGE UP (ref 80–94)
MONOCYTES # BLD AUTO: 0.79 K/UL — HIGH (ref 0.1–0.6)
MONOCYTES NFR BLD AUTO: 10.1 % — HIGH (ref 1.7–9.3)
NEUTROPHILS # BLD AUTO: 5.46 K/UL — SIGNIFICANT CHANGE UP (ref 1.4–6.5)
NEUTROPHILS NFR BLD AUTO: 69.3 % — SIGNIFICANT CHANGE UP (ref 42.2–75.2)
NON HDL CHOLESTEROL: 160 MG/DL — HIGH
NRBC # BLD: 0 /100 WBCS — SIGNIFICANT CHANGE UP (ref 0–0)
PHOSPHATE SERPL-MCNC: 3.1 MG/DL — SIGNIFICANT CHANGE UP (ref 2.1–4.9)
PLATELET # BLD AUTO: 170 K/UL — SIGNIFICANT CHANGE UP (ref 130–400)
POTASSIUM SERPL-MCNC: 3.5 MMOL/L — SIGNIFICANT CHANGE UP (ref 3.5–5)
POTASSIUM SERPL-MCNC: 3.9 MMOL/L — SIGNIFICANT CHANGE UP (ref 3.5–5)
POTASSIUM SERPL-SCNC: 3.5 MMOL/L — SIGNIFICANT CHANGE UP (ref 3.5–5)
POTASSIUM SERPL-SCNC: 3.9 MMOL/L — SIGNIFICANT CHANGE UP (ref 3.5–5)
PROT SERPL-MCNC: 6.5 G/DL — SIGNIFICANT CHANGE UP (ref 6–8)
PROT SERPL-MCNC: 6.6 G/DL — SIGNIFICANT CHANGE UP (ref 6–8)
RBC # BLD: 4.97 M/UL — SIGNIFICANT CHANGE UP (ref 4.7–6.1)
RBC # FLD: 14.1 % — SIGNIFICANT CHANGE UP (ref 11.5–14.5)
SODIUM SERPL-SCNC: 137 MMOL/L — SIGNIFICANT CHANGE UP (ref 135–146)
SODIUM SERPL-SCNC: 139 MMOL/L — SIGNIFICANT CHANGE UP (ref 135–146)
SPECIMEN SOURCE: SIGNIFICANT CHANGE UP
TRIGL SERPL-MCNC: 207 MG/DL — HIGH
WBC # BLD: 7.86 K/UL — SIGNIFICANT CHANGE UP (ref 4.8–10.8)
WBC # FLD AUTO: 7.86 K/UL — SIGNIFICANT CHANGE UP (ref 4.8–10.8)

## 2022-04-25 PROCEDURE — 99233 SBSQ HOSP IP/OBS HIGH 50: CPT

## 2022-04-25 PROCEDURE — 93010 ELECTROCARDIOGRAM REPORT: CPT

## 2022-04-25 RX ADMIN — ATORVASTATIN CALCIUM 80 MILLIGRAM(S): 80 TABLET, FILM COATED ORAL at 21:56

## 2022-04-25 RX ADMIN — Medication 4 MILLIGRAM(S): at 21:55

## 2022-04-25 RX ADMIN — Medication 4 MILLIGRAM(S): at 16:23

## 2022-04-25 RX ADMIN — Medication 4 MILLIGRAM(S): at 12:56

## 2022-04-25 RX ADMIN — SODIUM CHLORIDE 250 MILLILITER(S): 9 INJECTION, SOLUTION INTRAVENOUS at 21:56

## 2022-04-25 RX ADMIN — Medication 60 MILLIGRAM(S): at 05:39

## 2022-04-25 RX ADMIN — Medication 1 MILLIGRAM(S): at 12:57

## 2022-04-25 RX ADMIN — Medication 100 MILLIGRAM(S): at 12:57

## 2022-04-25 RX ADMIN — ONDANSETRON 4 MILLIGRAM(S): 8 TABLET, FILM COATED ORAL at 12:58

## 2022-04-25 RX ADMIN — Medication 1 TABLET(S): at 13:06

## 2022-04-25 RX ADMIN — MORPHINE SULFATE 4 MILLIGRAM(S): 50 CAPSULE, EXTENDED RELEASE ORAL at 21:58

## 2022-04-25 RX ADMIN — MORPHINE SULFATE 4 MILLIGRAM(S): 50 CAPSULE, EXTENDED RELEASE ORAL at 09:22

## 2022-04-25 RX ADMIN — ENOXAPARIN SODIUM 40 MILLIGRAM(S): 100 INJECTION SUBCUTANEOUS at 12:56

## 2022-04-25 RX ADMIN — MORPHINE SULFATE 4 MILLIGRAM(S): 50 CAPSULE, EXTENDED RELEASE ORAL at 15:44

## 2022-04-25 RX ADMIN — SODIUM CHLORIDE 250 MILLILITER(S): 9 INJECTION, SOLUTION INTRAVENOUS at 05:39

## 2022-04-25 RX ADMIN — MORPHINE SULFATE 4 MILLIGRAM(S): 50 CAPSULE, EXTENDED RELEASE ORAL at 11:14

## 2022-04-25 RX ADMIN — CHLORHEXIDINE GLUCONATE 1 APPLICATION(S): 213 SOLUTION TOPICAL at 05:40

## 2022-04-25 RX ADMIN — MORPHINE SULFATE 4 MILLIGRAM(S): 50 CAPSULE, EXTENDED RELEASE ORAL at 02:24

## 2022-04-25 RX ADMIN — Medication 4 MILLIGRAM(S): at 09:23

## 2022-04-25 NOTE — PROGRESS NOTE ADULT - ASSESSMENT
41 Y M with pmh of HTN, asthma (not on meds), recurrent alcoholic pancreatitis with pancreatic pseudocyst (last in March 2021), with current alcohol abuse presents to ED with abdominal pain and vomiting since Friday.  He used to drink vodka 1 bottle of vodka for long time changed to saki drink 1500 ml everyday since last year stopped for last 2 weeks except for hlaf bottle on friday night.      #)Acute recurrent interstitial edematous pancreatitis likely secondary to alcohol  #)Recurrent pancreatitis 3 rd episode   -Lipase 440, CT abdomen with cont: shows diffuse peripancreatic edema, attentuations in protal confluence, PD 4-5mm, no cyst or obvious mass  -, calcium 9.5  -US abdomen no GS, CBD 5mm  -Current alcohol abuse (Last drink on friday)  -hemodynamically stable (Initial tachycardia improved)  -improving hematocrit  -Pain is getting better     Rec:  Advised to take clear liquids   RL at 250 cc/hr  Monitor daily HCT/BUN/CR/urine output.  Monitor pain scale everyday and give pain medications accordingly  Advised to avoid alcohol explained about the risk of chronic pancreatitis which can progress to pancreatic cancer    #)h/o pancreatic hypodensity likely pseudocyst   -Ct scan in march 2021 showed 1.2 cm pancreatic hypodensity in the body of pancreas  -Ct scan in 9/2021 showed ill defined hypodensity no measurement in the CT scan   -Ct scan 4/2022 no mentioning of the pseudocyst   -recommend MRI as an OP after resolved acute episode of pancreatitis to for further evaluation     #)Elevated liver enzymes hepatocellular (ALT>AST) ?cirrhosis (Given reversal ratio) vs r/o other CLD  -LFT elevated since 3/2021 (ALT>AST)  -US showed hepatic steatosis, Ct showed hepatic steatosis (No evidence of cirrhosis radiologically)  -, normal INR     Rec:   Trend LFT, INR  Alcohol abstinence   Monitor for withdrawl  Thiamine, folate supplements   needs Alcohol anonymous rehabilitation  41 Y M with pmh of HTN, asthma (not on meds), recurrent alcoholic pancreatitis with pancreatic pseudocyst (last in March 2021), with current alcohol abuse presents to ED with abdominal pain and vomiting since Friday.  He used to drink vodka 1 bottle of vodka for long time changed to saki drink 1500 ml everyday since last year stopped for last 2 weeks except for hlaf bottle on friday night.      #)Acute recurrent interstitial edematous pancreatitis likely secondary to alcohol  #)Recurrent pancreatitis 3 rd episode   -Lipase 440, CT abdomen with cont: shows diffuse peripancreatic edema, attentuations in protal confluence, PD 4-5mm, no cyst or obvious mass  -, calcium 9.5  -US abdomen no GS, CBD 5mm  -Current alcohol abuse (Last drink on friday)  -hemodynamically stable (Initial tachycardia improved)  -improving hematocrit  -Pain is getting better     Rec:  Advised to take clear liquids   RL at 250 cc/hr  Monitor daily HCT/BUN/CR/urine output.  Monitor pain scale everyday and give pain medications accordingly  Advised to avoid alcohol explained about the risk of chronic pancreatitis which can progress to pancreatic cancer    #)h/o pancreatic hypodensity likely pseudocyst   -Ct scan in march 2021 showed 1.2 cm pancreatic hypodensity in the body of pancreas  -Ct scan in 9/2021 showed ill defined hypodensity no measurement in the CT scan   -Ct scan 4/2022 no mentioning of the pseudocyst   -recommend MRI as an OP after resolved acute episode of pancreatitis to for further evaluation     #)Elevated liver enzymes hepatocellular (ALT>AST) ?cirrhosis (Given reversal ratio) vs r/o other CLD  -LFT elevated since 3/2021 (ALT>AST)  -US showed hepatic steatosis, Ct showed hepatic steatosis (No evidence of cirrhosis radiologically)  -, normal INR     Rec:   -Trend LFT, INR  -Alcohol abstinence   -Monitor for withdrawl  -Thiamine, folate supplements   -needs Alcohol anonymous rehabilitation   -perform a CLD work up which includes HAV IgM/IgG, HBsAg, HBsAb, HBcAb IgM/IgG, HCV Ab, Anti HEV, Serum Ferritin, Transferrin Saturation, Ceruloplasmin level, MICAELA, SMA, immunoglobulins panel, AMA, Anti LK microsomal Ab, anti-soluble liver Ag, EBV, HSV, CMV

## 2022-04-25 NOTE — SBIRT NOTE ADULT - NSSBIRTALCPOSREINDET_GEN_A_CORE
LMSW encouraged pt to continue to decrease alcohol intake and continue to remain sober. Outpatient resources provided should pt want to attend an outpatient program.

## 2022-04-26 LAB
ALBUMIN SERPL ELPH-MCNC: 4.3 G/DL — SIGNIFICANT CHANGE UP (ref 3.5–5.2)
ALP SERPL-CCNC: 91 U/L — SIGNIFICANT CHANGE UP (ref 30–115)
ALT FLD-CCNC: 60 U/L — HIGH (ref 0–41)
ANION GAP SERPL CALC-SCNC: 14 MMOL/L — SIGNIFICANT CHANGE UP (ref 7–14)
AST SERPL-CCNC: 44 U/L — HIGH (ref 0–41)
BILIRUB SERPL-MCNC: 0.9 MG/DL — SIGNIFICANT CHANGE UP (ref 0.2–1.2)
BUN SERPL-MCNC: 8 MG/DL — LOW (ref 10–20)
CALCIUM SERPL-MCNC: 9.4 MG/DL — SIGNIFICANT CHANGE UP (ref 8.5–10.1)
CHLORIDE SERPL-SCNC: 98 MMOL/L — SIGNIFICANT CHANGE UP (ref 98–110)
CO2 SERPL-SCNC: 27 MMOL/L — SIGNIFICANT CHANGE UP (ref 17–32)
CREAT SERPL-MCNC: 0.8 MG/DL — SIGNIFICANT CHANGE UP (ref 0.7–1.5)
EGFR: 113 ML/MIN/1.73M2 — SIGNIFICANT CHANGE UP
GLUCOSE SERPL-MCNC: 122 MG/DL — HIGH (ref 70–99)
INR BLD: 1.04 RATIO — SIGNIFICANT CHANGE UP (ref 0.65–1.3)
POTASSIUM SERPL-MCNC: 3.6 MMOL/L — SIGNIFICANT CHANGE UP (ref 3.5–5)
POTASSIUM SERPL-SCNC: 3.6 MMOL/L — SIGNIFICANT CHANGE UP (ref 3.5–5)
PROT SERPL-MCNC: 6.9 G/DL — SIGNIFICANT CHANGE UP (ref 6–8)
PROTHROM AB SERPL-ACNC: 11.9 SEC — SIGNIFICANT CHANGE UP (ref 9.95–12.87)
SODIUM SERPL-SCNC: 139 MMOL/L — SIGNIFICANT CHANGE UP (ref 135–146)

## 2022-04-26 PROCEDURE — 99233 SBSQ HOSP IP/OBS HIGH 50: CPT

## 2022-04-26 RX ORDER — OXYCODONE HYDROCHLORIDE 5 MG/1
5 TABLET ORAL EVERY 6 HOURS
Refills: 0 | Status: DISCONTINUED | OUTPATIENT
Start: 2022-04-26 | End: 2022-04-27

## 2022-04-26 RX ORDER — MORPHINE SULFATE 50 MG/1
2 CAPSULE, EXTENDED RELEASE ORAL EVERY 6 HOURS
Refills: 0 | Status: DISCONTINUED | OUTPATIENT
Start: 2022-04-26 | End: 2022-04-27

## 2022-04-26 RX ADMIN — Medication 3 MILLIGRAM(S): at 13:56

## 2022-04-26 RX ADMIN — MORPHINE SULFATE 4 MILLIGRAM(S): 50 CAPSULE, EXTENDED RELEASE ORAL at 04:44

## 2022-04-26 RX ADMIN — ONDANSETRON 4 MILLIGRAM(S): 8 TABLET, FILM COATED ORAL at 02:18

## 2022-04-26 RX ADMIN — MORPHINE SULFATE 2 MILLIGRAM(S): 50 CAPSULE, EXTENDED RELEASE ORAL at 17:25

## 2022-04-26 RX ADMIN — MORPHINE SULFATE 4 MILLIGRAM(S): 50 CAPSULE, EXTENDED RELEASE ORAL at 10:01

## 2022-04-26 RX ADMIN — Medication 100 MILLIGRAM(S): at 11:42

## 2022-04-26 RX ADMIN — ATORVASTATIN CALCIUM 80 MILLIGRAM(S): 80 TABLET, FILM COATED ORAL at 22:34

## 2022-04-26 RX ADMIN — Medication 3 MILLIGRAM(S): at 22:30

## 2022-04-26 RX ADMIN — Medication 3 MILLIGRAM(S): at 05:39

## 2022-04-26 RX ADMIN — CHLORHEXIDINE GLUCONATE 1 APPLICATION(S): 213 SOLUTION TOPICAL at 05:45

## 2022-04-26 RX ADMIN — Medication 60 MILLIGRAM(S): at 05:38

## 2022-04-26 RX ADMIN — Medication 3 MILLIGRAM(S): at 17:23

## 2022-04-26 RX ADMIN — MORPHINE SULFATE 2 MILLIGRAM(S): 50 CAPSULE, EXTENDED RELEASE ORAL at 16:34

## 2022-04-26 RX ADMIN — Medication 1 MILLIGRAM(S): at 11:42

## 2022-04-26 RX ADMIN — OXYCODONE HYDROCHLORIDE 5 MILLIGRAM(S): 5 TABLET ORAL at 18:34

## 2022-04-26 RX ADMIN — Medication 4 MILLIGRAM(S): at 02:18

## 2022-04-26 RX ADMIN — SODIUM CHLORIDE 250 MILLILITER(S): 9 INJECTION, SOLUTION INTRAVENOUS at 18:35

## 2022-04-26 RX ADMIN — MORPHINE SULFATE 2 MILLIGRAM(S): 50 CAPSULE, EXTENDED RELEASE ORAL at 22:35

## 2022-04-26 RX ADMIN — MORPHINE SULFATE 4 MILLIGRAM(S): 50 CAPSULE, EXTENDED RELEASE ORAL at 03:44

## 2022-04-26 RX ADMIN — ONDANSETRON 4 MILLIGRAM(S): 8 TABLET, FILM COATED ORAL at 10:01

## 2022-04-26 RX ADMIN — Medication 3 MILLIGRAM(S): at 09:50

## 2022-04-26 RX ADMIN — SODIUM CHLORIDE 250 MILLILITER(S): 9 INJECTION, SOLUTION INTRAVENOUS at 10:02

## 2022-04-26 RX ADMIN — Medication 1 TABLET(S): at 11:42

## 2022-04-26 NOTE — PROGRESS NOTE ADULT - ASSESSMENT
41 Y M with pmh of HTN, asthma (not on meds), recurrent alcoholic pancreatitis with pancreatic pseudocyst (last in March 2021), with current alcohol abuse presents to ED with abdominal pain and vomiting since Friday.  He used to drink vodka 1 bottle of vodka for long time changed to saki drink 1500 ml everyday since last year stopped for last 2 weeks except for hlaf bottle on friday night.      #)Acute recurrent interstitial edematous pancreatitis likely secondary to alcohol  #)Recurrent pancreatitis 3 rd episode   -Lipase 440, CT abdomen with cont: shows diffuse peripancreatic edema, attentuations in protal confluence, PD 4-5mm, no cyst or obvious mass  -, calcium 9.5  -US abdomen no GS, CBD 5mm  -Current alcohol abuse (Last drink on friday)  -hemodynamically stable (Initial tachycardia improved)  -Abdominal pain 12/10 last night   -Denies any nausea, vomiting   -Able to tolerate clear liquid diet     Rec:  c/w clear liquids   RL at 250 cc/hr  repeat CBC today   Monitor daily HCT/BUN/CR/urine output.  Monitor pain scale everyday and give pain medications accordingly  Advised to avoid alcohol explained about the risk of chronic pancreatitis which can progress to pancreatic cancer    #)h/o pancreatic hypodensity likely pseudocyst   -Ct scan in march 2021 showed 1.2 cm pancreatic hypodensity in the body of pancreas  -Ct scan in 9/2021 showed ill defined hypodensity no measurement in the CT scan   -Ct scan 4/2022 no mentioning of the pseudocyst   -recommend MRI as an OP after resolved acute episode of pancreatitis to for further evaluation     #)Elevated liver enzymes hepatocellular (ALT>AST) ?cirrhosis (Given reversal ratio) vs r/o other CLD  #)Alcohol use disorder   -LFT elevated since 3/2021 (ALT>AST)  -US showed hepatic steatosis, Ct showed hepatic steatosis (No evidence of cirrhosis radiologically)  -, normal INR     Rec:   -Trend LFT, INR  -Alcohol abstinence   -Monitor for withdrawl  -[facility] protocol   -Thiamine, folate supplements   -needs Alcohol anonymous rehabilitation   -perform a CLD work up which includes HAV IgM/IgG, HBsAg, HBsAb, HBcAb IgM/IgG, HCV Ab, Anti HEV, Serum Ferritin, Transferrin Saturation, Ceruloplasmin level, MICAELA, SMA, immunoglobulins panel, AMA, Anti LK microsomal Ab, anti-soluble liver Ag, EBV, HSV, CMV

## 2022-04-27 VITALS
TEMPERATURE: 97 F | SYSTOLIC BLOOD PRESSURE: 142 MMHG | DIASTOLIC BLOOD PRESSURE: 96 MMHG | HEART RATE: 74 BPM | RESPIRATION RATE: 17 BRPM

## 2022-04-27 LAB
ALBUMIN SERPL ELPH-MCNC: 4.4 G/DL — SIGNIFICANT CHANGE UP (ref 3.5–5.2)
ALBUMIN SERPL ELPH-MCNC: 4.5 G/DL — SIGNIFICANT CHANGE UP (ref 3.5–5.2)
ALP SERPL-CCNC: 90 U/L — SIGNIFICANT CHANGE UP (ref 30–115)
ALP SERPL-CCNC: 91 U/L — SIGNIFICANT CHANGE UP (ref 30–115)
ALT FLD-CCNC: 76 U/L — HIGH (ref 0–41)
ALT FLD-CCNC: 88 U/L — HIGH (ref 0–41)
ANION GAP SERPL CALC-SCNC: 12 MMOL/L — SIGNIFICANT CHANGE UP (ref 7–14)
ANION GAP SERPL CALC-SCNC: 12 MMOL/L — SIGNIFICANT CHANGE UP (ref 7–14)
AST SERPL-CCNC: 70 U/L — HIGH (ref 0–41)
AST SERPL-CCNC: 88 U/L — HIGH (ref 0–41)
BASOPHILS # BLD AUTO: 0.04 K/UL — SIGNIFICANT CHANGE UP (ref 0–0.2)
BASOPHILS # BLD AUTO: 0.04 K/UL — SIGNIFICANT CHANGE UP (ref 0–0.2)
BASOPHILS NFR BLD AUTO: 0.7 % — SIGNIFICANT CHANGE UP (ref 0–1)
BASOPHILS NFR BLD AUTO: 0.8 % — SIGNIFICANT CHANGE UP (ref 0–1)
BILIRUB SERPL-MCNC: 0.6 MG/DL — SIGNIFICANT CHANGE UP (ref 0.2–1.2)
BILIRUB SERPL-MCNC: 0.6 MG/DL — SIGNIFICANT CHANGE UP (ref 0.2–1.2)
BUN SERPL-MCNC: 6 MG/DL — LOW (ref 10–20)
BUN SERPL-MCNC: 7 MG/DL — LOW (ref 10–20)
CALCIUM SERPL-MCNC: 9.6 MG/DL — SIGNIFICANT CHANGE UP (ref 8.5–10.1)
CALCIUM SERPL-MCNC: 9.6 MG/DL — SIGNIFICANT CHANGE UP (ref 8.5–10.1)
CERULOPLASMIN SERPL-MCNC: 26 MG/DL — SIGNIFICANT CHANGE UP (ref 15–30)
CHLORIDE SERPL-SCNC: 95 MMOL/L — LOW (ref 98–110)
CHLORIDE SERPL-SCNC: 96 MMOL/L — LOW (ref 98–110)
CO2 SERPL-SCNC: 26 MMOL/L — SIGNIFICANT CHANGE UP (ref 17–32)
CO2 SERPL-SCNC: 26 MMOL/L — SIGNIFICANT CHANGE UP (ref 17–32)
CREAT SERPL-MCNC: 0.7 MG/DL — SIGNIFICANT CHANGE UP (ref 0.7–1.5)
CREAT SERPL-MCNC: 0.8 MG/DL — SIGNIFICANT CHANGE UP (ref 0.7–1.5)
EGFR: 113 ML/MIN/1.73M2 — SIGNIFICANT CHANGE UP
EGFR: 117 ML/MIN/1.73M2 — SIGNIFICANT CHANGE UP
EOSINOPHIL # BLD AUTO: 0.04 K/UL — SIGNIFICANT CHANGE UP (ref 0–0.7)
EOSINOPHIL # BLD AUTO: 0.04 K/UL — SIGNIFICANT CHANGE UP (ref 0–0.7)
EOSINOPHIL NFR BLD AUTO: 0.7 % — SIGNIFICANT CHANGE UP (ref 0–8)
EOSINOPHIL NFR BLD AUTO: 0.8 % — SIGNIFICANT CHANGE UP (ref 0–8)
FERRITIN SERPL-MCNC: 1083 NG/ML — HIGH (ref 30–400)
GLUCOSE SERPL-MCNC: 114 MG/DL — HIGH (ref 70–99)
GLUCOSE SERPL-MCNC: 117 MG/DL — HIGH (ref 70–99)
HCT VFR BLD CALC: 43 % — SIGNIFICANT CHANGE UP (ref 42–52)
HCT VFR BLD CALC: 45 % — SIGNIFICANT CHANGE UP (ref 42–52)
HCV AB S/CO SERPL IA: 0.04 COI — SIGNIFICANT CHANGE UP
HCV AB SERPL-IMP: SIGNIFICANT CHANGE UP
HGB BLD-MCNC: 15.1 G/DL — SIGNIFICANT CHANGE UP (ref 14–18)
HGB BLD-MCNC: 15.7 G/DL — SIGNIFICANT CHANGE UP (ref 14–18)
IMM GRANULOCYTES NFR BLD AUTO: 0.2 % — SIGNIFICANT CHANGE UP (ref 0.1–0.3)
IMM GRANULOCYTES NFR BLD AUTO: 0.2 % — SIGNIFICANT CHANGE UP (ref 0.1–0.3)
INR BLD: 1.08 RATIO — SIGNIFICANT CHANGE UP (ref 0.65–1.3)
IRON SATN MFR SERPL: 29 % — SIGNIFICANT CHANGE UP (ref 15–50)
IRON SATN MFR SERPL: 94 UG/DL — SIGNIFICANT CHANGE UP (ref 35–150)
LYMPHOCYTES # BLD AUTO: 1.11 K/UL — LOW (ref 1.2–3.4)
LYMPHOCYTES # BLD AUTO: 1.46 K/UL — SIGNIFICANT CHANGE UP (ref 1.2–3.4)
LYMPHOCYTES # BLD AUTO: 21.3 % — SIGNIFICANT CHANGE UP (ref 20.5–51.1)
LYMPHOCYTES # BLD AUTO: 27.1 % — SIGNIFICANT CHANGE UP (ref 20.5–51.1)
MCHC RBC-ENTMCNC: 30.4 PG — SIGNIFICANT CHANGE UP (ref 27–31)
MCHC RBC-ENTMCNC: 30.6 PG — SIGNIFICANT CHANGE UP (ref 27–31)
MCHC RBC-ENTMCNC: 34.9 G/DL — SIGNIFICANT CHANGE UP (ref 32–37)
MCHC RBC-ENTMCNC: 35.1 G/DL — SIGNIFICANT CHANGE UP (ref 32–37)
MCV RBC AUTO: 87 FL — SIGNIFICANT CHANGE UP (ref 80–94)
MCV RBC AUTO: 87 FL — SIGNIFICANT CHANGE UP (ref 80–94)
MONOCYTES # BLD AUTO: 0.44 K/UL — SIGNIFICANT CHANGE UP (ref 0.1–0.6)
MONOCYTES # BLD AUTO: 0.54 K/UL — SIGNIFICANT CHANGE UP (ref 0.1–0.6)
MONOCYTES NFR BLD AUTO: 10.3 % — HIGH (ref 1.7–9.3)
MONOCYTES NFR BLD AUTO: 8.2 % — SIGNIFICANT CHANGE UP (ref 1.7–9.3)
NEUTROPHILS # BLD AUTO: 3.4 K/UL — SIGNIFICANT CHANGE UP (ref 1.4–6.5)
NEUTROPHILS # BLD AUTO: 3.48 K/UL — SIGNIFICANT CHANGE UP (ref 1.4–6.5)
NEUTROPHILS NFR BLD AUTO: 63.1 % — SIGNIFICANT CHANGE UP (ref 42.2–75.2)
NEUTROPHILS NFR BLD AUTO: 66.6 % — SIGNIFICANT CHANGE UP (ref 42.2–75.2)
NRBC # BLD: 0 /100 WBCS — SIGNIFICANT CHANGE UP (ref 0–0)
NRBC # BLD: 0 /100 WBCS — SIGNIFICANT CHANGE UP (ref 0–0)
PLATELET # BLD AUTO: 182 K/UL — SIGNIFICANT CHANGE UP (ref 130–400)
PLATELET # BLD AUTO: 205 K/UL — SIGNIFICANT CHANGE UP (ref 130–400)
POTASSIUM SERPL-MCNC: 3.7 MMOL/L — SIGNIFICANT CHANGE UP (ref 3.5–5)
POTASSIUM SERPL-MCNC: 3.8 MMOL/L — SIGNIFICANT CHANGE UP (ref 3.5–5)
POTASSIUM SERPL-SCNC: 3.7 MMOL/L — SIGNIFICANT CHANGE UP (ref 3.5–5)
POTASSIUM SERPL-SCNC: 3.8 MMOL/L — SIGNIFICANT CHANGE UP (ref 3.5–5)
PROT SERPL-MCNC: 6.9 G/DL — SIGNIFICANT CHANGE UP (ref 6–8)
PROT SERPL-MCNC: 7 G/DL — SIGNIFICANT CHANGE UP (ref 6–8)
PROTHROM AB SERPL-ACNC: 12.4 SEC — SIGNIFICANT CHANGE UP (ref 9.95–12.87)
RBC # BLD: 4.94 M/UL — SIGNIFICANT CHANGE UP (ref 4.7–6.1)
RBC # BLD: 5.17 M/UL — SIGNIFICANT CHANGE UP (ref 4.7–6.1)
RBC # FLD: 13.8 % — SIGNIFICANT CHANGE UP (ref 11.5–14.5)
RBC # FLD: 13.9 % — SIGNIFICANT CHANGE UP (ref 11.5–14.5)
SODIUM SERPL-SCNC: 133 MMOL/L — LOW (ref 135–146)
SODIUM SERPL-SCNC: 134 MMOL/L — LOW (ref 135–146)
TIBC SERPL-MCNC: 319 UG/DL — SIGNIFICANT CHANGE UP (ref 220–430)
UIBC SERPL-MCNC: 225 UG/DL — SIGNIFICANT CHANGE UP (ref 110–370)
WBC # BLD: 5.22 K/UL — SIGNIFICANT CHANGE UP (ref 4.8–10.8)
WBC # BLD: 5.39 K/UL — SIGNIFICANT CHANGE UP (ref 4.8–10.8)
WBC # FLD AUTO: 5.22 K/UL — SIGNIFICANT CHANGE UP (ref 4.8–10.8)
WBC # FLD AUTO: 5.39 K/UL — SIGNIFICANT CHANGE UP (ref 4.8–10.8)

## 2022-04-27 PROCEDURE — 99233 SBSQ HOSP IP/OBS HIGH 50: CPT

## 2022-04-27 RX ADMIN — Medication 60 MILLIGRAM(S): at 05:52

## 2022-04-27 RX ADMIN — Medication 2 MILLIGRAM(S): at 14:39

## 2022-04-27 RX ADMIN — OXYCODONE HYDROCHLORIDE 5 MILLIGRAM(S): 5 TABLET ORAL at 16:04

## 2022-04-27 RX ADMIN — SODIUM CHLORIDE 250 MILLILITER(S): 9 INJECTION, SOLUTION INTRAVENOUS at 04:47

## 2022-04-27 RX ADMIN — MORPHINE SULFATE 2 MILLIGRAM(S): 50 CAPSULE, EXTENDED RELEASE ORAL at 04:46

## 2022-04-27 RX ADMIN — MORPHINE SULFATE 2 MILLIGRAM(S): 50 CAPSULE, EXTENDED RELEASE ORAL at 11:35

## 2022-04-27 RX ADMIN — Medication 100 MILLIGRAM(S): at 11:34

## 2022-04-27 RX ADMIN — MORPHINE SULFATE 2 MILLIGRAM(S): 50 CAPSULE, EXTENDED RELEASE ORAL at 18:04

## 2022-04-27 RX ADMIN — Medication 1 TABLET(S): at 11:33

## 2022-04-27 RX ADMIN — Medication 1 MILLIGRAM(S): at 11:34

## 2022-04-27 RX ADMIN — MORPHINE SULFATE 2 MILLIGRAM(S): 50 CAPSULE, EXTENDED RELEASE ORAL at 12:51

## 2022-04-27 RX ADMIN — Medication 30 MILLILITER(S): at 18:52

## 2022-04-27 RX ADMIN — Medication 2 MILLIGRAM(S): at 18:04

## 2022-04-27 RX ADMIN — OXYCODONE HYDROCHLORIDE 5 MILLIGRAM(S): 5 TABLET ORAL at 10:38

## 2022-04-27 RX ADMIN — Medication 3 MILLIGRAM(S): at 02:05

## 2022-04-27 RX ADMIN — OXYCODONE HYDROCHLORIDE 5 MILLIGRAM(S): 5 TABLET ORAL at 17:04

## 2022-04-27 RX ADMIN — MORPHINE SULFATE 2 MILLIGRAM(S): 50 CAPSULE, EXTENDED RELEASE ORAL at 18:37

## 2022-04-27 RX ADMIN — OXYCODONE HYDROCHLORIDE 5 MILLIGRAM(S): 5 TABLET ORAL at 01:00

## 2022-04-27 RX ADMIN — OXYCODONE HYDROCHLORIDE 5 MILLIGRAM(S): 5 TABLET ORAL at 09:35

## 2022-04-27 RX ADMIN — ENOXAPARIN SODIUM 40 MILLIGRAM(S): 100 INJECTION SUBCUTANEOUS at 11:34

## 2022-04-27 RX ADMIN — Medication 2 MILLIGRAM(S): at 09:35

## 2022-04-27 RX ADMIN — Medication 2 MILLIGRAM(S): at 06:00

## 2022-04-27 NOTE — PROGRESS NOTE ADULT - SUBJECTIVE AND OBJECTIVE BOX
DENISSE GALVAN 41y Male  MRN#: [id number]   CODE STATUS: Full code    Hospital Day: 1d    Pt is currently admitted with the primary diagnosis of acute on chronic pancreatitis    SUBJECTIVE  Subjective complaints   Patient still has severe abdominal pain radiating to the back  He has nausea but no longer does he have any vomiting  Present Today:   - Hernandez:  No [ x ], Yes [   ] : Indication:     - Type of IV Access:       .. CVC/Piccline:  No [ x ], Yes [   ] : Indication:       .. Midline: No [ x ], Yes [   ] : Indication:                                             ----------------------------------------------------------  OBJECTIVE  PAST MEDICAL & SURGICAL HISTORY  Pancreatitis  due to alcohol    Asthma  no recent exacerbation, not using inhalers for a long time    Hypertension    H/O rotator cuff surgery                                              -----------------------------------------------------------  ALLERGIES:  No Known Allergies                                            ------------------------------------------------------------    HOME MEDICATIONS  Home Medications:  ALPRAZolam 2 mg oral tablet: 1 tab(s) orally once a day (at bedtime), As Needed (2022 09:36)  NIFEdipine 60 mg oral tablet, extended release: 1 tab(s) orally once a day (2022 09:36)                           MEDICATIONS:  STANDING MEDICATIONS  atorvastatin 80 milliGRAM(s) Oral at bedtime  chlorhexidine 4% Liquid 1 Application(s) Topical <User Schedule>  enoxaparin Injectable 40 milliGRAM(s) SubCutaneous every 24 hours  folic acid 1 milliGRAM(s) Oral daily  lactated ringers. 1000 milliLiter(s) IV Continuous <Continuous>  LORazepam     Tablet   Oral   LORazepam     Tablet 4 milliGRAM(s) Oral every 4 hours  multivitamin 1 Tablet(s) Oral daily  NIFEdipine XL 60 milliGRAM(s) Oral daily  thiamine 100 milliGRAM(s) Oral daily    PRN MEDICATIONS  morphine  - Injectable 4 milliGRAM(s) IV Push every 6 hours PRN  ondansetron Injectable 4 milliGRAM(s) IV Push every 6 hours PRN                                            ------------------------------------------------------------  VITAL SIGNS: Last 24 Hours  T(C): 35.7 (2022 08:00), Max: 37.1 (2022 20:25)  T(F): 96.3 (2022 08:00), Max: 98.7 (2022 20:25)  HR: 90 (2022 08:00) (85 - 108)  BP: 133/92 (2022 08:00) (127/85 - 134/74)  BP(mean): --  RR: 18 (2022 08:00) (18 - 18)  SpO2: 99% (2022 20:25) (99% - 99%)                                             --------------------------------------------------------------  LABS:                        15.5   7.86  )-----------( 170      ( 2022 04:30 )             43.2         137  |  96<L>  |  11  ----------------------------<  99  3.9   |  26  |  0.8    Ca    9.5      2022 04:30  Mg     1.8         TPro  6.5  /  Alb  4.1  /  TBili  1.0  /  DBili  0.2  /  AST  34  /  ALT  60<H>  /  AlkPhos  88  25    PT/INR - ( 2022 17:08 )   PT: 11.10 sec;   INR: 0.96 ratio         PTT - ( 2022 17:08 )  PTT:29.4 sec  Urinalysis Basic - ( 2022 06:21 )    Color: Yellow / Appearance: Clear / S.038 / pH: x  Gluc: x / Ketone: Moderate  / Bili: Small / Urobili: 3 mg/dL   Blood: x / Protein: >600 mg/dL / Nitrite: Negative   Leuk Esterase: Negative / RBC: 33 /HPF / WBC 1 /HPF   Sq Epi: x / Non Sq Epi: 1 /HPF / Bacteria: Negative                CARDIAC MARKERS ( 2022 01:11 )  x     / <0.01 ng/mL / x     / x     / x                                                  -------------------------------------------------------------  RADIOLOGY:                                            --------------------------------------------------------------    PHYSICAL EXAM:  General: alert oriented   HEENT: non remarkable  LUNGS: clear air sounds  HEART: normal s1 s2  ABDOMEN: severe tenderness on mild palpation  EXT: no edema                                               --------------------------------------------------------------    ASSESSMENT & PLAN  41 Y M with pmh of HTN, HLD, asthma (not on meds), 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 Admitted for acute on chronic pancreatitis, likely due to alcohol.    #Acute on chronic pancreatitis, likely due to alcohol  -however, patient says he has not had a drink in 2 weeks, alcohol level <10  -no OTC or herbal supplements  - IVF to 250cc/hr  -GI consult: recommended to keep hydration at 250cc/hr and trend LFTs and INR  -pain control (patient has history of drug-seeking, monitor)  -zofran prn    #EtOH abuse  #Suspected thiamine/folate deficiencies  -thiamine/folate/MVI  -CATCH team consult  - On ativan taper as alcohol level was low on admission    #HTN  #HLD  -continue nifedipine  -continue statin  - a1c: 6.1%  - Lipid profile: ,     #Transaminitis  - ALT>AST- Steatosis   -diffuse hepatic steatosis on U/S  -trend liver function panel    #DVT PPx- LVX 40mg sq qd  #GI PPx- None  #Diet- NPO except meds  #CHG  #Activity- AAT  #Dispo- Acute  #Code- FULL                            
Progress Note:  Provider Speciality                            Hospitalist      ELZBIETADENISSE REY MRN-[id number] 41y Male     CHIEF PRESENTING COMPLAINT:  Patient is a 41y old  Male who presents with a chief complaint of abdominal pain, vomiting (25 Apr 2022 08:53)        SUBJECTIVE:  Patient was seen and examined at bedside. Reports  moderate improvement in epigastric pain  No significant overnight events reported.     HISTORY OF PRESENTING ILLNESS:  HPI:  41 Y M with pmh of HTN, HLD, asthma (not on meds), recurrent pancreatitis with pancreatic pseudocyst (last in September 2021) presents to ED with abdominal pain and vomiting. Patient states that on Friday, he began having NBNB vomiting, unable to tolerate PO diet. All day Saturday was having multiple episodes of NBNB vomiting, severe 10/10 epigastric pain radiating to back. Last drink per patient was 2 weeks ago. Says he has cut back on drinking a lot. No fevers or chills. No OTC meds. Denies diarrhea, chest pain, shortness of breath. No recent travel or sick contacts.     ED Course:  T 96.8F, P 120, /117, R 20, S 98% on RA at rest. CT abd showing acute on chronic pancreatitis. Given IVF, multiple doses of morphine for pain. Still feels nauseous and having abdominal pain.  (24 Apr 2022 09:29)        REVIEW OF SYSTEMS:  Patient denies any headache, any vision complaints, runny nose, fever, chills. Denies chest pain, shortness of breath, palpitation. Denies nausea, vomiting, abdominal pain or diarrhoea, Denies dysuria. Denies  localized weakness in any part of the body or numbness.   At least 10 systems were reviewed in ROS. All systems reviewed  are within normal limits except for the complaints as described in Subjective.    PAST MEDICAL & SURGICAL HISTORY:  PAST MEDICAL & SURGICAL HISTORY:  Pancreatitis  due to alcohol    Asthma  no recent exacerbation, not using inhalers for a long time    Hypertension    H/O rotator cuff surgery            VITAL SIGNS:  Vital Signs Last 24 Hrs  T(C): 36.2 (26 Apr 2022 08:48), Max: 36.8 (26 Apr 2022 00:00)  T(F): 97.2 (26 Apr 2022 08:48), Max: 98.2 (26 Apr 2022 00:00)  HR: 95 (26 Apr 2022 08:48) (84 - 95)  BP: 146/96 (26 Apr 2022 08:48) (117/76 - 146/96)  BP(mean): --  RR: 18 (26 Apr 2022 08:48) (18 - 18)  SpO2: --        PHYSICAL EXAMINATION:  Not in acute distress  General: No icterus  HEENT:   no JVD.  Heart: S1+S2 audible  Lungs: bilateral  moderate air entry, no wheezing, no crepitations.  Abdomen: Soft, non-tender, non-distended , no  rigidity or guarding.  CNS: Awake alert, CN  grossly intact.  Extremities:  No edema      Skin: Tattoos bilateral UE        CONSULTS:  Consultant(s) Notes Reviewed by me.   Care Discussed with Consultants/Other Providers where required.        MEDICATIONS:  MEDICATIONS  (STANDING):  atorvastatin 80 milliGRAM(s) Oral at bedtime  chlorhexidine 4% Liquid 1 Application(s) Topical <User Schedule>  enoxaparin Injectable 40 milliGRAM(s) SubCutaneous every 24 hours  folic acid 1 milliGRAM(s) Oral daily  lactated ringers. 1000 milliLiter(s) (250 mL/Hr) IV Continuous <Continuous>  LORazepam     Tablet   Oral   LORazepam     Tablet 4 milliGRAM(s) Oral every 4 hours  multivitamin 1 Tablet(s) Oral daily  NIFEdipine XL 60 milliGRAM(s) Oral daily  thiamine 100 milliGRAM(s) Oral daily    MEDICATIONS  (PRN):  morphine  - Injectable 4 milliGRAM(s) IV Push every 6 hours PRN Moderate Pain (4 - 6)  ondansetron Injectable 4 milliGRAM(s) IV Push every 6 hours PRN Nausea and/or Vomiting            ASSESSMENT:        41 Y M with pmh of HTN, HLD, asthma (not on meds), recurrent pancreatitis with pancreatic pseudocyst (last in September 2021) presents to ED with abdominal pain and vomiting.    ASSESSMENT & PLAN:  Acute recurrent pancreatitis possibly due to alcohol  Active EtOH abuse  Suspected thiamine/folate deficiencies  HTN  HLD        Advance diet as tolerated  Conflicting history as to last drink- possibly continues to drink  ETOH level  No current signs of withdrawal  Transaminitis improving  Continue IVF & stop later today if PO intake improves  H/o pancreatic hypodensity likely pseudocyst - current CT did not shows it- MRI as an OP   CATCH team consult for disposition  Continue current medical management for active chronic comorbid conditions.  DVT Prophylaxis as appropriate  Supportive care including activity, diet, goals of care discussed in AM rounds.  Discussed with  / in AM rounds  Handoff: Discharge anticipated for 24-48 hrs to home pending symptom resolution    
DENISSE GALVAN 41y Male  MRN#: [id number]   CODE STATUS: Full code    Hospital Day: 3d    Pt is currently admitted with the primary diagnosis of Pancreatitis    SUBJECTIVE  Subjective complaints   Patient is doing much better today  still nauceaous with food  Present Today:   - Hernandez:  No [x  ], Yes [   ] : Indication:     - Type of IV Access:       .. CVC/Piccline:  No [ x ], Yes [   ] : Indication:       .. Midline: No [ x ], Yes [   ] : Indication:                                             ----------------------------------------------------------  OBJECTIVE  PAST MEDICAL & SURGICAL HISTORY  Pancreatitis  due to alcohol    Asthma  no recent exacerbation, not using inhalers for a long time    Hypertension    H/O rotator cuff surgery                                              -----------------------------------------------------------  ALLERGIES:  No Known Allergies                                            ------------------------------------------------------------    HOME MEDICATIONS  Home Medications:  ALPRAZolam 2 mg oral tablet: 1 tab(s) orally once a day (at bedtime), As Needed (24 Apr 2022 09:36)  NIFEdipine 60 mg oral tablet, extended release: 1 tab(s) orally once a day (24 Apr 2022 09:36)                           MEDICATIONS:  STANDING MEDICATIONS  atorvastatin 80 milliGRAM(s) Oral at bedtime  chlorhexidine 4% Liquid 1 Application(s) Topical <User Schedule>  enoxaparin Injectable 40 milliGRAM(s) SubCutaneous every 24 hours  folic acid 1 milliGRAM(s) Oral daily  lactated ringers. 1000 milliLiter(s) IV Continuous <Continuous>  LORazepam     Tablet   Oral   LORazepam     Tablet 2 milliGRAM(s) Oral every 4 hours  multivitamin 1 Tablet(s) Oral daily  NIFEdipine XL 60 milliGRAM(s) Oral daily  thiamine 100 milliGRAM(s) Oral daily    PRN MEDICATIONS  morphine  - Injectable 2 milliGRAM(s) IV Push every 6 hours PRN  ondansetron Injectable 4 milliGRAM(s) IV Push every 6 hours PRN  oxyCODONE    IR 5 milliGRAM(s) Oral every 6 hours PRN                                            ------------------------------------------------------------  VITAL SIGNS: Last 24 Hours  T(C): 36.1 (27 Apr 2022 08:37), Max: 36.4 (27 Apr 2022 00:00)  T(F): 97 (27 Apr 2022 08:37), Max: 97.5 (27 Apr 2022 00:00)  HR: 74 (27 Apr 2022 08:37) (74 - 87)  BP: 142/96 (27 Apr 2022 08:37) (130/81 - 149/86)  BP(mean): 98 (26 Apr 2022 15:41) (98 - 98)  RR: 17 (27 Apr 2022 08:37) (16 - 17)  SpO2: --                                             --------------------------------------------------------------  LABS:                        15.1   5.22  )-----------( 205      ( 27 Apr 2022 12:30 )             43.0     04-27    133<L>  |  95<L>  |  7<L>  ----------------------------<  117<H>  3.8   |  26  |  0.7    Ca    9.6      27 Apr 2022 12:30    TPro  7.0  /  Alb  4.5  /  TBili  0.6  /  DBili  x   /  AST  88<H>  /  ALT  88<H>  /  AlkPhos  91  04-27    PT/INR - ( 27 Apr 2022 07:18 )   PT: 12.40 sec;   INR: 1.08 ratio                                                                   -------------------------------------------------------------  RADIOLOGY:                                            --------------------------------------------------------------    PHYSICAL EXAM:  General: alert oriented  HEENT: non remarkable  LUNGS: clear air sounds  HEART: normal s1 s2  ABDOMEN: very mild tenderness  EXT: no edema                                             --------------------------------------------------------------    ASSESSMENT & PLAN    41 Y M with pmh of HTN, HLD, asthma (not on meds), recurrent pancreatitis with pancreatic pseudocyst (last in September 2021) presents to ED with abdominal pain and vomiting. Admitted for acute on chronic pancreatitis, likely due to alcohol.    #Acute on chronic pancreatitis, likely due to alcohol  -however, patient says he has not had a drink in 2 weeks, alcohol level <10  -no OTC or herbal supplements  - IVF to 250cc/hr decreased today to 100 ml/hr  -pain control (patient has history of drug-seeking, monitor)  - patient now on morhine 2 mg IV PRN every 6 hours and percocet 5 mg every 6 hrs PRN  -zofran prn    #EtOH abuse  #Suspected thiamine/folate deficiencies  -thiamine/folate/MVI  -CATCH team consult  - On ativan taper as alcohol level was low on admission    #HTN  #HLD  -continue nifedipine  -continue statin  - a1c: 6.1%  - Lipid profile: ,     #Transaminitis  - ALT>AST- Steatosis   -diffuse hepatic steatosis on U/S  -trend liver function panel  - CLD workup ordered - pending results    #DVT PPx- LVX 40mg sq qd  #GI PPx- None  #Diet- NPO except meds  #CHG  #Activity- AAT  #Dispo- Acute  #Code- FULL              
Gastroenterology progress note:     Patient is a 41y old  Male who presents with a chief complaint of abdominal pain, vomiting (26 Apr 2022 14:00)       Admitted on: 04-24-22    We are following the patient for pancreatitis     Interval History:  Abdominal pain is 50% better.   Denies any nausea, vomiting        PAST MEDICAL & SURGICAL HISTORY:  Pancreatitis  due to alcohol    Asthma  no recent exacerbation, not using inhalers for a long time    Hypertension    H/O rotator cuff surgery        MEDICATIONS  (STANDING):  atorvastatin 80 milliGRAM(s) Oral at bedtime  chlorhexidine 4% Liquid 1 Application(s) Topical <User Schedule>  enoxaparin Injectable 40 milliGRAM(s) SubCutaneous every 24 hours  folic acid 1 milliGRAM(s) Oral daily  lactated ringers. 1000 milliLiter(s) (100 mL/Hr) IV Continuous <Continuous>  LORazepam     Tablet   Oral   LORazepam     Tablet 2 milliGRAM(s) Oral every 4 hours  multivitamin 1 Tablet(s) Oral daily  NIFEdipine XL 60 milliGRAM(s) Oral daily  thiamine 100 milliGRAM(s) Oral daily    MEDICATIONS  (PRN):  morphine  - Injectable 2 milliGRAM(s) IV Push every 6 hours PRN Severe Pain (7 - 10)  ondansetron Injectable 4 milliGRAM(s) IV Push every 6 hours PRN Nausea and/or Vomiting  oxyCODONE    IR 5 milliGRAM(s) Oral every 6 hours PRN Moderate Pain (4 - 6)      Allergies  No Known Allergies      Review of Systems:     CONSTITUTIONAL: No weakness, fevers or chills  EYES/ENT: No visual changes;  No vertigo or throat pain   NECK: No pain or stiffness  RESPIRATORY: No cough, wheezing, hemoptysis; No shortness of breath  CARDIOVASCULAR: No chest pain or palpitations  GASTROINTESTINAL: No abdominal or epigastric pain. GENITOURINARY: No dysuria, frequency or hematuria  NEUROLOGICAL: No numbness or weakness  SKIN: No itching, rashes      Physical Examination:  T(C): 36.1 (04-27-22 @ 08:37), Max: 36.4 (04-27-22 @ 00:00)  HR: 74 (04-27-22 @ 08:37) (74 - 87)  BP: 142/96 (04-27-22 @ 08:37) (130/81 - 149/86)  RR: 17 (04-27-22 @ 08:37) (16 - 17)  SpO2: --      Constitutional: No acute distress.  Respiratory:  No signs of respiratory distress. Lung sounds are clear bilaterally.  Cardiovascular:  S1 S2, Regular rate and rhythm.  Abdominal: Abdomen is soft, symmetric, and non-tender without distention. There are no visible lesions or scars. Bowel sounds are present and normoactive in all four quadrants. No masses, hepatomegaly, or splenomegaly are noted.   Skin: No rashes, No Jaundice.        Data:                        15.7   5.39  )-----------( 182      ( 27 Apr 2022 07:18 )             45.0     Hgb trend:  15.7  04-27-22 @ 07:18  15.5  04-25-22 @ 04:30      04-27    134<L>  |  96<L>  |  6<L>  ----------------------------<  114<H>  3.7   |  26  |  0.8    Ca    9.6      27 Apr 2022 07:18  Phos  3.1     04-25  Mg     1.7     04-25    TPro  6.9  /  Alb  4.4  /  TBili  0.6  /  DBili  x   /  AST  70<H>  /  ALT  76<H>  /  AlkPhos  90  04-27    Liver panel trend:  TBili 0.6   /   AST 70   /   ALT 76   /   AlkP 90   /   Tptn 6.9   /   Alb 4.4    /   DBili --      04-27  TBili 0.9   /   AST 44   /   ALT 60   /   AlkP 91   /   Tptn 6.9   /   Alb 4.3    /   DBili --      04-26  TBili 1.2   /   AST 41   /   ALT 59   /   AlkP 89   /   Tptn 6.6   /   Alb 4.2    /   DBili 0.4      04-25  TBili 1.0   /   AST 34   /   ALT 60   /   AlkP 88   /   Tptn 6.5   /   Alb 4.1    /   DBili 0.2      04-25  TBili 1.0   /   AST 69   /      /   AlkP 111   /   Tptn 7.8   /   Alb 4.9    /   DBili --      04-24      PT/INR - ( 27 Apr 2022 07:18 )   PT: 12.40 sec;   INR: 1.08 ratio                Radiology:      
Progress Note:  Provider Speciality                            Hospitalist      ELZBIETADENISSE REY MRN-[id number] 41y Male     CHIEF PRESENTING COMPLAINT:  Patient is a 41y old  Male who presents with a chief complaint of abdominal pain, vomiting (25 Apr 2022 08:53)        SUBJECTIVE:  Patient was seen and examined at bedside. Reports epigastric pain  No significant overnight events reported.     HISTORY OF PRESENTING ILLNESS:  HPI:  41 Y M with pmh of HTN, HLD, asthma (not on meds), recurrent pancreatitis with pancreatic pseudocyst (last in September 2021) presents to ED with abdominal pain and vomiting. Patient states that on Friday, he began having NBNB vomiting, unable to tolerate PO diet. All day Saturday was having multiple episodes of NBNB vomiting, severe 10/10 epigastric pain radiating to back. Last drink per patient was 2 weeks ago. Says he has cut back on drinking a lot. No fevers or chills. No OTC meds. Denies diarrhea, chest pain, shortness of breath. No recent travel or sick contacts.     ED Course:  T 96.8F, P 120, /117, R 20, S 98% on RA at rest. CT abd showing acute on chronic pancreatitis. Given IVF, multiple doses of morphine for pain. Still feels nauseous and having abdominal pain.  (24 Apr 2022 09:29)        REVIEW OF SYSTEMS:  Patient denies any headache, any vision complaints, runny nose, fever, chills. Denies chest pain, shortness of breath, palpitation. Denies nausea, vomiting, abdominal pain or diarrhoea, Denies dysuria. Denies  localized weakness in any part of the body or numbness.   At least 10 systems were reviewed in ROS. All systems reviewed  are within normal limits except for the complaints as described in Subjective.    PAST MEDICAL & SURGICAL HISTORY:  PAST MEDICAL & SURGICAL HISTORY:  Pancreatitis  due to alcohol    Asthma  no recent exacerbation, not using inhalers for a long time    Hypertension    H/O rotator cuff surgery            VITAL SIGNS:  Vital Signs Last 24 Hrs  T(C): 35.7 (25 Apr 2022 08:00), Max: 37.1 (24 Apr 2022 20:25)  T(F): 96.3 (25 Apr 2022 08:00), Max: 98.7 (24 Apr 2022 20:25)  HR: 90 (25 Apr 2022 08:00) (85 - 108)  BP: 133/92 (25 Apr 2022 08:00) (127/85 - 134/74)  BP(mean): --  RR: 18 (25 Apr 2022 08:00) (18 - 18)  SpO2: 99% (24 Apr 2022 20:25) (99% - 99%)          PHYSICAL EXAMINATION:  Not in acute distress  General: No icterus  HEENT:   no JVD.  Heart: S1+S2 audible  Lungs: bilateral  moderate air entry, no wheezing, no crepitations.  Abdomen: Soft, non-tender, non-distended , no  rigidity or guarding.  CNS: Awake alert, CN  grossly intact.  Extremities:  No edema      Skin: Tattoos bilateral UE        CONSULTS:  Consultant(s) Notes Reviewed by me.   Care Discussed with Consultants/Other Providers where required.        MEDICATIONS:  MEDICATIONS  (STANDING):  atorvastatin 80 milliGRAM(s) Oral at bedtime  chlorhexidine 4% Liquid 1 Application(s) Topical <User Schedule>  enoxaparin Injectable 40 milliGRAM(s) SubCutaneous every 24 hours  folic acid 1 milliGRAM(s) Oral daily  lactated ringers. 1000 milliLiter(s) (250 mL/Hr) IV Continuous <Continuous>  LORazepam     Tablet   Oral   LORazepam     Tablet 4 milliGRAM(s) Oral every 4 hours  multivitamin 1 Tablet(s) Oral daily  NIFEdipine XL 60 milliGRAM(s) Oral daily  thiamine 100 milliGRAM(s) Oral daily    MEDICATIONS  (PRN):  morphine  - Injectable 4 milliGRAM(s) IV Push every 6 hours PRN Moderate Pain (4 - 6)  ondansetron Injectable 4 milliGRAM(s) IV Push every 6 hours PRN Nausea and/or Vomiting            ASSESSMENT:        41 Y M with pmh of HTN, HLD, asthma (not on meds), recurrent pancreatitis with pancreatic pseudocyst (last in September 2021) presents to ED with abdominal pain and vomiting.    ASSESSMENT & PLAN:  Acute recurrent pancreatitis possibly due to alcohol  Active EtOH abuse  Suspected thiamine/folate deficiencies  HTN  HLD        Started on clears  Conflicting history as to last drink- possibly continues to drink  ETOH level  No current signs of withdrawal  Transaminitis improving  Continue IVF  Advance as tolerated  H/o pancreatic hypodensity likely pseudocyst - current CT did not shows it- MRI as an OP   CATCH team consult  Continue current medical management for active chronic comorbid conditions.  DVT Prophylaxis as appropriate  Supportive care including activity, diet, goals of care discussed in AM rounds.  Discussed with  / in AM rounds  Expected inpatient care exceeds 48 hrs.       
DENISSE GALVAN 41y Male  MRN#: [id number]   CODE STATUS: Full code    Hospital Day: 2d    Pt is currently admitted with the primary diagnosis of Pancreatitis    SUBJECTIVE    Subjective complaints   Patient had severe pain in the night  Also has nausea but no vomiting  Tolerating clears  Present Today:   - Hernandez:  No [x  ], Yes [   ] : Indication:     - Type of IV Access:       .. CVC/Piccline:  No [x  ], Yes [   ] : Indication:       .. Midline: No [ x ], Yes [   ] : Indication:                                             ----------------------------------------------------------  OBJECTIVE  PAST MEDICAL & SURGICAL HISTORY  Pancreatitis  due to alcohol    Asthma  no recent exacerbation, not using inhalers for a long time    Hypertension    H/O rotator cuff surgery                                              -----------------------------------------------------------  ALLERGIES:  No Known Allergies                                            ------------------------------------------------------------    HOME MEDICATIONS  Home Medications:  ALPRAZolam 2 mg oral tablet: 1 tab(s) orally once a day (at bedtime), As Needed (24 Apr 2022 09:36)  NIFEdipine 60 mg oral tablet, extended release: 1 tab(s) orally once a day (24 Apr 2022 09:36)                           MEDICATIONS:  STANDING MEDICATIONS  atorvastatin 80 milliGRAM(s) Oral at bedtime  chlorhexidine 4% Liquid 1 Application(s) Topical <User Schedule>  enoxaparin Injectable 40 milliGRAM(s) SubCutaneous every 24 hours  folic acid 1 milliGRAM(s) Oral daily  lactated ringers. 1000 milliLiter(s) IV Continuous <Continuous>  LORazepam     Tablet   Oral   LORazepam     Tablet 3 milliGRAM(s) Oral every 4 hours  multivitamin 1 Tablet(s) Oral daily  NIFEdipine XL 60 milliGRAM(s) Oral daily  thiamine 100 milliGRAM(s) Oral daily    PRN MEDICATIONS  morphine  - Injectable 2 milliGRAM(s) IV Push every 6 hours PRN  ondansetron Injectable 4 milliGRAM(s) IV Push every 6 hours PRN  oxyCODONE    IR 5 milliGRAM(s) Oral every 6 hours PRN                                            ------------------------------------------------------------  VITAL SIGNS: Last 24 Hours  T(C): 36.2 (26 Apr 2022 08:48), Max: 36.8 (26 Apr 2022 00:00)  T(F): 97.2 (26 Apr 2022 08:48), Max: 98.2 (26 Apr 2022 00:00)  HR: 95 (26 Apr 2022 08:48) (84 - 95)  BP: 146/96 (26 Apr 2022 08:48) (117/76 - 146/96)  BP(mean): --  RR: 18 (26 Apr 2022 08:48) (18 - 18)  SpO2: --                                             --------------------------------------------------------------  LABS:                        15.5   7.86  )-----------( 170      ( 25 Apr 2022 04:30 )             43.2     04-26    139  |  98  |  8<L>  ----------------------------<  122<H>  3.6   |  27  |  0.8    Ca    9.4      26 Apr 2022 07:19  Phos  3.1     04-25  Mg     1.7     04-25    TPro  6.9  /  Alb  4.3  /  TBili  0.9  /  DBili  x   /  AST  44<H>  /  ALT  60<H>  /  AlkPhos  91  04-26    PT/INR - ( 26 Apr 2022 07:19 )   PT: 11.90 sec;   INR: 1.04 ratio         PTT - ( 24 Apr 2022 17:08 )  PTT:29.4 sec            Culture - Urine (collected 24 Apr 2022 06:21)  Source: Clean Catch Clean Catch (Midstream)  Final Report (25 Apr 2022 13:37):    <10,000 CFU/mL Normal Urogenital Janett                                                    -------------------------------------------------------------  RADIOLOGY:                                            --------------------------------------------------------------    PHYSICAL EXAM:  General: alert oriented  HEENT: non remarakble  LUNGS: clear air sounds  HEART: normal s1 s2  ABDOMEN: softer than yesterday, severe tenderness  EXT: no edema                                             --------------------------------------------------------------    ASSESSMENT & PLAN    41 Y M with pmh of HTN, HLD, asthma (not on meds), recurrent pancreatitis with pancreatic pseudocyst (last in September 2021) presents to ED with abdominal pain and vomiting. Admitted for acute on chronic pancreatitis, likely due to alcohol.    #Acute on chronic pancreatitis, likely due to alcohol  -however, patient says he has not had a drink in 2 weeks, alcohol level <10  -no OTC or herbal supplements  - IVF to 250cc/hr  -GI consult: recommended to keep hydration at 250cc/hr and trend LFTs and INR  -pain control (patient has history of drug-seeking, monitor)  - patient now on morhine 2 mg IV PRN every 6 hours and percocet 5 mg every 6 hrs PRN  -zofran prn    #EtOH abuse  #Suspected thiamine/folate deficiencies  -thiamine/folate/MVI  -CATCH team consult  - On ativan taper as alcohol level was low on admission    #HTN  #HLD  -continue nifedipine  -continue statin  - a1c: 6.1%  - Lipid profile: ,     #Transaminitis  - ALT>AST- Steatosis   -diffuse hepatic steatosis on U/S  -trend liver function panel  - CLD workup ordered for tomorrow    #DVT PPx- LVX 40mg sq qd  #GI PPx- None  #Diet- NPO except meds  #CHG  #Activity- AAT  #Dispo- Acute  #Code- FULL                          
Gastroenterology progress note:     Patient is a 41y old  Male who presents with a chief complaint of abdominal pain, vomiting (24 Apr 2022 10:34)       Admitted on: 04-24-22    We are following the patient for pancreatitis      Interval History:  Still c/o abdominal pain which is intermittent compared to constant pain before   denies any nausea, vomiting        PAST MEDICAL & SURGICAL HISTORY:  Pancreatitis  due to alcohol    Asthma  no recent exacerbation, not using inhalers for a long time    Hypertension    H/O rotator cuff surgery        MEDICATIONS  (STANDING):  atorvastatin 80 milliGRAM(s) Oral at bedtime  chlorhexidine 4% Liquid 1 Application(s) Topical <User Schedule>  enoxaparin Injectable 40 milliGRAM(s) SubCutaneous every 24 hours  folic acid 1 milliGRAM(s) Oral daily  lactated ringers. 1000 milliLiter(s) (250 mL/Hr) IV Continuous <Continuous>  multivitamin 1 Tablet(s) Oral daily  NIFEdipine XL 60 milliGRAM(s) Oral daily  thiamine 100 milliGRAM(s) Oral daily    MEDICATIONS  (PRN):  morphine  - Injectable 4 milliGRAM(s) IV Push every 6 hours PRN Moderate Pain (4 - 6)  ondansetron Injectable 4 milliGRAM(s) IV Push every 6 hours PRN Nausea and/or Vomiting      Allergies  No Known Allergies      Review of Systems:     CONSTITUTIONAL: No weakness, fevers or chills  EYES/ENT: No visual changes;  No vertigo or throat pain   NECK: No pain or stiffness  RESPIRATORY: No cough, wheezing, hemoptysis; No shortness of breath  CARDIOVASCULAR: No chest pain or palpitations  GASTROINTESTINAL: As mentioned above   GENITOURINARY: No dysuria, frequency or hematuria  NEUROLOGICAL: No numbness or weakness  SKIN: No itching, rashes      Physical Examination:  T(C): 35.7 (04-25-22 @ 08:00), Max: 37.1 (04-24-22 @ 20:25)  HR: 90 (04-25-22 @ 08:00) (85 - 108)  BP: 133/92 (04-25-22 @ 08:00) (127/85 - 134/74)  RR: 18 (04-25-22 @ 08:00) (18 - 18)  SpO2: 99% (04-24-22 @ 20:25) (99% - 99%)      Constitutional: No acute distress.  Respiratory:  No signs of respiratory distress. Lung sounds are clear bilaterally.  Cardiovascular:  S1 S2, Regular rate and rhythm.  Abdominal: Abdomen is soft, symmetric, and non-tender without distention.   Skin: No rashes, No Jaundice.        Data:                        15.5   7.86  )-----------( 170      ( 25 Apr 2022 04:30 )             43.2     Hgb trend:  15.5  04-25-22 @ 04:30  17.8  04-24-22 @ 01:11      04-25    137  |  96<L>  |  11  ----------------------------<  99  3.9   |  26  |  0.8    Ca    9.5      25 Apr 2022 04:30  Mg     1.8     04-25    TPro  6.5  /  Alb  4.1  /  TBili  1.0  /  DBili  0.2  /  AST  34  /  ALT  60<H>  /  AlkPhos  88  04-25    Liver panel trend:  TBili 1.0   /   AST 34   /   ALT 60   /   AlkP 88   /   Tptn 6.5   /   Alb 4.1    /   DBili 0.2      04-25  TBili 1.0   /   AST 69   /      /   AlkP 111   /   Tptn 7.8   /   Alb 4.9    /   DBili --      04-24      PT/INR - ( 24 Apr 2022 17:08 )   PT: 11.10 sec;   INR: 0.96 ratio         PTT - ( 24 Apr 2022 17:08 )  PTT:29.4 sec       Radiology:    US Abdomen Upper Quadrant Right:   ACC: [id number] EXAM:  US ABDOMEN RT UPR QUADRANT                          PROCEDURE DATE:  04/24/2022          INTERPRETATION:  CLINICAL INFORMATION: Right upper quadrant pain.    COMPARISON: Abdominal ultrasound 12/2/2020.    TECHNIQUE: Sonography of the right upper quadrant.    FINDINGS:    Liver: Increased echogenicity.  Bile ducts: Normal caliber. Common bile duct measures 5 mm.  Gallbladder: Within normal limits. Negative sonographic Marquez's sign.  Pancreas: Poorly visualized.  Right kidney: 10.9 cm. No hydronephrosis.  Ascites: None.  IVC: Visualized portions are within normal limits.    IMPRESSION:    Hepatic steatosis.    Pancreas is not well visualized on this examination.    --- End of Report ---          JUANITO ANGULO MD; Resident Radiologist  This document has been electronically signed.  MAUREEN GUERRA MD; Attending Radiologist  This document has been electronically signed. Apr 24 2022  4:30AM (04-24-22 @ 02:08)    < from: CT Abdomen and Pelvis w/ IV Cont (04.24.22 @ 02:59) >    LOWER CHEST: Unremarkable.    HEPATOBILIARY: Diffuse hepatic steatosis.    SPLEEN: Unremarkable.    PANCREAS: There is diffuse peripancreatic fat stranding and edema with   trace peripancreatic fluid. No evidence for peripancreatic loculated   collection.There is marked attenuation at the portal confluence with   surrounding collateral vessels noted around the pancreatic head, similar   to prior. Main pancreatic duct measures 4-5 mm in the pancreatic body,   similar to prior. Relatively uniform enhancement of the pancreas without   definite CT evidence for necrosis.    ADRENAL GLANDS: Unremarkable.    KIDNEYS: Symmetric bilateral renal enhancement. No hydronephrosis.    ABDOMINOPELVIC NODES: Unremarkable.    PELVIC ORGANS: Underdistended urinary bladder limits evaluation.    PERITONEUM/MESENTERY/BOWEL: No evidence of bowel obstruction or free air.   Small hiatal hernia. Unremarkable appendix. Motion artifact obscured   portions of the transverse colon. Reactive duodenal wall thickening.    BONES/SOFT TISSUES: No acute osseous abnormality.      IMPRESSION:    Acute pancreatitis. No loculated peripancreatic fluid collections.    Diffuse hepatic steatosis.    < end of copied text >        
Gastroenterology progress note:     Patient is a 41y old  Male who presents with a chief complaint of abdominal pain, vomiting (25 Apr 2022 12:40)       Admitted on: 04-24-22    We are following the patient for pancreatitis      Interval History:  Abdominal pain 12/10 last night   Denies any nausea, vomiting   Able to tolerate clear liquid diet        PAST MEDICAL & SURGICAL HISTORY:  Pancreatitis  due to alcohol    Asthma  no recent exacerbation, not using inhalers for a long time    Hypertension    H/O rotator cuff surgery        MEDICATIONS  (STANDING):  atorvastatin 80 milliGRAM(s) Oral at bedtime  chlorhexidine 4% Liquid 1 Application(s) Topical <User Schedule>  enoxaparin Injectable 40 milliGRAM(s) SubCutaneous every 24 hours  folic acid 1 milliGRAM(s) Oral daily  lactated ringers. 1000 milliLiter(s) (250 mL/Hr) IV Continuous <Continuous>  LORazepam     Tablet   Oral   LORazepam     Tablet 3 milliGRAM(s) Oral every 4 hours  multivitamin 1 Tablet(s) Oral daily  NIFEdipine XL 60 milliGRAM(s) Oral daily  thiamine 100 milliGRAM(s) Oral daily    MEDICATIONS  (PRN):  morphine  - Injectable 4 milliGRAM(s) IV Push every 6 hours PRN Moderate Pain (4 - 6)  ondansetron Injectable 4 milliGRAM(s) IV Push every 6 hours PRN Nausea and/or Vomiting      Allergies  No Known Allergies      Review of Systems:     CONSTITUTIONAL: No weakness, fevers or chills  EYES/ENT: No visual changes;  No vertigo or throat pain   NECK: No pain or stiffness  RESPIRATORY: No cough, wheezing, hemoptysis; No shortness of breath  CARDIOVASCULAR: No chest pain or palpitations  GASTROINTESTINAL: As mentioned above   GENITOURINARY: No dysuria, frequency or hematuria  NEUROLOGICAL: No numbness or weakness  SKIN: No itching, rashes      Physical Examination:  T(C): 36.2 (04-26-22 @ 08:48), Max: 36.8 (04-26-22 @ 00:00)  HR: 95 (04-26-22 @ 08:48) (84 - 95)  BP: 146/96 (04-26-22 @ 08:48) (117/76 - 146/96)  RR: 18 (04-26-22 @ 08:48) (18 - 18)  SpO2: --      Constitutional: No acute distress.  Respiratory:  No signs of respiratory distress. Lung sounds are clear bilaterally.  Cardiovascular:  S1 S2, Regular rate and rhythm.  Abdominal: Abdomen is soft, symmetric, and non-tender without distention. There are no visible lesions or scars. Bowel sounds are present and normoactive in all four quadrants. No masses, hepatomegaly, or splenomegaly are noted.   Skin: No rashes, No Jaundice.        Data:                        15.5   7.86  )-----------( 170      ( 25 Apr 2022 04:30 )             43.2     Hgb trend:  15.5  04-25-22 @ 04:30  17.8  04-24-22 @ 01:11      04-26    139  |  98  |  8<L>  ----------------------------<  122<H>  3.6   |  27  |  0.8    Ca    9.4      26 Apr 2022 07:19  Phos  3.1     04-25  Mg     1.7     04-25    TPro  6.9  /  Alb  4.3  /  TBili  0.9  /  DBili  x   /  AST  44<H>  /  ALT  60<H>  /  AlkPhos  91  04-26    Liver panel trend:  TBili 0.9   /   AST 44   /   ALT 60   /   AlkP 91   /   Tptn 6.9   /   Alb 4.3    /   DBili --      04-26  TBili 1.2   /   AST 41   /   ALT 59   /   AlkP 89   /   Tptn 6.6   /   Alb 4.2    /   DBili 0.4      04-25  TBili 1.0   /   AST 34   /   ALT 60   /   AlkP 88   /   Tptn 6.5   /   Alb 4.1    /   DBili 0.2      04-25  TBili 1.0   /   AST 69   /      /   AlkP 111   /   Tptn 7.8   /   Alb 4.9    /   DBili --      04-24      PT/INR - ( 26 Apr 2022 07:19 )   PT: 11.90 sec;   INR: 1.04 ratio         PTT - ( 24 Apr 2022 17:08 )  PTT:29.4 sec    Culture - Urine (collected 24 Apr 2022 06:21)  Source: Clean Catch Clean Catch (Midstream)  Final Report (25 Apr 2022 13:37):    <10,000 CFU/mL Normal Urogenital Janett         Radiology:

## 2022-04-27 NOTE — PROGRESS NOTE ADULT - ASSESSMENT
41 Y M with pmh of HTN, asthma (not on meds), recurrent alcoholic pancreatitis with pancreatic pseudocyst (last in March 2021), with current alcohol abuse presents to ED with abdominal pain and vomiting since Friday.  He used to drink vodka 1 bottle of vodka for long time changed to saki drink 1500 ml everyday since last year stopped for last 2 weeks except for hlaf bottle on friday night.      #)Acute recurrent interstitial edematous pancreatitis likely secondary to alcohol  #)Recurrent pancreatitis 3 rd episode   -Lipase 440, CT abdomen with cont: shows diffuse peripancreatic edema, attentuations in protal confluence, PD 4-5mm, no cyst or obvious mass  -, calcium 9.5  -US abdomen no GS, CBD 5mm  -Current alcohol abuse (Last drink on friday)  -hemodynamically stable (Initial tachycardia improved)  -Abdominal pain is better today   -Able to tolerate clear liquids   -Denies any nausea, vomiting     Rec:  c/w clear liquids advance to low fat diet   RL at 250 cc/hr  Monitor daily HCT/BUN/CR/urine output.  Monitor pain scale everyday and give pain medications accordingly  Advised to avoid alcohol explained about the risk of chronic pancreatitis which can progress to pancreatic cancer    #)h/o pancreatic hypodensity likely pseudocyst   -Ct scan in march 2021 showed 1.2 cm pancreatic hypodensity in the body of pancreas  -Ct scan in 9/2021 showed ill defined hypodensity no measurement in the CT scan   -Ct scan 4/2022 no mentioning of the pseudocyst   -recommend MRI as an OP after resolved acute episode of pancreatitis to for further evaluation     #)Elevated liver enzymes hepatocellular (ALT>AST) ?cirrhosis (Given reversal ratio) vs r/o other CLD  #)Alcohol use disorder   -LFT elevated since 3/2021 (ALT>AST)  -US showed hepatic steatosis, Ct showed hepatic steatosis (No evidence of cirrhosis radiologically)    Rec:   -Trend LFT, INR  -Alcohol abstinence   -Monitor for withdrawl  -[facility] protocol   -Thiamine, folate supplements   -needs Alcohol anonymous rehabilitation   -pending CLD work up

## 2022-04-27 NOTE — PROGRESS NOTE ADULT - PROVIDER SPECIALTY LIST ADULT
Gastroenterology
Gastroenterology
Hospitalist
Hospitalist
Internal Medicine
Gastroenterology
Internal Medicine
Internal Medicine

## 2022-04-27 NOTE — PROGRESS NOTE ADULT - REASON FOR ADMISSION
abdominal pain, vomiting

## 2022-04-27 NOTE — PROGRESS NOTE ADULT - ATTENDING COMMENTS
41 Y M with pmh of HTN, HLD, asthma (not on meds), recurrent pancreatitis with pancreatic pseudocyst (last in September 2021) presents to ED with abdominal pain and vomiting.    ASSESSMENT & PLAN:  Acute recurrent pancreatitis possibly due to alcohol---Advance diet as tolerated  admitted to binge drinking 2 weeks before coming  No current signs of withdrawal  Active EtOH abuse--Transaminitis improving  Suspected thiamine/folate deficiencies  HTN  HLD  H/o pancreatic hypodensity likely pseudocyst - current CT did not shows it- MRI as an OP   CATCH team consult for disposition  Continue current medical management for active chronic comorbid conditions.  DVT Prophylaxis on lovenox    Handoff: Discharge anticipated for 24-48 hrs to home pending symptom resolution  patient has been walking around on the floor. c/o nausea and bloating.
I edited the note

## 2022-04-28 LAB
CMV DNA CSF QL NAA+PROBE: SIGNIFICANT CHANGE UP
CMV DNA SPEC NAA+PROBE-LOG#: SIGNIFICANT CHANGE UP LOG10IU/ML
CMV IGG FLD QL: <0.2 U/ML — SIGNIFICANT CHANGE UP
CMV IGG SERPL-IMP: NEGATIVE — SIGNIFICANT CHANGE UP
CMV IGM FLD-ACNC: <8 AU/ML — SIGNIFICANT CHANGE UP
CMV IGM SERPL QL: NEGATIVE — SIGNIFICANT CHANGE UP
EBV EA AB SER IA-ACNC: 32.3 U/ML — HIGH
EBV EA AB TITR SER IF: POSITIVE
EBV EA IGG SER-ACNC: POSITIVE
EBV NA IGG SER IA-ACNC: >600 U/ML — HIGH
EBV PATRN SPEC IB-IMP: SIGNIFICANT CHANGE UP
EBV VCA IGG AVIDITY SER QL IA: POSITIVE
EBV VCA IGM SER IA-ACNC: 17.2 U/ML — SIGNIFICANT CHANGE UP
EBV VCA IGM SER IA-ACNC: >750 U/ML — HIGH
EBV VCA IGM TITR FLD: NEGATIVE — SIGNIFICANT CHANGE UP
HAV IGG SER QL IA: SIGNIFICANT CHANGE UP
HAV IGM SER-ACNC: SIGNIFICANT CHANGE UP
HBV CORE AB SER-ACNC: SIGNIFICANT CHANGE UP
HBV CORE IGM SER-ACNC: SIGNIFICANT CHANGE UP
HBV SURFACE AG SER-ACNC: SIGNIFICANT CHANGE UP
HSV1 IGG SER-ACNC: 0.41 INDEX — SIGNIFICANT CHANGE UP
HSV1 IGG SERPL QL IA: NEGATIVE — SIGNIFICANT CHANGE UP
HSV2 IGG FLD-ACNC: 0.1 INDEX — SIGNIFICANT CHANGE UP
HSV2 IGG SERPL QL IA: NEGATIVE — SIGNIFICANT CHANGE UP
IGA FLD-MCNC: 420 MG/DL — SIGNIFICANT CHANGE UP (ref 84–499)
IGG FLD-MCNC: 824 MG/DL — SIGNIFICANT CHANGE UP (ref 610–1660)
IGM SERPL-MCNC: 152 MG/DL — SIGNIFICANT CHANGE UP (ref 35–242)
KAPPA LC SER QL IFE: 1.42 MG/DL — SIGNIFICANT CHANGE UP (ref 0.33–1.94)
KAPPA/LAMBDA FREE LIGHT CHAIN RATIO, SERUM: 1.1 RATIO — SIGNIFICANT CHANGE UP (ref 0.26–1.65)
LAMBDA LC SER QL IFE: 1.29 MG/DL — SIGNIFICANT CHANGE UP (ref 0.57–2.63)
MITOCHONDRIA AB SER-ACNC: SIGNIFICANT CHANGE UP
SMOOTH MUSCLE AB SER-ACNC: SIGNIFICANT CHANGE UP

## 2022-04-29 ENCOUNTER — APPOINTMENT (OUTPATIENT)
Dept: PSYCHIATRY | Facility: CLINIC | Age: 42
End: 2022-04-29

## 2022-04-29 LAB
HEV AB FLD QL: NEGATIVE — SIGNIFICANT CHANGE UP
LKM AB SER-ACNC: <20.1 UNITS — SIGNIFICANT CHANGE UP (ref 0–20)

## 2022-05-02 LAB
ANA PAT FLD IF-IMP: ABNORMAL
ANA TITR SER: ABNORMAL

## 2022-05-03 DIAGNOSIS — I10 ESSENTIAL (PRIMARY) HYPERTENSION: ICD-10-CM

## 2022-05-03 DIAGNOSIS — R00.0 TACHYCARDIA, UNSPECIFIED: ICD-10-CM

## 2022-05-03 DIAGNOSIS — J45.909 UNSPECIFIED ASTHMA, UNCOMPLICATED: ICD-10-CM

## 2022-05-03 DIAGNOSIS — K85.20 ALCOHOL INDUCED ACUTE PANCREATITIS WITHOUT NECROSIS OR INFECTION: ICD-10-CM

## 2022-05-03 DIAGNOSIS — Z80.0 FAMILY HISTORY OF MALIGNANT NEOPLASM OF DIGESTIVE ORGANS: ICD-10-CM

## 2022-05-03 DIAGNOSIS — R10.9 UNSPECIFIED ABDOMINAL PAIN: ICD-10-CM

## 2022-05-03 DIAGNOSIS — E51.9 THIAMINE DEFICIENCY, UNSPECIFIED: ICD-10-CM
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
## 2022-05-03 DIAGNOSIS — K76.0 FATTY (CHANGE OF) LIVER, NOT ELSEWHERE CLASSIFIED: ICD-10-CM

## 2022-05-03 DIAGNOSIS — K70.9 ALCOHOLIC LIVER DISEASE, UNSPECIFIED: ICD-10-CM

## 2022-05-03 DIAGNOSIS — F17.290 NICOTINE DEPENDENCE, OTHER TOBACCO PRODUCT, UNCOMPLICATED: ICD-10-CM

## 2022-05-03 DIAGNOSIS — E53.8 DEFICIENCY OF OTHER SPECIFIED B GROUP VITAMINS: ICD-10-CM

## 2022-05-03 DIAGNOSIS — F10.288 ALCOHOL DEPENDENCE WITH OTHER ALCOHOL-INDUCED DISORDER: ICD-10-CM

## 2022-05-03 DIAGNOSIS — K86.3 PSEUDOCYST OF PANCREAS: ICD-10-CM

## 2022-05-03 DIAGNOSIS — F17.210 NICOTINE DEPENDENCE, CIGARETTES, UNCOMPLICATED: ICD-10-CM

## 2022-05-03 LAB — SOLUBLE LIVER IGG SER IA-ACNC: 0.6 — SIGNIFICANT CHANGE UP (ref 0–20)

## 2022-09-06 ENCOUNTER — EMERGENCY (EMERGENCY)
Facility: HOSPITAL | Age: 42
LOS: 0 days | Discharge: HOME | End: 2022-09-06
Attending: STUDENT IN AN ORGANIZED HEALTH CARE EDUCATION/TRAINING PROGRAM | Admitting: STUDENT IN AN ORGANIZED HEALTH CARE EDUCATION/TRAINING PROGRAM

## 2022-09-06 VITALS
DIASTOLIC BLOOD PRESSURE: 101 MMHG | OXYGEN SATURATION: 98 % | HEART RATE: 99 BPM | SYSTOLIC BLOOD PRESSURE: 185 MMHG | TEMPERATURE: 97 F | RESPIRATION RATE: 20 BRPM

## 2022-09-06 VITALS
HEART RATE: 64 BPM | DIASTOLIC BLOOD PRESSURE: 111 MMHG | OXYGEN SATURATION: 97 % | RESPIRATION RATE: 20 BRPM | SYSTOLIC BLOOD PRESSURE: 182 MMHG

## 2022-09-06 DIAGNOSIS — I10 ESSENTIAL (PRIMARY) HYPERTENSION: ICD-10-CM

## 2022-09-06 DIAGNOSIS — J90 PLEURAL EFFUSION, NOT ELSEWHERE CLASSIFIED: ICD-10-CM

## 2022-09-06 DIAGNOSIS — R07.81 PLEURODYNIA: ICD-10-CM

## 2022-09-06 DIAGNOSIS — E78.5 HYPERLIPIDEMIA, UNSPECIFIED: ICD-10-CM

## 2022-09-06 DIAGNOSIS — S22.42XA MULTIPLE FRACTURES OF RIBS, LEFT SIDE, INITIAL ENCOUNTER FOR CLOSED FRACTURE: ICD-10-CM
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
## 2022-09-06 DIAGNOSIS — J98.11 ATELECTASIS: ICD-10-CM

## 2022-09-06 DIAGNOSIS — J45.909 UNSPECIFIED ASTHMA, UNCOMPLICATED: ICD-10-CM

## 2022-09-06 DIAGNOSIS — Z98.890 OTHER SPECIFIED POSTPROCEDURAL STATES: Chronic | ICD-10-CM
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
## 2022-09-06 LAB
ALBUMIN SERPL ELPH-MCNC: 4.8 G/DL — SIGNIFICANT CHANGE UP (ref 3.5–5.2)
ALP SERPL-CCNC: 64 U/L — SIGNIFICANT CHANGE UP (ref 30–115)
ALT FLD-CCNC: 30 U/L — SIGNIFICANT CHANGE UP (ref 0–41)
ANION GAP SERPL CALC-SCNC: 12 MMOL/L — SIGNIFICANT CHANGE UP (ref 7–14)
APTT BLD: 31.2 SEC — SIGNIFICANT CHANGE UP (ref 27–39.2)
AST SERPL-CCNC: 36 U/L — SIGNIFICANT CHANGE UP (ref 0–41)
BASOPHILS # BLD AUTO: 0.05 K/UL — SIGNIFICANT CHANGE UP (ref 0–0.2)
BASOPHILS NFR BLD AUTO: 0.6 % — SIGNIFICANT CHANGE UP (ref 0–1)
BILIRUB SERPL-MCNC: 0.8 MG/DL — SIGNIFICANT CHANGE UP (ref 0.2–1.2)
BUN SERPL-MCNC: 8 MG/DL — LOW (ref 10–20)
CALCIUM SERPL-MCNC: 9.7 MG/DL — SIGNIFICANT CHANGE UP (ref 8.5–10.1)
CHLORIDE SERPL-SCNC: 99 MMOL/L — SIGNIFICANT CHANGE UP (ref 98–110)
CO2 SERPL-SCNC: 30 MMOL/L — SIGNIFICANT CHANGE UP (ref 17–32)
CREAT SERPL-MCNC: 0.8 MG/DL — SIGNIFICANT CHANGE UP (ref 0.7–1.5)
EGFR: 112 ML/MIN/1.73M2 — SIGNIFICANT CHANGE UP
EOSINOPHIL # BLD AUTO: 0.06 K/UL — SIGNIFICANT CHANGE UP (ref 0–0.7)
EOSINOPHIL NFR BLD AUTO: 0.7 % — SIGNIFICANT CHANGE UP (ref 0–8)
ETHANOL SERPL-MCNC: 22 MG/DL — HIGH
GLUCOSE SERPL-MCNC: 84 MG/DL — SIGNIFICANT CHANGE UP (ref 70–99)
HCT VFR BLD CALC: 51.6 % — SIGNIFICANT CHANGE UP (ref 42–52)
HGB BLD-MCNC: 16.9 G/DL — SIGNIFICANT CHANGE UP (ref 14–18)
IMM GRANULOCYTES NFR BLD AUTO: 0.4 % — HIGH (ref 0.1–0.3)
INR BLD: 0.9 RATIO — SIGNIFICANT CHANGE UP (ref 0.65–1.3)
LACTATE SERPL-SCNC: 1.2 MMOL/L — SIGNIFICANT CHANGE UP (ref 0.7–2)
LIDOCAIN IGE QN: 11 U/L — SIGNIFICANT CHANGE UP (ref 7–60)
LYMPHOCYTES # BLD AUTO: 1.95 K/UL — SIGNIFICANT CHANGE UP (ref 1.2–3.4)
LYMPHOCYTES # BLD AUTO: 23.5 % — SIGNIFICANT CHANGE UP (ref 20.5–51.1)
MCHC RBC-ENTMCNC: 30.8 PG — SIGNIFICANT CHANGE UP (ref 27–31)
MCHC RBC-ENTMCNC: 32.8 G/DL — SIGNIFICANT CHANGE UP (ref 32–37)
MCV RBC AUTO: 94.2 FL — HIGH (ref 80–94)
MONOCYTES # BLD AUTO: 0.83 K/UL — HIGH (ref 0.1–0.6)
MONOCYTES NFR BLD AUTO: 10 % — HIGH (ref 1.7–9.3)
NEUTROPHILS # BLD AUTO: 5.37 K/UL — SIGNIFICANT CHANGE UP (ref 1.4–6.5)
NEUTROPHILS NFR BLD AUTO: 64.8 % — SIGNIFICANT CHANGE UP (ref 42.2–75.2)
NRBC # BLD: 0 /100 WBCS — SIGNIFICANT CHANGE UP (ref 0–0)
PLATELET # BLD AUTO: 353 K/UL — SIGNIFICANT CHANGE UP (ref 130–400)
POTASSIUM SERPL-MCNC: 3.6 MMOL/L — SIGNIFICANT CHANGE UP (ref 3.5–5)
POTASSIUM SERPL-SCNC: 3.6 MMOL/L — SIGNIFICANT CHANGE UP (ref 3.5–5)
PROT SERPL-MCNC: 7.1 G/DL — SIGNIFICANT CHANGE UP (ref 6–8)
PROTHROM AB SERPL-ACNC: 10.4 SEC — SIGNIFICANT CHANGE UP (ref 9.95–12.87)
RBC # BLD: 5.48 M/UL — SIGNIFICANT CHANGE UP (ref 4.7–6.1)
RBC # FLD: 14.6 % — HIGH (ref 11.5–14.5)
SODIUM SERPL-SCNC: 141 MMOL/L — SIGNIFICANT CHANGE UP (ref 135–146)
WBC # BLD: 8.29 K/UL — SIGNIFICANT CHANGE UP (ref 4.8–10.8)
WBC # FLD AUTO: 8.29 K/UL — SIGNIFICANT CHANGE UP (ref 4.8–10.8)

## 2022-09-06 PROCEDURE — 99285 EMERGENCY DEPT VISIT HI MDM: CPT

## 2022-09-06 PROCEDURE — 71101 X-RAY EXAM UNILAT RIBS/CHEST: CPT | Mod: 26,LT

## 2022-09-06 PROCEDURE — 72125 CT NECK SPINE W/O DYE: CPT | Mod: 26,MA

## 2022-09-06 PROCEDURE — 99283 EMERGENCY DEPT VISIT LOW MDM: CPT

## 2022-09-06 PROCEDURE — 74176 CT ABD & PELVIS W/O CONTRAST: CPT | Mod: 26,MA

## 2022-09-06 PROCEDURE — 71250 CT THORAX DX C-: CPT | Mod: 26,MA

## 2022-09-06 PROCEDURE — 70450 CT HEAD/BRAIN W/O DYE: CPT | Mod: 26,MA

## 2022-09-06 RX ORDER — LIDOCAINE 4 G/100G
1 CREAM TOPICAL ONCE
Refills: 0 | Status: DISCONTINUED | OUTPATIENT
Start: 2022-09-06 | End: 2022-09-06

## 2022-09-06 RX ORDER — NIFEDIPINE 30 MG
60 TABLET, EXTENDED RELEASE 24 HR ORAL ONCE
Refills: 0 | Status: DISCONTINUED | OUTPATIENT
Start: 2022-09-06 | End: 2022-09-06

## 2022-09-06 RX ORDER — NIFEDIPINE 30 MG
20 TABLET, EXTENDED RELEASE 24 HR ORAL ONCE
Refills: 0 | Status: DISCONTINUED | OUTPATIENT
Start: 2022-09-06 | End: 2022-09-06

## 2022-09-06 RX ORDER — KETOROLAC TROMETHAMINE 30 MG/ML
15 SYRINGE (ML) INJECTION ONCE
Refills: 0 | Status: DISCONTINUED | OUTPATIENT
Start: 2022-09-06 | End: 2022-09-06

## 2022-09-06 RX ORDER — IBUPROFEN 200 MG
1 TABLET ORAL
Qty: 30 | Refills: 0
Start: 2022-09-06

## 2022-09-06 RX ADMIN — Medication 15 MILLIGRAM(S): at 17:10

## 2022-09-06 NOTE — ED PROVIDER NOTE - PHYSICAL EXAMINATION
Vital Signs: I have reviewed the initial vital signs.  Constitutional: appears stated age, no acute distress.  HEENT: Airway patent, moist MM, EOMI,   CV: regular rate, regular rhythm, well-perfused extremities,   Lungs: Clear to ascultation bilaterally, no increased work of breathing.  ABD: Non-tender, Non-distended,   MSK: Neck supple, nontender, normal range of motion, full range of motion of L shoulder, no pain with active range of motion of LUE. Tender to palpation L 7/8th rib along mid-axillary line.   INTEG: Skin warm, dry, no rash no bruising.   NEURO: A&Ox3, moving all extremities, normal speech  PSYCH: Calm, cooperative, normal affect and interaction.

## 2022-09-06 NOTE — ED PROVIDER NOTE - NSFOLLOWUPCLINICS_GEN_ALL_ED_FT
[facility]  Trauma Surgery  [address] [place] [address]  Phone: [phone]  Fax:   Follow Up Time: 4-6 Days

## 2022-09-06 NOTE — ED PROVIDER NOTE - NSRISKSEXPLAINEDTO_ED_A_ED
PAIN CLINIC DISCHARGE INSTRUCTIONS    Diet/Activity     Resume previous diet unless instructed otherwise.   No strenuous activity or long distance walking for 24-72 hours  No shower for the first 24 hours  No hot tub, swimming, bath, or whirlpool for the 3-5 days    Medications     You may resume blood thinning medications _____________________________________  Resume your normal medications unless instructed otherwise  No anti-inflammatories for the first 24 hours (Advil, Motrin, Aleve, Aspirin)     Pain/Site Care    You may remove the band-aids after 24 hours  You may experience increased severity of your symptoms or pain for the first 24 hours. You may try ice or heat to the injection site as needed for pain    Call your physician if you develop any of the following at the injection site:        Bleeding      Drainage      Severe Headache      Fever over 101      Chills       Redness or swelling at the site   Patient

## 2022-09-06 NOTE — ED PROVIDER NOTE - NS ED ROS FT
Review of Systems    Constitutional: (-) fever  Cardiovascular: (-) chest pain, (-) syncope  Respiratory: (-) cough, (-) shortness of breath  Gastrointestinal: (-) vomiting, (-) diarrhea, (-) abdominal pain  Musculoskeletal: (-) neck pain, (-) back pain, (-) joint pain (+) chest wall pain and L rib pain  Integumentary: (-) rash, (-) edema  Neurological: (-) headache, (-) altered mental status    Except as documented in the HPI, all other systems are negative.
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
## 2022-09-06 NOTE — ED PROVIDER NOTE - PATIENT PORTAL LINK FT
You can access the FollowMyHealth Patient Portal offered by [facility] by registering at the following website: [email or web address]. By joining [facility]’s FollowMyHealth portal, you will also be able to view your health information using other applications (apps) compatible with our system.

## 2022-09-06 NOTE — ED PROVIDER NOTE - NSFOLLOWUPINSTRUCTIONS_ED_ALL_ED_FT
Rib Fracture(s)    A rib fracture is a break or crack in one of the bones of the ribs. The ribs are a group of long, curved bones that wrap around your chest and attach to your spine. . A broken or cracked rib is often painful, but most do not cause other problems and heal on their own over time. Symptoms include pain when you breath or cough or pain when pressing over the area.    SEEK IMMEDIATE MEDICAL CARE IF YOU HAVE THE FOLLOWING SYMPTOMS: fever, shortness of breath, coughing up blood, abdominal pain, nausea or vomiting, pain not controlled with medications.

## 2022-09-07 DIAGNOSIS — S22.42XA MULTIPLE FRACTURES OF RIBS, LEFT SIDE, INITIAL ENCOUNTER FOR CLOSED FRACTURE: ICD-10-CM
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
## 2022-10-05 ENCOUNTER — INPATIENT (INPATIENT)
Facility: HOSPITAL | Age: 42
LOS: 3 days | Discharge: HOME | End: 2022-10-09
Attending: STUDENT IN AN ORGANIZED HEALTH CARE EDUCATION/TRAINING PROGRAM | Admitting: STUDENT IN AN ORGANIZED HEALTH CARE EDUCATION/TRAINING PROGRAM
Payer: MEDICAID

## 2022-10-05 VITALS
OXYGEN SATURATION: 99 % | RESPIRATION RATE: 20 BRPM | WEIGHT: 190.04 LBS | HEIGHT: 72 IN | SYSTOLIC BLOOD PRESSURE: 184 MMHG | HEART RATE: 115 BPM | TEMPERATURE: 98 F | DIASTOLIC BLOOD PRESSURE: 117 MMHG

## 2022-10-05 DIAGNOSIS — Z98.890 OTHER SPECIFIED POSTPROCEDURAL STATES: Chronic | ICD-10-CM

## 2022-10-05 LAB
ALBUMIN SERPL ELPH-MCNC: 5.2 G/DL — SIGNIFICANT CHANGE UP (ref 3.5–5.2)
ALP SERPL-CCNC: 103 U/L — SIGNIFICANT CHANGE UP (ref 30–115)
ALT FLD-CCNC: 100 U/L — HIGH (ref 0–41)
ANION GAP SERPL CALC-SCNC: 23 MMOL/L — HIGH (ref 7–14)
APPEARANCE UR: CLEAR — SIGNIFICANT CHANGE UP
APTT BLD: 30.8 SEC — SIGNIFICANT CHANGE UP (ref 27–39.2)
AST SERPL-CCNC: 79 U/L — HIGH (ref 0–41)
BACTERIA # UR AUTO: NEGATIVE — SIGNIFICANT CHANGE UP
BASE EXCESS BLDV CALC-SCNC: -3.3 MMOL/L — LOW (ref -2–3)
BASOPHILS # BLD AUTO: 0 K/UL — SIGNIFICANT CHANGE UP (ref 0–0.2)
BASOPHILS NFR BLD AUTO: 0 % — SIGNIFICANT CHANGE UP (ref 0–1)
BILIRUB SERPL-MCNC: 1.3 MG/DL — HIGH (ref 0.2–1.2)
BILIRUB UR-MCNC: NEGATIVE — SIGNIFICANT CHANGE UP
BLD GP AB SCN SERPL QL: SIGNIFICANT CHANGE UP
BUN SERPL-MCNC: 8 MG/DL — LOW (ref 10–20)
CA-I SERPL-SCNC: 1.04 MMOL/L — LOW (ref 1.15–1.33)
CALCIUM SERPL-MCNC: 10.7 MG/DL — HIGH (ref 8.4–10.4)
CHLORIDE SERPL-SCNC: 90 MMOL/L — LOW (ref 98–110)
CO2 SERPL-SCNC: 24 MMOL/L — SIGNIFICANT CHANGE UP (ref 17–32)
COLOR SPEC: SIGNIFICANT CHANGE UP
CREAT SERPL-MCNC: 1 MG/DL — SIGNIFICANT CHANGE UP (ref 0.7–1.5)
DIFF PNL FLD: ABNORMAL
EGFR: 95 ML/MIN/1.73M2 — SIGNIFICANT CHANGE UP
EOSINOPHIL # BLD AUTO: 0 K/UL — SIGNIFICANT CHANGE UP (ref 0–0.7)
EOSINOPHIL NFR BLD AUTO: 0 % — SIGNIFICANT CHANGE UP (ref 0–8)
EPI CELLS # UR: 0 /HPF — SIGNIFICANT CHANGE UP (ref 0–5)
ETHANOL SERPL-MCNC: <10 MG/DL — SIGNIFICANT CHANGE UP
GAS PNL BLDV: 124 MMOL/L — LOW (ref 136–145)
GAS PNL BLDV: SIGNIFICANT CHANGE UP
GLUCOSE SERPL-MCNC: 91 MG/DL — SIGNIFICANT CHANGE UP (ref 70–99)
GLUCOSE UR QL: NEGATIVE — SIGNIFICANT CHANGE UP
HCO3 BLDV-SCNC: 20 MMOL/L — LOW (ref 22–29)
HCT VFR BLD CALC: 57.1 % — HIGH (ref 42–52)
HCT VFR BLDA CALC: 52 % — HIGH (ref 39–51)
HGB BLD CALC-MCNC: 17.3 G/DL — SIGNIFICANT CHANGE UP (ref 12.6–17.4)
HGB BLD-MCNC: 19.6 G/DL — CRITICAL HIGH (ref 14–18)
HYALINE CASTS # UR AUTO: 1 /LPF — SIGNIFICANT CHANGE UP (ref 0–7)
INR BLD: 0.89 RATIO — SIGNIFICANT CHANGE UP (ref 0.65–1.3)
KETONES UR-MCNC: ABNORMAL
LACTATE BLDV-MCNC: 2.1 MMOL/L — HIGH (ref 0.5–2)
LEUKOCYTE ESTERASE UR-ACNC: NEGATIVE — SIGNIFICANT CHANGE UP
LIDOCAIN IGE QN: 408 U/L — HIGH (ref 7–60)
LYMPHOCYTES # BLD AUTO: 0.79 K/UL — LOW (ref 1.2–3.4)
LYMPHOCYTES # BLD AUTO: 6.5 % — LOW (ref 20.5–51.1)
MAGNESIUM SERPL-MCNC: 1.7 MG/DL — LOW (ref 1.8–2.4)
MANUAL SMEAR VERIFICATION: SIGNIFICANT CHANGE UP
MCHC RBC-ENTMCNC: 31.7 PG — HIGH (ref 27–31)
MCHC RBC-ENTMCNC: 34.3 G/DL — SIGNIFICANT CHANGE UP (ref 32–37)
MCV RBC AUTO: 92.4 FL — SIGNIFICANT CHANGE UP (ref 80–94)
MONOCYTES # BLD AUTO: 0.68 K/UL — HIGH (ref 0.1–0.6)
MONOCYTES NFR BLD AUTO: 5.6 % — SIGNIFICANT CHANGE UP (ref 1.7–9.3)
MYELOCYTES NFR BLD: 0.9 % — HIGH (ref 0–0)
NEUTROPHILS # BLD AUTO: 10.38 K/UL — HIGH (ref 1.4–6.5)
NEUTROPHILS NFR BLD AUTO: 85.1 % — HIGH (ref 42.2–75.2)
NITRITE UR-MCNC: NEGATIVE — SIGNIFICANT CHANGE UP
NT-PROBNP SERPL-SCNC: 1574 PG/ML — HIGH (ref 0–300)
PCO2 BLDV: 33 MMHG — LOW (ref 42–55)
PH BLDV: 7.4 — SIGNIFICANT CHANGE UP (ref 7.32–7.43)
PH UR: 6.5 — SIGNIFICANT CHANGE UP (ref 5–8)
PLAT MORPH BLD: NORMAL — SIGNIFICANT CHANGE UP
PLATELET # BLD AUTO: 286 K/UL — SIGNIFICANT CHANGE UP (ref 130–400)
PO2 BLDV: 55 MMHG — SIGNIFICANT CHANGE UP
POLYCHROMASIA BLD QL SMEAR: SLIGHT — SIGNIFICANT CHANGE UP
POTASSIUM BLDV-SCNC: 6.7 MMOL/L — CRITICAL HIGH (ref 3.5–5.1)
POTASSIUM SERPL-MCNC: 5.1 MMOL/L — HIGH (ref 3.5–5)
POTASSIUM SERPL-SCNC: 5.1 MMOL/L — HIGH (ref 3.5–5)
PROT SERPL-MCNC: 8 G/DL — SIGNIFICANT CHANGE UP (ref 6–8)
PROT UR-MCNC: SIGNIFICANT CHANGE UP
PROTHROM AB SERPL-ACNC: 10.1 SEC — SIGNIFICANT CHANGE UP (ref 9.95–12.87)
RBC # BLD: 6.18 M/UL — HIGH (ref 4.7–6.1)
RBC # FLD: 13 % — SIGNIFICANT CHANGE UP (ref 11.5–14.5)
RBC BLD AUTO: ABNORMAL
RBC CASTS # UR COMP ASSIST: 5 /HPF — HIGH (ref 0–4)
SAO2 % BLDV: 86.3 % — SIGNIFICANT CHANGE UP
SARS-COV-2 RNA SPEC QL NAA+PROBE: DETECTED
SMUDGE CELLS # BLD: PRESENT — SIGNIFICANT CHANGE UP
SODIUM SERPL-SCNC: 137 MMOL/L — SIGNIFICANT CHANGE UP (ref 135–146)
SP GR SPEC: 1.04 — HIGH (ref 1.01–1.03)
TROPONIN T SERPL-MCNC: <0.01 NG/ML — SIGNIFICANT CHANGE UP
UROBILINOGEN FLD QL: SIGNIFICANT CHANGE UP
VARIANT LYMPHS # BLD: 1.9 % — SIGNIFICANT CHANGE UP (ref 0–5)
WBC # BLD: 12.2 K/UL — HIGH (ref 4.8–10.8)
WBC # FLD AUTO: 12.2 K/UL — HIGH (ref 4.8–10.8)
WBC UR QL: 0 /HPF — SIGNIFICANT CHANGE UP (ref 0–5)

## 2022-10-05 PROCEDURE — 71045 X-RAY EXAM CHEST 1 VIEW: CPT | Mod: 26

## 2022-10-05 PROCEDURE — 99223 1ST HOSP IP/OBS HIGH 75: CPT

## 2022-10-05 PROCEDURE — 74174 CTA ABD&PLVS W/CONTRAST: CPT | Mod: 26,MA

## 2022-10-05 PROCEDURE — 93010 ELECTROCARDIOGRAM REPORT: CPT

## 2022-10-05 PROCEDURE — 99285 EMERGENCY DEPT VISIT HI MDM: CPT

## 2022-10-05 PROCEDURE — 71275 CT ANGIOGRAPHY CHEST: CPT | Mod: 26,MA

## 2022-10-05 RX ORDER — SODIUM CHLORIDE 9 MG/ML
1000 INJECTION, SOLUTION INTRAVENOUS ONCE
Refills: 0 | Status: COMPLETED | OUTPATIENT
Start: 2022-10-05 | End: 2022-10-05

## 2022-10-05 RX ORDER — MORPHINE SULFATE 50 MG/1
4 CAPSULE, EXTENDED RELEASE ORAL ONCE
Refills: 0 | Status: DISCONTINUED | OUTPATIENT
Start: 2022-10-05 | End: 2022-10-05

## 2022-10-05 RX ORDER — HYDROMORPHONE HYDROCHLORIDE 2 MG/ML
1 INJECTION INTRAMUSCULAR; INTRAVENOUS; SUBCUTANEOUS ONCE
Refills: 0 | Status: DISCONTINUED | OUTPATIENT
Start: 2022-10-05 | End: 2022-10-05

## 2022-10-05 RX ORDER — ONDANSETRON 8 MG/1
4 TABLET, FILM COATED ORAL ONCE
Refills: 0 | Status: COMPLETED | OUTPATIENT
Start: 2022-10-05 | End: 2022-10-05

## 2022-10-05 RX ORDER — MAGNESIUM SULFATE 500 MG/ML
2 VIAL (ML) INJECTION ONCE
Refills: 0 | Status: COMPLETED | OUTPATIENT
Start: 2022-10-05 | End: 2022-10-05

## 2022-10-05 RX ADMIN — Medication 30 MILLILITER(S): at 21:03

## 2022-10-05 RX ADMIN — HYDROMORPHONE HYDROCHLORIDE 1 MILLIGRAM(S): 2 INJECTION INTRAMUSCULAR; INTRAVENOUS; SUBCUTANEOUS at 19:42

## 2022-10-05 RX ADMIN — HYDROMORPHONE HYDROCHLORIDE 1 MILLIGRAM(S): 2 INJECTION INTRAMUSCULAR; INTRAVENOUS; SUBCUTANEOUS at 17:11

## 2022-10-05 RX ADMIN — SODIUM CHLORIDE 1000 MILLILITER(S): 9 INJECTION, SOLUTION INTRAVENOUS at 19:42

## 2022-10-05 RX ADMIN — SODIUM CHLORIDE 1000 MILLILITER(S): 9 INJECTION, SOLUTION INTRAVENOUS at 21:13

## 2022-10-05 RX ADMIN — HYDROMORPHONE HYDROCHLORIDE 1 MILLIGRAM(S): 2 INJECTION INTRAMUSCULAR; INTRAVENOUS; SUBCUTANEOUS at 19:48

## 2022-10-05 RX ADMIN — HYDROMORPHONE HYDROCHLORIDE 1 MILLIGRAM(S): 2 INJECTION INTRAMUSCULAR; INTRAVENOUS; SUBCUTANEOUS at 21:00

## 2022-10-05 RX ADMIN — ONDANSETRON 4 MILLIGRAM(S): 8 TABLET, FILM COATED ORAL at 21:00

## 2022-10-05 RX ADMIN — MORPHINE SULFATE 4 MILLIGRAM(S): 50 CAPSULE, EXTENDED RELEASE ORAL at 16:48

## 2022-10-05 RX ADMIN — ONDANSETRON 4 MILLIGRAM(S): 8 TABLET, FILM COATED ORAL at 16:48

## 2022-10-05 RX ADMIN — Medication 25 GRAM(S): at 19:47

## 2022-10-05 RX ADMIN — SODIUM CHLORIDE 1000 MILLILITER(S): 9 INJECTION, SOLUTION INTRAVENOUS at 16:49

## 2022-10-05 RX ADMIN — MORPHINE SULFATE 4 MILLIGRAM(S): 50 CAPSULE, EXTENDED RELEASE ORAL at 18:10

## 2022-10-05 RX ADMIN — HYDROMORPHONE HYDROCHLORIDE 1 MILLIGRAM(S): 2 INJECTION INTRAMUSCULAR; INTRAVENOUS; SUBCUTANEOUS at 17:59

## 2022-10-05 RX ADMIN — ONDANSETRON 4 MILLIGRAM(S): 8 TABLET, FILM COATED ORAL at 18:18

## 2022-10-05 NOTE — ED PROVIDER NOTE - PHYSICAL EXAMINATION
Physical Exam    Vital Signs: I have reviewed the initial vital signs.  Constitutional: appears stated age, no acute distress  Eyes: Conjunctiva pink, Sclera clear.   Cardiovascular: S1 and S2, regular rate, regular rhythm, well-perfused extremities, radial pulses equal and 2+, pedal pulses 2+ and equal  Respiratory: unlabored respiratory effort, clear to auscultation bilaterally no wheezing, rales and rhonchi  Gastrointestinal: soft, +epigastric ttp, no pulsatile mass, normal bowl sounds  Musculoskeletal: supple neck, no lower extremity edema, no midline tenderness  Integumentary: warm, dry, no rash  Neurologic: awake, alert, nvi

## 2022-10-05 NOTE — H&P ADULT - NSHPLABSRESULTS_GEN_ALL_CORE
(10-05 @ 18:30)                      19.6  12.20 )-----------( 286                 57.1    Neutrophils = 10.38 (85.1%)  Lymphocytes = 0.79 (6.5%)  Eosinophils = 0.00 (0.0%)  Basophils = 0.00 (0.0%)  Monocytes = 0.68 (5.6%)  Bands = --%    10-05    137  |  90<L>  |  8<L>  ----------------------------<  91  5.1<H>   |  24  |  1.0    Ca    10.7<H>      05 Oct 2022 18:30  Mg     1.7     10-05    TPro  8.0  /  Alb  5.2  /  TBili  1.3<H>  /  DBili  x   /  AST  79<H>  /  ALT  100<H>  /  AlkPhos  103  10-05    ( 05 Oct 2022 18:30 )   PT: 10.10 sec;   INR: 0.89 ratio;       PTT:30.8 sec  CARDIAC MARKERS ( 05 Oct 2022 18:30 )  Trop <0.01 ng/mL / CK x     / CKMB x           RVP:    Venous Blood Gas:  10-05 @ 20:53  7.40/33/55/20/86.3  VBG Lactate: 2.10      Urinalysis Basic - ( 05 Oct 2022 20:39 )    Color: Light Yellow / Appearance: Clear / S.042 / pH: x  Gluc: x / Ketone: Large  / Bili: Negative / Urobili: <2 mg/dL   Blood: x / Protein: Trace / Nitrite: Negative   Leuk Esterase: Negative / RBC: 5 /HPF / WBC 0 /HPF   Sq Epi: x / Non Sq Epi: 0 /HPF / Bacteria: Negative

## 2022-10-05 NOTE — ED PROVIDER NOTE - OBJECTIVE STATEMENT
43 yo male, pmh of htn and pancreatitis 2/2 etoh abuse, presents to ed for abd pain, started last night, a/w nv, moderate pain, burning, radiates to back, pt admits to drinking two days ago, states he binge drank, pain started last night. Denies fever, chills, cp, sob, diarrhea, dysuria, hematuria.

## 2022-10-05 NOTE — ED PROVIDER NOTE - NS ED ATTENDING STATEMENT MOD
This was a shared visit with the SONY. I reviewed and verified the documentation and independently performed the documented:

## 2022-10-05 NOTE — ED PROVIDER NOTE - ATTENDING APP SHARED VISIT CONTRIBUTION OF CARE
42-year-old male with a past medical history of pancreatitis, frequent alcohol use, presents with epigastric discomfort, sharp, radiates to his back and right shoulder at times.  Has been since yesterday.  Multiple episodes of nonbloody vomiting.  No GI bleed symptoms.  No fever or chest pain.  Denies any shortness of breath.  On exam tachycardic, hypertensive, appears uncomfortable and actively vomiting.  Lungs clear bilaterally, heart regular no murmurs, abdomen soft, nondistended, diffuse upper abdominal tenderness, no rebound or guarding.  Differential diagnosis pancreatitis versus gastritis versus peptic ulcer disease.  Clinically no signs of peritonitis to suggest perforation.  Considered dissection as well so emergently obtain CTA.  Disposition pending treatment, labs, imaging results, and reassessment.  Significantly improved after pain medication.

## 2022-10-05 NOTE — ED ADULT TRIAGE NOTE - MEANS OF ARRIVAL
Relevant Problems   No relevant active problems       Anesthetic History   No history of anesthetic complications            Review of Systems / Medical History  Patient summary reviewed, nursing notes reviewed and pertinent labs reviewed    Pulmonary  Within defined limits          Asthma        Neuro/Psych   Within defined limits           Cardiovascular  Within defined limits  Hypertension                   GI/Hepatic/Renal  Within defined limits              Endo/Other  Within defined limits           Other Findings              Physical Exam    Airway  Mallampati: II  TM Distance: > 6 cm  Neck ROM: normal range of motion   Mouth opening: Normal     Cardiovascular  Regular rate and rhythm,  S1 and S2 normal,  no murmur, click, rub, or gallop             Dental  No notable dental hx       Pulmonary  Breath sounds clear to auscultation               Abdominal  GI exam deferred       Other Findings            Anesthetic Plan    ASA: 2  Anesthesia type: general          Induction: Intravenous  Anesthetic plan and risks discussed with: Patient ambulatory

## 2022-10-05 NOTE — H&P ADULT - ATTENDING COMMENTS
HPI:  41 yo male  PMH of htn and pancreatitis 2/2 EtOH abuse,   Presents to ed for abd pain  Pt started last night, a/w nv, moderate pain, burning, radiates to back  Pt admits to drinking 4 days ago, states he binge drank, pain started last night.   Denies fever, chills, cp, sob, diarrhea, dysuria, hematuria.    In the ED:  BP: 184/177  HR : 115   RR:20  T : 97  O2: 99% on RA    WBC : 12  HB : 19   HCT : 57   INR : 0.89  K : 5.1, Ca 10.7  Lipase : 408  BNP :1.5k  Lac 2.1  Alc blood < 10  ECG : Sinus tachycardia  COVID +  ALT/AST: 100/79   Alk phos wnl    CT abdomen/pelvis :   1. No evidence of thoracic or abdominal aortic aneurysm or dissection.  2. Mild enlargement of the pancreatic head with indistinct margins of the   pancreas. There is mild dilatation of the pancreatic duct in the body and   tail. Mild peripancreatic stranding is noted especially in the region   surrounding the pancreatic head. The findings are consistent with   interstitial edematous pancreatitis. There is no evidence of pancreatic   necrosis. No acute peripancreatic fluid collections are noted at this   time.     (05 Oct 2022 23:16)    REVIEW OF SYSTEMS: see cc/HPI   CONSTITUTIONAL: No weakness, fevers or chills  EYES/ENT: No visual changes;  No vertigo or throat pain   NECK: No pain or stiffness  RESPIRATORY: No cough, wheezing, hemoptysis; No shortness of breath  CARDIOVASCULAR: No chest pain or palpitations  GASTROINTESTINAL: No abdominal or epigastric pain. No nausea, vomiting, or hematemesis; No diarrhea or constipation. No melena or hematochezia.  GENITOURINARY: No dysuria, frequency or hematuria  NEUROLOGICAL: No numbness or weakness  SKIN: No itching, rashes    Physical Exam:   General: WN/WD NAD  Neurology: A&Ox3, nonfocal, follows commands  Eyes: PERRLA/ EOMI  ENT/Neck: Neck supple, trachea midline, No JVD  Respiratory: CTA B/L, No wheezing, rales, rhonchi  CV: Normal rate regular rhythm, S1S2, no murmurs, rubs or gallops  Abdominal: Soft, NT, ND +BS,   Extremities: No edema, + peripheral pulses  Skin: No Rashes, Hematoma, Ecchymosis  Incisions:   Tubes:    A/p HPI:  43 yo male  PMH of htn and pancreatitis 2/2 EtOH abuse,   Presents to ed for abd pain  Pt started last night, a/w nv, moderate pain, burning, radiates to back  Pt admits to drinking 4 days ago, states he binge drank, pain started last night.   Denies fever, chills, cp, sob, diarrhea, dysuria, hematuria.    In the ED:  BP: 184/177  HR : 115   RR:20  T : 97  O2: 99% on RA    WBC : 12  HB : 19   HCT : 57   INR : 0.89  K : 5.1, Ca 10.7  Lipase : 408  BNP :1.5k  Lac 2.1  Alc blood < 10  ECG : Sinus tachycardia  COVID +  ALT/AST: 100/79   Alk phos wnl    CT abdomen/pelvis :   1. No evidence of thoracic or abdominal aortic aneurysm or dissection.  2. Mild enlargement of the pancreatic head with indistinct margins of the   pancreas. There is mild dilatation of the pancreatic duct in the body and   tail. Mild peripancreatic stranding is noted especially in the region   surrounding the pancreatic head. The findings are consistent with   interstitial edematous pancreatitis. There is no evidence of pancreatic   necrosis. No acute peripancreatic fluid collections are noted at this   time.     (05 Oct 2022 23:16)    REVIEW OF SYSTEMS: see cc/HPI   CONSTITUTIONAL: No weakness, fevers or chills  EYES/ENT: No visual changes;  No vertigo or throat pain   NECK: No pain or stiffness  RESPIRATORY: No cough, wheezing, hemoptysis; No shortness of breath  CARDIOVASCULAR: No chest pain or palpitations  GASTROINTESTINAL: (+) abdomina-epigastric pain and radiating to the back (+) nausea/ vomiting, No hematemesis; No diarrhea or constipation. No melena or hematochezia., see cc/HPI   GENITOURINARY: No dysuria, frequency or hematuria  NEUROLOGICAL: No numbness or weakness  SKIN: No itching, rashes    Physical Exam:   General: WN/WD NAD  Neurology: A&Ox3, nonfocal, follows commands  Eyes: PERRLA/ EOMI  ENT/Neck: Neck supple, trachea midline, No JVD  Respiratory: CTA B/L, No wheezing, rales, rhonchi  CV: Normal rate regular rhythm, S1S2, no murmurs, rubs or gallops  Abdominal: Soft, ND +BS, tender in the epigastric area   Extremities: No edema, + peripheral pulses  Skin: No Rashes, Hematoma, Ecchymosis  Incisions:   Tubes:    A/p  Acute alcohol related pancreatitis   -NPO/IV fluids/ NPO and IV PPI  -PRN Pain Rx   -GI eval   -Is and Os   -Zofran PRN     Alcohol use disorder   -MVI/ Thiamine / Folic acid   -CATCH team eval   -CIWA protocol w/ Librium     HTN   Dyslipidemia   -agree w/ Nifedipine   -no statin while NPO    Elevated transaminases   -Alcohol cessation   -serial LFTs     COVID-19 infection - asymptomatic   -Observation for now     H/o Asthma   -PRN albuterol     DVT prophylaxis HPI:  43 yo male  PMH of htn and pancreatitis 2/2 EtOH abuse,   Presents to ed for abd pain  Pt started last night, a/w nv, moderate pain, burning, radiates to back  Pt admits to drinking 4 days ago, states he binge drank, pain started last night.   Denies fever, chills, cp, sob, diarrhea, dysuria, hematuria.    In the ED:  BP: 184/177  HR : 115   RR:20  T : 97  O2: 99% on RA    WBC : 12  HB : 19   HCT : 57   INR : 0.89  K : 5.1, Ca 10.7  Lipase : 408  BNP :1.5k  Lac 2.1  Alc blood < 10  ECG : Sinus tachycardia  COVID +  ALT/AST: 100/79   Alk phos wnl    CT abdomen/pelvis :   1. No evidence of thoracic or abdominal aortic aneurysm or dissection.  2. Mild enlargement of the pancreatic head with indistinct margins of the   pancreas. There is mild dilatation of the pancreatic duct in the body and   tail. Mild peripancreatic stranding is noted especially in the region   surrounding the pancreatic head. The findings are consistent with   interstitial edematous pancreatitis. There is no evidence of pancreatic   necrosis. No acute peripancreatic fluid collections are noted at this   time.     (05 Oct 2022 23:16)    REVIEW OF SYSTEMS: see cc/HPI   CONSTITUTIONAL: No weakness, fevers or chills  EYES/ENT: No visual changes;  No vertigo or throat pain   NECK: No pain or stiffness  RESPIRATORY: No cough, wheezing, hemoptysis; No shortness of breath  CARDIOVASCULAR: No chest pain or palpitations  GASTROINTESTINAL: (+) abdomina-epigastric pain and radiating to the back (+) nausea/ vomiting, No hematemesis; No diarrhea or constipation. No melena or hematochezia., see cc/HPI   GENITOURINARY: No dysuria, frequency or hematuria  NEUROLOGICAL: No numbness or weakness  SKIN: No itching, rashes    Physical Exam:   General: WN/WD NAD  Neurology: A&Ox3, nonfocal, follows commands  Eyes: PERRLA/ EOMI  ENT/Neck: Neck supple, trachea midline, No JVD  Respiratory: CTA B/L, No wheezing, rales, rhonchi  CV: Normal rate regular rhythm, S1S2, no murmurs, rubs or gallops  Abdominal: Soft, ND +BS, tender in the epigastric area   Extremities: No edema, + peripheral pulses  Skin: No Rashes, Hematoma, Ecchymosis  Incisions:   Tubes:    A/p  Acute alcohol related pancreatitis   -NPO/IV fluids/ NPO and IV PPI  -PRN Pain Rx   -GI eval   -Is and Os   -Zofran PRN     Alcohol use disorder   -MVI/ Thiamine / Folic acid   -CATCH team eval   -CIWA protocol w/ Librium     HTN   Dyslipidemia   -agree w/ Nifedipine   -no statin while NPO    Elevated transaminases   -Alcohol cessation   -serial LFTs     COVID-19 infection - asymptomatic   -Observation for now     H/o Asthma   -PRN albuterol     DVT prophylaxis    PATIENT SEEN by ATTENDING 10/5/22 ( note revised 10/06)

## 2022-10-05 NOTE — H&P ADULT - NSHPPHYSICALEXAM_GEN_ALL_CORE
GENERAL: NAD, lying in bed comfortably  HEAD:  Atraumatic, Normocephalic  EYES: EOMI, PERRLA, conjunctiva and sclera clear  ENT: Moist mucous membranes  NECK: Supple, No JVD  CHEST/LUNG: Clear to auscultation bilaterally; No rales, rhonchi, wheezing, or rubs. Unlabored respirations  HEART: Regular rate and rhythm; No murmurs, rubs, or gallops  ABDOMEN: Bowel sounds present; Soft, Nontender, Nondistended. No hepatomegally  EXTREMITIES:  2+ Peripheral Pulses, brisk capillary refill. No clubbing, cyanosis, or edema  NERVOUS SYSTEM:  Alert & Oriented X3, speech clear. No deficits   MSK: FROM all 4 extremities, full and equal strength  SKIN: No rashes or lesions GENERAL: in bed with tremors in pain   HEAD:  Atraumatic, Normocephalic  EYES: EOMI, PERRLA, conjunctiva and sclera clear  NECK: Supple  CHEST/LUNG: Clear to auscultation bilaterally  HEART: tachycardic  ABDOMEN:  Soft, + tender   EXTREMITIES:  2+ Peripheral Pulses,   NERVOUS SYSTEM:  Alert & Oriented X3, speech clear. tremors BL with arms extended   MSK: FROM all 4 extremities, full and equal strength  SKIN: flushed

## 2022-10-05 NOTE — H&P ADULT - NSHPREVIEWOFSYSTEMS_GEN_ALL_CORE
REVIEW OF SYSTEMS:    CONSTITUTIONAL:  No weakness, fevers or chills  EYES/ENT:  No visual changes;  No vertigo or throat pain   NECK:  No pain or stiffness  RESPIRATORY:  No cough, wheezing, hemoptysis; No shortness of breath  CARDIOVASCULAR:  No chest pain or palpitations  GASTROINTESTINAL:  No abdominal or epigastric pain. No nausea, vomiting, or hematemesis; No diarrhea or constipation. No melena or hematochezia.  GENITOURINARY:  No dysuria, frequency or hematuria  NEUROLOGICAL:  No numbness or weakness  SKIN:  No itching, rashes REVIEW OF SYSTEMS:    CONSTITUTIONAL:  No fevers or chills  EYES/ENT:  No visual changes;  No vertigo   NECK:  No pain or stiffness  RESPIRATORY:  No cough, wheezing, hemoptysis; No shortness of breath  CARDIOVASCULAR:  No chest pain or palpitations  GASTROINTESTINAL:  + abdominal pain and tenderness + nausea, + vomiting, no hematemesis; No diarrhea or constipation. No melena or hematochezia.  GENITOURINARY:  No dysuria, frequency or hematuria  NEUROLOGICAL:  No numbness or weakness  SKIN:  flushed skin

## 2022-10-05 NOTE — H&P ADULT - HISTORY OF PRESENT ILLNESS
43 yo male  PMH of htn and pancreatitis 2/2 EtOH abuse,   Presents to ed for abd pain  Pt started last night, a/w nv, moderate pain, burning, radiates to back  Pt admits to drinking two days ago, states he binge drank, pain started last night. Denies fever, chills, cp, sob, diarrhea, dysuria, hematuria.    In the ED:  BP: 184/177  HR : 115   RR:20  T : 97  O2: 99% on RA    WBC : 12  HB : 19   HCT : 57   INR : 0.89  K : 5.1, Ca 10.7  Lipase : 408  BNP :1.5k  Lac 2.1  Alc blood < 10  ECG : Sinus tachycardia  COVID +  ALT/AST: 100/79   Alk phos wnl    CT abdomen/pelvis :   1. No evidence of thoracic or abdominal aortic aneurysm or dissection.  2. Mild enlargement of the pancreatic head with indistinct margins of the   pancreas. There is mild dilatation of the pancreatic duct in the body and   tail. Mild peripancreatic stranding is noted especially in the region   surrounding the pancreatic head. The findings are consistent with   interstitial edematous pancreatitis. There is no evidence of pancreatic   necrosis. No acute peripancreatic fluid collections are noted at this   time.                 41 yo male  PMH of htn and pancreatitis 2/2 EtOH abuse,   Presents to ed for abd pain  Pt started last night, a/w nv, moderate pain, burning, radiates to back  Pt admits to drinking 4 days ago, states he binge drank, pain started last night.   Denies fever, chills, cp, sob, diarrhea, dysuria, hematuria.    In the ED:  BP: 184/177  HR : 115   RR:20  T : 97  O2: 99% on RA    WBC : 12  HB : 19   HCT : 57   INR : 0.89  K : 5.1, Ca 10.7  Lipase : 408  BNP :1.5k  Lac 2.1  Alc blood < 10  ECG : Sinus tachycardia  COVID +  ALT/AST: 100/79   Alk phos wnl    CT abdomen/pelvis :   1. No evidence of thoracic or abdominal aortic aneurysm or dissection.  2. Mild enlargement of the pancreatic head with indistinct margins of the   pancreas. There is mild dilatation of the pancreatic duct in the body and   tail. Mild peripancreatic stranding is noted especially in the region   surrounding the pancreatic head. The findings are consistent with   interstitial edematous pancreatitis. There is no evidence of pancreatic   necrosis. No acute peripancreatic fluid collections are noted at this   time.

## 2022-10-05 NOTE — H&P ADULT - ASSESSMENT
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    #Acute on chronic pancreatitis, likely due to alcohol  - last drink :  - IVF to 250cc/hr   - pain control (patient has history of drug-seeking, monitor) : morhine 2 mg IV PRN every 6 hours and percocet 5 mg every 6 hrs PRN  -zofran prn    #EtOH abuse  #Suspected thiamine/folate deficiencies  - thiamine/folate/MVI  - CATCH team consult  - [facility] protocol   - Ativan taper as alcohol level was low on admission    #HTN  #HLD  -continue nifedipine  -continue statin  - f/u lipid profile     #Transaminitis  - ALT>AST- Steatosis   - diffuse hepatic steatosis on U/S  - trend liver function panel  - Last admission in April : EBV +. MICAELA+, Hep pannel neg    #DVT PPx- LVX 40mg sq qd  #GI PPx- None  #Diet- NPO except meds  #CHG  #Activity- AAT  #Dispo- Acute  #Code- FULL         42 Y M with pmh of HTN, HLD, asthma (not on meds), recurrent pancreatitis with pancreatic pseudocyst (last in September 2021) presents to ED with abdominal pain.    #Acute on chronic pancreatitis, likely due to alcohol  # Dehydrated on admission   - CT abdomen/pelvis : CT abdomen/pelvis : Mild enlargement of the pancreatic head with indistinct margins of the pancreas. There is mild dilatation of the pancreatic duct in the body and tail. Mild peripancreatic stranding is noted especially in the region surrounding the pancreatic head. The findings are consistent with interstitial edematous pancreatitis. There is no evidence of pancreatic  necrosis. No acute peripancreatic fluid collections are noted at this time.  - last drink :   - IVF to NS 250cc/hr   - pain control (patient has history of drug-seeking, monitor) : morhine 2 mg IV PRN every 6 + ketorolac 15mg IV q6  -zofran prn    #EtOH abuse  #Suspected thiamine/folate deficiencies  - thiamine/folate/B6  - CATCH team consult   - CIWA protocol + Librium  taper     #HTN  #HLD  - continue nifedipine  - continue statin  - f/u lipid profile     #Transaminitis  - ALT>AST- Steatosis   - f/u Abd US   - f/u GGT  - f/u Mg , phos   - trend liver function panel  - Last admission in April 2022 : EBV +. MICAELA+, Hep pannel neg    # COVID +  - on RA   - symptomatic management   - vaccinated :     #DVT PPx- LVX 40mg sq qd  #Diet- Full liq , low fat   #Activity- IAT  #Dispo- from home  #Code- FULL         42 Y M with pmh of HTN, HLD, asthma (not on meds), recurrent pancreatitis with pancreatic pseudocyst (last in September 2021) presents to ED with abdominal pain.    #Acute on chronic pancreatitis, likely due to alcohol  # Dehydrated on admission   - CT abdomen/pelvis : CT abdomen/pelvis : Mild enlargement of the pancreatic head with indistinct margins of the pancreas. There is mild dilatation of the pancreatic duct in the body and tail. Mild peripancreatic stranding is noted especially in the region surrounding the pancreatic head. The findings are consistent with interstitial edematous pancreatitis. There is no evidence of pancreatic  necrosis. No acute peripancreatic fluid collections are noted at this time.  - last drink : 4 days ago   - IVF to NS 250cc/hr   - pain control (patient has history of drug-seeking, monitor) : morhine 2 mg IV PRN every 6 + ketorolac 15mg IV q6  - zofran prn  - BISAP = 1    #EtOH abuse  #Suspected thiamine/folate deficiencies  - thiamine/folate/B6  - last drink 4d ago : zoraida drank can ot remember how much but blacked out  - CATCH team consult   - CIWA on admission 18  - CIWA protocol + Librium  taper     #HTN  #hx HLD  - continue nifedipine  - f/u lipid profile     #Transaminitis  - ALT>AST- Steatosis   - f/u Abd US   - f/u GGT  - f/u Mg , phos   - trend liver function panel  - Last admission in April 2022 : EBV +. MICAELA+, Hep pannel neg    # COVID +  - on RA   - symptomatic management     # Hx asthma   - albuteol prn     #DVT PPx- LVX 40mg sq qd  #Diet- Full liq , low fat   #Activity- IAT  #Dispo- from home  #Code- FULL

## 2022-10-05 NOTE — ED ADULT NURSE NOTE - OBJECTIVE STATEMENT
pt states was heavy drinking 3 days ago has hx of pancreatitis and pt now has b/l abdomen pain radiates to right shoulder and vomiting

## 2022-10-06 LAB
ALBUMIN SERPL ELPH-MCNC: 3.9 G/DL — SIGNIFICANT CHANGE UP (ref 3.5–5.2)
ALBUMIN SERPL ELPH-MCNC: 4.2 G/DL — SIGNIFICANT CHANGE UP (ref 3.5–5.2)
ALP SERPL-CCNC: 75 U/L — SIGNIFICANT CHANGE UP (ref 30–115)
ALP SERPL-CCNC: 82 U/L — SIGNIFICANT CHANGE UP (ref 30–115)
ALT FLD-CCNC: 70 U/L — HIGH (ref 0–41)
ALT FLD-CCNC: 77 U/L — HIGH (ref 0–41)
ANION GAP SERPL CALC-SCNC: 16 MMOL/L — HIGH (ref 7–14)
ANION GAP SERPL CALC-SCNC: 21 MMOL/L — HIGH (ref 7–14)
AST SERPL-CCNC: 54 U/L — HIGH (ref 0–41)
AST SERPL-CCNC: 59 U/L — HIGH (ref 0–41)
BILIRUB SERPL-MCNC: 0.8 MG/DL — SIGNIFICANT CHANGE UP (ref 0.2–1.2)
BILIRUB SERPL-MCNC: 0.8 MG/DL — SIGNIFICANT CHANGE UP (ref 0.2–1.2)
BUN SERPL-MCNC: 5 MG/DL — LOW (ref 10–20)
BUN SERPL-MCNC: 6 MG/DL — LOW (ref 10–20)
CALCIUM SERPL-MCNC: 8.5 MG/DL — SIGNIFICANT CHANGE UP (ref 8.4–10.4)
CALCIUM SERPL-MCNC: 9.3 MG/DL — SIGNIFICANT CHANGE UP (ref 8.4–10.5)
CHLORIDE SERPL-SCNC: 90 MMOL/L — LOW (ref 98–110)
CHLORIDE SERPL-SCNC: 95 MMOL/L — LOW (ref 98–110)
CHOLEST SERPL-MCNC: 166 MG/DL — SIGNIFICANT CHANGE UP
CO2 SERPL-SCNC: 21 MMOL/L — SIGNIFICANT CHANGE UP (ref 17–32)
CO2 SERPL-SCNC: 26 MMOL/L — SIGNIFICANT CHANGE UP (ref 17–32)
CREAT SERPL-MCNC: 0.8 MG/DL — SIGNIFICANT CHANGE UP (ref 0.7–1.5)
CREAT SERPL-MCNC: 0.9 MG/DL — SIGNIFICANT CHANGE UP (ref 0.7–1.5)
EGFR: 108 ML/MIN/1.73M2 — SIGNIFICANT CHANGE UP
EGFR: 112 ML/MIN/1.73M2 — SIGNIFICANT CHANGE UP
GGT SERPL-CCNC: 74 U/L — HIGH (ref 1–40)
GLUCOSE SERPL-MCNC: 112 MG/DL — HIGH (ref 70–99)
GLUCOSE SERPL-MCNC: 94 MG/DL — SIGNIFICANT CHANGE UP (ref 70–99)
HCT VFR BLD CALC: 48.6 % — SIGNIFICANT CHANGE UP (ref 42–52)
HDLC SERPL-MCNC: 30 MG/DL — LOW
HGB BLD-MCNC: 16.7 G/DL — SIGNIFICANT CHANGE UP (ref 14–18)
LIPID PNL WITH DIRECT LDL SERPL: 111 MG/DL — HIGH
MAGNESIUM SERPL-MCNC: 1.8 MG/DL — SIGNIFICANT CHANGE UP (ref 1.8–2.4)
MCHC RBC-ENTMCNC: 31.9 PG — HIGH (ref 27–31)
MCHC RBC-ENTMCNC: 34.4 G/DL — SIGNIFICANT CHANGE UP (ref 32–37)
MCV RBC AUTO: 92.7 FL — SIGNIFICANT CHANGE UP (ref 80–94)
NON HDL CHOLESTEROL: 136 MG/DL — HIGH
NRBC # BLD: 0 /100 WBCS — SIGNIFICANT CHANGE UP (ref 0–0)
PHOSPHATE SERPL-MCNC: 3.3 MG/DL — SIGNIFICANT CHANGE UP (ref 2.1–4.9)
PLATELET # BLD AUTO: 238 K/UL — SIGNIFICANT CHANGE UP (ref 130–400)
POTASSIUM SERPL-MCNC: 4.4 MMOL/L — SIGNIFICANT CHANGE UP (ref 3.5–5)
POTASSIUM SERPL-MCNC: 4.5 MMOL/L — SIGNIFICANT CHANGE UP (ref 3.5–5)
POTASSIUM SERPL-SCNC: 4.4 MMOL/L — SIGNIFICANT CHANGE UP (ref 3.5–5)
POTASSIUM SERPL-SCNC: 4.5 MMOL/L — SIGNIFICANT CHANGE UP (ref 3.5–5)
PROT SERPL-MCNC: 6 G/DL — SIGNIFICANT CHANGE UP (ref 6–8)
PROT SERPL-MCNC: 6.6 G/DL — SIGNIFICANT CHANGE UP (ref 6–8)
RBC # BLD: 5.24 M/UL — SIGNIFICANT CHANGE UP (ref 4.7–6.1)
RBC # FLD: 13.1 % — SIGNIFICANT CHANGE UP (ref 11.5–14.5)
SODIUM SERPL-SCNC: 132 MMOL/L — LOW (ref 135–146)
SODIUM SERPL-SCNC: 137 MMOL/L — SIGNIFICANT CHANGE UP (ref 135–146)
TRIGL SERPL-MCNC: 124 MG/DL — SIGNIFICANT CHANGE UP
WBC # BLD: 11.57 K/UL — HIGH (ref 4.8–10.8)
WBC # FLD AUTO: 11.57 K/UL — HIGH (ref 4.8–10.8)

## 2022-10-06 PROCEDURE — 76705 ECHO EXAM OF ABDOMEN: CPT | Mod: 26

## 2022-10-06 PROCEDURE — 99253 IP/OBS CNSLTJ NEW/EST LOW 45: CPT

## 2022-10-06 PROCEDURE — 99233 SBSQ HOSP IP/OBS HIGH 50: CPT

## 2022-10-06 PROCEDURE — 99232 SBSQ HOSP IP/OBS MODERATE 35: CPT

## 2022-10-06 RX ORDER — PYRIDOXINE HCL (VITAMIN B6) 100 MG
100 TABLET ORAL DAILY
Refills: 0 | Status: DISCONTINUED | OUTPATIENT
Start: 2022-10-06 | End: 2022-10-09

## 2022-10-06 RX ORDER — ALBUTEROL 90 UG/1
2 AEROSOL, METERED ORAL EVERY 6 HOURS
Refills: 0 | Status: DISCONTINUED | OUTPATIENT
Start: 2022-10-06 | End: 2022-10-09

## 2022-10-06 RX ORDER — ACETAMINOPHEN 500 MG
650 TABLET ORAL EVERY 6 HOURS
Refills: 0 | Status: DISCONTINUED | OUTPATIENT
Start: 2022-10-06 | End: 2022-10-09

## 2022-10-06 RX ORDER — MORPHINE SULFATE 50 MG/1
2 CAPSULE, EXTENDED RELEASE ORAL EVERY 6 HOURS
Refills: 0 | Status: DISCONTINUED | OUTPATIENT
Start: 2022-10-06 | End: 2022-10-09

## 2022-10-06 RX ORDER — KETOROLAC TROMETHAMINE 30 MG/ML
15 SYRINGE (ML) INJECTION EVERY 6 HOURS
Refills: 0 | Status: DISCONTINUED | OUTPATIENT
Start: 2022-10-06 | End: 2022-10-06

## 2022-10-06 RX ORDER — FOLIC ACID 0.8 MG
1 TABLET ORAL EVERY 24 HOURS
Refills: 0 | Status: DISCONTINUED | OUTPATIENT
Start: 2022-10-06 | End: 2022-10-09

## 2022-10-06 RX ORDER — NIFEDIPINE 30 MG
60 TABLET, EXTENDED RELEASE 24 HR ORAL DAILY
Refills: 0 | Status: DISCONTINUED | OUTPATIENT
Start: 2022-10-06 | End: 2022-10-09

## 2022-10-06 RX ORDER — ALPRAZOLAM 0.25 MG
1 TABLET ORAL
Qty: 0 | Refills: 0 | DISCHARGE

## 2022-10-06 RX ORDER — OXYCODONE HYDROCHLORIDE 5 MG/1
10 TABLET ORAL EVERY 6 HOURS
Refills: 0 | Status: DISCONTINUED | OUTPATIENT
Start: 2022-10-06 | End: 2022-10-06

## 2022-10-06 RX ORDER — ONDANSETRON 8 MG/1
4 TABLET, FILM COATED ORAL EVERY 12 HOURS
Refills: 0 | Status: DISCONTINUED | OUTPATIENT
Start: 2022-10-06 | End: 2022-10-09

## 2022-10-06 RX ORDER — HYDROMORPHONE HYDROCHLORIDE 2 MG/ML
2 INJECTION INTRAMUSCULAR; INTRAVENOUS; SUBCUTANEOUS ONCE
Refills: 0 | Status: DISCONTINUED | OUTPATIENT
Start: 2022-10-06 | End: 2022-10-06

## 2022-10-06 RX ORDER — SODIUM CHLORIDE 9 MG/ML
1000 INJECTION INTRAMUSCULAR; INTRAVENOUS; SUBCUTANEOUS
Refills: 0 | Status: DISCONTINUED | OUTPATIENT
Start: 2022-10-05 | End: 2022-10-09

## 2022-10-06 RX ORDER — ENOXAPARIN SODIUM 100 MG/ML
40 INJECTION SUBCUTANEOUS EVERY 24 HOURS
Refills: 0 | Status: DISCONTINUED | OUTPATIENT
Start: 2022-10-06 | End: 2022-10-09

## 2022-10-06 RX ORDER — OXYCODONE HYDROCHLORIDE 5 MG/1
15 TABLET ORAL EVERY 6 HOURS
Refills: 0 | Status: DISCONTINUED | OUTPATIENT
Start: 2022-10-06 | End: 2022-10-09

## 2022-10-06 RX ORDER — THIAMINE MONONITRATE (VIT B1) 100 MG
100 TABLET ORAL DAILY
Refills: 0 | Status: DISCONTINUED | OUTPATIENT
Start: 2022-10-06 | End: 2022-10-09

## 2022-10-06 RX ORDER — KETOROLAC TROMETHAMINE 30 MG/ML
30 SYRINGE (ML) INJECTION EVERY 6 HOURS
Refills: 0 | Status: DISCONTINUED | OUTPATIENT
Start: 2022-10-06 | End: 2022-10-09

## 2022-10-06 RX ORDER — LANOLIN ALCOHOL/MO/W.PET/CERES
3 CREAM (GRAM) TOPICAL AT BEDTIME
Refills: 0 | Status: DISCONTINUED | OUTPATIENT
Start: 2022-10-06 | End: 2022-10-09

## 2022-10-06 RX ORDER — ALBUTEROL 90 UG/1
2 AEROSOL, METERED ORAL
Qty: 0 | Refills: 0 | DISCHARGE

## 2022-10-06 RX ADMIN — ONDANSETRON 4 MILLIGRAM(S): 8 TABLET, FILM COATED ORAL at 20:50

## 2022-10-06 RX ADMIN — Medication 100 MILLIGRAM(S): at 11:18

## 2022-10-06 RX ADMIN — MORPHINE SULFATE 2 MILLIGRAM(S): 50 CAPSULE, EXTENDED RELEASE ORAL at 22:38

## 2022-10-06 RX ADMIN — MORPHINE SULFATE 2 MILLIGRAM(S): 50 CAPSULE, EXTENDED RELEASE ORAL at 08:48

## 2022-10-06 RX ADMIN — Medication 1 MILLIGRAM(S): at 06:15

## 2022-10-06 RX ADMIN — Medication 30 MILLIGRAM(S): at 14:40

## 2022-10-06 RX ADMIN — Medication 15 MILLIGRAM(S): at 05:00

## 2022-10-06 RX ADMIN — ONDANSETRON 4 MILLIGRAM(S): 8 TABLET, FILM COATED ORAL at 08:48

## 2022-10-06 RX ADMIN — HYDROMORPHONE HYDROCHLORIDE 2 MILLIGRAM(S): 2 INJECTION INTRAMUSCULAR; INTRAVENOUS; SUBCUTANEOUS at 00:35

## 2022-10-06 RX ADMIN — Medication 60 MILLIGRAM(S): at 06:15

## 2022-10-06 RX ADMIN — Medication 50 MILLIGRAM(S): at 01:11

## 2022-10-06 RX ADMIN — Medication 15 MILLIGRAM(S): at 04:22

## 2022-10-06 RX ADMIN — MORPHINE SULFATE 2 MILLIGRAM(S): 50 CAPSULE, EXTENDED RELEASE ORAL at 15:20

## 2022-10-06 RX ADMIN — SODIUM CHLORIDE 250 MILLILITER(S): 9 INJECTION INTRAMUSCULAR; INTRAVENOUS; SUBCUTANEOUS at 08:57

## 2022-10-06 RX ADMIN — Medication 50 MILLIGRAM(S): at 11:18

## 2022-10-06 RX ADMIN — Medication 15 MILLIGRAM(S): at 10:27

## 2022-10-06 RX ADMIN — Medication 50 MILLIGRAM(S): at 06:15

## 2022-10-06 RX ADMIN — Medication 30 MILLIGRAM(S): at 20:47

## 2022-10-06 RX ADMIN — ENOXAPARIN SODIUM 40 MILLIGRAM(S): 100 INJECTION SUBCUTANEOUS at 06:16

## 2022-10-06 RX ADMIN — OXYCODONE HYDROCHLORIDE 10 MILLIGRAM(S): 5 TABLET ORAL at 12:02

## 2022-10-06 RX ADMIN — OXYCODONE HYDROCHLORIDE 15 MILLIGRAM(S): 5 TABLET ORAL at 19:39

## 2022-10-06 RX ADMIN — HYDROMORPHONE HYDROCHLORIDE 2 MILLIGRAM(S): 2 INJECTION INTRAMUSCULAR; INTRAVENOUS; SUBCUTANEOUS at 01:00

## 2022-10-06 NOTE — CONSULT NOTE ADULT - ASSESSMENT
Patient is a 42 year old male with a PMHx of HTN and alcoholic pancreatitis (states current episode is his fourth) that presented to the ED one day ago with a chief complaint of 10/10 sharp, epigastric abdominal pain that radiates to his back which began four days ago and gradually worsened. He reports pain is associated with nausea and approximately 20 episodes of bilious, non-bloody vomiting. Denies any other symptoms. States that he last drank alcohol five days ago, and had been binge drinking for one month; reports consuming one bottle of vodka/day during his binge.     In ED 10/5, labs significant for Lipase 408, WC 12, AST 79 . Labs 10/6 show WC 11.75, AST 54 ALT 70.   Imaging significant for CT Abd/Pelv showing mild enlargement of the pancreatic head, mild dilatation of the pancreatic duct in the body and tail. Mild peripancreatic stranding noted especially in the region surrounding the pancreatic head; findings are consistent with interstitial edematous pancreatitis. In the liver, stable focal fatty infiltration vs. small cyst in the   region of the falciform ligament. US RUQ showed increased echogenicity of liver, enlarged measuring 20.8 cm    Patient started on fluids, pain control, and chlordiazepoxide. Transitioned to low-fat liquid diet, patient is tolerating well. Reports minimal improvement in epigastric pain and nausea.     # Acute Interstitial Pancreatitis 2/2 Alcohol Consumption  - Patient is hemodynamically stable  - Labs reviewed  - Imaging reviewed  - Lipase 408  - Counseled patient on the importance of avoiding alcohol, offered patient resources to quit if he feels as though he needs help    # Transaminitis  - AST 54, ALT 70  - r/o alcoholic liver disease   - avoid hepatotoxic drugs  - Maddrey score shows patient may benefit from glucocorticoid therapy if found to have alcoholic hepatitis    # Focal liver lesion (focal fatty infiltration vs. small cyst)  - Consider outpatient MRI    # Pancreatic duct dilation  - consider outpatient EUS after resolution of pancreatitis    Recommendations  - Continue IV Fluids, NS 1000 mL at 250 mL/hr  - Continue pain control PRN  - Monitor BUN/Cr and Hct to ensure adequate hydration     Patient is a 42 year old male with a PMHx of HTN and alcoholic pancreatitis (states current episode is his fourth) that presented to the ED one day ago with a chief complaint of 10/10 sharp, epigastric abdominal pain that radiates to his back which began four days ago and gradually worsened. He reports pain is associated with nausea and approximately 20 episodes of bilious, non-bloody vomiting. Denies any other symptoms. States that he last drank alcohol five days ago, and had been binge drinking for one month; reports consuming one bottle of vodka/day during his binge.     In ED 10/5, labs significant for Lipase 408, WC 12, AST 79 . Labs 10/6 show WC 11.75, AST 54 ALT 70.   Imaging significant for CT Abd/Pelv showing mild enlargement of the pancreatic head, mild dilatation of the pancreatic duct in the body and tail. Mild peripancreatic stranding noted especially in the region surrounding the pancreatic head; findings are consistent with interstitial edematous pancreatitis. In the liver, stable focal fatty infiltration vs. small cyst in the   region of the falciform ligament. US RUQ showed increased echogenicity of liver, enlarged measuring 20.8 cm    Patient started on fluids, pain control, and chlordiazepoxide. Transitioned to low-fat liquid diet, patient is tolerating well. Reports minimal improvement in epigastric pain and nausea.     # Acute Interstitial Pancreatitis 2/2 Alcohol Consumption  - Patient is hemodynamically stable  - Labs reviewed  - Imaging reviewed  - Lipase 408      Recommendations  - Continue IV Fluids, NS 1000 mL at 250 mL/hr  - Continue pain control PRN  - Monitor BUN/Cr and Hct to ensure adequate hydration  - if clinically worsening, repeat CT A/P with IV contrast  - Counseled patient on the importance of avoiding alcohol, offered patient resources to quit if he feels as though he needs help    # Transaminitis  - AST 54, ALT 70  - r/o alcoholic liver disease   - avoid hepatotoxic drugs    # Focal liver lesion (focal fatty infiltration vs. small cyst)  - Consider outpatient MRI    # Pancreatic duct dilation  - consider outpatient EUS after resolution of pancreatitis         Patient is a 42 year old male with a PMHx of HTN and alcoholic pancreatitis (states current episode is his fourth) that presented to the ED one day ago with a chief complaint of 10/10 sharp, epigastric abdominal pain that radiates to his back which began four days ago and gradually worsened. He reports pain is associated with nausea and approximately 20 episodes of bilious, non-bloody vomiting. Denies any other symptoms. States that he last drank alcohol five days ago, and had been binge drinking for one month; reports consuming one bottle of vodka/day during his binge.     In ED 10/5, labs significant for Lipase 408, WC 12, AST 79 . Labs 10/6 show WC 11.75, AST 54 ALT 70.   Imaging significant for CT Abd/Pelv showing mild enlargement of the pancreatic head, mild dilatation of the pancreatic duct in the body and tail. Mild peripancreatic stranding noted especially in the region surrounding the pancreatic head; findings are consistent with interstitial edematous pancreatitis. In the liver, stable focal fatty infiltration vs. small cyst in the   region of the falciform ligament. US RUQ showed increased echogenicity of liver, enlarged measuring 20.8 cm    Patient started on fluids, pain control, and chlordiazepoxide. Transitioned to low-fat liquid diet, patient is tolerating well. Reports minimal improvement in epigastric pain and nausea.     # Acute Interstitial Pancreatitis 2/2 Alcohol Consumption  - Patient is hemodynamically stable  - Labs reviewed  - Imaging reviewed  - Lipase 408      Recommendations  - Continue IV Fluids, NS 1000 mL at 250 mL/hr  - Continue pain control PRN  - Monitor BUN/Cr and Hct to ensure adequate hydration  - if clinically worsening, repeat CT A/P with IV contrast  - Counseled patient on the importance of avoiding alcohol, offered patient resources to quit if he feels as though he needs help    # Transaminitis  - AST 54, ALT 70  - r/o alcoholic liver disease   - avoid hepatotoxic drugs  -Trend LFTs     # Focal liver lesion (focal fatty infiltration vs. small cyst)  - Consider outpatient MRI    # Pancreatic duct dilation  - outpatient EUS after resolution of pancreatitis

## 2022-10-06 NOTE — CONSULT NOTE ADULT - SUBJECTIVE AND OBJECTIVE BOX
Gastroenterology Consultation:    Patient is a 42y old  Male who presents with a chief complaint of Pancreatitis (06 Oct 2022 11:47)      Admitted on: 10-05-22      HPI:  Patient is a 42 year old male with a PMHx of HTN and alcoholic pancreatitis (states current episode is his fourth) that presented to the ED one day ago with a chief complaint of 10/10 sharp, epigastric abdominal pain that radiates to his back which began four days ago and gradually worsened. He reports pain is associated with nausea and approximately 20 episodes of bilious, non-bloody vomiting. Denies any other symptoms. States that he last drank alcohol five days ago, and had been binge drinking for one month; reports consuming one bottle of vodka/day during his binge. States he frequently abstains from alcohol for months in between binges; reports previous binge prior to this was around March 2022. Denies history of gallstones, or any previous scopes.        In the ED:  BP: 184/177  HR : 115   RR:20  T : 97  O2: 99% on RA    WBC : 12  HB : 19   HCT : 57   INR : 0.89  K : 5.1, Ca 10.7  Lipase : 408  BNP :1.5k  Lac 2.1  Alc blood < 10  ECG : Sinus tachycardia  COVID +  ALT/AST: 100/79   Alk phos wnl    CT abdomen/pelvis :   1. No evidence of thoracic or abdominal aortic aneurysm or dissection.  2. Mild enlargement of the pancreatic head with indistinct margins of the   pancreas. There is mild dilatation of the pancreatic duct in the body and   tail. Mild peripancreatic stranding is noted especially in the region   surrounding the pancreatic head. The findings are consistent with   interstitial edematous pancreatitis. There is no evidence of pancreatic   necrosis. No acute peripancreatic fluid collections are noted at this   time.       (05 Oct 2022 23:16)    Prior EGD: Denies    Prior Colonoscopy: denies      PAST MEDICAL & SURGICAL HISTORY:  Pancreatitis   - due to alcohol       Asthma  - no recent exacerbation, not using inhalers for a long time      Hypertension      H/O rotator cuff surgery      FAMILY HISTORY:  Family history of pancreatic cancer (Father)    Alcohol use  - sister, with admission due to pancreatitis and GI Bleed        Social History:  Tobacco: reports 20+ pack year history of cigarette smoking; now uses vape daily for the past 2 years  Alcohol: Reports he began binge drinking 3 years ago; will drink bottle of vodka/day during binges, usually lasts one month. States he will abstain from alcohol between binges, and reports time between binges may span months.   Drugs: Unknown    Home Medications:  albuterol 90 mcg/inh inhalation aerosol: 2 puff(s) inhaled every 6 hours, As Needed (06 Oct 2022 01:18)  NIFEdipine 60 mg oral tablet, extended release: 1 tab(s) orally once a day (24 Apr 2022 09:36)      MEDICATIONS  (STANDING):  enoxaparin Injectable 40 milliGRAM(s) SubCutaneous every 24 hours  folic acid 1 milliGRAM(s) Oral every 24 hours  NIFEdipine XL 60 milliGRAM(s) Oral daily  pyridoxine 100 milliGRAM(s) Oral daily  sodium chloride 0.9%. 1000 milliLiter(s) (250 mL/Hr) IV Continuous <Continuous>  thiamine 100 milliGRAM(s) Oral daily    MEDICATIONS  (PRN):  acetaminophen     Tablet .. 650 milliGRAM(s) Oral every 6 hours PRN Temp greater or equal to 38C (100.4F), Mild Pain (1 - 3)  ALBUTerol    90 MICROgram(s) HFA Inhaler 2 Puff(s) Inhalation every 6 hours PRN Shortness of Breath and/or Wheezing  aluminum hydroxide/magnesium hydroxide/simethicone Suspension 30 milliLiter(s) Oral every 4 hours PRN Dyspepsia  chlordiazePOXIDE 50 milliGRAM(s) Oral every 1 hour PRN Symptom-triggered: each CIWA -Ar score 8 or GREATER  ketorolac   Injectable 30 milliGRAM(s) IV Push every 6 hours PRN Moderate Pain (4 - 6)  melatonin 3 milliGRAM(s) Oral at bedtime PRN Insomnia  morphine  - Injectable 2 milliGRAM(s) IV Push every 6 hours PRN Moderate Pain (4 - 6)  ondansetron Injectable 4 milliGRAM(s) IV Push every 12 hours PRN Nausea and/or Vomiting  oxyCODONE    IR 10 milliGRAM(s) Oral every 6 hours PRN Severe Pain (7 - 10)      Allergies  No Known Allergies      Review of Systems:   Constitutional:  No Fever, No Chills  ENT/Mouth:  No Hearing Changes,  No Difficulty Swallowing  Eyes:  No Eye Pain, No Vision Changes  Cardiovascular:  No Chest Pain, No Palpitations  Respiratory:  No Cough, No Dyspnea  Gastrointestinal:  As described in HPI        Physical Examination:  T(C): 36.1 (10-06-22 @ 08:04), Max: 37.3 (10-05-22 @ 16:45)  HR: 102 (10-06-22 @ 08:04) (102 - 116)  BP: 136/76 (10-06-22 @ 08:04) (136/76 - 184/117)  RR: 20 (10-06-22 @ 08:04) (20 - 20)  SpO2: 98% (10-06-22 @ 08:04) (98% - 99%)  Height (cm): 182.9 (10-05-22 @ 16:28)  Weight (kg): 86.2 (10-05-22 @ 16:28)    10-06-22 @ 07:01  -  10-06-22 @ 13:54  --------------------------------------------------------  IN: 0 mL / OUT: 300 mL / NET: -300 mL          GENERAL: AAOx3, no acute distress.  HEAD:  Atraumatic, Normocephalic  EYES: conjunctiva and sclera clear  CHEST/LUNG: Clear to auscultation bilaterally; No wheeze, rhonchi, or rales  HEART: Regular rate and rhythm; normal S1, S2, No murmurs.  ABDOMEN: + Minimal TTP epigastric region, soft, nondistended; Bowel sounds present  SKIN: flushed, diaphoretic, Intact, no jaundice        Data:                        16.7   11.57 )-----------( 238      ( 06 Oct 2022 07:21 )             48.6     Hgb Trend:  16.7  10-06-22 @ 07:21  19.6  10-05-22 @ 18:30        10-06    137  |  95<L>  |  5<L>  ----------------------------<  94  4.5   |  26  |  0.9    Ca    8.5      06 Oct 2022 07:21  Phos  3.3     10-06  Mg     1.8     10-06    TPro  6.0  /  Alb  3.9  /  TBili  0.8  /  DBili  x   /  AST  54<H>  /  ALT  70<H>  /  AlkPhos  75  10-06    Liver panel trend:  TBili 0.8   /   AST 54   /   ALT 70   /   AlkP 75   /   Tptn 6.0   /   Alb 3.9    /   DBili --      10-06  TBili 0.8   /   AST 59   /   ALT 77   /   AlkP 82   /   Tptn 6.6   /   Alb 4.2    /   DBili --      10-06  TBili 1.3   /   AST 79   /      /   AlkP 103   /   Tptn 8.0   /   Alb 5.2    /   DBili --      10-05      PT/INR - ( 05 Oct 2022 18:30 )   PT: 10.10 sec;   INR: 0.89 ratio         PTT - ( 05 Oct 2022 18:30 )  PTT:30.8 sec        Radiology:  CT Angio Abdomen and Pelvis w/ IV Cont:   ACC: [id number] EXAM:  CT ANGIO ABD PELV (W)AW IC                        ACC: [id number] EXAM:  CT ANGIO CHEST AORTA [facility]                          PROCEDURE DATE:  10/05/2022          INTERPRETATION:  REASON FOR EXAM / CLINICAL STATEMENT: Abdominal pain   that radiates to back.  Evaluate for aortic dissection. Abdominal pain.   WBC 8.29   PMHx of HTN and pancreatitis 2/2 ETOH abuse    TECHNIQUE: Contiguous axial CT images were obtained from the thoracic   inlet through the upper abdomen without contrast and from the thoracic   inlet through the pelvis with intravenous contrast, in the arterial phase.    Reformatted images in the coronal and sagittal planes were acquired, as   well as 3D, Volume rendering, and MIP reconstructed images.    COMPARISON CT: CT scan of the chest dated 9/6/2022    OTHER STUDIES USED FOR CORRELATION: None.      FINDINGS CHEST:    TUBES AND LINES: None.    HEART AND VESSELS: The heart size is within normal limits. There are   coronary artery calcifications. There is no pericardial effusion.    There is no evidence of thoracic aortic aneurysm or dissection. No   intimal flap or double lumen.    Normal caliber aorta and pulmonary artery. No evidence of central   pulmonary embolism.    The brachiocephalic vessels are unremarkable.      MEDIASTINUM: There are no suspicious mediastinal, hilar or axillary lymph   nodes. The visualized portion of the thyroid gland is unremarkable.    AIRWAYS, LUNGS AND PLEURA: The central tracheobronchial tree is patent.   Paraseptal emphysematous changes noted both upper lobes. There is no   pleural effusion. There is no pneumothorax.    CHEST WALL/BONES/SOFT TISSUES:    FINDINGS ABDOMEN AND PELVIS:    HEPATIC: The liver is normal in size with no evidence of mass or bile   duct dilatation. Stable focal fatty infiltration vs. small cyst in the   region of the falciform ligament. A stable lobulated tissue, inseparable   from the caudate lobe of liver is seen projecting medially into the   region of the gastrohepatic ligament, may represent hepatic lobulation.   The portal vein is patent. The hepatic veins are opacified.    BILIARY: No calcified gallstones are noted.    SPLEEN: Unremarkable.    PANCREAS: Mild enlargement of the pancreatic head with indistinct margins   of the pancreas. There is mild dilatation of the pancreatic duct in the   body and tail. Mild peripancreatic stranding is noted especially in the   region surrounding the pancreatic head. The findings are consistent with   interstitial edematous pancreatitis. There is no evidence of pancreatic   necrosis. No acute peripancreatic fluid collections are noted at this   time.    ADRENAL GLANDS: Unremarkable.    KIDNEYS: No evidence of hydronephrosis, calcified stones, or solid renal   mass.    ABDOMINOPELVIC NODES: Unremarkable.    PELVIC ORGANS: No evidence of pelvic mass, lymphadenopathy, or fluid   collection.    BLADDER: Unremarkable.    PERITONEUM/MESENTERY/BOWEL: Small hiatus hernia. No evidence of   obstruction, colitis, inflammatory process, or ascites within the abdomen   or pelvis. The appendix is normal in appearance.    BONES/SOFT TISSUES: Degenerative changes of the spine are noted.    VASCULAR: Mild aortoiliac calcifications are noted with no evidence of   abdominal aortic aneurysm or dissection. No intimal flap or double lumen.   The celiac axis, the superior mesenteric artery, the inferior mesenteric   artery, and the renal arteries are patent without flow-limiting stenosis.   Single renal arteries are seen bilaterally.        IMPRESSION:    1. No evidence of thoracic or abdominal aortic aneurysm or dissection.    2. Mild enlargement of the pancreatic head with indistinct margins of the   pancreas. There is mild dilatation of the pancreatic duct in the body and   tail. Mild peripancreatic stranding is noted especially in the region   surrounding the pancreatic head. The findings are consistent with   interstitial edematous pancreatitis. There is no evidence of pancreatic   necrosis. No acute peripancreatic fluid collections are noted at this   time.    --- End of Report ---            SIM PENN MD; Attending Interventional Radiologist  This document has been electronically signed. Oct  5 2022  7:40PM (10-05-22 @ 18:05)    US Abdomen Upper Quadrant Right:   ACC: [id number] EXAM:  US ABDOMEN RT UPR QUADRANT                          PROCEDURE DATE:  10/06/2022          INTERPRETATION:  CLINICAL INFORMATION: Abdominal pain, nausea/vomiting,   alcohol use.    COMPARISON: US abdomen 4/24/2022 correlated withCT abdomen and pelvis   10/5/2022.    TECHNIQUE: Sonography of the right upper quadrant.    FINDINGS:  Liver: Increased echogenicity of liver. Enlarged measuring 20.8 cm.  Bile ducts: Normal caliber. Common bile duct measures 4 mm.  Gallbladder: Within normal limits. Negative Marquez's sign on exam.  Pancreas: Portions of pancreatic head appear inflamed though examination   is limited on this modality.  Right kidney: 12.5 cm. No hydronephrosis.  Ascites: None.  IVC: Visualized portions are within normal limits.    IMPRESSION:  Hepatic steatosis and hepatomegaly.    Portions of the visualized pancreas are inflamed, which suggests   pancreatitis and can be better visualized on CT abdomen and pelvis dated   10/5/2022.    --- End of Report ---          MARILYNN SHORT MD; Resident Radiologist  This document has been electronically signed.  MARCELA YEH MD; Attending Radiologist  This document has been electronically signed. Oct  6 2022 11:44AM (10-06-22 @ 09:32)     Gastroenterology Consultation:    Patient is a 42y old  Male who presents with a chief complaint of Pancreatitis (06 Oct 2022 11:47)      Admitted on: 10-05-22      HPI:  Patient is a 42 year old male with a PMHx of HTN and alcoholic pancreatitis (states current episode is his fourth) that presented to the ED one day ago with a chief complaint of 10/10 sharp, epigastric abdominal pain that radiates to his back which began four days ago and gradually worsened. He reports pain is associated with nausea and approximately 20 episodes of bilious, non-bloody vomiting. Denies any other symptoms. States that he last drank alcohol five days ago, and had been binge drinking for one month; reports consuming one bottle of vodka/day during his binge. States he frequently abstains from alcohol for months in between binges; reports previous binge prior to this was around March 2022. Denies history of gallstones, or any previous scopes.        In the ED:  BP: 184/177  HR : 115   RR:20  T : 97  O2: 99% on RA    WBC : 12  HB : 19   HCT : 57   INR : 0.89  K : 5.1, Ca 10.7  Lipase : 408  BNP :1.5k  Lac 2.1  Alc blood < 10  ECG : Sinus tachycardia  COVID +  ALT/AST: 100/79   Alk phos wnl    CT abdomen/pelvis :   1. No evidence of thoracic or abdominal aortic aneurysm or dissection.  2. Mild enlargement of the pancreatic head with indistinct margins of the   pancreas. There is mild dilatation of the pancreatic duct in the body and   tail. Mild peripancreatic stranding is noted especially in the region   surrounding the pancreatic head. The findings are consistent with   interstitial edematous pancreatitis. There is no evidence of pancreatic   necrosis. No acute peripancreatic fluid collections are noted at this   time.       (05 Oct 2022 23:16)    Prior EGD: Denies    Prior Colonoscopy: denies      PAST MEDICAL & SURGICAL HISTORY:  Pancreatitis   - due to alcohol       Asthma  - no recent exacerbation, not using inhalers for a long time      Hypertension      H/O rotator cuff surgery      FAMILY HISTORY:  Family history of pancreatic cancer (Father)    Alcohol use  - sister, with admission due to pancreatitis and GI Bleed        Social History:  Tobacco: reports 20+ pack year history of cigarette smoking; now uses vape daily for the past 2 years  Alcohol: Reports he began binge drinking 3 years ago; will drink bottle of vodka/day during binges, usually lasts one month. States he will abstain from alcohol between binges, and reports time between binges may span months.   Drugs: Unknown    Home Medications:  albuterol 90 mcg/inh inhalation aerosol: 2 puff(s) inhaled every 6 hours, As Needed (06 Oct 2022 01:18)  NIFEdipine 60 mg oral tablet, extended release: 1 tab(s) orally once a day (24 Apr 2022 09:36)      MEDICATIONS  (STANDING):  enoxaparin Injectable 40 milliGRAM(s) SubCutaneous every 24 hours  folic acid 1 milliGRAM(s) Oral every 24 hours  NIFEdipine XL 60 milliGRAM(s) Oral daily  pyridoxine 100 milliGRAM(s) Oral daily  sodium chloride 0.9%. 1000 milliLiter(s) (250 mL/Hr) IV Continuous <Continuous>  thiamine 100 milliGRAM(s) Oral daily    MEDICATIONS  (PRN):  acetaminophen     Tablet .. 650 milliGRAM(s) Oral every 6 hours PRN Temp greater or equal to 38C (100.4F), Mild Pain (1 - 3)  ALBUTerol    90 MICROgram(s) HFA Inhaler 2 Puff(s) Inhalation every 6 hours PRN Shortness of Breath and/or Wheezing  aluminum hydroxide/magnesium hydroxide/simethicone Suspension 30 milliLiter(s) Oral every 4 hours PRN Dyspepsia  chlordiazePOXIDE 50 milliGRAM(s) Oral every 1 hour PRN Symptom-triggered: each CIWA -Ar score 8 or GREATER  ketorolac   Injectable 30 milliGRAM(s) IV Push every 6 hours PRN Moderate Pain (4 - 6)  melatonin 3 milliGRAM(s) Oral at bedtime PRN Insomnia  morphine  - Injectable 2 milliGRAM(s) IV Push every 6 hours PRN Moderate Pain (4 - 6)  ondansetron Injectable 4 milliGRAM(s) IV Push every 12 hours PRN Nausea and/or Vomiting  oxyCODONE    IR 10 milliGRAM(s) Oral every 6 hours PRN Severe Pain (7 - 10)      Allergies  No Known Allergies      Review of Systems:   Constitutional:  No Fever, No Chills  ENT/Mouth:  No Hearing Changes,  No Difficulty Swallowing  Eyes:  No Eye Pain, No Vision Changes  Cardiovascular:  No Chest Pain, No Palpitations  Respiratory:  No Cough, No Dyspnea  Gastrointestinal:  As described in HPI  Neurological: no dizziness, no altered mental status   Skin: no rash, no jaundice           Physical Examination:  T(C): 36.1 (10-06-22 @ 08:04), Max: 37.3 (10-05-22 @ 16:45)  HR: 102 (10-06-22 @ 08:04) (102 - 116)  BP: 136/76 (10-06-22 @ 08:04) (136/76 - 184/117)  RR: 20 (10-06-22 @ 08:04) (20 - 20)  SpO2: 98% (10-06-22 @ 08:04) (98% - 99%)  Height (cm): 182.9 (10-05-22 @ 16:28)  Weight (kg): 86.2 (10-05-22 @ 16:28)    10-06-22 @ 07:01  -  10-06-22 @ 13:54  --------------------------------------------------------  IN: 0 mL / OUT: 300 mL / NET: -300 mL          GENERAL: AAOx3, no acute distress.  HEAD:  Atraumatic, Normocephalic  EYES: conjunctiva and sclera clear  CHEST/LUNG: Clear to auscultation bilaterally; No wheeze, rhonchi, or rales  HEART: Regular rate and rhythm; normal S1, S2, No murmurs.  ABDOMEN: + Minimal TTP epigastric region, soft, nondistended; Bowel sounds present  SKIN: flushed, diaphoretic, Intact, no jaundice        Data:                        16.7   11.57 )-----------( 238      ( 06 Oct 2022 07:21 )             48.6     Hgb Trend:  16.7  10-06-22 @ 07:21  19.6  10-05-22 @ 18:30        10-06    137  |  95<L>  |  5<L>  ----------------------------<  94  4.5   |  26  |  0.9    Ca    8.5      06 Oct 2022 07:21  Phos  3.3     10-06  Mg     1.8     10-06    TPro  6.0  /  Alb  3.9  /  TBili  0.8  /  DBili  x   /  AST  54<H>  /  ALT  70<H>  /  AlkPhos  75  10-06    Liver panel trend:  TBili 0.8   /   AST 54   /   ALT 70   /   AlkP 75   /   Tptn 6.0   /   Alb 3.9    /   DBili --      10-06  TBili 0.8   /   AST 59   /   ALT 77   /   AlkP 82   /   Tptn 6.6   /   Alb 4.2    /   DBili --      10-06  TBili 1.3   /   AST 79   /      /   AlkP 103   /   Tptn 8.0   /   Alb 5.2    /   DBili --      10-05      PT/INR - ( 05 Oct 2022 18:30 )   PT: 10.10 sec;   INR: 0.89 ratio         PTT - ( 05 Oct 2022 18:30 )  PTT:30.8 sec        Radiology:  CT Angio Abdomen and Pelvis w/ IV Cont:   ACC: [id number] EXAM:  CT ANGIO ABD PELV (W)AW IC                        ACC: [id number] EXAM:  CT ANGIO CHEST AORTA [facility]                          PROCEDURE DATE:  10/05/2022          INTERPRETATION:  REASON FOR EXAM / CLINICAL STATEMENT: Abdominal pain   that radiates to back.  Evaluate for aortic dissection. Abdominal pain.   WBC 8.29   PMHx of HTN and pancreatitis 2/2 ETOH abuse    TECHNIQUE: Contiguous axial CT images were obtained from the thoracic   inlet through the upper abdomen without contrast and from the thoracic   inlet through the pelvis with intravenous contrast, in the arterial phase.    Reformatted images in the coronal and sagittal planes were acquired, as   well as 3D, Volume rendering, and MIP reconstructed images.    COMPARISON CT: CT scan of the chest dated 9/6/2022    OTHER STUDIES USED FOR CORRELATION: None.      FINDINGS CHEST:    TUBES AND LINES: None.    HEART AND VESSELS: The heart size is within normal limits. There are   coronary artery calcifications. There is no pericardial effusion.    There is no evidence of thoracic aortic aneurysm or dissection. No   intimal flap or double lumen.    Normal caliber aorta and pulmonary artery. No evidence of central   pulmonary embolism.    The brachiocephalic vessels are unremarkable.      MEDIASTINUM: There are no suspicious mediastinal, hilar or axillary lymph   nodes. The visualized portion of the thyroid gland is unremarkable.    AIRWAYS, LUNGS AND PLEURA: The central tracheobronchial tree is patent.   Paraseptal emphysematous changes noted both upper lobes. There is no   pleural effusion. There is no pneumothorax.    CHEST WALL/BONES/SOFT TISSUES:    FINDINGS ABDOMEN AND PELVIS:    HEPATIC: The liver is normal in size with no evidence of mass or bile   duct dilatation. Stable focal fatty infiltration vs. small cyst in the   region of the falciform ligament. A stable lobulated tissue, inseparable   from the caudate lobe of liver is seen projecting medially into the   region of the gastrohepatic ligament, may represent hepatic lobulation.   The portal vein is patent. The hepatic veins are opacified.    BILIARY: No calcified gallstones are noted.    SPLEEN: Unremarkable.    PANCREAS: Mild enlargement of the pancreatic head with indistinct margins   of the pancreas. There is mild dilatation of the pancreatic duct in the   body and tail. Mild peripancreatic stranding is noted especially in the   region surrounding the pancreatic head. The findings are consistent with   interstitial edematous pancreatitis. There is no evidence of pancreatic   necrosis. No acute peripancreatic fluid collections are noted at this   time.    ADRENAL GLANDS: Unremarkable.    KIDNEYS: No evidence of hydronephrosis, calcified stones, or solid renal   mass.    ABDOMINOPELVIC NODES: Unremarkable.    PELVIC ORGANS: No evidence of pelvic mass, lymphadenopathy, or fluid   collection.    BLADDER: Unremarkable.    PERITONEUM/MESENTERY/BOWEL: Small hiatus hernia. No evidence of   obstruction, colitis, inflammatory process, or ascites within the abdomen   or pelvis. The appendix is normal in appearance.    BONES/SOFT TISSUES: Degenerative changes of the spine are noted.    VASCULAR: Mild aortoiliac calcifications are noted with no evidence of   abdominal aortic aneurysm or dissection. No intimal flap or double lumen.   The celiac axis, the superior mesenteric artery, the inferior mesenteric   artery, and the renal arteries are patent without flow-limiting stenosis.   Single renal arteries are seen bilaterally.        IMPRESSION:    1. No evidence of thoracic or abdominal aortic aneurysm or dissection.    2. Mild enlargement of the pancreatic head with indistinct margins of the   pancreas. There is mild dilatation of the pancreatic duct in the body and   tail. Mild peripancreatic stranding is noted especially in the region   surrounding the pancreatic head. The findings are consistent with   interstitial edematous pancreatitis. There is no evidence of pancreatic   necrosis. No acute peripancreatic fluid collections are noted at this   time.    --- End of Report ---            SIM PENN MD; Attending Interventional Radiologist  This document has been electronically signed. Oct  5 2022  7:40PM (10-05-22 @ 18:05)    US Abdomen Upper Quadrant Right:   ACC: [id number] EXAM:  US ABDOMEN RT UPR QUADRANT                          PROCEDURE DATE:  10/06/2022          INTERPRETATION:  CLINICAL INFORMATION: Abdominal pain, nausea/vomiting,   alcohol use.    COMPARISON: US abdomen 4/24/2022 correlated withCT abdomen and pelvis   10/5/2022.    TECHNIQUE: Sonography of the right upper quadrant.    FINDINGS:  Liver: Increased echogenicity of liver. Enlarged measuring 20.8 cm.  Bile ducts: Normal caliber. Common bile duct measures 4 mm.  Gallbladder: Within normal limits. Negative Marquez's sign on exam.  Pancreas: Portions of pancreatic head appear inflamed though examination   is limited on this modality.  Right kidney: 12.5 cm. No hydronephrosis.  Ascites: None.  IVC: Visualized portions are within normal limits.    IMPRESSION:  Hepatic steatosis and hepatomegaly.    Portions of the visualized pancreas are inflamed, which suggests   pancreatitis and can be better visualized on CT abdomen and pelvis dated   10/5/2022.    --- End of Report ---          MARILYNN SHORT MD; Resident Radiologist  This document has been electronically signed.  MARCELA YEH MD; Attending Radiologist  This document has been electronically signed. Oct  6 2022 11:44AM (10-06-22 @ 09:32)

## 2022-10-06 NOTE — PROGRESS NOTE ADULT - TIME BILLING
time spent on review of labs, imaging studies, old records, obtaining history, personally examining patient, counselling and communicating with patient, entering orders for medications/tests/etc, discussions with other health care providers, documentation in electronic health records, independent interpretation of labs, imaging/procedure results and care coordination.

## 2022-10-06 NOTE — PROGRESS NOTE ADULT - ASSESSMENT
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    #Acute on chronic pancreatitis, likely due to alcohol  # Dehydrated on admission   - CT abdomen/pelvis : CT abdomen/pelvis : Mild enlargement of the pancreatic head with indistinct margins of the pancreas. There is mild dilatation of the pancreatic duct in the body and tail. Mild peripancreatic stranding is noted especially in the region surrounding the pancreatic head. The findings are consistent with interstitial edematous pancreatitis. There is no evidence of pancreatic  necrosis. No acute peripancreatic fluid collections are noted at this time.  - last drink : 4 days ago   - IVF to NS 250cc/hr   - pain control : morhine 2 mg IV PRN every 6 + ketorolac 15mg IV q6  - zofran prn  - Gi consult given dilation on CT scan   - RUQ neg for dilation     #EtOH abuse  #Suspected thiamine/folate deficiencies  - thiamine/folate/B6  - last drink 4d ago : zoraida drank can ot remember how much but blacked out  - CATCH team consult   - CIWA on admission 18  - CIWA protocol. symtpom triggered     #polycythemia   - likely 2/2 hemoconcentration 2/2 dehydration- resolved    #HTN  #hx HLD  - continue nifedipine  - f/u lipid profile     #Transaminitis-->improving   - ALT>AST- Steatosis   - f/u Abd US   - f/u GGT  - f/u Mg , phos   - trend liver function panel  - Last admission in April 2022 : EBV +. MICAELA+, Hep pannel neg    # COVID +  - on RA   - Pt had covid 2 weeks ago   - symptom treatment     # Hx asthma   - albuteol prn     #Progress Note Handoff  Pending (specify):  advance diet, follow up GI  Family discussion: house staff updated pt family  Disposition: home   Anticipated date: 48-72 hrs

## 2022-10-06 NOTE — PROGRESS NOTE ADULT - SUBJECTIVE AND OBJECTIVE BOX
Pt seen and examined at bedside. No CP or SOB. Pt feeling better today.  He states that he was vomiting profusely           PAST MEDICAL & SURGICAL HISTORY:  Pancreatitis  due to alcohol    Asthma  no recent exacerbation, not using inhalers for a long time    Hypertension    H/O rotator cuff surgery        VITAL SIGNS (Last 24 hrs):  T(C): 36.1 (10-06-22 @ 08:04), Max: 37.3 (10-05-22 @ 16:45)  HR: 102 (10-06-22 @ 08:04) (102 - 116)  BP: 136/76 (10-06-22 @ 08:04) (136/76 - 184/117)  RR: 20 (10-06-22 @ 08:04) (20 - 20)  SpO2: 98% (10-06-22 @ 08:04) (98% - 99%)  Wt(kg): --  Daily Height in cm: 182.88 (05 Oct 2022 16:28)    Daily     I&O's Summary    06 Oct 2022 07:01  -  06 Oct 2022 11:47  --------------------------------------------------------  IN: 0 mL / OUT: 300 mL / NET: -300 mL        PHYSICAL EXAM:  GENERAL: NAD, well-developed  HEAD:  Atraumatic, Normocephalic  EYES: EOMI, PERRLA, conjunctiva and sclera clear  NECK: Supple, No JVD  CHEST/LUNG: Clear to auscultation bilaterally; No wheeze  HEART: Regular rate and rhythm; No murmurs, rubs, or gallops  ABDOMEN: Soft, Nontender, Nondistended; Bowel sounds present  EXTREMITIES:  2+ Peripheral Pulses, No clubbing, cyanosis, or edema  PSYCH: AAOx3  NEUROLOGY: non-focal  SKIN: No rashes or lesions    Labs Reviewed  Spoke to patient in regards to abnormal labs.    CBC Full  -  ( 06 Oct 2022 07:21 )  WBC Count : 11.57 K/uL  Hemoglobin : 16.7 g/dL  Hematocrit : 48.6 %  Platelet Count - Automated : 238 K/uL  Mean Cell Volume : 92.7 fL  Mean Cell Hemoglobin : 31.9 pg  Mean Cell Hemoglobin Concentration : 34.4 g/dL  Auto Neutrophil # : x  Auto Lymphocyte # : x  Auto Monocyte # : x  Auto Eosinophil # : x  Auto Basophil # : x  Auto Neutrophil % : x  Auto Lymphocyte % : x  Auto Monocyte % : x  Auto Eosinophil % : x  Auto Basophil % : x    BMP:    10-06 @ 07:21    Blood Urea Nitrogen - 5  Calcium - 8.5  Carbond Dioxide - 26  Chloride - 95  Creatinine - 0.9  Glucose - 94  Potassium - 4.5  Sodium - 137      Hemoglobin A1c -   PT/INR - ( 05 Oct 2022 18:30 )   PT: 10.10 sec;   INR: 0.89 ratio         PTT - ( 05 Oct 2022 18:30 )  PTT:30.8 sec  Urine Culture:        COVID Labs  CRP:      D-Dimer:            Imaging reviewed independently and reviewed read  < from: US Abdomen Upper Quadrant Right (10.06.22 @ 09:32) >  IMPRESSION:  Hepatic steatosis and hepatomegaly.    Portions of the visualized pancreas are inflamed, which suggests   pancreatitis and can be better visualized on CT abdomen and pelvis dated   10/5/2022.    < end of copied text >        MEDICATIONS  (STANDING):  chlordiazePOXIDE   Oral   chlordiazePOXIDE 50 milliGRAM(s) Oral every 6 hours  enoxaparin Injectable 40 milliGRAM(s) SubCutaneous every 24 hours  folic acid 1 milliGRAM(s) Oral every 24 hours  NIFEdipine XL 60 milliGRAM(s) Oral daily  pyridoxine 100 milliGRAM(s) Oral daily  sodium chloride 0.9%. 1000 milliLiter(s) (250 mL/Hr) IV Continuous <Continuous>  thiamine 100 milliGRAM(s) Oral daily    MEDICATIONS  (PRN):  acetaminophen     Tablet .. 650 milliGRAM(s) Oral every 6 hours PRN Temp greater or equal to 38C (100.4F), Mild Pain (1 - 3)  ALBUTerol    90 MICROgram(s) HFA Inhaler 2 Puff(s) Inhalation every 6 hours PRN Shortness of Breath and/or Wheezing  aluminum hydroxide/magnesium hydroxide/simethicone Suspension 30 milliLiter(s) Oral every 4 hours PRN Dyspepsia  LORazepam   Injectable 2 milliGRAM(s) IV Push every 1 hour PRN CIWA-Ar score 8 or greater  melatonin 3 milliGRAM(s) Oral at bedtime PRN Insomnia  morphine  - Injectable 2 milliGRAM(s) IV Push every 6 hours PRN Moderate Pain (4 - 6)  ondansetron Injectable 4 milliGRAM(s) IV Push every 12 hours PRN Nausea and/or Vomiting

## 2022-10-07 LAB
ALBUMIN SERPL ELPH-MCNC: 3.2 G/DL — LOW (ref 3.5–5.2)
ALP SERPL-CCNC: 60 U/L — SIGNIFICANT CHANGE UP (ref 30–115)
ALT FLD-CCNC: 65 U/L — HIGH (ref 0–41)
ANION GAP SERPL CALC-SCNC: 11 MMOL/L — SIGNIFICANT CHANGE UP (ref 7–14)
AST SERPL-CCNC: 52 U/L — HIGH (ref 0–41)
BASOPHILS # BLD AUTO: 0.03 K/UL — SIGNIFICANT CHANGE UP (ref 0–0.2)
BASOPHILS NFR BLD AUTO: 0.4 % — SIGNIFICANT CHANGE UP (ref 0–1)
BILIRUB SERPL-MCNC: 0.7 MG/DL — SIGNIFICANT CHANGE UP (ref 0.2–1.2)
BUN SERPL-MCNC: 5 MG/DL — LOW (ref 10–20)
CALCIUM SERPL-MCNC: 8.6 MG/DL — SIGNIFICANT CHANGE UP (ref 8.4–10.5)
CHLORIDE SERPL-SCNC: 99 MMOL/L — SIGNIFICANT CHANGE UP (ref 98–110)
CO2 SERPL-SCNC: 24 MMOL/L — SIGNIFICANT CHANGE UP (ref 17–32)
CREAT SERPL-MCNC: 0.8 MG/DL — SIGNIFICANT CHANGE UP (ref 0.7–1.5)
EGFR: 112 ML/MIN/1.73M2 — SIGNIFICANT CHANGE UP
EOSINOPHIL # BLD AUTO: 0.03 K/UL — SIGNIFICANT CHANGE UP (ref 0–0.7)
EOSINOPHIL NFR BLD AUTO: 0.4 % — SIGNIFICANT CHANGE UP (ref 0–8)
GLUCOSE SERPL-MCNC: 98 MG/DL — SIGNIFICANT CHANGE UP (ref 70–99)
HCT VFR BLD CALC: 42.2 % — SIGNIFICANT CHANGE UP (ref 42–52)
HGB BLD-MCNC: 14.7 G/DL — SIGNIFICANT CHANGE UP (ref 14–18)
IMM GRANULOCYTES NFR BLD AUTO: 0.3 % — SIGNIFICANT CHANGE UP (ref 0.1–0.3)
LYMPHOCYTES # BLD AUTO: 1.41 K/UL — SIGNIFICANT CHANGE UP (ref 1.2–3.4)
LYMPHOCYTES # BLD AUTO: 20.9 % — SIGNIFICANT CHANGE UP (ref 20.5–51.1)
MAGNESIUM SERPL-MCNC: 1.8 MG/DL — SIGNIFICANT CHANGE UP (ref 1.8–2.4)
MCHC RBC-ENTMCNC: 32.3 PG — HIGH (ref 27–31)
MCHC RBC-ENTMCNC: 34.8 G/DL — SIGNIFICANT CHANGE UP (ref 32–37)
MCV RBC AUTO: 92.7 FL — SIGNIFICANT CHANGE UP (ref 80–94)
MONOCYTES # BLD AUTO: 0.44 K/UL — SIGNIFICANT CHANGE UP (ref 0.1–0.6)
MONOCYTES NFR BLD AUTO: 6.5 % — SIGNIFICANT CHANGE UP (ref 1.7–9.3)
NEUTROPHILS # BLD AUTO: 4.82 K/UL — SIGNIFICANT CHANGE UP (ref 1.4–6.5)
NEUTROPHILS NFR BLD AUTO: 71.5 % — SIGNIFICANT CHANGE UP (ref 42.2–75.2)
NRBC # BLD: 0 /100 WBCS — SIGNIFICANT CHANGE UP (ref 0–0)
PLATELET # BLD AUTO: 183 K/UL — SIGNIFICANT CHANGE UP (ref 130–400)
POTASSIUM SERPL-MCNC: 4 MMOL/L — SIGNIFICANT CHANGE UP (ref 3.5–5)
POTASSIUM SERPL-SCNC: 4 MMOL/L — SIGNIFICANT CHANGE UP (ref 3.5–5)
PROT SERPL-MCNC: 5.2 G/DL — LOW (ref 6–8)
RBC # BLD: 4.55 M/UL — LOW (ref 4.7–6.1)
RBC # FLD: 13.2 % — SIGNIFICANT CHANGE UP (ref 11.5–14.5)
SODIUM SERPL-SCNC: 134 MMOL/L — LOW (ref 135–146)
WBC # BLD: 6.75 K/UL — SIGNIFICANT CHANGE UP (ref 4.8–10.8)
WBC # FLD AUTO: 6.75 K/UL — SIGNIFICANT CHANGE UP (ref 4.8–10.8)

## 2022-10-07 PROCEDURE — 99233 SBSQ HOSP IP/OBS HIGH 50: CPT

## 2022-10-07 RX ORDER — INFLUENZA VIRUS VACCINE 15; 15; 15; 15 UG/.5ML; UG/.5ML; UG/.5ML; UG/.5ML
0.5 SUSPENSION INTRAMUSCULAR ONCE
Refills: 0 | Status: DISCONTINUED | OUTPATIENT
Start: 2022-10-07 | End: 2022-10-09

## 2022-10-07 RX ADMIN — MORPHINE SULFATE 2 MILLIGRAM(S): 50 CAPSULE, EXTENDED RELEASE ORAL at 04:24

## 2022-10-07 RX ADMIN — OXYCODONE HYDROCHLORIDE 15 MILLIGRAM(S): 5 TABLET ORAL at 13:31

## 2022-10-07 RX ADMIN — Medication 30 MILLIGRAM(S): at 03:09

## 2022-10-07 RX ADMIN — Medication 30 MILLIGRAM(S): at 12:00

## 2022-10-07 RX ADMIN — Medication 100 MILLIGRAM(S): at 11:24

## 2022-10-07 RX ADMIN — OXYCODONE HYDROCHLORIDE 15 MILLIGRAM(S): 5 TABLET ORAL at 21:47

## 2022-10-07 RX ADMIN — MORPHINE SULFATE 2 MILLIGRAM(S): 50 CAPSULE, EXTENDED RELEASE ORAL at 23:59

## 2022-10-07 RX ADMIN — Medication 1 MILLIGRAM(S): at 06:06

## 2022-10-07 RX ADMIN — Medication 60 MILLIGRAM(S): at 06:07

## 2022-10-07 RX ADMIN — SODIUM CHLORIDE 250 MILLILITER(S): 9 INJECTION INTRAMUSCULAR; INTRAVENOUS; SUBCUTANEOUS at 21:48

## 2022-10-07 RX ADMIN — MORPHINE SULFATE 2 MILLIGRAM(S): 50 CAPSULE, EXTENDED RELEASE ORAL at 16:19

## 2022-10-07 RX ADMIN — Medication 30 MILLIGRAM(S): at 11:28

## 2022-10-07 RX ADMIN — OXYCODONE HYDROCHLORIDE 15 MILLIGRAM(S): 5 TABLET ORAL at 08:30

## 2022-10-07 RX ADMIN — MORPHINE SULFATE 2 MILLIGRAM(S): 50 CAPSULE, EXTENDED RELEASE ORAL at 09:45

## 2022-10-07 RX ADMIN — MORPHINE SULFATE 2 MILLIGRAM(S): 50 CAPSULE, EXTENDED RELEASE ORAL at 16:00

## 2022-10-07 RX ADMIN — ENOXAPARIN SODIUM 40 MILLIGRAM(S): 100 INJECTION SUBCUTANEOUS at 06:07

## 2022-10-07 RX ADMIN — Medication 100 MILLIGRAM(S): at 11:23

## 2022-10-07 RX ADMIN — OXYCODONE HYDROCHLORIDE 15 MILLIGRAM(S): 5 TABLET ORAL at 02:50

## 2022-10-07 RX ADMIN — ONDANSETRON 4 MILLIGRAM(S): 8 TABLET, FILM COATED ORAL at 08:16

## 2022-10-07 RX ADMIN — MORPHINE SULFATE 2 MILLIGRAM(S): 50 CAPSULE, EXTENDED RELEASE ORAL at 09:25

## 2022-10-07 RX ADMIN — Medication 30 MILLIGRAM(S): at 18:13

## 2022-10-07 RX ADMIN — Medication 3 MILLIGRAM(S): at 01:18

## 2022-10-07 RX ADMIN — OXYCODONE HYDROCHLORIDE 15 MILLIGRAM(S): 5 TABLET ORAL at 08:14

## 2022-10-07 RX ADMIN — OXYCODONE HYDROCHLORIDE 15 MILLIGRAM(S): 5 TABLET ORAL at 14:00

## 2022-10-07 NOTE — PROGRESS NOTE ADULT - SUBJECTIVE AND OBJECTIVE BOX
Gastroenterology progress note:     Patient is a 42y old  Male who presents with a chief complaint of Pancreatitis (07 Oct 2022 11:24)       Admitted on: 10-05-22    We are following the patient for pancreatitis       Interval History: Patient admitted to [facility], currently receiving NS at 250 mL/hr, morphine PRN, and Zofran PRN.   No acute events overnight.   - Diet - full liquid, low fat diet  - last BM - 3 days ago, states it is most likely due to decreased PO intake  - Abdominal pain - patient reports continued epigastric abdominal pain with minimal improvement since yesterday. States there is complete resolution of nausea since receiving Zofran, denies any episodes of vomiting since admission. Patient tolerating small amounts of water and jello, but states that he is experiencing epigastric abdominal pain with broth consumption.        PAST MEDICAL & SURGICAL HISTORY:  Pancreatitis  due to alcohol      Asthma  no recent exacerbation, not using inhalers for a long time      Hypertension      H/O rotator cuff surgery          MEDICATIONS  (STANDING):  enoxaparin Injectable 40 milliGRAM(s) SubCutaneous every 24 hours  folic acid 1 milliGRAM(s) Oral every 24 hours  influenza   Vaccine 0.5 milliLiter(s) IntraMuscular once  NIFEdipine XL 60 milliGRAM(s) Oral daily  pyridoxine 100 milliGRAM(s) Oral daily  sodium chloride 0.9%. 1000 milliLiter(s) (250 mL/Hr) IV Continuous <Continuous>  thiamine 100 milliGRAM(s) Oral daily    MEDICATIONS  (PRN):  acetaminophen     Tablet .. 650 milliGRAM(s) Oral every 6 hours PRN Temp greater or equal to 38C (100.4F), Mild Pain (1 - 3)  ALBUTerol    90 MICROgram(s) HFA Inhaler 2 Puff(s) Inhalation every 6 hours PRN Shortness of Breath and/or Wheezing  aluminum hydroxide/magnesium hydroxide/simethicone Suspension 30 milliLiter(s) Oral every 4 hours PRN Dyspepsia  ketorolac   Injectable 30 milliGRAM(s) IV Push every 6 hours PRN Moderate Pain (4 - 6)  LORazepam     Tablet 2 milliGRAM(s) Oral every 2 hours PRN Symptom-triggered 2 point increase in CIWA-Ar  melatonin 3 milliGRAM(s) Oral at bedtime PRN Insomnia  morphine  - Injectable 2 milliGRAM(s) IV Push every 6 hours PRN Moderate Pain (4 - 6)  ondansetron Injectable 4 milliGRAM(s) IV Push every 12 hours PRN Nausea and/or Vomiting  oxyCODONE    IR 15 milliGRAM(s) Oral every 6 hours PRN Severe Pain (7 - 10)      Allergies  No Known Allergies      Review of Systems:   Cardiovascular:  No Chest Pain, No Palpitations  Respiratory:  No Cough, No Dyspnea  Gastrointestinal:  As described in HPI  Skin:  No Skin Lesions, No Jaundice  Neuro:  No Syncope, No Dizziness    Physical Examination:  T(C): 36.3 (10-07-22 @ 05:10), Max: 37.2 (10-06-22 @ 23:48)  HR: 91 (10-07-22 @ 05:10) (91 - 107)  BP: 127/62 (10-07-22 @ 05:10) (115/73 - 139/74)  RR: 18 (10-07-22 @ 05:10) (18 - 18)  SpO2: 98% (10-07-22 @ 05:10) (96% - 98%)  Weight (kg): 87.2 (10-07-22 @ 00:29)    10-06-22 @ 07:01  -  10-07-22 @ 07:00  --------------------------------------------------------  IN: 0 mL / OUT: 300 mL / NET: -300 mL        GENERAL: AAOx3, no acute distress.  HEAD:  Atraumatic, Normocephalic  EYES: conjunctiva and sclera clear  NECK: Supple, no JVD or thyromegaly  CHEST/LUNG: Clear to auscultation bilaterally; No wheeze, rhonchi, or rales  HEART: Regular rate and rhythm; normal S1, S2, No murmurs.  ABDOMEN: Soft, TTP epigastric region, nondistended; Bowel sounds present  NEUROLOGY: No asterixis or tremor.   SKIN: Intact, no jaundice     Data:                        14.7   6.75  )-----------( 183      ( 07 Oct 2022 07:00 )             42.2     Hgb trend:  14.7  10-07-22 @ 07:00  16.7  10-06-22 @ 07:21  19.6  10-05-22 @ 18:30        10-07    134<L>  |  99  |  5<L>  ----------------------------<  98  4.0   |  24  |  0.8    Ca    8.6      07 Oct 2022 07:00  Phos  3.3     10-06  Mg     1.8     10-07    TPro  5.2<L>  /  Alb  3.2<L>  /  TBili  0.7  /  DBili  x   /  AST  52<H>  /  ALT  65<H>  /  AlkPhos  60  10-07    Liver panel trend:  TBili 0.7   /   AST 52   /   ALT 65   /   AlkP 60   /   Tptn 5.2   /   Alb 3.2    /   DBili --      10-07  TBili 0.8   /   AST 54   /   ALT 70   /   AlkP 75   /   Tptn 6.0   /   Alb 3.9    /   DBili --      10-06  TBili 0.8   /   AST 59   /   ALT 77   /   AlkP 82   /   Tptn 6.6   /   Alb 4.2    /   DBili --      10-06  TBili 1.3   /   AST 79   /      /   AlkP 103   /   Tptn 8.0   /   Alb 5.2    /   DBili --      10-05      PT/INR - ( 05 Oct 2022 18:30 )   PT: 10.10 sec;   INR: 0.89 ratio         PTT - ( 05 Oct 2022 18:30 )  PTT:30.8 sec       Radiology:    US Abdomen Upper Quadrant Right:   ACC: [id number] EXAM:  US ABDOMEN RT UPR QUADRANT                          PROCEDURE DATE:  10/06/2022          INTERPRETATION:  CLINICAL INFORMATION: Abdominal pain, nausea/vomiting,   alcohol use.    COMPARISON: US abdomen 4/24/2022 correlated withCT abdomen and pelvis   10/5/2022.    TECHNIQUE: Sonography of the right upper quadrant.    FINDINGS:  Liver: Increased echogenicity of liver. Enlarged measuring 20.8 cm.  Bile ducts: Normal caliber. Common bile duct measures 4 mm.  Gallbladder: Within normal limits. Negative Marquez's sign on exam.  Pancreas: Portions of pancreatic head appear inflamed though examination   is limited on this modality.  Right kidney: 12.5 cm. No hydronephrosis.  Ascites: None.  IVC: Visualized portions are within normal limits.    IMPRESSION:  Hepatic steatosis and hepatomegaly.    Portions of the visualized pancreas are inflamed, which suggests   pancreatitis and can be better visualized on CT abdomen and pelvis dated   10/5/2022.    --- End of Report ---          MARILYNN SHORT MD; Resident Radiologist  This document has been electronically signed.  MARCELA YEH MD; Attending Radiologist  This document has been electronically signed. Oct  6 2022 11:44AM (10-06-22 @ 09:32)     Gastroenterology progress note:     Patient is a 42y old  Male who presents with a chief complaint of Pancreatitis (07 Oct 2022 11:24)       Admitted on: 10-05-22    We are following the patient for pancreatitis       Interval History: Patient admitted to [facility], currently receiving NS at 250 mL/hr, morphine PRN, and Zofran PRN.   No acute events overnight.   - Diet - full liquid low fat diet   - last BM - 3 days ago, states it is most likely due to decreased PO intake  - Abdominal pain - patient reports continued epigastric abdominal pain but less than yesterday. States there is complete resolution of nausea since receiving Zofran, denies any episodes of vomiting since admission. Patient tolerating small amounts of water and jello, but states that he is experiencing epigastric abdominal pain with broth consumption.        PAST MEDICAL & SURGICAL HISTORY:  Pancreatitis  due to alcohol      Asthma  no recent exacerbation, not using inhalers for a long time      Hypertension      H/O rotator cuff surgery          MEDICATIONS  (STANDING):  enoxaparin Injectable 40 milliGRAM(s) SubCutaneous every 24 hours  folic acid 1 milliGRAM(s) Oral every 24 hours  influenza   Vaccine 0.5 milliLiter(s) IntraMuscular once  NIFEdipine XL 60 milliGRAM(s) Oral daily  pyridoxine 100 milliGRAM(s) Oral daily  sodium chloride 0.9%. 1000 milliLiter(s) (250 mL/Hr) IV Continuous <Continuous>  thiamine 100 milliGRAM(s) Oral daily    MEDICATIONS  (PRN):  acetaminophen     Tablet .. 650 milliGRAM(s) Oral every 6 hours PRN Temp greater or equal to 38C (100.4F), Mild Pain (1 - 3)  ALBUTerol    90 MICROgram(s) HFA Inhaler 2 Puff(s) Inhalation every 6 hours PRN Shortness of Breath and/or Wheezing  aluminum hydroxide/magnesium hydroxide/simethicone Suspension 30 milliLiter(s) Oral every 4 hours PRN Dyspepsia  ketorolac   Injectable 30 milliGRAM(s) IV Push every 6 hours PRN Moderate Pain (4 - 6)  LORazepam     Tablet 2 milliGRAM(s) Oral every 2 hours PRN Symptom-triggered 2 point increase in CIWA-Ar  melatonin 3 milliGRAM(s) Oral at bedtime PRN Insomnia  morphine  - Injectable 2 milliGRAM(s) IV Push every 6 hours PRN Moderate Pain (4 - 6)  ondansetron Injectable 4 milliGRAM(s) IV Push every 12 hours PRN Nausea and/or Vomiting  oxyCODONE    IR 15 milliGRAM(s) Oral every 6 hours PRN Severe Pain (7 - 10)      Allergies  No Known Allergies      Review of Systems:   Cardiovascular:  No Chest Pain, No Palpitations  Respiratory:  No Cough, No Dyspnea  Gastrointestinal:  As described in HPI  Skin:  No Skin Lesions, No Jaundice  Neuro:  No Syncope, No Dizziness    Physical Examination:  T(C): 36.3 (10-07-22 @ 05:10), Max: 37.2 (10-06-22 @ 23:48)  HR: 91 (10-07-22 @ 05:10) (91 - 107)  BP: 127/62 (10-07-22 @ 05:10) (115/73 - 139/74)  RR: 18 (10-07-22 @ 05:10) (18 - 18)  SpO2: 98% (10-07-22 @ 05:10) (96% - 98%)  Weight (kg): 87.2 (10-07-22 @ 00:29)    10-06-22 @ 07:01  -  10-07-22 @ 07:00  --------------------------------------------------------  IN: 0 mL / OUT: 300 mL / NET: -300 mL        GENERAL: AAOx3, no acute distress.  HEAD:  Atraumatic, Normocephalic  EYES: conjunctiva and sclera clear  NECK: Supple, no JVD or thyromegaly  CHEST/LUNG: Clear to auscultation bilaterally; No wheeze, rhonchi, or rales  HEART: Regular rate and rhythm; normal S1, S2, No murmurs.  ABDOMEN: Soft, TTP epigastric region, nondistended; Bowel sounds present  NEUROLOGY: No asterixis or tremor.   SKIN: Intact, no jaundice     Data:                        14.7   6.75  )-----------( 183      ( 07 Oct 2022 07:00 )             42.2     Hgb trend:  14.7  10-07-22 @ 07:00  16.7  10-06-22 @ 07:21  19.6  10-05-22 @ 18:30        10-07    134<L>  |  99  |  5<L>  ----------------------------<  98  4.0   |  24  |  0.8    Ca    8.6      07 Oct 2022 07:00  Phos  3.3     10-06  Mg     1.8     10-07    TPro  5.2<L>  /  Alb  3.2<L>  /  TBili  0.7  /  DBili  x   /  AST  52<H>  /  ALT  65<H>  /  AlkPhos  60  10-07    Liver panel trend:  TBili 0.7   /   AST 52   /   ALT 65   /   AlkP 60   /   Tptn 5.2   /   Alb 3.2    /   DBili --      10-07  TBili 0.8   /   AST 54   /   ALT 70   /   AlkP 75   /   Tptn 6.0   /   Alb 3.9    /   DBili --      10-06  TBili 0.8   /   AST 59   /   ALT 77   /   AlkP 82   /   Tptn 6.6   /   Alb 4.2    /   DBili --      10-06  TBili 1.3   /   AST 79   /      /   AlkP 103   /   Tptn 8.0   /   Alb 5.2    /   DBili --      10-05      PT/INR - ( 05 Oct 2022 18:30 )   PT: 10.10 sec;   INR: 0.89 ratio         PTT - ( 05 Oct 2022 18:30 )  PTT:30.8 sec       Radiology:    US Abdomen Upper Quadrant Right:   ACC: [id number] EXAM:  US ABDOMEN RT UPR QUADRANT                          PROCEDURE DATE:  10/06/2022          INTERPRETATION:  CLINICAL INFORMATION: Abdominal pain, nausea/vomiting,   alcohol use.    COMPARISON: US abdomen 4/24/2022 correlated withCT abdomen and pelvis   10/5/2022.    TECHNIQUE: Sonography of the right upper quadrant.    FINDINGS:  Liver: Increased echogenicity of liver. Enlarged measuring 20.8 cm.  Bile ducts: Normal caliber. Common bile duct measures 4 mm.  Gallbladder: Within normal limits. Negative Marquez's sign on exam.  Pancreas: Portions of pancreatic head appear inflamed though examination   is limited on this modality.  Right kidney: 12.5 cm. No hydronephrosis.  Ascites: None.  IVC: Visualized portions are within normal limits.    IMPRESSION:  Hepatic steatosis and hepatomegaly.    Portions of the visualized pancreas are inflamed, which suggests   pancreatitis and can be better visualized on CT abdomen and pelvis dated   10/5/2022.    --- End of Report ---          MARILYNN SHORT MD; Resident Radiologist  This document has been electronically signed.  MARCELA YEH MD; Attending Radiologist  This document has been electronically signed. Oct  6 2022 11:44AM (10-06-22 @ 09:32)

## 2022-10-07 NOTE — PROGRESS NOTE ADULT - ASSESSMENT
Patient is a 42 year old male with a chief complaint of pancreatitis secondary to alcohol consumption. States that epigastric abdominal pain has minimally improved since yesterday, with complete resolution of nausea and vomiting since admission s/p receiving Zofran. Patient tolerating small amounts of water and jello, but states that he is experiencing epigastric abdominal pain with broth consumption.    # Acute Interstitial Pancreatitis 2/2 Alcohol Consumption  - Patient is hemodynamically stable  - Labs reviewed    Recommendations  - Keep patient on clear liquid diet  - Change rate of NS to 150 mL/hr  - Continue pain control PRN  - Continue to monitor BUN/Cr and Hct to ensure adequate hydration  - if clinically worsening, repeat CT A/P with IV contrast  - Counseled patient on the importance of avoiding alcohol, offered patient resources to quit if he feels as though he needs help    # Transaminitis  - AST 52, ALT 65 as of 10/7, minimal change from 10/6 (AST 54, ALT 70)  - r/o alcoholic liver disease   - avoid hepatotoxic drugs  - Continue to trend LFTs    # Focal liver lesion (focal fatty infiltration vs. small cyst)  - Consider outpatient MRI    # Pancreatic duct dilation  - outpatient EUS after resolution of pancreatitis Patient is a 42 year old male with a chief complaint of pancreatitis secondary to alcohol consumption. States that epigastric abdominal pain has minimally improved since yesterday, with complete resolution of nausea and vomiting since admission s/p receiving Zofran. Patient tolerating small amounts of water and jello, but states that he is experiencing epigastric abdominal pain with broth consumption.    # Acute Interstitial Pancreatitis 2/2 Alcohol Consumption  - Patient is hemodynamically stable  - Labs reviewed    Recommendations  - Keep patient on clear liquid diet for now  - advance slowly if the patient is tolerating clear with no nausea / vomiting or worsening pain  - Change rate of NS to 250 mL/hr  - Continue pain control PRN  - Continue to monitor BUN/Cr and Hct to ensure adequate hydration  - if clinically worsening, repeat CT A/P with IV contrast  - Counseled patient on the importance of avoiding alcohol, offered patient resources to quit if he feels as though he needs help    # Transaminitis  - AST 52, ALT 65 as of 10/7, minimal change from 10/6 (AST 54, ALT 70)  - r/o alcoholic liver disease   - avoid hepatotoxic drugs  - Continue to trend LFTs    # Focal liver lesion (focal fatty infiltration vs. small cyst)  - Consider outpatient MRI    # Pancreatic duct dilation  - outpatient EUS after resolution of pancreatitis     Patient is a 42 year old male with a chief complaint of pancreatitis secondary to alcohol consumption. States that epigastric abdominal pain has minimally improved since yesterday, with complete resolution of nausea and vomiting since admission s/p receiving Zofran. Patient tolerating small amounts of water and jello, but states that he is experiencing epigastric abdominal pain with broth consumption.    # Acute Interstitial Pancreatitis 2/2 Alcohol Consumption  - Patient is hemodynamically stable  - Labs reviewed    Recommendations  - Keep patient on clear liquid diet for now  - Change rate of NS to 250 mL/hr  - Continue pain control PRN  - Continue to monitor BUN/Cr and Hct to ensure adequate hydration  - if clinically worsening, repeat CT A/P with IV contrast  - Counseled patient on the importance of avoiding alcohol, offered patient resources to quit if he feels as though he needs help    # Transaminitis  - AST 52, ALT 65 as of 10/7, minimal change from 10/6 (AST 54, ALT 70)  - r/o alcoholic liver disease   - avoid hepatotoxic drugs  - Continue to trend LFTs  -hepatology follow up as outpatient for further workup   Follow-up at our [facility] [phone]     # Focal liver lesion (focal fatty infiltration vs. small cyst)  - Consider outpatient MRI    # Pancreatic duct dilation  - outpatient EUS after resolution of pancreatitis

## 2022-10-07 NOTE — PROGRESS NOTE ADULT - ASSESSMENT
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    #Acute on chronic pancreatitis, likely due to alcohol  # Dehydrated on admission   - CT abdomen/pelvis : CT abdomen/pelvis : Mild enlargement of the pancreatic head with indistinct margins of the pancreas. There is mild dilatation of the pancreatic duct in the body and tail. Mild peripancreatic stranding is noted especially in the region surrounding the pancreatic head. The findings are consistent with interstitial edematous pancreatitis. There is no evidence of pancreatic  necrosis. No acute peripancreatic fluid collections are noted at this time.  - last drink : 4 days ago   - IVF to NS 250cc/hr   - pain control : morhine 2 mg IV PRN every 6 + ketorolac 15mg IV q6  - zofran prn  - Gi consult given dilation on CT scan   - RUQ neg for dilation   - GI recs appreciated:  - Keep patient on clear liquid diet  - Change rate of NS to 150 mL/hr  - Continue pain control PRN  - Continue to monitor BUN/Cr and Hct to ensure adequate hydration  - if clinically worsening, repeat CT A/P with IV contrast  - Counseled patient on the importance of avoiding alcohol, offered patient resources to quit if he feels as though he needs help        #EtOH abuse  #Suspected thiamine/folate deficiencies  - thiamine/folate/B6  - last drink 4d ago : zoraida drank can ot remember how much but blacked out  - CATCH team consult   - CIWA on admission 18  - WA protocol. symtpom triggered     #polycythemia   - likely 2/2 hemoconcentration 2/2 dehydration- resolved    #HTN  #hx HLD  - continue nifedipine  - f/u lipid profile     #Transaminitis-->improving   - ALT>AST- Steatosis   - f/u Abd US   - f/u GGT  - f/u Mg , phos   - trend liver function panel  - Last admission in April 2022 : EBV +. MICAELA+, Hep pannel neg    # COVID +  - on RA   - Pt had covid 2 weeks ago   - symptom treatment     # Hx asthma   - albuteol prn     #Progress Note Handoff  Disposition: home

## 2022-10-07 NOTE — PATIENT PROFILE ADULT - FALL HARM RISK - RISK INTERVENTIONS

## 2022-10-07 NOTE — PROGRESS NOTE ADULT - SUBJECTIVE AND OBJECTIVE BOX
pt seen and examined.   feels better.   pain is epigastric , no radiation and slightly less severe. no n/v. tolerated jello. no diarrhea.     ROS: no cp, no sob, no n/v, no fever    Vital Signs Last 24 Hrs  T(C): 36.3 (07 Oct 2022 05:10), Max: 37.2 (06 Oct 2022 23:48)  T(F): 97.4 (07 Oct 2022 05:10), Max: 98.9 (06 Oct 2022 23:48)  HR: 91 (07 Oct 2022 05:10) (91 - 107)  BP: 127/62 (07 Oct 2022 05:10) (115/73 - 139/74)  RR: 18 (07 Oct 2022 05:10) (18 - 18)  SpO2: 98% (07 Oct 2022 05:10) (96% - 98%)    Parameters below as of 07 Oct 2022 00:29  Patient On (Oxygen Delivery Method): room air    physical exam  constitutional NAD, AAOX3, Respiratory  lungs CTA, CVS heart RRR, GI: abdomen Soft NT, slighly tender in epigastric area , BS+, skin: intact  neuro exam Motor, sensory and CN normal, no deficit     MEDICATIONS  (STANDING):  enoxaparin Injectable 40 milliGRAM(s) SubCutaneous every 24 hours  folic acid 1 milliGRAM(s) Oral every 24 hours  influenza   Vaccine 0.5 milliLiter(s) IntraMuscular once  NIFEdipine XL 60 milliGRAM(s) Oral daily  pyridoxine 100 milliGRAM(s) Oral daily  sodium chloride 0.9%. 1000 milliLiter(s) (250 mL/Hr) IV Continuous <Continuous>  thiamine 100 milliGRAM(s) Oral daily    MEDICATIONS  (PRN):  acetaminophen     Tablet .. 650 milliGRAM(s) Oral every 6 hours PRN Temp greater or equal to 38C (100.4F), Mild Pain (1 - 3)  ALBUTerol    90 MICROgram(s) HFA Inhaler 2 Puff(s) Inhalation every 6 hours PRN Shortness of Breath and/or Wheezing  aluminum hydroxide/magnesium hydroxide/simethicone Suspension 30 milliLiter(s) Oral every 4 hours PRN Dyspepsia  ketorolac   Injectable 30 milliGRAM(s) IV Push every 6 hours PRN Moderate Pain (4 - 6)  LORazepam     Tablet 2 milliGRAM(s) Oral every 2 hours PRN Symptom-triggered 2 point increase in CIWA-Ar  melatonin 3 milliGRAM(s) Oral at bedtime PRN Insomnia  morphine  - Injectable 2 milliGRAM(s) IV Push every 6 hours PRN Moderate Pain (4 - 6)  ondansetron Injectable 4 milliGRAM(s) IV Push every 12 hours PRN Nausea and/or Vomiting  oxyCODONE    IR 15 milliGRAM(s) Oral every 6 hours PRN Severe Pain (7 - 10)    CAPILLARY BLOOD GLUCOSE                              14.7   6.75  )-----------( 183      ( 07 Oct 2022 07:00 )             42.2     10-07    134<L>  |  99  |  5<L>  ----------------------------<  98  4.0   |  24  |  0.8    Ca    8.6      07 Oct 2022 07:00  Phos  3.3     10-06  Mg     1.8     10-07    TPro  5.2<L>  /  Alb  3.2<L>  /  TBili  0.7  /  DBili  x   /  AST  52<H>  /  ALT  65<H>  /  AlkPhos  60  10-07      COVID-19 PCR: Detected (10-05-22 @ 18:30)      CARDIAC MARKERS ( 05 Oct 2022 18:30 )  x     / <0.01 ng/mL / x     / x     / x          PT/INR - ( 05 Oct 2022 18:30 )   PT: 10.10 sec;   INR: 0.89 ratio         PTT - ( 05 Oct 2022 18:30 )  PTT:30.8 sec    < from: CT Angio Chest Aorta w/wo IV Cont (10.05.22 @ 18:08) >  1. No evidence of thoracic or abdominal aortic aneurysm or dissection.    2. Mild enlargement of the pancreatic head with indistinct margins of the   pancreas. There is mild dilatation of the pancreatic duct in the body and   tail. Mild peripancreatic stranding is noted especially in the region   surrounding the pancreatic head. The findings are consistent with   interstitial edematous pancreatitis. There is no evidence of pancreatic   necrosis. No acute peripancreatic fluid collections are noted at this   time    < end of copied text >    a/p  # acute alcohol induced pancreatitis. cont supprotive measures, cont diet as tolerated, cont IVF     # mild hyponatremia, monitor     # keep mag>2,     # etoh binging, no sign of withdrawal at this time, prn ativan     # suspected thiamin and folate def, cont supplement     # recent dx of covid, asymptomatic, no need for any treatment. ? need for isolation, check with ID     #Progress Note Handoff  Pending (specify):  Clinical improvement   Family discussion: seven pt   Disposition: Home when stable

## 2022-10-07 NOTE — PROGRESS NOTE ADULT - SUBJECTIVE AND OBJECTIVE BOX
SUBJECTIVE:    Patient is a 42y old Male who presents with a chief complaint of Pancreatitis (07 Oct 2022 12:03)    Currently admitted to medicine with the primary diagnosis of Alcoholic pancreatitis       Today is hospital day 2d. This morning he is resting comfortably in bed with just some mild epigastric abdominal pain.     PAST MEDICAL & SURGICAL HISTORY  Pancreatitis  due to alcohol    Asthma  no recent exacerbation, not using inhalers for a long time    Hypertension    H/O rotator cuff surgery      SOCIAL HISTORY:  Negative for smoking/alcohol/drug use.     ALLERGIES:  No Known Allergies    MEDICATIONS:  STANDING MEDICATIONS  enoxaparin Injectable 40 milliGRAM(s) SubCutaneous every 24 hours  folic acid 1 milliGRAM(s) Oral every 24 hours  influenza   Vaccine 0.5 milliLiter(s) IntraMuscular once  NIFEdipine XL 60 milliGRAM(s) Oral daily  pyridoxine 100 milliGRAM(s) Oral daily  sodium chloride 0.9%. 1000 milliLiter(s) IV Continuous <Continuous>  thiamine 100 milliGRAM(s) Oral daily    PRN MEDICATIONS  acetaminophen     Tablet .. 650 milliGRAM(s) Oral every 6 hours PRN  ALBUTerol    90 MICROgram(s) HFA Inhaler 2 Puff(s) Inhalation every 6 hours PRN  aluminum hydroxide/magnesium hydroxide/simethicone Suspension 30 milliLiter(s) Oral every 4 hours PRN  ketorolac   Injectable 30 milliGRAM(s) IV Push every 6 hours PRN  LORazepam     Tablet 2 milliGRAM(s) Oral every 2 hours PRN  melatonin 3 milliGRAM(s) Oral at bedtime PRN  morphine  - Injectable 2 milliGRAM(s) IV Push every 6 hours PRN  ondansetron Injectable 4 milliGRAM(s) IV Push every 12 hours PRN  oxyCODONE    IR 15 milliGRAM(s) Oral every 6 hours PRN    VITALS:   T(F): 97.4  HR: 91  BP: 127/62  RR: 18  SpO2: 98%    LABS:                        14.7   6.75  )-----------( 183      ( 07 Oct 2022 07:00 )             42.2     10-07    134<L>  |  99  |  5<L>  ----------------------------<  98  4.0   |  24  |  0.8    Ca    8.6      07 Oct 2022 07:00  Phos  3.3     10-06  Mg     1.8     10-07    TPro  5.2<L>  /  Alb  3.2<L>  /  TBili  0.7  /  DBili  x   /  AST  52<H>  /  ALT  65<H>  /  AlkPhos  60  10-07    PT/INR - ( 05 Oct 2022 18:30 )   PT: 10.10 sec;   INR: 0.89 ratio         PTT - ( 05 Oct 2022 18:30 )  PTT:30.8 sec  Urinalysis Basic - ( 05 Oct 2022 20:39 )    Color: Light Yellow / Appearance: Clear / S.042 / pH: x  Gluc: x / Ketone: Large  / Bili: Negative / Urobili: <2 mg/dL   Blood: x / Protein: Trace / Nitrite: Negative   Leuk Esterase: Negative / RBC: 5 /HPF / WBC 0 /HPF   Sq Epi: x / Non Sq Epi: 0 /HPF / Bacteria: Negative            CARDIAC MARKERS ( 05 Oct 2022 18:30 )  x     / <0.01 ng/mL / x     / x     / x          RADIOLOGY:    ACC: [id number] EXAM:  US ABDOMEN RT UPR QUADRANT                          PROCEDURE DATE:  10/06/2022          INTERPRETATION:  CLINICAL INFORMATION: Abdominal pain, nausea/vomiting,   alcohol use.    COMPARISON: US abdomen 2022 correlated withCT abdomen and pelvis   10/5/2022.    TECHNIQUE: Sonography of the right upper quadrant.    FINDINGS:  Liver: Increased echogenicity of liver. Enlarged measuring 20.8 cm.  Bile ducts: Normal caliber. Common bile duct measures 4 mm.  Gallbladder: Within normal limits. Negative Marquez's sign on exam.  Pancreas: Portions of pancreatic head appear inflamed though examination   is limited on this modality.  Right kidney: 12.5 cm. No hydronephrosis.  Ascites: None.  IVC: Visualized portions are within normal limits.    IMPRESSION:  Hepatic steatosis and hepatomegaly.    Portions of the visualized pancreas are inflamed, which suggests   pancreatitis and can be better visualized on CT abdomen and pelvis dated   10/5/2022.      PHYSICAL EXAM:  GEN: No acute distress  LUNGS: Clear to auscultation bilaterally   HEART: S1/S2 present. RRR.   ABD: Soft, slightly tender in epigastric region, non-distended. Bowel sounds present  EXT: NC/NC/NE/2+PP/BEAN/Skin Intact.   NEURO: AAOX3

## 2022-10-08 ENCOUNTER — TRANSCRIPTION ENCOUNTER (OUTPATIENT)
Age: 42
End: 2022-10-08

## 2022-10-08 LAB
ALBUMIN SERPL ELPH-MCNC: 3.6 G/DL — SIGNIFICANT CHANGE UP (ref 3.5–5.2)
ALP SERPL-CCNC: 73 U/L — SIGNIFICANT CHANGE UP (ref 30–115)
ALT FLD-CCNC: 71 U/L — HIGH (ref 0–41)
ANION GAP SERPL CALC-SCNC: 10 MMOL/L — SIGNIFICANT CHANGE UP (ref 7–14)
AST SERPL-CCNC: 57 U/L — HIGH (ref 0–41)
BILIRUB SERPL-MCNC: 0.3 MG/DL — SIGNIFICANT CHANGE UP (ref 0.2–1.2)
BUN SERPL-MCNC: 3 MG/DL — LOW (ref 10–20)
CALCIUM SERPL-MCNC: 8.6 MG/DL — SIGNIFICANT CHANGE UP (ref 8.4–10.5)
CHLORIDE SERPL-SCNC: 103 MMOL/L — SIGNIFICANT CHANGE UP (ref 98–110)
CO2 SERPL-SCNC: 27 MMOL/L — SIGNIFICANT CHANGE UP (ref 17–32)
CREAT SERPL-MCNC: 0.8 MG/DL — SIGNIFICANT CHANGE UP (ref 0.7–1.5)
EGFR: 112 ML/MIN/1.73M2 — SIGNIFICANT CHANGE UP
GLUCOSE SERPL-MCNC: 168 MG/DL — HIGH (ref 70–99)
HCT VFR BLD CALC: 41.5 % — LOW (ref 42–52)
HGB BLD-MCNC: 14.2 G/DL — SIGNIFICANT CHANGE UP (ref 14–18)
MAGNESIUM SERPL-MCNC: 1.9 MG/DL — SIGNIFICANT CHANGE UP (ref 1.8–2.4)
MCHC RBC-ENTMCNC: 32.1 PG — HIGH (ref 27–31)
MCHC RBC-ENTMCNC: 34.2 G/DL — SIGNIFICANT CHANGE UP (ref 32–37)
MCV RBC AUTO: 93.7 FL — SIGNIFICANT CHANGE UP (ref 80–94)
NRBC # BLD: 0 /100 WBCS — SIGNIFICANT CHANGE UP (ref 0–0)
PLATELET # BLD AUTO: 182 K/UL — SIGNIFICANT CHANGE UP (ref 130–400)
POTASSIUM SERPL-MCNC: 3.6 MMOL/L — SIGNIFICANT CHANGE UP (ref 3.5–5)
POTASSIUM SERPL-SCNC: 3.6 MMOL/L — SIGNIFICANT CHANGE UP (ref 3.5–5)
PROT SERPL-MCNC: 5.5 G/DL — LOW (ref 6–8)
RBC # BLD: 4.43 M/UL — LOW (ref 4.7–6.1)
RBC # FLD: 13.1 % — SIGNIFICANT CHANGE UP (ref 11.5–14.5)
SODIUM SERPL-SCNC: 140 MMOL/L — SIGNIFICANT CHANGE UP (ref 135–146)
WBC # BLD: 5.96 K/UL — SIGNIFICANT CHANGE UP (ref 4.8–10.8)
WBC # FLD AUTO: 5.96 K/UL — SIGNIFICANT CHANGE UP (ref 4.8–10.8)

## 2022-10-08 PROCEDURE — 99233 SBSQ HOSP IP/OBS HIGH 50: CPT

## 2022-10-08 RX ORDER — MAGNESIUM SULFATE 500 MG/ML
1 VIAL (ML) INJECTION ONCE
Refills: 0 | Status: COMPLETED | OUTPATIENT
Start: 2022-10-08 | End: 2022-10-08

## 2022-10-08 RX ORDER — POTASSIUM CHLORIDE 20 MEQ
40 PACKET (EA) ORAL ONCE
Refills: 0 | Status: COMPLETED | OUTPATIENT
Start: 2022-10-08 | End: 2022-10-08

## 2022-10-08 RX ADMIN — MORPHINE SULFATE 2 MILLIGRAM(S): 50 CAPSULE, EXTENDED RELEASE ORAL at 15:38

## 2022-10-08 RX ADMIN — SODIUM CHLORIDE 250 MILLILITER(S): 9 INJECTION INTRAMUSCULAR; INTRAVENOUS; SUBCUTANEOUS at 02:22

## 2022-10-08 RX ADMIN — Medication 100 MILLIGRAM(S): at 12:28

## 2022-10-08 RX ADMIN — MORPHINE SULFATE 2 MILLIGRAM(S): 50 CAPSULE, EXTENDED RELEASE ORAL at 08:17

## 2022-10-08 RX ADMIN — Medication 30 MILLIGRAM(S): at 12:33

## 2022-10-08 RX ADMIN — Medication 60 MILLIGRAM(S): at 06:11

## 2022-10-08 RX ADMIN — MORPHINE SULFATE 2 MILLIGRAM(S): 50 CAPSULE, EXTENDED RELEASE ORAL at 22:06

## 2022-10-08 RX ADMIN — Medication 100 GRAM(S): at 12:47

## 2022-10-08 RX ADMIN — OXYCODONE HYDROCHLORIDE 15 MILLIGRAM(S): 5 TABLET ORAL at 04:04

## 2022-10-08 RX ADMIN — OXYCODONE HYDROCHLORIDE 15 MILLIGRAM(S): 5 TABLET ORAL at 10:42

## 2022-10-08 RX ADMIN — Medication 30 MILLIGRAM(S): at 02:22

## 2022-10-08 RX ADMIN — Medication 30 MILLIGRAM(S): at 20:30

## 2022-10-08 RX ADMIN — Medication 1 MILLIGRAM(S): at 06:11

## 2022-10-08 RX ADMIN — Medication 40 MILLIEQUIVALENT(S): at 12:47

## 2022-10-08 RX ADMIN — ENOXAPARIN SODIUM 40 MILLIGRAM(S): 100 INJECTION SUBCUTANEOUS at 06:11

## 2022-10-08 RX ADMIN — OXYCODONE HYDROCHLORIDE 15 MILLIGRAM(S): 5 TABLET ORAL at 17:32

## 2022-10-08 NOTE — PROGRESS NOTE ADULT - SUBJECTIVE AND OBJECTIVE BOX
SUBJECTIVE:    Patient is a 42y old Male who presents with a chief complaint of Pancreatitis (07 Oct 2022 12:49)    Currently admitted to medicine with the primary diagnosis of Alcoholic pancreatitis       Today is hospital day 3d. This morning he is resting comfortably in bed and reports no new issues or overnight events.     PAST MEDICAL & SURGICAL HISTORY  Pancreatitis  due to alcohol    Asthma  no recent exacerbation, not using inhalers for a long time    Hypertension    H/O rotator cuff surgery      SOCIAL HISTORY:  Negative for smoking/alcohol/drug use.     ALLERGIES:  No Known Allergies    MEDICATIONS:  STANDING MEDICATIONS  enoxaparin Injectable 40 milliGRAM(s) SubCutaneous every 24 hours  folic acid 1 milliGRAM(s) Oral every 24 hours  influenza   Vaccine 0.5 milliLiter(s) IntraMuscular once  NIFEdipine XL 60 milliGRAM(s) Oral daily  pyridoxine 100 milliGRAM(s) Oral daily  sodium chloride 0.9%. 1000 milliLiter(s) IV Continuous <Continuous>  thiamine 100 milliGRAM(s) Oral daily    PRN MEDICATIONS  acetaminophen     Tablet .. 650 milliGRAM(s) Oral every 6 hours PRN  ALBUTerol    90 MICROgram(s) HFA Inhaler 2 Puff(s) Inhalation every 6 hours PRN  aluminum hydroxide/magnesium hydroxide/simethicone Suspension 30 milliLiter(s) Oral every 4 hours PRN  ketorolac   Injectable 30 milliGRAM(s) IV Push every 6 hours PRN  LORazepam     Tablet 2 milliGRAM(s) Oral every 2 hours PRN  melatonin 3 milliGRAM(s) Oral at bedtime PRN  morphine  - Injectable 2 milliGRAM(s) IV Push every 6 hours PRN  ondansetron Injectable 4 milliGRAM(s) IV Push every 12 hours PRN  oxyCODONE    IR 15 milliGRAM(s) Oral every 6 hours PRN    VITALS:   T(F): 97.1  HR: 80  BP: 133/75  RR: 18  SpO2: 99%    LABS:                        14.2   5.96  )-----------( 182      ( 08 Oct 2022 06:35 )             41.5     10-08    140  |  103  |  3<L>  ----------------------------<  168<H>  3.6   |  27  |  0.8    Ca    8.6      08 Oct 2022 06:35  Mg     1.9     10-08    TPro  5.5<L>  /  Alb  3.6  /  TBili  0.3  /  DBili  x   /  AST  57<H>  /  ALT  71<H>  /  AlkPhos  73  10-08                  RADIOLOGY:    PHYSICAL EXAM:  GEN: No acute distress  LUNGS: Clear to auscultation bilaterally   HEART: S1/S2 present. RRR.   ABD: Soft, non-tender, non-distended. Bowel sounds present  EXT: NC/NC/NE/2+PP/BEAN/Skin Intact.   NEURO: AAOX3    Intravenous access:   NG tube:   Hernandez Catheter:          SUBJECTIVE:    Patient is a 42y old Male who presents with a chief complaint of Pancreatitis (07 Oct 2022 12:49)    Currently admitted to medicine with the primary diagnosis of Alcoholic pancreatitis  ROS no active bleeding , no cp, no sob      Today is hospital day 3d. This morning he is resting comfortably in bed and reports no new issues or overnight events.     PAST MEDICAL & SURGICAL HISTORY  Pancreatitis  due to alcohol    Asthma  no recent exacerbation, not using inhalers for a long time    Hypertension    H/O rotator cuff surgery      SOCIAL HISTORY:  Negative for smoking/alcohol/drug use.     ALLERGIES:  No Known Allergies    MEDICATIONS:  STANDING MEDICATIONS  enoxaparin Injectable 40 milliGRAM(s) SubCutaneous every 24 hours  folic acid 1 milliGRAM(s) Oral every 24 hours  influenza   Vaccine 0.5 milliLiter(s) IntraMuscular once  NIFEdipine XL 60 milliGRAM(s) Oral daily  pyridoxine 100 milliGRAM(s) Oral daily  sodium chloride 0.9%. 1000 milliLiter(s) IV Continuous <Continuous>  thiamine 100 milliGRAM(s) Oral daily    PRN MEDICATIONS  acetaminophen     Tablet .. 650 milliGRAM(s) Oral every 6 hours PRN  ALBUTerol    90 MICROgram(s) HFA Inhaler 2 Puff(s) Inhalation every 6 hours PRN  aluminum hydroxide/magnesium hydroxide/simethicone Suspension 30 milliLiter(s) Oral every 4 hours PRN  ketorolac   Injectable 30 milliGRAM(s) IV Push every 6 hours PRN  LORazepam     Tablet 2 milliGRAM(s) Oral every 2 hours PRN  melatonin 3 milliGRAM(s) Oral at bedtime PRN  morphine  - Injectable 2 milliGRAM(s) IV Push every 6 hours PRN  ondansetron Injectable 4 milliGRAM(s) IV Push every 12 hours PRN  oxyCODONE    IR 15 milliGRAM(s) Oral every 6 hours PRN    VITALS:   T(F): 97.1  HR: 80  BP: 133/75  RR: 18  SpO2: 99%    LABS:                        14.2   5.96  )-----------( 182      ( 08 Oct 2022 06:35 )             41.5     10-08    140  |  103  |  3<L>  ----------------------------<  168<H>  3.6   |  27  |  0.8    Ca    8.6      08 Oct 2022 06:35  Mg     1.9     10-08    TPro  5.5<L>  /  Alb  3.6  /  TBili  0.3  /  DBili  x   /  AST  57<H>  /  ALT  71<H>  /  AlkPhos  73  10-08                  RADIOLOGY:    PHYSICAL EXAM:  GEN: No acute distress  LUNGS: Clear to auscultation bilaterally   HEART: S1/S2 present. RRR.   ABD: Soft, non-tender, non-distended. Bowel sounds present  EXT: NC/NC/NE/2+PP/BEAN/Skin Intact.   NEURO: AAOX3    Intravenous access:   NG tube:   Hernandez Catheter:

## 2022-10-08 NOTE — DISCHARGE NOTE PROVIDER - NSDCMRMEDTOKEN_GEN_ALL_CORE_FT
albuterol 90 mcg/inh inhalation aerosol: 2 puff(s) inhaled every 6 hours, As Needed  ibuprofen 600 mg oral tablet: 1 tab(s) orally every 6 hours, As Needed -for moderate pain   NIFEdipine 60 mg oral tablet, extended release: 1 tab(s) orally once a day   albuterol 90 mcg/inh inhalation aerosol: 2 puff(s) inhaled every 6 hours, As Needed  folic acid 1 mg oral tablet: 1 tab(s) orally every 24 hours  NIFEdipine 60 mg oral tablet, extended release: 1 tab(s) orally once a day  thiamine 100 mg oral tablet: 1 tab(s) orally once a day

## 2022-10-08 NOTE — PROGRESS NOTE ADULT - ASSESSMENT
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    #Acute on chronic pancreatitis, likely due to alcohol  # Dehydrated on admission   - CT abdomen/pelvis : CT abdomen/pelvis : Mild enlargement of the pancreatic head with indistinct margins of the pancreas. There is mild dilatation of the pancreatic duct in the body and tail. Mild peripancreatic stranding is noted especially in the region surrounding the pancreatic head. The findings are consistent with interstitial edematous pancreatitis. There is no evidence of pancreatic  necrosis. No acute peripancreatic fluid collections are noted at this time.  - last drink : 4 days ago   - IVF to NS 250cc/hr   - pain control : morhine 2 mg IV PRN every 6 + ketorolac 15mg IV q6  - zofran prn  - Gi consult given dilation on CT scan   - RUQ neg for dilation   - GI recs appreciated:  - Keep patient on clear liquid diet  - Continue pain control PRN  - Continue to monitor BUN/Cr and Hct to ensure adequate hydration  - if clinically worsening, repeat CT A/P with IV contrast  - Counseled patient on the importance of avoiding alcohol, offered patient resources to quit if he feels as though he needs help        #EtOH abuse  #Suspected thiamine/folate deficiencies  - thiamine/folate/B6  - last drink 4d ago : zoraida drank can ot remember how much but blacked out  - CATCH team consult   - CIWA on admission 18  - WA protocol. symtpom triggered     #polycythemia   - likely 2/2 hemoconcentration 2/2 dehydration- resolved    #HTN  #hx HLD  - continue nifedipine  - f/u lipid profile     #Transaminitis-->improving   - ALT>AST- Steatosis   - f/u Abd US   - f/u GGT  - f/u Mg , phos   - trend liver function panel  - Last admission in April 2022 : EBV +. MICAELA+, Hep pannel neg    # COVID +  - on RA   - Pt had covid 2 weeks ago   - symptom treatment     # Hx asthma   - albuteol prn     #Progress Note Handoff  Disposition: home

## 2022-10-08 NOTE — DISCHARGE NOTE PROVIDER - CARE PROVIDERS DIRECT ADDRESSES
[email or web address]

## 2022-10-08 NOTE — PROGRESS NOTE ADULT - ATTENDING COMMENTS
a/p  # acute alcohol induced pancreatitis. cont supprotive measures, cont diet as tolerated, cont IVF     # keep K >4 and mag >2    # etoh binging, no sign of withdrawal at this time, prn ativan     # suspected thiamin and folate def, cont supplement     # recent dx of covid, asymptomatic, no need for any treatment. ? need for isolation, check with ID     #Progress Note Handoff  Pending (specify):  Clinical improvement   Family discussion: seven pt   Disposition: Home when stable  anticipate dc in am
I edited the note

## 2022-10-08 NOTE — DISCHARGE NOTE PROVIDER - CARE PROVIDER_API CALL
Michi Colón)  [id number]  [address] [place] [address]  Phone: [phone]  Fax: [phone]  Follow Up Time:

## 2022-10-08 NOTE — DISCHARGE NOTE PROVIDER - NSDCCPCAREPLAN_GEN_ALL_CORE_FT
PRINCIPAL DISCHARGE DIAGNOSIS  Diagnosis: Alcoholic pancreatitis  Assessment and Plan of Treatment: Pancreatitis  Pancreatitis is a condition in which the pancreas becomes irritated and swollen (inflammation). The pancreas is a gland that is located behind the stomach. It produces enzymes that help to digest food. Most acute attacks last a couple of days and can cause serious problems and even be life threatening. The most common causes are alcohol abuse and gallstones. Symptoms include pain in the upper abdomen that may radiate to the back, nausea, and vomiting. Diagnosis is made with a medical history and physical exam as well as additional diagnostic testing. At home, eat smaller, more frequent meals and avoid alcohol and fatty foods. Drink enough fluid to keep your urine clear or pale yellow.   SEEK IMMEDIATE MEDICAL CARE IF YOU HAVE ANY OF THE FOLLOWING SYMPTOMS: inability to keep fluids down, increasing pain, yellowing of your skin or eyes, or lightheadedness/dizziness.  Alcohol Abuse  Alcohol intoxication occurs when the amount of alcohol that a person has consumed impairs his or her ability to mentally and physically function. Chronic alcohol consumption can also lead to a variety of health issues including neurological disease, stomach disease, heart disease, liver disease, etc. Do not drive after drinking alcohol. Drinking enough alcohol to end up in an Emergency Room suggests you may have an alcohol abuse problem. Seek help at a drug addiction center.  SEEK IMMEDIATE MEDICAL CARE IF YOU HAVE ANY OF THE FOLLOWING SYMPTOMS: seizures, vomiting blood, blood in your stool, lightheadedness/dizziness, or becoming shaky to tremulous when you stop drinking.

## 2022-10-08 NOTE — DISCHARGE NOTE PROVIDER - HOSPITAL COURSE
43 yo male PMH of htn and pancreatitis 2/2 EtOH abuse,   Presented to ed for abd pain, a/w nv, moderate pain, burning, radiates to back  Pt admits to drinking 4 days prior to admission, states he binge drank, pain started night prior to admission.   Denies fever, chills, cp, sob, diarrhea, dysuria, hematuria.    In the ED:  BP: 184/177  HR : 115   RR:20  T : 97  O2: 99% on RA    WBC : 12  HB : 19   HCT : 57   INR : 0.89  K : 5.1, Ca 10.7  Lipase : 408  BNP :1.5k  Lac 2.1  Alc blood < 10  ECG : Sinus tachycardia  COVID +  ALT/AST: 100/79   Alk phos wnl    CT abdomen/pelvis :   1. No evidence of thoracic or abdominal aortic aneurysm or dissection.  2. Mild enlargement of the pancreatic head with indistinct margins of the   pancreas. There is mild dilatation of the pancreatic duct in the body and   tail. Mild peripancreatic stranding is noted especially in the region   surrounding the pancreatic head. The findings are consistent with   interstitial edematous pancreatitis. There is no evidence of pancreatic   necrosis. No acute peripancreatic fluid collections are noted at this   time.    #Acute on chronic pancreatitis, likely due to alcohol - Resolving  # Dehydrated on admission   - CT abdomen/pelvis : CT abdomen/pelvis : Mild enlargement of the pancreatic head with indistinct margins of the pancreas. There is mild dilatation of the pancreatic duct in the body and tail. Mild peripancreatic stranding is noted especially in the region surrounding the pancreatic head. The findings are consistent with interstitial edematous pancreatitis. There is no evidence of pancreatic  necrosis. No acute peripancreatic fluid collections are noted at this time.  - last drink : 4 day prior to admission  - zofran prn  - Gi consult given dilation on CT scan   - RUQ neg for dilation   - GI recs appreciated.  - Counseled patient on the importance of avoiding alcohol, offered patient resources to quit if he feels as though he needs help  - Pt was able to tolerate graduation of diet from liquids to solids successfully.        #EtOH abuse  #Suspected thiamine/folate deficiencies  - thiamine/folate/B6  - last drink 4 days prior to admission: zoraida drank cannot remember how much but blacked out    #polycythemia   - likely 2/2 hemoconcentration 2/2 dehydration- resolved    #HTN  #hx HLD  - continue nifedipine  - f/u lipid profile     #Transaminitis-->improving   - ALT>AST- Steatosis   - Last admission in April 2022 : EBV +. MICAELA+, Hep pannel neg    # COVID + - Resolving    # Hx asthma   - albuteol prn      41 yo male PMH of htn and pancreatitis 2/2 EtOH abuse,   Presented to ed for abd pain, a/w nv, moderate pain, burning, radiates to back  Pt admits to drinking 4 days prior to admission, states he binge drank, pain started night prior to admission.   Denies fever, chills, cp, sob, diarrhea, dysuria, hematuria.    In the ED:  BP: 184/177  HR : 115   RR:20  T : 97  O2: 99% on RA    WBC : 12  HB : 19   HCT : 57   INR : 0.89  K : 5.1, Ca 10.7  Lipase : 408  BNP :1.5k  Lac 2.1  Alc blood < 10  ECG : Sinus tachycardia  COVID +  ALT/AST: 100/79   Alk phos wnl    CT abdomen/pelvis :   1. No evidence of thoracic or abdominal aortic aneurysm or dissection.  2. Mild enlargement of the pancreatic head with indistinct margins of the   pancreas. There is mild dilatation of the pancreatic duct in the body and   tail. Mild peripancreatic stranding is noted especially in the region   surrounding the pancreatic head. The findings are consistent with   interstitial edematous pancreatitis. There is no evidence of pancreatic   necrosis. No acute peripancreatic fluid collections are noted at this   time.    #Acute on chronic pancreatitis, likely due to alcohol - Resolving  # Dehydrated on admission   - CT abdomen/pelvis : CT abdomen/pelvis : Mild enlargement of the pancreatic head with indistinct margins of the pancreas. There is mild dilatation of the pancreatic duct in the body and tail. Mild peripancreatic stranding is noted especially in the region surrounding the pancreatic head. The findings are consistent with interstitial edematous pancreatitis. There is no evidence of pancreatic  necrosis. No acute peripancreatic fluid collections are noted at this time.  - last drink : 4 day prior to admission  - zofran prn  - Gi consult given dilation on CT scan   - RUQ neg for dilation   - GI recs appreciated.  - Counseled patient on the importance of avoiding alcohol, offered patient resources to quit if he feels as though he needs help  - Pt was able to tolerate graduation of diet from liquids to solids successfully.      #EtOH abuse  #Suspected thiamine/folate deficiencies  - thiamine/folate/B6  - last drink 4 days prior to admission: zoraida drank cannot remember how much but blacked out    #polycythemia   - likely 2/2 hemoconcentration 2/2 dehydration- resolved    #HTN  #hx HLD  - continue nifedipine  - f/u lipid profile     #Transaminitis-->improving   - ALT>AST- Steatosis   - Last admission in April 2022 : EBV +. MICAELA+, Hep pannel neg    # COVID asymptomatic     # Hx asthma   - albuteol prn

## 2022-10-09 ENCOUNTER — TRANSCRIPTION ENCOUNTER (OUTPATIENT)
Age: 42
End: 2022-10-09

## 2022-10-09 VITALS
TEMPERATURE: 97 F | OXYGEN SATURATION: 99 % | SYSTOLIC BLOOD PRESSURE: 135 MMHG | HEART RATE: 87 BPM | RESPIRATION RATE: 18 BRPM | DIASTOLIC BLOOD PRESSURE: 78 MMHG

## 2022-10-09 LAB
ALBUMIN SERPL ELPH-MCNC: 3.9 G/DL — SIGNIFICANT CHANGE UP (ref 3.5–5.2)
ALP SERPL-CCNC: 78 U/L — SIGNIFICANT CHANGE UP (ref 30–115)
ALT FLD-CCNC: 113 U/L — HIGH (ref 0–41)
ANION GAP SERPL CALC-SCNC: 13 MMOL/L — SIGNIFICANT CHANGE UP (ref 7–14)
AST SERPL-CCNC: 86 U/L — HIGH (ref 0–41)
BILIRUB SERPL-MCNC: 0.3 MG/DL — SIGNIFICANT CHANGE UP (ref 0.2–1.2)
BUN SERPL-MCNC: 6 MG/DL — LOW (ref 10–20)
CALCIUM SERPL-MCNC: 9.2 MG/DL — SIGNIFICANT CHANGE UP (ref 8.4–10.4)
CHLORIDE SERPL-SCNC: 96 MMOL/L — LOW (ref 98–110)
CO2 SERPL-SCNC: 27 MMOL/L — SIGNIFICANT CHANGE UP (ref 17–32)
CREAT SERPL-MCNC: 0.8 MG/DL — SIGNIFICANT CHANGE UP (ref 0.7–1.5)
EGFR: 112 ML/MIN/1.73M2 — SIGNIFICANT CHANGE UP
GLUCOSE SERPL-MCNC: 109 MG/DL — HIGH (ref 70–99)
HCT VFR BLD CALC: 45.9 % — SIGNIFICANT CHANGE UP (ref 42–52)
HGB BLD-MCNC: 15.9 G/DL — SIGNIFICANT CHANGE UP (ref 14–18)
MAGNESIUM SERPL-MCNC: 2 MG/DL — SIGNIFICANT CHANGE UP (ref 1.8–2.4)
MCHC RBC-ENTMCNC: 32.7 PG — HIGH (ref 27–31)
MCHC RBC-ENTMCNC: 34.6 G/DL — SIGNIFICANT CHANGE UP (ref 32–37)
MCV RBC AUTO: 94.4 FL — HIGH (ref 80–94)
NRBC # BLD: 0 /100 WBCS — SIGNIFICANT CHANGE UP (ref 0–0)
PLATELET # BLD AUTO: 203 K/UL — SIGNIFICANT CHANGE UP (ref 130–400)
POTASSIUM SERPL-MCNC: 4.1 MMOL/L — SIGNIFICANT CHANGE UP (ref 3.5–5)
POTASSIUM SERPL-SCNC: 4.1 MMOL/L — SIGNIFICANT CHANGE UP (ref 3.5–5)
PROT SERPL-MCNC: 6.3 G/DL — SIGNIFICANT CHANGE UP (ref 6–8)
RBC # BLD: 4.86 M/UL — SIGNIFICANT CHANGE UP (ref 4.7–6.1)
RBC # FLD: 13.5 % — SIGNIFICANT CHANGE UP (ref 11.5–14.5)
SODIUM SERPL-SCNC: 136 MMOL/L — SIGNIFICANT CHANGE UP (ref 135–146)
WBC # BLD: 6.64 K/UL — SIGNIFICANT CHANGE UP (ref 4.8–10.8)
WBC # FLD AUTO: 6.64 K/UL — SIGNIFICANT CHANGE UP (ref 4.8–10.8)

## 2022-10-09 PROCEDURE — 99239 HOSP IP/OBS DSCHRG MGMT >30: CPT | Mod: GC

## 2022-10-09 RX ORDER — THIAMINE MONONITRATE (VIT B1) 100 MG
1 TABLET ORAL
Qty: 30 | Refills: 0
Start: 2022-10-09 | End: 2022-11-07

## 2022-10-09 RX ORDER — FOLIC ACID 0.8 MG
1 TABLET ORAL
Qty: 30 | Refills: 0
Start: 2022-10-09 | End: 2022-11-07

## 2022-10-09 RX ADMIN — ENOXAPARIN SODIUM 40 MILLIGRAM(S): 100 INJECTION SUBCUTANEOUS at 05:18

## 2022-10-09 RX ADMIN — OXYCODONE HYDROCHLORIDE 15 MILLIGRAM(S): 5 TABLET ORAL at 01:00

## 2022-10-09 RX ADMIN — Medication 100 MILLIGRAM(S): at 11:26

## 2022-10-09 RX ADMIN — Medication 1 MILLIGRAM(S): at 05:18

## 2022-10-09 RX ADMIN — OXYCODONE HYDROCHLORIDE 15 MILLIGRAM(S): 5 TABLET ORAL at 08:16

## 2022-10-09 RX ADMIN — MORPHINE SULFATE 2 MILLIGRAM(S): 50 CAPSULE, EXTENDED RELEASE ORAL at 05:08

## 2022-10-09 RX ADMIN — Medication 60 MILLIGRAM(S): at 05:09

## 2022-10-09 RX ADMIN — OXYCODONE HYDROCHLORIDE 15 MILLIGRAM(S): 5 TABLET ORAL at 02:10

## 2022-10-09 RX ADMIN — OXYCODONE HYDROCHLORIDE 15 MILLIGRAM(S): 5 TABLET ORAL at 09:26

## 2022-10-09 NOTE — DISCHARGE NOTE NURSING/CASE MANAGEMENT/SOCIAL WORK - NSDCPEEMAIL_GEN_ALL_CORE
[facility] email [email or web address]

## 2022-10-09 NOTE — DIETITIAN INITIAL EVALUATION ADULT - OTHER CALCULATIONS
MSJ 1800 x SF 1.1-1.2= [id number] kcal/day. Pro needs 105 g/day based on 1.2 g/kg. Est needs consider pancreatitis. Fluid needs 3052 based on 35 mL/kg.

## 2022-10-09 NOTE — DISCHARGE NOTE NURSING/CASE MANAGEMENT/SOCIAL WORK - NSDCPEFALRISK_GEN_ALL_CORE
For information on Fall & Injury Prevention, visit: [email or web address] OR  [email or web address] OR  https://www.cdc.gov/steadi/patient.html

## 2022-10-09 NOTE — PROGRESS NOTE ADULT - SUBJECTIVE AND OBJECTIVE BOX
pt seen and examined  feeling better. pain is controlled, tolerating diet         ROS: no cp, no sob, no n/v, no fever    Vital Signs Last 24 Hrs  T(C): 36.1 (09 Oct 2022 05:00), Max: 36.6 (08 Oct 2022 12:22)  T(F): 96.9 (09 Oct 2022 05:00), Max: 97.9 (08 Oct 2022 12:22)  HR: 79 (09 Oct 2022 05:00) (79 - 89)  BP: 146/81 (09 Oct 2022 05:00) (131/82 - 146/81)  BP(mean): --  RR: 18 (09 Oct 2022 05:00) (18 - 18)  SpO2: --        physical exam  constitutional NAD, AAOX3, Respiratory  lungs CTA, CVS heart RRR, GI: abdomen Soft NT, ND, BS+, skin: intact  neuro exam Motor, sensory and CN normal, no deficit     MEDICATIONS  (STANDING):  enoxaparin Injectable 40 milliGRAM(s) SubCutaneous every 24 hours  folic acid 1 milliGRAM(s) Oral every 24 hours  influenza   Vaccine 0.5 milliLiter(s) IntraMuscular once  NIFEdipine XL 60 milliGRAM(s) Oral daily  pyridoxine 100 milliGRAM(s) Oral daily  sodium chloride 0.9%. 1000 milliLiter(s) (250 mL/Hr) IV Continuous <Continuous>  thiamine 100 milliGRAM(s) Oral daily    MEDICATIONS  (PRN):  acetaminophen     Tablet .. 650 milliGRAM(s) Oral every 6 hours PRN Temp greater or equal to 38C (100.4F), Mild Pain (1 - 3)  ALBUTerol    90 MICROgram(s) HFA Inhaler 2 Puff(s) Inhalation every 6 hours PRN Shortness of Breath and/or Wheezing  aluminum hydroxide/magnesium hydroxide/simethicone Suspension 30 milliLiter(s) Oral every 4 hours PRN Dyspepsia  ketorolac   Injectable 30 milliGRAM(s) IV Push every 6 hours PRN Moderate Pain (4 - 6)  LORazepam     Tablet 2 milliGRAM(s) Oral every 2 hours PRN Symptom-triggered 2 point increase in CIWA-Ar  melatonin 3 milliGRAM(s) Oral at bedtime PRN Insomnia  morphine  - Injectable 2 milliGRAM(s) IV Push every 6 hours PRN Moderate Pain (4 - 6)  ondansetron Injectable 4 milliGRAM(s) IV Push every 12 hours PRN Nausea and/or Vomiting  oxyCODONE    IR 15 milliGRAM(s) Oral every 6 hours PRN Severe Pain (7 - 10)    CAPILLARY BLOOD GLUCOSE                              15.9   6.64  )-----------( 203      ( 09 Oct 2022 07:12 )             45.9     10-09    136  |  96<L>  |  6<L>  ----------------------------<  109<H>  4.1   |  27  |  0.8    Ca    9.2      09 Oct 2022 07:12  Mg     2.0     10-09    TPro  6.3  /  Alb  3.9  /  TBili  0.3  /  DBili  x   /  AST  86<H>  /  ALT  113<H>  /  AlkPhos  78  10-09      COVID-19 PCR: Detected (10-05-22 @ 18:30)    < from: CT Angio Chest Aorta w/wo IV Cont (10.05.22 @ 18:08) >  1. No evidence of thoracic or abdominal aortic aneurysm or dissection.    2. Mild enlargement of the pancreatic head with indistinct margins of the   pancreas. There is mild dilatation of the pancreatic duct in the body and   tail. Mild peripancreatic stranding is noted especially in the region   surrounding the pancreatic head. The findings are consistent with   interstitial edematous pancreatitis. There is no evidence of pancreatic   necrosis. No acute peripancreatic fluid collections are noted at this   time.    < end of copied text >    a/p    # acute alcohol induced pancreatitis. improved, stable, tolerating diet     # keep K >4 and mag >2    # etoh binging, no sign of withdrawal at this time, prn ativan     # suspected thiamin and folate def, cont supplement     # recent dx of covid, asymptomatic, no need for any treatment. ? need for isolation, check with ID     #Progress Note Handoff  Pending (specify):  Clinical improvement   Family discussion: dw pt   Disposition: Home today   pt was advised to return to ER if he has any fever, or recurrence of pain or any new concerns and also advised to avoid alcohol     time spent 35 min

## 2022-10-09 NOTE — DIETITIAN INITIAL EVALUATION ADULT - PERTINENT LABORATORY DATA
10-08    140  |  103  |  3<L>  ----------------------------<  168<H>  3.6   |  27  |  0.8    Ca    8.6      08 Oct 2022 06:35  Mg     1.9     10-08    TPro  5.5<L>  /  Alb  3.6  /  TBili  0.3  /  DBili  x   /  AST  57<H>  /  ALT  71<H>  /  AlkPhos  73  10-08  A1C with Estimated Average Glucose Result: 6.1 % (04-25-22 @ 04:30)

## 2022-10-09 NOTE — DISCHARGE NOTE NURSING/CASE MANAGEMENT/SOCIAL WORK - PATIENT PORTAL LINK FT
You can access the FollowMyHealth Patient Portal offered by [facility] by registering at the following website: [email or web address]. By joining [employer]’s FollowMyHealth portal, you will also be able to view your health information using other applications (apps) compatible with our system.

## 2022-10-09 NOTE — DIETITIAN INITIAL EVALUATION ADULT - ADD RECOMMEND
Continue with diet as ordered, monitor PO intake and GI tolerance, f/u within 4-6 days, if pt not discharged.

## 2022-10-09 NOTE — DIETITIAN INITIAL EVALUATION ADULT - ENERGY INTAKE
Adequate (%) Low fat full liquid diet 10/6-10/8, now on low fat, soft and bite-sized, good PO intake, tolerating well.

## 2022-10-09 NOTE — DISCHARGE NOTE NURSING/CASE MANAGEMENT/SOCIAL WORK - NSDCPEWEB_GEN_ALL_CORE
[facility] for Tobacco Control website --- [email or web address] website --- [email or web address]

## 2022-10-09 NOTE — DIETITIAN INITIAL EVALUATION ADULT - PERTINENT MEDS FT
MEDICATIONS  (STANDING):  enoxaparin Injectable 40 milliGRAM(s) SubCutaneous every 24 hours  folic acid 1 milliGRAM(s) Oral every 24 hours  influenza   Vaccine 0.5 milliLiter(s) IntraMuscular once  NIFEdipine XL 60 milliGRAM(s) Oral daily  pyridoxine 100 milliGRAM(s) Oral daily  sodium chloride 0.9%. 1000 milliLiter(s) (250 mL/Hr) IV Continuous <Continuous>  thiamine 100 milliGRAM(s) Oral daily    MEDICATIONS  (PRN):  acetaminophen     Tablet .. 650 milliGRAM(s) Oral every 6 hours PRN Temp greater or equal to 38C (100.4F), Mild Pain (1 - 3)  ALBUTerol    90 MICROgram(s) HFA Inhaler 2 Puff(s) Inhalation every 6 hours PRN Shortness of Breath and/or Wheezing  aluminum hydroxide/magnesium hydroxide/simethicone Suspension 30 milliLiter(s) Oral every 4 hours PRN Dyspepsia  ketorolac   Injectable 30 milliGRAM(s) IV Push every 6 hours PRN Moderate Pain (4 - 6)  LORazepam     Tablet 2 milliGRAM(s) Oral every 2 hours PRN Symptom-triggered 2 point increase in CIWA-Ar  melatonin 3 milliGRAM(s) Oral at bedtime PRN Insomnia  morphine  - Injectable 2 milliGRAM(s) IV Push every 6 hours PRN Moderate Pain (4 - 6)  ondansetron Injectable 4 milliGRAM(s) IV Push every 12 hours PRN Nausea and/or Vomiting  oxyCODONE    IR 15 milliGRAM(s) Oral every 6 hours PRN Severe Pain (7 - 10)

## 2022-10-18 DIAGNOSIS — E78.5 HYPERLIPIDEMIA, UNSPECIFIED: ICD-10-CM

## 2022-10-18 DIAGNOSIS — J45.909 UNSPECIFIED ASTHMA, UNCOMPLICATED: ICD-10-CM

## 2022-10-18 DIAGNOSIS — K86.89 OTHER SPECIFIED DISEASES OF PANCREAS: ICD-10-CM

## 2022-10-18 DIAGNOSIS — F17.290 NICOTINE DEPENDENCE, OTHER TOBACCO PRODUCT, UNCOMPLICATED: ICD-10-CM

## 2022-10-18 DIAGNOSIS — K86.0 ALCOHOL-INDUCED CHRONIC PANCREATITIS: ICD-10-CM
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
## 2022-10-18 DIAGNOSIS — Z79.1 LONG TERM (CURRENT) USE OF NON-STEROIDAL ANTI-INFLAMMATORIES (NSAID): ICD-10-CM

## 2022-10-18 DIAGNOSIS — D75.1 SECONDARY POLYCYTHEMIA: ICD-10-CM

## 2022-10-18 DIAGNOSIS — E53.8 DEFICIENCY OF OTHER SPECIFIED B GROUP VITAMINS: ICD-10-CM

## 2022-10-18 DIAGNOSIS — U07.1 COVID-19: ICD-10-CM

## 2022-10-18 DIAGNOSIS — R10.9 UNSPECIFIED ABDOMINAL PAIN: ICD-10-CM

## 2022-10-18 DIAGNOSIS — E87.1 HYPO-OSMOLALITY AND HYPONATREMIA: ICD-10-CM

## 2022-10-18 DIAGNOSIS — K85.20 ALCOHOL INDUCED ACUTE PANCREATITIS WITHOUT NECROSIS OR INFECTION: ICD-10-CM

## 2022-10-18 DIAGNOSIS — E86.0 DEHYDRATION: ICD-10-CM

## 2022-10-18 DIAGNOSIS — E51.9 THIAMINE DEFICIENCY, UNSPECIFIED: ICD-10-CM

## 2022-10-18 DIAGNOSIS — I10 ESSENTIAL (PRIMARY) HYPERTENSION: ICD-10-CM

## 2022-10-18 DIAGNOSIS — F10.10 ALCOHOL ABUSE, UNCOMPLICATED: ICD-10-CM

## 2023-01-27 NOTE — H&P ADULT - NSTOBACCOSCREENHP_GEN_A_NCS
-- DO NOT REPLY / DO NOT REPLY ALL --  -- Message is from [facility] [employer] ([facility]) --    General Patient Message: Patient requesting a call back from doctor Layton Ring in regards to medication refill.     Caller Information       Type Contact Phone/Fax    01/26/2023 05:31 PM CST Phone (Incoming) Emelina Watts (Self) [phone] (M)        Alternative phone number: No    Can a detailed message be left? Yes    Message Turnaround:     Is it Working Hours? Yes - Working Hours     [place]:    Please give this turnaround time to the caller:   \"This message will be sent to [state Provider's name]. The clinical team will fulfill your request as soon as they review your message.\"                
Spoke to patient informed her that her prescription was sent to her pharmacy on 01/26/2023.     
Yes

## 2023-03-29 NOTE — PATIENT PROFILE ADULT - OVER THE PAST TWO WEEKS HAVE YOU FELT DOWN, DEPRESSED OR HOPELESS?
Doing well  GTT/CBC today  RTC in 3-4 weeks with Tdap, Rhogam    Pregnancy complications:  1. Hx of infant loss 10/1/22  2. Hx of uncontrolled GDM per [facility] reports  3. Hx of chronic HTN with superimposed preeclampsia  4. Hx of LTCS x1 on 8/26/21  5. Prepregnancy BMI of 43     Recommendations:  1. TOLAC vs RCS at her preference  2. Early glucose and Hgb A1C  3. HTN labs  4. ASA 81mg daily starting at 12 weeks  5. Growth at 28w, 36w, BPP weekly 34w through delivery  6. We will follow up with her at visits regarding mental health with recent loss; if she needs assistance at any point, we are here for her    Birth plan:  Desires CS if needs to deliver at 37w  If laboring, desires to be mobile, use wireless monitors, water for calming  May try to go without pain medications or utilize them depending on time of day/desire to sleep/etc  Delayed cord clamping, [place] to cut cord, skin/skin     no

## 2023-04-20 NOTE — PATIENT PROFILE ADULT - PACKS YRS CALCULATION
Continue to check blood sugar twice per day at different times.  Continue to limit added sugar to 36 grams or less per day.  Continue to stay active every day.  Continue Metformin 500 mg twice per day with food.  Schedule a diabetes check with  around 6/1/23.    Blood sugar goals:  Before meals   1-2 hours after meals: less 180  Bedtime:     A1c: less than 7.0% (estimated average blood sugar of 154 mg/dl)     Ben, you are doing great! Keep up the good work!   25

## 2023-08-18 NOTE — ED ADULT NURSE NOTE - NS ED NURSE IV DC DT
"     [facility] Cardiothoracic Surgery Office Follow Up Note     Date of Encounter: 2023     Name: Khoa Arnold  : 1956     Referred By: No ref. provider found  PCP: Shirlene Sharpe MD    Chief Complaint:    Chief Complaint   Patient presents with    Carotid Artery Disease     Hospital follow up s/p right carotid endarterectomy 23. Complains of having some vision changes in his right eye since surgery.       Subjective      History of Present Illness:    Khoa Arnold is a 67 y.o. male s/p right CEA per Dr. Goldsmith on 2023 receded by left CEA per Dr. Goldsmith on 10/21/2022 for symptomatic bilateral carotid artery stenosis.  PMH: Aortic stenosis s/p AVR with CAD s/p CABG per Dr. Goldsmith , ROBERTO s/p now bilateral CEAs, asthma, BPH, COPD, type 2 diabetes, hyperlipidemia, HTN, and CVA .  Following uncomplicated right CEA, patient was extubated and sent to the ICU in stable condition.  On POD #1 he was noted to have no neurological changes and was weaned off his Cardene drip.  GUIDO drain removed and patient was discharged home in stable condition.  Patient presents to CT surgery clinic for scheduled 4-week postop visit.  He states compliance with aspirin, Plavix, and statin.  However, patient relates right eye vision began to be intermittently blurry with \"flickering lights\" approximately 2 weeks postop.  Patient also relates feeling very dizzy and off-balance with worsening right eye blurriness/vision loss when bending forward or and standing back up.  Patient relates he was seen by cardiology within the past week, having undergone updated stress test and \"ultrasound\".  Patient states he has been seen by his ophthalmologist since his carotid surgery with no changes in his ophthalmic exam.  He states blood pressure at home generally ranges 140/80 mmHg.    In clinic orthostatic blood pressures as follows: Lying down right 172/79, left 180/84 followed by standing blood " pressures right 162/77, left 158/77 mmHg.  Patient states his fluid intake includes 216 ounce bottles of water per day, two or three 20 ounce coffees per day, and one 20 ounce tea per day.    Review of Systems:  Review of Systems   Constitutional: Positive for malaise/fatigue. Negative for chills, decreased appetite, diaphoresis, fever, night sweats and weight loss.   HENT: Negative.  Negative for congestion, hoarse voice, sore throat and stridor.    Eyes:  Positive for visual disturbance.   Cardiovascular:  Positive for dyspnea on exertion. Negative for chest pain, claudication, irregular heartbeat, leg swelling, near-syncope, orthopnea, palpitations, paroxysmal nocturnal dyspnea and syncope.   Respiratory: Negative.  Negative for cough, hemoptysis, shortness of breath, sleep disturbances due to breathing, snoring, sputum production and wheezing.    Hematologic/Lymphatic: Negative for adenopathy and bleeding problem. Bruises/bleeds easily.   Skin: Negative.  Negative for color change, dry skin, itching, poor wound healing and rash.   Musculoskeletal:  Positive for back pain. Negative for arthritis, falls and muscle weakness.   Gastrointestinal: Negative.  Negative for abdominal pain, anorexia, constipation, diarrhea, hematochezia, melena, nausea and vomiting.   Neurological:  Positive for dizziness, light-headedness and loss of balance. Negative for difficulty with concentration, disturbances in coordination, numbness, seizures, vertigo and weakness.   Psychiatric/Behavioral:  Negative for altered mental status, depression, memory loss and substance abuse. The patient has insomnia and is nervous/anxious.    Allergic/Immunologic: Negative for persistent infections.     I have reviewed the following portions of the patient's history: problem list, current medications, allergies, past surgical history, past medical history, past social history, past family history, and ROS and confirm it's  accurate.    Allergies:  Allergies   Allergen Reactions    Ranexa [Ranolazine Er] Nausea Only and Dizziness    Lisinopril Itching and Rash     On feet       Medications:      Current Outpatient Medications:     Accu-Chek Guide test strip, Use to check glucose TID, Disp: , Rfl:     Accu-Chek Softclix Lancets lancets, Use to check glucose three times daily, Disp: , Rfl:     albuterol sulfate  (90 Base) MCG/ACT inhaler, Inhale 2 puffs Every 4 (Four) Hours As Needed for Wheezing or Shortness of Air., Disp: , Rfl:     aspirin 81 MG EC tablet, Take 1 tablet by mouth Daily., Disp: , Rfl:     busPIRone (BUSPAR) 10 MG tablet, Take 1 tablet by mouth 3 (Three) Times a Day., Disp: , Rfl:     cetirizine (zyrTEC) 10 MG tablet, Take 1 tablet by mouth Daily As Needed for Allergies or Rhinitis., Disp: , Rfl:     clopidogrel (PLAVIX) 75 MG tablet, Take 1 tablet by mouth Daily. Per Dr Agus dorsey preop, Disp: , Rfl:     empagliflozin (Jardiance) 10 MG tablet tablet, Take 1 tablet by mouth Daily., Disp: 90 tablet, Rfl: 1    losartan (COZAAR) 100 MG tablet, Take 1 tablet by mouth Daily., Disp: , Rfl:     metFORMIN (GLUCOPHAGE) 500 MG tablet, Take 2 tablets by mouth 2 (Two) Times a Day With Meals., Disp: , Rfl:     metoprolol tartrate (LOPRESSOR) 100 MG tablet, Take 1 tablet by mouth Every 12 (Twelve) Hours., Disp: 60 tablet, Rfl: 11    multivitamin (THERAGRAN) tablet tablet, Take 1 tablet by mouth Daily., Disp: , Rfl:     nitroglycerin (NITROSTAT) 0.4 MG SL tablet, Place 1 tablet under the tongue Every 5 (Five) Minutes As Needed for Chest Pain. Take no more than 3 doses in 15 minutes., Disp: , Rfl:     omeprazole (priLOSEC) 40 MG capsule, Take 1 capsule by mouth Daily., Disp: , Rfl:     potassium chloride (K-DUR,KLOR-CON) 10 MEQ CR tablet, Take 1 tablet by mouth Daily., Disp: , Rfl: 0    rosuvastatin (CRESTOR) 40 MG tablet, Take 1 tablet by mouth Daily., Disp: , Rfl:     Semaglutide, 2 MG/DOSE, (Ozempic, 2 MG/DOSE,) 8 MG/3ML  solution pen-injector, Inject 2 mg under the skin into the appropriate area as directed 1 (One) Time Per Week. Dose increase, Disp: 9 mL, Rfl: 1    tamsulosin (FLOMAX) 0.4 MG capsule 24 hr capsule, Take 1 capsule by mouth 2 (Two) Times a Day., Disp: , Rfl:     temazepam (RESTORIL) 30 MG capsule, Take 1 capsule by mouth At Night As Needed for Sleep., Disp: , Rfl:     vitamin B-12 (CYANOCOBALAMIN) 250 MCG tablet, Take 1 tablet by mouth Daily., Disp: , Rfl:     History:   Past Medical History:   Diagnosis Date    Aortic valve stenosis     Asthma     BPH (benign prostatic hyperplasia)     Carotid stenosis     COPD (chronic obstructive pulmonary disease)     Coronary artery disease     Diabetes mellitus     Edentulous     Hyperlipidemia     Hypertension     IBS (irritable bowel syndrome)     Stroke     2009, no residual    Type 2 diabetes mellitus with circulatory disorder, without long-term current use of insulin 09/06/2019    Wears dentures     full set    Wears glasses        Past Surgical History:   Procedure Laterality Date    CARDIAC CATHETERIZATION N/A 09/06/2019    Procedure: Left Heart Cath;  Surgeon: Jonathan Pineda MD;  Location:  [facility];  Service: Cardiovascular    CARDIAC ELECTROPHYSIOLOGY PROCEDURE N/A 01/19/2021    Procedure: DEVICE IMPLANT;  Surgeon: Jonathan Pineda MD;  Location:  [facility];  Service: Cardiovascular;  Laterality: N/A;    CAROTID ENDARTERECTOMY Left 10/20/2022    Procedure: CAROTID ENDARTERECTOMY WITH EEG LEFT;  Surgeon: Denys Goldsmith MD;  Location:  [facility];  Service: Vascular;  Laterality: Left;    CAROTID ENDARTERECTOMY Right 7/20/2023    Procedure: CAROTID ENDARTERECTOMY WITH EEG RIGHT;  Surgeon: Denys Goldsmith MD;  Location:  [facility];  Service: Vascular;  Laterality: Right;  clamp on 0720; clamp off 0742    CHOLECYSTECTOMY      COLONOSCOPY      CORONARY ANGIOPLASTY WITH STENT PLACEMENT      x 3, last one 2014     "CORONARY ARTERY BYPASS GRAFT WITH AORTIC VALVE REPAIR/REPLACEMENT N/A 09/10/2019    Procedure: CARLYN PER ANESTHESIA, MEDIAN STERNOTOMY, CORONARY ARTERY BYPASS GRAFT X 4, UTILIZING THE LEFT INTERNAL MAMMARY ARTERY, AND EVH OF THE RIGHT GREATER SAPHENOUS VEIN,   AORTIC VALVE REPLACEMENT;  Surgeon: Denys Goldsmith MD;  Location: [facility];  Service: Cardiothoracic    EYE SURGERY Bilateral     cataracts    INGUINAL HERNIA REPAIR Right     PACEMAKER IMPLANTATION  2021    UMBILICAL HERNIA REPAIR         Social History     Socioeconomic History    Marital status:     Number of children: 1   Tobacco Use    Smoking status: Former     Types: Cigars     Quit date: 10/14/1979     Years since quittin.8    Smokeless tobacco: Former     Types: Chew     Quit date: 2004   Vaping Use    Vaping Use: Never used   Substance and Sexual Activity    Alcohol use: No    Drug use: No    Sexual activity: Defer        Family History   Problem Relation Age of Onset    Coronary artery disease Mother     Diabetes Mother     Coronary artery disease Father     Diabetes Father        Objective   Physical Exam:  Vitals:    23 1055 23 1056   BP: 153/83 146/84   BP Location: Right arm Left arm   Patient Position: Sitting Sitting   Pulse: 66    Temp: 97.1 øF (36.2 øC)    SpO2: 98%    Weight: 98 kg (216 lb)    Height: 167.6 cm (66\")  Comment: patient reports       Body mass index is 34.86 kg/mý.    Physical Exam  Vitals reviewed.   Constitutional:       General: He is not in acute distress.     Appearance: He is obese.   HENT:      Head: Normocephalic and atraumatic.   Eyes:      General: Lids are normal. Vision grossly intact.      Extraocular Movements: Extraocular movements intact.      Conjunctiva/sclera: Conjunctivae normal.      Pupils: Pupils are equal, round, and reactive to light.   Neck:      Vascular: No carotid bruit.      Trachea: Trachea and phonation normal.      Comments: Well healing right CEA incision " "without dehiscence, redness, tenderness or exudate.    Cardiovascular:      Rate and Rhythm: Normal rate and regular rhythm.      Pulses:           Radial pulses are 2+ on the right side and 2+ on the left side.        Dorsalis pedis pulses are 2+ on the right side and 2+ on the left side.        Posterior tibial pulses are 2+ on the right side and 2+ on the left side.      Heart sounds: S1 normal and S2 normal. Murmur heard.   Systolic murmur is present with a grade of 2/6.   Pulmonary:      Effort: Pulmonary effort is normal. No respiratory distress.      Breath sounds: Normal breath sounds.   Musculoskeletal:         General: Normal range of motion.      Cervical back: Normal range of motion and neck supple.      Right lower leg: No edema.      Left lower leg: No edema.   Lymphadenopathy:      Cervical: No cervical adenopathy.   Skin:     General: Skin is warm and dry.      Capillary Refill: Capillary refill takes less than 2 seconds.   Neurological:      General: No focal deficit present.      Mental Status: He is alert and oriented to person, place, and time.      GCS: GCS eye subscore is 4. GCS verbal subscore is 5. GCS motor subscore is 6.      Cranial Nerves: No dysarthria.      Motor: Motor function is intact.      Gait: Gait is intact.   Psychiatric:         Attention and Perception: Attention normal.         Mood and Affect: Mood normal.         Speech: Speech normal.         Behavior: Behavior is cooperative.       Imaging/Labs:  Operative Pathology:  7/21/23  Clinical Information     Carotid stenosis, asymptomatic, right   Final Diagnosis   RIGHT CAROTID PLAQUE:               Calcific atherosclerotic disease   Electronically signed by Cha Brown MD on 7/21/2023 at 1209   Gross Description     1. Carotid Plaque.  Received in formalin labeled \"right carotid plaque\", is a 2.5 x 1.8 x 0.5 cm aggregate of yellow-tan, membranous and vascular soft tissue.  Sectioning reveals yellow-white, friable " and moderately calcified cut surfaces.  Representative sections are submitted in cassette 1A following decalcification.   MLA        CT ANGIOGRAM CAROTIDS-9/21/2022 8:56 AM  FINDINGS:  The lung apices are grossly clear. Evaluation of the neck soft tissues  demonstrates no pathologic cervical adenopathy or unexpected  aerodigestive tract mass. Evaluation of the osseous structures  demonstrates multilevel spondylosis, otherwise without evidence of acute  fracture or aggressive osseous lesion. Limited intracranial evaluation  demonstrates no acute findings. The carotid siphons are incidentally  imaged with prominent calcific atherosclerotic change resulting in mild  narrowing of the supraclinoid segments bilaterally. Patent aortic arch  with three-vessel branching pattern. On the right, there is calcific  atherosclerotic change in addition to eccentric soft plaque which  results in minimal, less than 50% narrowing of the ICA origin. There is  calcific plaque present involving the left ICA origin, with focal  approximate 80% stenosis by NASCET criteria. Eccentric plaque is also  present along the more distal left ICA just proximal to the skull base,  with less than 50% narrowing present at this location. The vertebral  arteries demonstrate no evidence of flow-limiting stenosis.     IMPRESSION:  Minimal, less than 50% narrowing of the right ICA origin, with  combination of eccentric soft plaque and calcific atherosclerotic  change.  Prominent calcific atherosclerotic change of the left ICA origin, with  focal approximate 80% stenosis.No acute nonvascular findings.   This report was finalized on 9/21/2022 12:05 PM by Jonathan Mcrae.      Assessment / Plan      Assessment / Plan:  1. Bilateral carotid artery stenosis s/p right and left CEA (Primary)  - 67 y.o. male s/p right CEA per Dr. Goldsmith on 7/20/2023 receded by left CEA per Dr. Goldsmith on 10/21/2022 for symptomatic bilateral carotid artery stenosis.  - PMH:  "Aortic stenosis s/p AVR with CAD s/p CABG per Dr. Goldsmith 2019, ROBERTO s/p now bilateral CEAs, asthma, BPH, COPD, type 2 diabetes, hyperlipidemia, HTN, and CVA 2009.    - Following uncomplicated right CEA, patient was extubated and sent to the ICU in stable condition.  On POD #1 he was noted to have no neurological changes and was weaned off his Cardene drip.  GUIDO drain removed and patient was discharged home in stable condition.    - Patient presents to CT surgery clinic for scheduled 4-week postop visit.  No readmissions or ER visits.   - Compliant with aspirin, Plavix, and statin.    - C/o recurrent right eye vision blurring with \"flickering lights\" approximately 2 weeks postop.    - Also relates feeling very dizzy and off-balance with worsening right eye blurriness/vision loss when bending forward or and standing back up.  - Seen by cardiology within the past week, having undergone updated stress test and \"ultrasound\". (Study results and notes pending)  - States he has been seen by his ophthalmologist since his carotid surgery with no changes in his ophthalmic exam.    - States blood pressure at home generally ranges 140/80 mmHg.  - Reviewed evidence of orthostatic BP changes in clinic today:  recommend reduce coffee to one cup per day and replace tea/coffee with water  - Will obtain CTA carotids and have patient return to clinic afterwards       Follow Up:   Return in about 2 weeks (around 9/4/2023) for CTA carotids.   Or sooner for any further concerns or worsening sign and symptoms. If unable to reach us in the office please dial 911 or go to the nearest emergency department.      ELENO Gaviria  [facility] Cardiothoracic Surgery    Time Spent: I spent 42 minutes caring for Khoa on this date of service. This time includes time spent by me in the following activities: preparing for the visit, reviewing tests, obtaining and/or reviewing a separately obtained history, performing a medically appropriate " examination and/or evaluation, counseling and educating the patient/family/caregiver, ordering medications, tests, or procedures, documenting information in the medical record, and care coordination.     06-Sep-2022 23:50

## 2024-02-08 NOTE — ED PROVIDER NOTE - PRO INTERPRETER NEED 2
MEDICARE WELLNESS VISIT + NOTE    CHIEF COMPLAINT:  Atif presents for his First Annual Medicare Wellness Visit.   His additional complaints or concerns are addressed below.     Patient Care Team:  Helen Donnelly MD as PCP - General (Family Medicine)        Patient Active Problem List   Diagnosis   • Type 1 diabetes mellitus (CMD)   • Chronic lymphoid leukemia in remission (CMD)   • Chronic pansinusitis   • Glaucoma   • Hyperlipidemia   • Essential hypertension   • Overweight with body mass index (BMI) 25.0-29.9   • Postherpetic neuralgia   • Stage 3b chronic kidney disease (CMD)   • Chronic gout of right ankle   • Elevated LFTs   • Atherosclerosis of aorta (CMD)         Past Medical History:   Diagnosis Date   • Chronic diastolic heart failure (CMD)    • Chronic gout of right ankle 2023   • Chronic kidney disease    • Chronic pansinusitis 01/10/2018   • Diabetes mellitus (CMD)    • Essential hypertension 2023   • Glaucoma 01/10/2018   • History of COVID-19    • Hyperlipidemia 2023   • Leukemia (CMD)    • Postherpetic neuralgia 2023   • Thyroid disease          Past Surgical History:   Procedure Laterality Date   • Appendectomy     • Colonoscopy  2018   • Nasal scopy,open maxill sinus Bilateral    • Repair ing hernia,5+y/o,reducibl           Social History     Tobacco Use   • Smoking status: Former     Current packs/day: 0.00     Types: Cigarettes     Quit date:      Years since quittin.1     Passive exposure: Past   • Smokeless tobacco: Never   Vaping Use   • Vaping Use: never used   Substance Use Topics   • Alcohol use: Not Currently   • Drug use: Never     Family History   Problem Relation Age of Onset   • Congestive Heart Failure Mother    • Hypertension Mother    • Leukemia Father    • Other Brother         impaired glucose tolerance         Current Outpatient Medications   Medication Sig Dispense Refill   • tamsulosin (FLOMAX) 0.4 MG Cap Take 1  capsule by mouth daily. 90 capsule 3   • metoPROLOL succinate (TOPROL-XL) 25 MG 24 hr tablet Take 1 tablet by mouth daily. 90 tablet 3   • diclofenac (VOLTAREN) 1 % gel Apply 4 g topically 4 times daily as needed (Knee Pain). 4 g 3   • Cholecalciferol (vitamin D) 50 mcg (2,000 units) capsule Take 1 capsule by mouth daily. 90 capsule 3   • chlorthalidone (THALITONE) 25 MG tablet Take 1 tablet by mouth daily. 90 tablet 3   • allopurinol (ZYLOPRIM) 100 MG tablet Take 1 tablet by mouth daily. 90 tablet 3   • electrolyte/PEG 3350 (GOLYTELY) 236 g solution Drink first half of prep at 5 pm the day before procedure. Drink second half of prep 6 hours prior to procedure time 4000 mL 0   • bisacodyl 5 MG EC tablet Take 2 tablets after finishing first half of prep 2 tablet 0   • NovoLOG 100 UNIT/ML injectable solution For use with insulin pump. Maximum daily dose: 80 units 30 mL 5   • Glucagon (Baqsimi One Pack) 3 MG/DOSE Powder Use for emergency low blood sugar. Place 1 spray in 1 nostril.  If no response in 15 minutes, place 1 more spray in nostril with new device. Call 911. 1 each 5   • rosuvastatin (CRESTOR) 40 MG tablet Take 1 tablet by mouth daily. 90 tablet 3   • Ascorbic Acid (vitamin C) 500 MG tablet Take 500 mg by mouth daily.     • Alcohol Swabs (DropSafe Alcohol Prep) 70 % Pads      • brimonidine (ALPHAGAN) 0.2 % ophthalmic solution Instill 1 drop twice a day by ophthalmic route for 75 days.     • dorzolamide-timolol (COSOPT) 22.3-6.8 MG/ML ophthalmic solution      • True Metrix Blood Glucose Test test strip      • Lantus SoloStar 100 UNIT/ML pen-injector      • latanoprost (XALATAN) 0.005 % ophthalmic solution        No current facility-administered medications for this visit.        The following items on the Medicare Health Risk Assessment were found to be positive  6 a.) How many servings of Fruits and Vegetables do you have each day ( 1 serving = 1 piece of fruit, 1/2 cup fruits or vegetables): 1 per day          Vision and Hearing screens:   Hearing Screening    1000Hz   Right ear Pass   Left ear Pass     Vision Screening    Right eye Left eye Both eyes   Without correction 20/30 20/30 20/30   With correction          Advance care planning documents on file - no    Cognitive/Functional Status: no evidence of cognitive dysfunction by direct observation    Opioid Review: Atif is not taking opioid medications.    Recent PHQ 2/9 Score:    PHQ 2:  PHQ 2 Score Adult PHQ 2 Score Adult PHQ 2 Interpretation Little interest or pleasure in activity?   2/8/2024   9:07 AM 0 No further screening needed 0       PHQ 9:       DEPRESSION ASSESSMENT/PLAN:  Depression screening is negative no further plan needed.     Body mass index is 28.61 kg/m².    BMI FOLLOW-UP/PLAN:  Patient is overweight.    Caloric restriction       See Patient Instructions section.   Return in about 3 months (around 5/8/2024) for Well Visit .      OUTPATIENT PROGRESS NOTE    Subjective   Chief Complaint Office Visit and Medicare Wellness Visit    HPI     Denies acute complaints or recent hospitalizations     Medications  Medications were reviewed and updated today.    Histories  I have personally reviewed and updated the patient's past medical, past surgical, family and social histories during today's visit.    Review of Systems: Review of Systems   Constitutional:  Negative for chills, diaphoresis and fever.   HENT:  Negative for ear pain and sore throat.    Eyes:  Negative for blurred vision and discharge.   Respiratory:  Negative for shortness of breath and wheezing.    Cardiovascular:  Negative for chest pain and palpitations.   Gastrointestinal:  Negative for diarrhea, nausea and vomiting.   Genitourinary:  Negative for dysuria.   Neurological:  Negative for dizziness, seizures and loss of consciousness.         Objective   Visit Vitals  /66 (BP Location: LUE - Left upper extremity, Patient Position: Sitting, Cuff Size: Large Adult)   Pulse 84    Temp 97.5 °F (36.4 °C) (Tympanic)   Ht 6' 4\" (1.93 m)   Wt 106.6 kg (235 lb)   SpO2 96%   BMI 28.61 kg/m²     Physical Exam  Physical Exam  Constitutional:       General: He is not in acute distress.     Appearance: He is not ill-appearing.   HENT:      Head: Normocephalic and atraumatic.   Eyes:      Extraocular Movements: Extraocular movements intact.   Cardiovascular:      Rate and Rhythm: Normal rate and regular rhythm.      Heart sounds: No murmur heard.     No friction rub. No gallop.   Pulmonary:      Effort: Pulmonary effort is normal. No respiratory distress.      Breath sounds: No wheezing, rhonchi or rales.   Abdominal:      General: There is no distension.      Palpations: Abdomen is soft.   Musculoskeletal:         General: Normal range of motion.      Cervical back: Normal range of motion and neck supple.   Skin:     General: Skin is warm and dry.   Neurological:      General: No focal deficit present.      Mental Status: He is alert and oriented to person, place, and time.   Psychiatric:         Mood and Affect: Mood normal.         Behavior: Behavior normal.           Laboratory  I have reviewed the pertinent laboratory tests.     Imaging  I have reviewed the pertinent imaging study reports.     Assessment & Plan   Diagnoses and associated orders for this visit:  1. Type 1 diabetes mellitus with hyperglycemia (CMD)  Assessment & Plan:  Last Lab A1C:  Hemoglobin A1C (%)   Date Value   01/23/2024 8.3 (H)   -Recently established care with in-network endo  -Will defer mgmt to endo     2. Essential hypertension  Assessment & Plan:  -Well controlled above goal today on chlorthalidone 25 qd + Metoprolol 25 qd    -Continue current medication.     Orders:  -     Metoprolol Succinate ER  -     Chlorthalidone  3. Stage 3b chronic kidney disease (CMD)  Assessment & Plan:  Glomerular Filtration Rate (no units)   Date Value   01/23/2024 33 (L)   -Stable, continue mgmt of T1DM and HTN         Orders:  -      Vitamin D  4. Atherosclerosis of aorta (CMD)  Assessment & Plan:  -Statin held 2/2 transaminitis, no changes to mgmt today   5. Moderate mixed hyperlipidemia not requiring statin therapy  Assessment & Plan:  -Statin held while GI monitoring previous transaminitis   6. Injury of left knee, initial encounter  Comments:  -Improving   -Hx not suggestive of tear or fracture  -Re-eval if no improvement in 4 weeks from injury   Orders:  -     Diclofenac Sodium  7. Chronic gout of right ankle due to renal impairment without tophus  -     Allopurinol  8. Benign prostatic hyperplasia without lower urinary tract symptoms  -     Tamsulosin HCl     English

## 2024-06-18 ENCOUNTER — NON-APPOINTMENT (OUTPATIENT)
Age: 44
End: 2024-06-18

## 2024-10-08 NOTE — ED ADULT NURSE NOTE - PRO INTERPRETER NEED 2
Refill Routing Note   Medication(s) are not appropriate for processing by [facility] for the following reason(s):        New or recently adjusted medication    [facility] action(s):  Defer               Appointments  past 12m or future 3m with PCP    Date Provider   Last Visit   7/22/2024 Brennon Cruz MD   Next Visit   Visit date not found Brennon Cruz MD   ED visits in past 90 days: 0        Note composed:4:48 PM 10/08/2024           English

## 2024-10-16 NOTE — H&P ADULT - NSHPPOAURINARYCATHETER_GEN_ALL_CORE
[] : right side. The other side left free, and demonstrate good range of motion. Pressure points carefully padded. Cast instructions given. All questions answered. [unfilled] tolerated procedure well. No complications. 
no

## 2025-01-03 ENCOUNTER — INPATIENT (INPATIENT)
Facility: HOSPITAL | Age: 45
LOS: 1 days | Discharge: HOME HEALTH | DRG: 639 | End: 2025-01-05
Attending: INTERNAL MEDICINE | Admitting: STUDENT IN AN ORGANIZED HEALTH CARE EDUCATION/TRAINING PROGRAM
Payer: SELF-PAY

## 2025-01-03 VITALS
DIASTOLIC BLOOD PRESSURE: 136 MMHG | OXYGEN SATURATION: 99 % | SYSTOLIC BLOOD PRESSURE: 186 MMHG | HEART RATE: 78 BPM | TEMPERATURE: 98 F | RESPIRATION RATE: 18 BRPM | WEIGHT: 154.98 LBS

## 2025-01-03 DIAGNOSIS — Z98.890 OTHER SPECIFIED POSTPROCEDURAL STATES: Chronic | ICD-10-CM

## 2025-01-03 LAB
ALBUMIN SERPL ELPH-MCNC: 4.5 G/DL — SIGNIFICANT CHANGE UP (ref 3.5–5.2)
ALP SERPL-CCNC: 186 U/L — HIGH (ref 30–115)
ALT FLD-CCNC: 24 U/L — SIGNIFICANT CHANGE UP (ref 0–41)
ANION GAP SERPL CALC-SCNC: 16 MMOL/L — HIGH (ref 7–14)
APPEARANCE UR: CLEAR — SIGNIFICANT CHANGE UP
AST SERPL-CCNC: 21 U/L — SIGNIFICANT CHANGE UP (ref 0–41)
B-OH-BUTYR SERPL-SCNC: 0.8 MMOL/L — HIGH
BACTERIA # UR AUTO: NEGATIVE /HPF — SIGNIFICANT CHANGE UP
BASE EXCESS BLDV CALC-SCNC: 6.6 MMOL/L — HIGH (ref -2–3)
BASOPHILS # BLD AUTO: 0.07 K/UL — SIGNIFICANT CHANGE UP (ref 0–0.2)
BASOPHILS NFR BLD AUTO: 0.7 % — SIGNIFICANT CHANGE UP (ref 0–1)
BILIRUB SERPL-MCNC: 0.5 MG/DL — SIGNIFICANT CHANGE UP (ref 0.2–1.2)
BILIRUB UR-MCNC: NEGATIVE — SIGNIFICANT CHANGE UP
BUN SERPL-MCNC: 16 MG/DL — SIGNIFICANT CHANGE UP (ref 10–20)
CALCIUM SERPL-MCNC: 9.7 MG/DL — SIGNIFICANT CHANGE UP (ref 8.4–10.5)
CAST: 0 /LPF — SIGNIFICANT CHANGE UP (ref 0–4)
CHLORIDE SERPL-SCNC: 87 MMOL/L — LOW (ref 98–110)
CO2 SERPL-SCNC: 24 MMOL/L — SIGNIFICANT CHANGE UP (ref 17–32)
COLOR SPEC: YELLOW — SIGNIFICANT CHANGE UP
CREAT SERPL-MCNC: 1 MG/DL — SIGNIFICANT CHANGE UP (ref 0.7–1.5)
DIFF PNL FLD: ABNORMAL
EGFR: 94 ML/MIN/1.73M2 — SIGNIFICANT CHANGE UP
EOSINOPHIL # BLD AUTO: 0.04 K/UL — SIGNIFICANT CHANGE UP (ref 0–0.7)
EOSINOPHIL NFR BLD AUTO: 0.4 % — SIGNIFICANT CHANGE UP (ref 0–8)
GAS PNL BLDV: 123 MMOL/L — LOW (ref 136–145)
GAS PNL BLDV: SIGNIFICANT CHANGE UP
GLUCOSE SERPL-MCNC: 675 MG/DL — CRITICAL HIGH (ref 70–99)
GLUCOSE UR QL: >=1000 MG/DL
HCO3 BLDV-SCNC: 30 MMOL/L — HIGH (ref 22–29)
HCT VFR BLD CALC: 52.9 % — HIGH (ref 42–52)
HGB BLD-MCNC: 18.4 G/DL — HIGH (ref 14–18)
IMM GRANULOCYTES NFR BLD AUTO: 0.6 % — HIGH (ref 0.1–0.3)
KETONES UR-MCNC: ABNORMAL MG/DL
LACTATE BLDV-MCNC: 1.5 MMOL/L — SIGNIFICANT CHANGE UP (ref 0.5–2)
LEUKOCYTE ESTERASE UR-ACNC: NEGATIVE — SIGNIFICANT CHANGE UP
LIDOCAIN IGE QN: 129 U/L — HIGH (ref 7–60)
LYMPHOCYTES # BLD AUTO: 1.47 K/UL — SIGNIFICANT CHANGE UP (ref 1.2–3.4)
LYMPHOCYTES # BLD AUTO: 14.9 % — LOW (ref 20.5–51.1)
MAGNESIUM SERPL-MCNC: 2 MG/DL — SIGNIFICANT CHANGE UP (ref 1.8–2.4)
MCHC RBC-ENTMCNC: 29.1 PG — SIGNIFICANT CHANGE UP (ref 27–31)
MCHC RBC-ENTMCNC: 34.8 G/DL — SIGNIFICANT CHANGE UP (ref 32–37)
MCV RBC AUTO: 83.7 FL — SIGNIFICANT CHANGE UP (ref 80–94)
MONOCYTES # BLD AUTO: 0.89 K/UL — HIGH (ref 0.1–0.6)
MONOCYTES NFR BLD AUTO: 9 % — SIGNIFICANT CHANGE UP (ref 1.7–9.3)
NEUTROPHILS # BLD AUTO: 7.31 K/UL — HIGH (ref 1.4–6.5)
NEUTROPHILS NFR BLD AUTO: 74.4 % — SIGNIFICANT CHANGE UP (ref 42.2–75.2)
NITRITE UR-MCNC: NEGATIVE — SIGNIFICANT CHANGE UP
NRBC # BLD: 0 /100 WBCS — SIGNIFICANT CHANGE UP (ref 0–0)
OSMOLALITY SERPL: 328 MOS/KG — HIGH (ref 275–300)
PCO2 BLDV: 40 MMHG — LOW (ref 42–55)
PH BLDV: 7.49 — HIGH (ref 7.32–7.43)
PH UR: 6.5 — SIGNIFICANT CHANGE UP (ref 5–8)
PHOSPHATE SERPL-MCNC: 3.8 MG/DL — SIGNIFICANT CHANGE UP (ref 2.1–4.9)
PLATELET # BLD AUTO: 336 K/UL — SIGNIFICANT CHANGE UP (ref 130–400)
PMV BLD: 10.9 FL — HIGH (ref 7.4–10.4)
PO2 BLDV: 78 MMHG — HIGH (ref 25–45)
POTASSIUM BLDV-SCNC: 3.8 MMOL/L — SIGNIFICANT CHANGE UP (ref 3.5–5.1)
POTASSIUM SERPL-MCNC: 4.5 MMOL/L — SIGNIFICANT CHANGE UP (ref 3.5–5)
POTASSIUM SERPL-SCNC: 4.5 MMOL/L — SIGNIFICANT CHANGE UP (ref 3.5–5)
PROT SERPL-MCNC: 7.1 G/DL — SIGNIFICANT CHANGE UP (ref 6–8)
PROT UR-MCNC: 30 MG/DL
RBC # BLD: 6.32 M/UL — HIGH (ref 4.7–6.1)
RBC # FLD: 14.2 % — SIGNIFICANT CHANGE UP (ref 11.5–14.5)
RBC CASTS # UR COMP ASSIST: 5 /HPF — HIGH (ref 0–4)
SAO2 % BLDV: 96.3 % — HIGH (ref 67–88)
SODIUM SERPL-SCNC: 127 MMOL/L — LOW (ref 135–146)
SP GR SPEC: >1.03 — HIGH (ref 1–1.03)
SQUAMOUS # UR AUTO: 0 /HPF — SIGNIFICANT CHANGE UP (ref 0–5)
UROBILINOGEN FLD QL: 0.2 MG/DL — SIGNIFICANT CHANGE UP (ref 0.2–1)
WBC # BLD: 9.84 K/UL — SIGNIFICANT CHANGE UP (ref 4.8–10.8)
WBC # FLD AUTO: 9.84 K/UL — SIGNIFICANT CHANGE UP (ref 4.8–10.8)
WBC UR QL: 0 /HPF — SIGNIFICANT CHANGE UP (ref 0–5)

## 2025-01-03 PROCEDURE — 71045 X-RAY EXAM CHEST 1 VIEW: CPT | Mod: 26

## 2025-01-03 PROCEDURE — 99285 EMERGENCY DEPT VISIT HI MDM: CPT

## 2025-01-03 PROCEDURE — 93010 ELECTROCARDIOGRAM REPORT: CPT

## 2025-01-03 RX ORDER — INSULIN GLARGINE-YFGN 100 [IU]/ML
10 INJECTION, SOLUTION SUBCUTANEOUS AT BEDTIME
Refills: 0 | Status: DISCONTINUED | OUTPATIENT
Start: 2025-01-03 | End: 2025-01-04

## 2025-01-03 RX ORDER — INSULIN HUMAN 100 [IU]/ML
7 INJECTION, SOLUTION SUBCUTANEOUS
Qty: 100 | Refills: 0 | Status: DISCONTINUED | OUTPATIENT
Start: 2025-01-03 | End: 2025-01-03

## 2025-01-03 RX ORDER — SODIUM CHLORIDE 9 MG/ML
1000 INJECTION, SOLUTION INTRAVENOUS
Refills: 0 | Status: DISCONTINUED | OUTPATIENT
Start: 2025-01-03 | End: 2025-01-05

## 2025-01-03 RX ORDER — SODIUM CHLORIDE 9 MG/ML
1000 INJECTION, SOLUTION INTRAMUSCULAR; INTRAVENOUS; SUBCUTANEOUS ONCE
Refills: 0 | Status: COMPLETED | OUTPATIENT
Start: 2025-01-03 | End: 2025-01-03

## 2025-01-03 RX ORDER — SODIUM CHLORIDE 9 MG/ML
1000 INJECTION, SOLUTION INTRAVENOUS ONCE
Refills: 0 | Status: COMPLETED | OUTPATIENT
Start: 2025-01-03 | End: 2025-01-03

## 2025-01-03 RX ADMIN — SODIUM CHLORIDE 100 MILLILITER(S): 9 INJECTION, SOLUTION INTRAVENOUS at 22:51

## 2025-01-03 RX ADMIN — INSULIN HUMAN 7 UNIT(S)/HR: 100 INJECTION, SOLUTION SUBCUTANEOUS at 20:57

## 2025-01-03 RX ADMIN — SODIUM CHLORIDE 1000 MILLILITER(S): 9 INJECTION, SOLUTION INTRAMUSCULAR; INTRAVENOUS; SUBCUTANEOUS at 19:48

## 2025-01-03 RX ADMIN — INSULIN GLARGINE-YFGN 10 UNIT(S): 100 INJECTION, SOLUTION SUBCUTANEOUS at 22:45

## 2025-01-03 RX ADMIN — SODIUM CHLORIDE 1000 MILLILITER(S): 9 INJECTION, SOLUTION INTRAVENOUS at 22:15

## 2025-01-03 RX ADMIN — Medication 500000 UNIT(S): at 22:02

## 2025-01-03 NOTE — ED ADULT TRIAGE NOTE - CHIEF COMPLAINT QUOTE
"I had thrush, it started two weeks ago" im not taking any meds for it. pt on nifedipine 60mg ir fs 544

## 2025-01-03 NOTE — ED PROVIDER NOTE - CLINICAL SUMMARY MEDICAL DECISION MAKING FREE TEXT BOX
44-year-old male past medical history of hypertension, asthma, prior alcohol abuse and dependence complicated by pancreatitis who presents to the emergency department with symptomatic hyperglycemia with polydipsia, polyuria urea, nocturia, unintentional weight loss, oral thrush and mouth pain.  Patient's had the symptoms that have been worsening over the past several months and came into the emergency department today because his mouth had pain and he had thrush lesions.  Patient no longer drinks alcohol.  Patient denies any fever, chills, recent illness.    On exam, vital signs reviewed.  Patient appears moderately dehydrated, fingerstick is above 500.  Patient has a grossly normal neurological exam.  Patient has clear lungs and normal heart sounds, normal abdominal exam.  IV placed and labs sent, urine sent, VBG performed.  Patient started on IV fluid resuscitation.  Labs reviewed and concerning for hyperosmolar hyper ketotic state and patient started on insulin drip.  EKG reviewed and shows no signs of active ischemia.  ICU consulted for care and recommendations appreciated.  Labs closely monitored and patient switched to subcutaneous insulin.  Patient will require admission for new onset diabetes in the setting of [facility].    ED work up reviewed and results and plan of care discussed with patient. Patient requires admission for further work up, monitoring, and management. Need for admission discussed with patient.

## 2025-01-03 NOTE — ED PROVIDER NOTE - ATTENDING CONTRIBUTION TO CARE
I personally evaluated patient. I agree with the findings and plan with all documentation on chart except as documented  in my note.    44-year-old male past medical history of hypertension, asthma, prior alcohol abuse and dependence complicated by pancreatitis who presents to the emergency department with symptomatic hyperglycemia with polydipsia, polyuria urea, nocturia, unintentional weight loss, oral thrush and mouth pain.  Patient's had the symptoms that have been worsening over the past several months and came into the emergency department today because his mouth had pain and he had thrush lesions.  Patient no longer drinks alcohol.  Patient denies any fever, chills, recent illness.    On exam, vital signs reviewed.  Patient appears moderately dehydrated, fingerstick is above 500.  Patient has a grossly normal neurological exam.  Patient has clear lungs and normal heart sounds, normal abdominal exam.  IV placed and labs sent, urine sent, VBG performed.  Patient started on IV fluid resuscitation.  Labs reviewed and concerning for hyperosmolar hyper ketotic state and patient started on insulin drip.  EKG reviewed and shows no signs of active ischemia.  ICU consulted for care and recommendations appreciated.  Labs closely monitored and patient switched to subcutaneous insulin.  Patient will require admission for new onset diabetes in the setting of [facility].    ED work up reviewed and results and plan of care discussed with patient. Patient requires admission for further work up, monitoring, and management. Need for admission discussed with patient.

## 2025-01-03 NOTE — ED PROVIDER NOTE - OBJECTIVE STATEMENT
Pt is a 44 y.o Male with PMHx of HTN, Asthma, pancreatitis 2/2 EtOH abuse who presents to the ED with chief complaint of oral thrush x 2 weeks. Pt also admits to polydipsia, polyuria, nocturia, sugar cravings x 2 weeks and unintentional twenty pound weight loss over two months. Pt states tolerating po. Denies any nausea, vomiting, diarrhea, abdominal pain uri symptoms, fever chills, sob or cp. Denies any neck pain difficulty swallowing or speaking. Denies any alcohol use in 3 years. Denies any recreational drug use except for marijuana and tobacco smoking.

## 2025-01-03 NOTE — ED PROVIDER NOTE - PATIENT PORTAL LINK FT
You can access the FollowMyHealth Patient Portal offered by [facility] by registering at the following website: [email or web address]. By joining [employer]’s FollowMyHealth portal, you will also be able to view your health information using other applications (apps) compatible with our system.

## 2025-01-03 NOTE — ED PROVIDER NOTE - CADM POA CENTRAL LINE
Quality 130: Documentation Of Current Medications In The Medical Record: Current Medications Documented
Quality 431: Preventive Care And Screening: Unhealthy Alcohol Use - Screening: Patient not identified as an unhealthy alcohol user when screened for unhealthy alcohol use using a systematic screening method
Quality 110: Preventive Care And Screening: Influenza Immunization: Influenza Immunization Administered during Influenza season
Quality 226: Preventive Care And Screening: Tobacco Use: Screening And Cessation Intervention: Patient screened for tobacco use and is an ex/non-smoker
Detail Level: Detailed
No

## 2025-01-03 NOTE — ED PROVIDER NOTE - PROGRESS NOTE DETAILS
SH  Pt reassessed  States improvement in symptoms  VSS  Results reviewed  Plan to dc f/u with pcp ,c/w medications as prescribed  Pt agrees with plan  Strict ED return precuations given

## 2025-01-03 NOTE — ED PROVIDER NOTE - PHYSICAL EXAMINATION
VITAL SIGNS: noted  CONSTITUTIONAL: Well-developed; well-nourished; in no acute distress  HEAD: Normocephalic; atraumatic  EYES: PERRL, EOM intact; conjunctiva and sclera clear  ENT: No nasal discharge;, MMM, oropharynx+ oral thrush without tonsillar hypertrophy or exudates uvula midline no pta.   NECK: Supple; non tender. No anterior cervical lymphadenopathy noted FROM  CARD: S1, S2 normal; no murmurs, gallops, or rubs. Regular rate and rhythm  RESP: CTAB/L, no wheezes, rales or rhonchi  ABD: Normal bowel sounds; soft; non-distended; non-tender; no organoomegaly. No CVA tenderness  EXT: Normal ROM. No calf tenderness or edema. Distal pulses intact  NEURO: Awake and alert, oriented. Grossly unremarkable. No focal deficits.  SKIN: Skin exam is warm and dry, no acute rash

## 2025-01-03 NOTE — ED PROVIDER NOTE - CARE PLAN
1 Principal Discharge DX:	Acute viral syndrome   Principal Discharge DX:	Diabetes with hyperosmolar coma

## 2025-01-03 NOTE — ED ADULT TRIAGE NOTE - AVIAN FLU SYMPTOMS
[Consultation] : a consultation [Sleep Apnea] : sleep apnea No [Shortness of Breath] : shortness of breath

## 2025-01-04 DIAGNOSIS — E11.01 TYPE 2 DIABETES MELLITUS WITH HYPEROSMOLARITY WITH COMA: ICD-10-CM

## 2025-01-04 LAB
ANION GAP SERPL CALC-SCNC: 13 MMOL/L — SIGNIFICANT CHANGE UP (ref 7–14)
BUN SERPL-MCNC: 14 MG/DL — SIGNIFICANT CHANGE UP (ref 10–20)
CALCIUM SERPL-MCNC: 9.2 MG/DL — SIGNIFICANT CHANGE UP (ref 8.4–10.5)
CHLORIDE SERPL-SCNC: 95 MMOL/L — LOW (ref 98–110)
CO2 SERPL-SCNC: 25 MMOL/L — SIGNIFICANT CHANGE UP (ref 17–32)
CREAT SERPL-MCNC: 0.8 MG/DL — SIGNIFICANT CHANGE UP (ref 0.7–1.5)
EGFR: 111 ML/MIN/1.73M2 — SIGNIFICANT CHANGE UP
GLUCOSE BLDC GLUCOMTR-MCNC: 169 MG/DL — HIGH (ref 70–99)
GLUCOSE BLDC GLUCOMTR-MCNC: 249 MG/DL — HIGH (ref 70–99)
GLUCOSE BLDC GLUCOMTR-MCNC: 259 MG/DL — HIGH (ref 70–99)
GLUCOSE BLDC GLUCOMTR-MCNC: 324 MG/DL — HIGH (ref 70–99)
GLUCOSE BLDC GLUCOMTR-MCNC: 413 MG/DL — HIGH (ref 70–99)
GLUCOSE BLDC GLUCOMTR-MCNC: 456 MG/DL — CRITICAL HIGH (ref 70–99)
GLUCOSE SERPL-MCNC: 250 MG/DL — HIGH (ref 70–99)
POTASSIUM SERPL-MCNC: 3.8 MMOL/L — SIGNIFICANT CHANGE UP (ref 3.5–5)
POTASSIUM SERPL-SCNC: 3.8 MMOL/L — SIGNIFICANT CHANGE UP (ref 3.5–5)
SODIUM SERPL-SCNC: 133 MMOL/L — LOW (ref 135–146)

## 2025-01-04 PROCEDURE — 82962 GLUCOSE BLOOD TEST: CPT

## 2025-01-04 PROCEDURE — 99222 1ST HOSP IP/OBS MODERATE 55: CPT

## 2025-01-04 PROCEDURE — 85025 COMPLETE CBC W/AUTO DIFF WBC: CPT

## 2025-01-04 PROCEDURE — 83735 ASSAY OF MAGNESIUM: CPT

## 2025-01-04 PROCEDURE — 80053 COMPREHEN METABOLIC PANEL: CPT

## 2025-01-04 PROCEDURE — 36415 COLL VENOUS BLD VENIPUNCTURE: CPT

## 2025-01-04 PROCEDURE — 83036 HEMOGLOBIN GLYCOSYLATED A1C: CPT

## 2025-01-04 PROCEDURE — 99253 IP/OBS CNSLTJ NEW/EST LOW 45: CPT

## 2025-01-04 RX ORDER — INSULIN GLARGINE-YFGN 100 [IU]/ML
10 INJECTION, SOLUTION SUBCUTANEOUS ONCE
Refills: 0 | Status: COMPLETED | OUTPATIENT
Start: 2025-01-04 | End: 2025-01-04

## 2025-01-04 RX ORDER — INSULIN LISPRO 100/ML
10 VIAL (ML) SUBCUTANEOUS ONCE
Refills: 0 | Status: COMPLETED | OUTPATIENT
Start: 2025-01-04 | End: 2025-01-04

## 2025-01-04 RX ORDER — INSULIN LISPRO 100/ML
4 VIAL (ML) SUBCUTANEOUS
Refills: 0 | Status: DISCONTINUED | OUTPATIENT
Start: 2025-01-04 | End: 2025-01-04

## 2025-01-04 RX ORDER — INSULIN LISPRO 100/ML
6 VIAL (ML) SUBCUTANEOUS
Refills: 0 | Status: DISCONTINUED | OUTPATIENT
Start: 2025-01-04 | End: 2025-01-05

## 2025-01-04 RX ORDER — ONDANSETRON 4 MG/1
4 TABLET ORAL EVERY 8 HOURS
Refills: 0 | Status: DISCONTINUED | OUTPATIENT
Start: 2025-01-04 | End: 2025-01-05

## 2025-01-04 RX ORDER — ACETAMINOPHEN 80 MG/.8ML
650 SOLUTION/ DROPS ORAL EVERY 6 HOURS
Refills: 0 | Status: DISCONTINUED | OUTPATIENT
Start: 2025-01-04 | End: 2025-01-05

## 2025-01-04 RX ORDER — INSULIN GLARGINE-YFGN 100 [IU]/ML
18 INJECTION, SOLUTION SUBCUTANEOUS AT BEDTIME
Refills: 0 | Status: DISCONTINUED | OUTPATIENT
Start: 2025-01-04 | End: 2025-01-05

## 2025-01-04 RX ORDER — ENOXAPARIN SODIUM 60 MG/.6ML
40 INJECTION INTRAVENOUS; SUBCUTANEOUS EVERY 24 HOURS
Refills: 0 | Status: DISCONTINUED | OUTPATIENT
Start: 2025-01-04 | End: 2025-01-05

## 2025-01-04 RX ORDER — NIFEDIPINE 60 MG/1
60 TABLET, EXTENDED RELEASE ORAL ONCE
Refills: 0 | Status: COMPLETED | OUTPATIENT
Start: 2025-01-04 | End: 2025-01-04

## 2025-01-04 RX ORDER — ALBUTEROL SULFATE 90 UG/1
2 INHALANT RESPIRATORY (INHALATION) EVERY 6 HOURS
Refills: 0 | Status: DISCONTINUED | OUTPATIENT
Start: 2025-01-04 | End: 2025-01-05

## 2025-01-04 RX ORDER — INFLUENZA A VIRUS A/WISCONSIN/588/2019 (H1N1) RECOMBINANT HEMAGGLUTININ ANTIGEN, INFLUENZA A VIRUS A/DARWIN/6/2021 (H3N2) RECOMBINANT HEMAGGLUTININ ANTIGEN, INFLUENZA B VIRUS B/AUSTRIA/1359417/2021 RECOMBINANT HEMAGGLUTININ ANTIGEN, AND INFLUENZA B VIRUS B/PHUKET/3073/2013 RECOMBINANT HEMAGGLUTININ ANTIGEN 45; 45; 45; 45 UG/.5ML; UG/.5ML; UG/.5ML; UG/.5ML
0.5 INJECTION INTRAMUSCULAR ONCE
Refills: 0 | Status: DISCONTINUED | OUTPATIENT
Start: 2025-01-04 | End: 2025-01-05

## 2025-01-04 RX ORDER — MAG HYDROX/ALUMINUM HYD/SIMETH 200-200-20
30 SUSPENSION, ORAL (FINAL DOSE FORM) ORAL EVERY 4 HOURS
Refills: 0 | Status: DISCONTINUED | OUTPATIENT
Start: 2025-01-04 | End: 2025-01-05

## 2025-01-04 RX ORDER — NIFEDIPINE 60 MG/1
60 TABLET, EXTENDED RELEASE ORAL DAILY
Refills: 0 | Status: DISCONTINUED | OUTPATIENT
Start: 2025-01-05 | End: 2025-01-05

## 2025-01-04 RX ORDER — LOSARTAN POTASSIUM 100 MG/1
25 TABLET, FILM COATED ORAL DAILY
Refills: 0 | Status: DISCONTINUED | OUTPATIENT
Start: 2025-01-04 | End: 2025-01-05

## 2025-01-04 RX ORDER — GINKGO BILOBA 40 MG
3 CAPSULE ORAL AT BEDTIME
Refills: 0 | Status: DISCONTINUED | OUTPATIENT
Start: 2025-01-04 | End: 2025-01-05

## 2025-01-04 RX ORDER — INSULIN LISPRO 100/ML
VIAL (ML) SUBCUTANEOUS
Refills: 0 | Status: DISCONTINUED | OUTPATIENT
Start: 2025-01-04 | End: 2025-01-05

## 2025-01-04 RX ADMIN — Medication 500000 UNIT(S): at 12:18

## 2025-01-04 RX ADMIN — NIFEDIPINE 60 MILLIGRAM(S): 60 TABLET, EXTENDED RELEASE ORAL at 06:53

## 2025-01-04 RX ADMIN — Medication 4: at 07:34

## 2025-01-04 RX ADMIN — Medication 500000 UNIT(S): at 23:10

## 2025-01-04 RX ADMIN — Medication 10 UNIT(S): at 22:07

## 2025-01-04 RX ADMIN — ACETAMINOPHEN 650 MILLIGRAM(S): 80 SOLUTION/ DROPS ORAL at 17:39

## 2025-01-04 RX ADMIN — SODIUM CHLORIDE 100 MILLILITER(S): 9 INJECTION, SOLUTION INTRAVENOUS at 23:10

## 2025-01-04 RX ADMIN — Medication 6 UNIT(S): at 11:56

## 2025-01-04 RX ADMIN — Medication 2: at 17:21

## 2025-01-04 RX ADMIN — SODIUM CHLORIDE 100 MILLILITER(S): 9 INJECTION, SOLUTION INTRAVENOUS at 11:10

## 2025-01-04 RX ADMIN — LOSARTAN POTASSIUM 25 MILLIGRAM(S): 100 TABLET, FILM COATED ORAL at 06:41

## 2025-01-04 RX ADMIN — ALBUTEROL SULFATE 2 PUFF(S): 90 INHALANT RESPIRATORY (INHALATION) at 06:54

## 2025-01-04 RX ADMIN — Medication 500000 UNIT(S): at 17:22

## 2025-01-04 RX ADMIN — INSULIN GLARGINE-YFGN 18 UNIT(S): 100 INJECTION, SOLUTION SUBCUTANEOUS at 22:07

## 2025-01-04 RX ADMIN — Medication 6 UNIT(S): at 17:22

## 2025-01-04 RX ADMIN — Medication 8: at 11:12

## 2025-01-04 RX ADMIN — ACETAMINOPHEN 650 MILLIGRAM(S): 80 SOLUTION/ DROPS ORAL at 18:03

## 2025-01-04 RX ADMIN — ACETAMINOPHEN 650 MILLIGRAM(S): 80 SOLUTION/ DROPS ORAL at 06:41

## 2025-01-04 NOTE — H&P ADULT - NSHPPHYSICALEXAM_GEN_ALL_CORE
CONSTITUTIONAL: Well-developed; well-nourished; in no acute distress  HEAD: Dusky skin color   ENT: No nasal discharge;, MMM, oropharynx+ oral thrush without tonsillar hypertrophy or exudates uvula midline no pta.   NECK: Supple; non tender. No anterior cervical lymphadenopathy noted FROM  CARD: S1, S2 normal; no murmurs, gallops, or rubs. Regular rate and rhythm  RESP: CTAB/L, no wheezes, rales or rhonchi  ABD: Normal bowel sounds; soft; non-distended; non-tender; no organomegaly. No CVA tenderness  EXT: Normal ROM. No calf tenderness or edema. Distal pulses intact  NEURO: Awake and alert, oriented. Grossly unremarkable. No focal deficits.  SKIN: Skin exam is warm and dry, no acute rash

## 2025-01-04 NOTE — H&P ADULT - ATTENDING COMMENTS
44 y.o Male with PMHx of HTN, Asthma, pancreatitis 2/2 EtOH abuse who presents to the ED with chief complaint of oral thrush x 2 weeks. Pt also admits to polydipsia, polyuria, nocturia, sugar cravings x 2 weeks and unintentional twenty pound weight loss over two months.    #New onset diabetes  likely type 2  FS improving  - Cont lantus/lispro, increase to 18-6-6-6  - ISS  - Endocrinology eval  - ADP 24h after FS better controlled    #Oral thrush  - Nystatin s/s q6h    #HTN  - Cont nifedipine xl 60mg qD    #hx asthma  - Duonebs PRN    DVT PPX, LVX    #Progress Note Handoff  Pending (specify): Cont insulin regimen, endocrine f/u  Family discussion: seven pt regarding plan of care  Disposition: GMF, from home

## 2025-01-04 NOTE — CONSULT NOTE ADULT - ASSESSMENT
#newly diagnosed diabetes / mild HHS / History of alcohol induced pancreatitis x2 in more then 2 years ago ( no alchocol intake since )   - denies recent use of steroids   - responded to IV fluids and SC insulin   - check A1c , check c peptid   - continue Lantus 18 units at bedtime   - Admelog 7 units TIDAC   - continue sliding scale 2: 50 > 150 TIDAC   - discussed  need for insulin upon discharge   - will need to follow up post discharge with endo    No

## 2025-01-04 NOTE — H&P ADULT - NSHPLABSRESULTS_GEN_ALL_CORE
LABS:      CBC Full  -  ( 03 Jan 2025 19:50 )  WBC Count : 9.84 K/uL  RBC Count : 6.32 M/uL  Hemoglobin : 18.4 g/dL  Hematocrit : 52.9 %  Platelet Count - Automated : 336 K/uL  Mean Cell Volume : 83.7 fL  Mean Cell Hemoglobin : 29.1 pg  Mean Cell Hemoglobin Concentration : 34.8 g/dL  Auto Neutrophil # : 7.31 K/uL  Auto Lymphocyte # : 1.47 K/uL  Auto Monocyte # : 0.89 K/uL  Auto Eosinophil # : 0.04 K/uL  Auto Basophil # : 0.07 K/uL  Auto Neutrophil % : 74.4 %  Auto Lymphocyte % : 14.9 %  Auto Monocyte % : 9.0 %  Auto Eosinophil % : 0.4 %  Auto Basophil % : 0.7 %    01-03    133[L]  |  95[L]  |  14  ----------------------------<  250[H]  3.8   |  25  |  0.8    Ca    9.2      03 Jan 2025 22:19  Phos  3.8     01-03  Mg     2.0     01-03    TPro  7.1  /  Alb  4.5  /  TBili  0.5  /  DBili  x   /  AST  21  /  ALT  24  /  AlkPhos  186[H]  01-03          Urinalysis Basic - ( 03 Jan 2025 22:19 )    Color: x / Appearance: x / SG: x / pH: x  Gluc: 250 mg/dL / Ketone: x  / Bili: x / Urobili: x   Blood: x / Protein: x / Nitrite: x   Leuk Esterase: x / RBC: x / WBC x   Sq Epi: x / Non Sq Epi: x / Bacteria: x                RADIOLOGY & ADDITIONAL STUDIES (The following images were personally reviewed):

## 2025-01-04 NOTE — ED ADULT NURSE NOTE - OBJECTIVE STATEMENT
Pt AOx,3 complaining of weight loss, increased urination, and thirst.. Pt reports white coat on tongue, and dry like a razor.

## 2025-01-04 NOTE — CONSULT NOTE ADULT - SUBJECTIVE AND OBJECTIVE BOX
HPI:   Pt is a 44 y.o Male with PMHx of HTN, Asthma, pancreatitis 2/2 EtOH abuse who presents to the ED with chief complaint of oral thrush x 2 weeks. Pt also admits to polydipsia, polyuria, nocturia, sugar cravings x 2 weeks and unintentional twenty pound weight loss over two months. Pt states tolerating po. Denies any nausea, vomiting, diarrhea, abdominal pain uri symptoms, fever chills, sob or cp. Denies any neck pain difficulty swallowing or speaking. Denies any alcohol use in 3 years. Denies any recreational drug use except for marijuana and tobacco smoking.      vitals in ED  /136 , HR 78 , T 98.3 , O2 99% RA    labs significant for   Hb 18.4 on CBC   Glucose in 600's initially , AG 16 , HCO3 24 , PH 7.49 Serum Osm 326 , BHB 0.8 , UA with trace ketones  Lipase 129    Labs concerning for mild HHS , started on insulin drip for 2 hours almost and then repeat labs and FS showed improvement in AG and Glucose and started on SQ insulin  Also given Nystatin in ED    ADMITTED to medicine for further management .   (04 Jan 2025 01:46)      PAST MEDICAL & SURGICAL HISTORY  Pancreatitis  due to alcohol    Asthma  no recent exacerbation, not using inhalers for a long time    Hypertension    H/O rotator cuff surgery        FAMILY HISTORY:  FAMILY HISTORY:  Family history of pancreatic cancer (Father)    Alcohol use  sister, with admission due to pancreatitis and GI Bleed        SOCIAL HISTORY:  []smoker  []Alcohol  []Drug    ALLERGIES:  No Known Allergies      MEDICATIONS:  MEDICATIONS  (STANDING):  enoxaparin Injectable 40 milliGRAM(s) SubCutaneous every 24 hours  influenza   Vaccine 0.5 milliLiter(s) IntraMuscular once  insulin glargine Injectable (LANTUS) 18 Unit(s) SubCutaneous at bedtime  insulin lispro (ADMELOG) corrective regimen sliding scale   SubCutaneous three times a day before meals  insulin lispro Injectable (ADMELOG) 6 Unit(s) SubCutaneous three times a day before meals  losartan 25 milliGRAM(s) Oral daily  nystatin    Suspension 997384 Unit(s) Swish and Swallow every 6 hours  sodium chloride 0.9% 1000 milliLiter(s) (100 mL/Hr) IV Continuous <Continuous>    MEDICATIONS  (PRN):  acetaminophen     Tablet .. 650 milliGRAM(s) Oral every 6 hours PRN Temp greater or equal to 38C (100.4F), Mild Pain (1 - 3)  albuterol    90 MICROgram(s) HFA Inhaler 2 Puff(s) Inhalation every 6 hours PRN Shortness of Breath and/or Wheezing  aluminum hydroxide/magnesium hydroxide/simethicone Suspension 30 milliLiter(s) Oral every 4 hours PRN Dyspepsia  melatonin 3 milliGRAM(s) Oral at bedtime PRN Insomnia  ondansetron Injectable 4 milliGRAM(s) IV Push every 8 hours PRN Nausea and/or Vomiting      HOME MEDICATIONS:  Home Medications:  albuterol 90 mcg/inh inhalation aerosol: 2 puff(s) inhaled every 6 hours, As Needed (06 Oct 2022 01:18)  NIFEdipine 60 mg oral tablet, extended release: 1 tab(s) orally once a day (24 Apr 2022 09:36)      VITALS:   T(F): 97.9 (01-04 @ 13:12), Max: 98.3 (01-03 @ 18:54)  HR: 75 (01-04 @ 13:12) (71 - 85)  BP: 157/91 (01-04 @ 06:44) (157/91 - 186/136)  BP(mean): --  RR: 18 (01-04 @ 13:12) (16 - 18)  SpO2: 96% (01-04 @ 13:12) (95% - 99%)    I&O's Summary      REVIEW OF SYSTEMS:  CONSTITUTIONAL: No weakness, fevers or chills, weight loss   EYES: blurry vision   RESPIRATORY: No cough, wheezing, hemoptysis; No shortness of breath  CARDIOVASCULAR: No chest pain or palpitations  GASTROINTESTINAL: No abdominal or epigastric pain. No nausea, vomiting  GENITOURINARY: + Polyuria  NEUROLOGICAL:  No tremors, no Weakness or numbness  SKIN: No itching  MSK: no joint pain    PHYSICAL EXAM:  GENERAL: Patient is awake , alert and oriented,  not in acute distress  EYES: No proptosis, no lid lag  NECK: No thyroid enlargement  LUNGS: Clear to auscultation bilaterally   CARDIOVASCULAR: S1/S2 present, RRR , no murmurs or rubs  ABD: Soft, non-tender, non-distended  EXT: No SONIA  SKIN: No abdominal striae      LABS:                        18.4   9.84  )-----------( 336      ( 03 Jan 2025 19:50 )             52.9     01-03    133[L]  |  95[L]  |  14  ----------------------------<  250[H]  3.8   |  25  |  0.8    Ca    9.2      03 Jan 2025 22:19  Phos  3.8     01-03  Mg     2.0     01-03    TPro  7.1  /  Alb  4.5  /  TBili  0.5  /  DBili  x   /  AST  21  /  ALT  24  /  AlkPhos  186[H]  01-03              POCT Blood Glucose.: 169 mg/dL (01-04-25 @ 17:09)  POCT Blood Glucose.: 324 mg/dL (01-04-25 @ 11:00)  POCT Blood Glucose.: 259 mg/dL (01-04-25 @ 08:04)  POCT Blood Glucose.: 249 mg/dL (01-04-25 @ 07:04)  POCT Blood Glucose.: 243 mg/dL (01-03-25 @ 22:42)  POCT Blood Glucose.: 341 mg/dL (01-03-25 @ 21:45)  POCT Blood Glucose.: 446 mg/dL (01-03-25 @ 20:45)  POCT Blood Glucose.: 544 mg/dL (01-03-25 @ 18:58)

## 2025-01-04 NOTE — ED ADULT NURSE NOTE - NSFALLUNIVINTERV_ED_ALL_ED
Bed/Stretcher in lowest position, wheels locked, appropriate side rails in place/Call bell, personal items and telephone in reach/Instruct patient to call for assistance before getting out of bed/chair/stretcher/Non-slip footwear applied when patient is off stretcher/[place] to call system/Physically safe environment - no spills, clutter or unnecessary equipment/Purposeful proactive rounding/Room/bathroom lighting operational, light cord in reach

## 2025-01-04 NOTE — H&P ADULT - ASSESSMENT
Pt is a 44 y.o Male with PMHx of HTN, Asthma, pancreatitis 2/2 EtOH abuse who presents to the ED with chief complaint of oral thrush x 2 weeks. Pt also admits to polydipsia, polyuria, nocturia, sugar cravings x 2 weeks and unintentional twenty pound weight loss over two months. Pt states tolerating po. Denies any nausea, vomiting, diarrhea, abdominal pain uri symptoms, fever chills, sob or cp. Denies any neck pain difficulty swallowing or speaking. Denies any alcohol use in 3 years. Denies any recreational drug use except for marijuana and tobacco smoking. admitted for mild HHS and new onset DM    #Mild HHS   #New onset DM  - labs on admission concerning for HHS > Glucose in 600's initially , AG 16 , HCO3 24 , PH 7.49 Serum Osm 326 , BHB 0.8 , UA with trace ketones  - started on insulin drip at 7 U/hr  for almost 2 hours and then repeat labs and FS showed improvement in AG and Glucose and started on SQ insulin 10 units   - s/p 2 L IV fluids in ED  - c/w IV fluids till morning  - Check a1c   - Endocrine eval once A1c is back for home regimen,    #Oral thrush  - a1c  - nystatin     #Polycthemia  -Hb 18.4 , Hct 52.9%  -pt is a chronic smoker  - check EPO    #HTN- urgency on admisison  #Asthma  #Hx of Etoh Pancreatitis  - resume home BP meds  - Lipase noted 129 on admission, pt has no abd pain or symptoms of acute pancreatitis , but will c/w IV fluids as above   - pt says he quit alcohol     #Misc  -dvt ppx ; lovenox  - GI ppx ; none  - Diet Carb cons  - Activity increase as tolerated   Pt is a 44 y.o Male with PMHx of HTN, Asthma, pancreatitis 2/2 EtOH abuse who presents to the ED with chief complaint of oral thrush x 2 weeks. Pt also admits to polydipsia, polyuria, nocturia, sugar cravings x 2 weeks and unintentional twenty pound weight loss over two months. Pt states tolerating po. Denies any nausea, vomiting, diarrhea, abdominal pain uri symptoms, fever chills, sob or cp. Denies any neck pain difficulty swallowing or speaking. Denies any alcohol use in 3 years. Denies any recreational drug use except for marijuana and tobacco smoking. admitted for mild HHS and new onset DM    #Mild HHS   #New onset DM  - labs on admission concerning for HHS > Glucose in 600's initially , AG 16 , HCO3 24 , PH 7.49 Serum Osm 326 , BHB 0.8 , UA with trace ketones  - started on insulin drip at 7 U/hr  for almost 2 hours and then repeat labs and FS showed improvement in AG and Glucose and started on SQ insulin 10 units   - s/p 2 L IV fluids in ED  - c/w IV fluids till morning  - Check a1c   - Endocrine eval once A1c is back for home regimen,    #Oral thrush  - a1c  - nystatin     #Polycthemia  -Hb 18.4 , Hct 52.9%  -pt is a chronic smoker and uses testosterone injections, advised to stop and to f/u with endocrinology     #HTN- urgency on admission  #Asthma  #Hx of Etoh Pancreatitis  - resume home BP meds ( on nifedipine 60mg ER q24 ) , add losartan 25 mg Q24 since he has diabetes   - Lipase noted 129 on admission, pt has no abd pain or symptoms of acute pancreatitis , but will c/w IV fluids as above   - pt says he quit alcohol     #Misc  -dvt ppx ; lovenox  - GI ppx ; none  - Diet Carb cons  - Activity increase as tolerated

## 2025-01-04 NOTE — H&P ADULT - HISTORY OF PRESENT ILLNESS
Pt is a 44 y.o Male with PMHx of HTN, Asthma, pancreatitis 2/2 EtOH abuse who presents to the ED with chief complaint of oral thrush x 2 weeks. Pt also admits to polydipsia, polyuria, nocturia, sugar cravings x 2 weeks and unintentional twenty pound weight loss over two months. Pt states tolerating po. Denies any nausea, vomiting, diarrhea, abdominal pain uri symptoms, fever chills, sob or cp. Denies any neck pain difficulty swallowing or speaking. Denies any alcohol use in 3 years. Denies any recreational drug use except for marijuana and tobacco smoking.      vitals in ED  /136 , HR 78 , T 98.3 , O2 99% RA    labs significant for   Hb 18.4 on CBC   Glucose in 600's initially , AG 16 , HCO3 24 , PH 7.49 Serum Osm 326 , BHB 0.8 , UA with trace ketones  Lipase 129    Labs concerning for mild HHS , started on insulin drip for 2 hours almost and then repeat labs and FS showed improvement in AG and Glucose and started on SQ insulin  Also given Nystatin in ED    ADMITTED to medicine for further management .

## 2025-01-05 VITALS
TEMPERATURE: 98 F | OXYGEN SATURATION: 93 % | DIASTOLIC BLOOD PRESSURE: 71 MMHG | RESPIRATION RATE: 18 BRPM | SYSTOLIC BLOOD PRESSURE: 125 MMHG | HEART RATE: 80 BPM

## 2025-01-05 LAB
ALBUMIN SERPL ELPH-MCNC: 3.5 G/DL — SIGNIFICANT CHANGE UP (ref 3.5–5.2)
ALP SERPL-CCNC: 98 U/L — SIGNIFICANT CHANGE UP (ref 30–115)
ALT FLD-CCNC: 17 U/L — SIGNIFICANT CHANGE UP (ref 0–41)
ANION GAP SERPL CALC-SCNC: 12 MMOL/L — SIGNIFICANT CHANGE UP (ref 7–14)
AST SERPL-CCNC: 13 U/L — SIGNIFICANT CHANGE UP (ref 0–41)
BASOPHILS # BLD AUTO: 0.05 K/UL — SIGNIFICANT CHANGE UP (ref 0–0.2)
BASOPHILS NFR BLD AUTO: 0.6 % — SIGNIFICANT CHANGE UP (ref 0–1)
BILIRUB SERPL-MCNC: 0.3 MG/DL — SIGNIFICANT CHANGE UP (ref 0.2–1.2)
BUN SERPL-MCNC: 12 MG/DL — SIGNIFICANT CHANGE UP (ref 10–20)
CALCIUM SERPL-MCNC: 8.3 MG/DL — LOW (ref 8.4–10.5)
CHLORIDE SERPL-SCNC: 95 MMOL/L — LOW (ref 98–110)
CO2 SERPL-SCNC: 27 MMOL/L — SIGNIFICANT CHANGE UP (ref 17–32)
CREAT SERPL-MCNC: 0.9 MG/DL — SIGNIFICANT CHANGE UP (ref 0.7–1.5)
EGFR: 107 ML/MIN/1.73M2 — SIGNIFICANT CHANGE UP
EOSINOPHIL # BLD AUTO: 0.06 K/UL — SIGNIFICANT CHANGE UP (ref 0–0.7)
EOSINOPHIL NFR BLD AUTO: 0.8 % — SIGNIFICANT CHANGE UP (ref 0–8)
GLUCOSE BLDC GLUCOMTR-MCNC: 213 MG/DL — HIGH (ref 70–99)
GLUCOSE BLDC GLUCOMTR-MCNC: 224 MG/DL — HIGH (ref 70–99)
GLUCOSE SERPL-MCNC: 259 MG/DL — HIGH (ref 70–99)
HCT VFR BLD CALC: 50.3 % — SIGNIFICANT CHANGE UP (ref 42–52)
HGB BLD-MCNC: 16.8 G/DL — SIGNIFICANT CHANGE UP (ref 14–18)
IMM GRANULOCYTES NFR BLD AUTO: 0.6 % — HIGH (ref 0.1–0.3)
LYMPHOCYTES # BLD AUTO: 1.7 K/UL — SIGNIFICANT CHANGE UP (ref 1.2–3.4)
LYMPHOCYTES # BLD AUTO: 21.5 % — SIGNIFICANT CHANGE UP (ref 20.5–51.1)
MAGNESIUM SERPL-MCNC: 1.7 MG/DL — LOW (ref 1.8–2.4)
MCHC RBC-ENTMCNC: 29.4 PG — SIGNIFICANT CHANGE UP (ref 27–31)
MCHC RBC-ENTMCNC: 33.4 G/DL — SIGNIFICANT CHANGE UP (ref 32–37)
MCV RBC AUTO: 88.1 FL — SIGNIFICANT CHANGE UP (ref 80–94)
MONOCYTES # BLD AUTO: 0.61 K/UL — HIGH (ref 0.1–0.6)
MONOCYTES NFR BLD AUTO: 7.7 % — SIGNIFICANT CHANGE UP (ref 1.7–9.3)
NEUTROPHILS # BLD AUTO: 5.42 K/UL — SIGNIFICANT CHANGE UP (ref 1.4–6.5)
NEUTROPHILS NFR BLD AUTO: 68.8 % — SIGNIFICANT CHANGE UP (ref 42.2–75.2)
NRBC # BLD: 0 /100 WBCS — SIGNIFICANT CHANGE UP (ref 0–0)
PLATELET # BLD AUTO: 270 K/UL — SIGNIFICANT CHANGE UP (ref 130–400)
PMV BLD: 10.8 FL — HIGH (ref 7.4–10.4)
POTASSIUM SERPL-MCNC: 4.5 MMOL/L — SIGNIFICANT CHANGE UP (ref 3.5–5)
POTASSIUM SERPL-SCNC: 4.5 MMOL/L — SIGNIFICANT CHANGE UP (ref 3.5–5)
PROT SERPL-MCNC: 5.3 G/DL — LOW (ref 6–8)
RBC # BLD: 5.71 M/UL — SIGNIFICANT CHANGE UP (ref 4.7–6.1)
RBC # FLD: 14.6 % — HIGH (ref 11.5–14.5)
SODIUM SERPL-SCNC: 134 MMOL/L — LOW (ref 135–146)
WBC # BLD: 7.89 K/UL — SIGNIFICANT CHANGE UP (ref 4.8–10.8)
WBC # FLD AUTO: 7.89 K/UL — SIGNIFICANT CHANGE UP (ref 4.8–10.8)

## 2025-01-05 PROCEDURE — 99239 HOSP IP/OBS DSCHRG MGMT >30: CPT

## 2025-01-05 RX ORDER — INSULIN LISPRO 100/ML
7 VIAL (ML) SUBCUTANEOUS
Qty: 1 | Refills: 2
Start: 2025-01-05 | End: 2025-04-04

## 2025-01-05 RX ORDER — INSULIN GLARGINE-YFGN 100 [IU]/ML
20 INJECTION, SOLUTION SUBCUTANEOUS
Qty: 1 | Refills: 2
Start: 2025-01-05 | End: 2025-04-04

## 2025-01-05 RX ADMIN — ACETAMINOPHEN 650 MILLIGRAM(S): 80 SOLUTION/ DROPS ORAL at 11:57

## 2025-01-05 RX ADMIN — Medication 4: at 08:04

## 2025-01-05 RX ADMIN — Medication 6 UNIT(S): at 08:04

## 2025-01-05 RX ADMIN — ACETAMINOPHEN 650 MILLIGRAM(S): 80 SOLUTION/ DROPS ORAL at 10:07

## 2025-01-05 RX ADMIN — Medication 500000 UNIT(S): at 11:21

## 2025-01-05 RX ADMIN — LOSARTAN POTASSIUM 25 MILLIGRAM(S): 100 TABLET, FILM COATED ORAL at 05:26

## 2025-01-05 RX ADMIN — Medication 500000 UNIT(S): at 05:29

## 2025-01-05 RX ADMIN — Medication 4: at 11:21

## 2025-01-05 RX ADMIN — SODIUM CHLORIDE 100 MILLILITER(S): 9 INJECTION, SOLUTION INTRAVENOUS at 08:45

## 2025-01-05 RX ADMIN — NIFEDIPINE 60 MILLIGRAM(S): 60 TABLET, EXTENDED RELEASE ORAL at 05:26

## 2025-01-05 RX ADMIN — Medication 6 UNIT(S): at 11:22

## 2025-01-05 NOTE — DISCHARGE NOTE NURSING/CASE MANAGEMENT/SOCIAL WORK - PATIENT PORTAL LINK FT
You can access the FollowMyHealth Patient Portal offered by [facility] by registering at the following website: [email or web address]. By joining [facility]’s FollowMyHealth portal, you will also be able to view your health information using other applications (apps) compatible with our system.

## 2025-01-05 NOTE — DISCHARGE NOTE PROVIDER - PROVIDER TOKENS
PROVIDER:[TOKEN:[[id number]],FOLLOWUP:[2 weeks]],PROVIDER:[TOKEN:[[id number]],FOLLOWUP:[2 weeks]]

## 2025-01-05 NOTE — DISCHARGE NOTE PROVIDER - NSDCCPCAREPLAN_GEN_ALL_CORE_FT
PRINCIPAL DISCHARGE DIAGNOSIS  Diagnosis: Diabetes  Assessment and Plan of Treatment: Take lantus (long-acting insulin) 20U at bedtime and lispro (short-acting insulin) 7 unit before meals. Can hold off if skipping meals. Monitor your fingerstick glucose 4 times a day and keep a log for your doctor to review. Follow up with your PCP and endocrinology within 2-4 weeks.  Adhere to consistent carbohydrate diet.

## 2025-01-05 NOTE — DISCHARGE NOTE PROVIDER - HOSPITAL COURSE
44 y.o Male with PMHx of HTN, Asthma, pancreatitis 2/2 EtOH abuse who presents to the ED with chief complaint of oral thrush x 2 weeks. Pt also admits to polydipsia, polyuria, nocturia, sugar cravings x 2 weeks and unintentional twenty pound weight loss over two months. Patient diagnosed with new onset DM. Evaluated by endocrinology, recommendations made for patient to be dc on insulin at home. Diabetic education done and pt will follow up outpatient. He was also found to have oral thrush which was treated with oral nystatin. Pt prescribed course of nystatin.

## 2025-01-05 NOTE — DISCHARGE NOTE NURSING/CASE MANAGEMENT/SOCIAL WORK - FINANCIAL ASSISTANCE
[facility] provides services at a reduced cost to those who are determined to be eligible through [facility]’s financial assistance program. Information regarding [facility]’s financial assistance program can be found by going to [email or web address] or by calling [phone].

## 2025-01-05 NOTE — DISCHARGE NOTE PROVIDER - CARE PROVIDER_API CALL
Michi Colón  Internal Medicine  [address] [place] [address]  Phone: [phone]  Fax: [phone]  Follow Up Time: 2 weeks    Hawa Garcia  Endocrinology/Metab/Diabetes  [address], [address]  [place] [address]  Phone: [phone]  Fax: [phone]  Follow Up Time: 2 weeks

## 2025-01-05 NOTE — DISCHARGE NOTE PROVIDER - NSDCMRMEDTOKEN_GEN_ALL_CORE_FT
albuterol 90 mcg/inh inhalation aerosol: 2 puff(s) inhaled every 6 hours, As Needed  Alcohol swab: For insulin pen injections  Glucometer: Use for FS monitoring  Glucose Test Strip: For FS monitoring. Use 3 times a day before meals and bedtime  Insulin Lispro KwikPen 100 units/mL injectable solution: 7 unit(s) injectable 3 times a day (before meals)  Lancet: For FS monitoring. Use before meals and bedtime  Lantus Solostar Pen 100 units/mL subcutaneous solution: 20 international unit(s) subcutaneous once a day (at bedtime)  NIFEdipine 60 mg oral tablet, extended release: 1 tab(s) orally once a day  nystatin 100,000 units/mL oral suspension: 5 milliliter(s) orally every 6 hours Swish and swallow  Pen needles: Use with insulin pen 4 times  day (3 short acting, 1 long acting)

## 2025-01-06 LAB
A1C WITH ESTIMATED AVERAGE GLUCOSE RESULT: >15.5 % — HIGH (ref 4–5.6)
ESTIMATED AVERAGE GLUCOSE: >398 MG/DL — HIGH (ref 68–114)

## 2025-01-09 DIAGNOSIS — B37.0 CANDIDAL STOMATITIS: ICD-10-CM

## 2025-01-09 DIAGNOSIS — E11.01 TYPE 2 DIABETES MELLITUS WITH HYPEROSMOLARITY WITH COMA: ICD-10-CM

## 2025-01-09 DIAGNOSIS — I16.0 HYPERTENSIVE URGENCY: ICD-10-CM

## 2025-01-09 DIAGNOSIS — R63.4 ABNORMAL WEIGHT LOSS: ICD-10-CM

## 2025-01-09 DIAGNOSIS — Z79.890 HORMONE REPLACEMENT THERAPY: ICD-10-CM

## 2025-01-09 DIAGNOSIS — D75.1 SECONDARY POLYCYTHEMIA: ICD-10-CM

## 2025-01-09 DIAGNOSIS — J45.909 UNSPECIFIED ASTHMA, UNCOMPLICATED: ICD-10-CM

## 2025-01-09 DIAGNOSIS — I10 ESSENTIAL (PRIMARY) HYPERTENSION: ICD-10-CM

## 2025-01-09 DIAGNOSIS — F17.200 NICOTINE DEPENDENCE, UNSPECIFIED, UNCOMPLICATED: ICD-10-CM

## 2025-07-06 NOTE — DIETITIAN INITIAL EVALUATION ADULT - INCREASED NUTRIENT NEEDS
[facility] MEWS TRIGGER FOLLOW UP       MEWS Monitoring, Score is: 3  Indication for review:     Bedside NurseLali and Charge RN Susan contacted, no concerns verbalized at this time, instructed to call [phone] for further concerns or assistance.    Rechecked pt temp in case that was contributing factor to HR elevation. Temp was 99.2 oral. Pt appeared in NAD. Suggested to primary RN pt may be in pain due to wound care completed recently. Pt is nonverbal. Suggested trying PRN morphine available on pt MAR. Care ongoing.       kcal/pro

## 2025-07-21 ENCOUNTER — APPOINTMENT (OUTPATIENT)
Age: 45
End: 2025-07-21
Payer: COMMERCIAL

## 2025-07-21 VITALS
DIASTOLIC BLOOD PRESSURE: 76 MMHG | OXYGEN SATURATION: 98 % | SYSTOLIC BLOOD PRESSURE: 127 MMHG | RESPIRATION RATE: 16 BRPM | WEIGHT: 176 LBS | BODY MASS INDEX: 23.84 KG/M2 | HEART RATE: 74 BPM | HEIGHT: 72 IN | TEMPERATURE: 97.9 F

## 2025-07-21 DIAGNOSIS — K85.90 ACUTE PANCREATITIS WITHOUT NECROSIS OR INFECTION, UNSPECIFIED: ICD-10-CM

## 2025-07-21 DIAGNOSIS — K76.0 FATTY (CHANGE OF) LIVER, NOT ELSEWHERE CLASSIFIED: ICD-10-CM

## 2025-07-21 DIAGNOSIS — K86.89 ACUTE PANCREATITIS WITHOUT NECROSIS OR INFECTION, UNSPECIFIED: ICD-10-CM

## 2025-07-21 DIAGNOSIS — E11.9 TYPE 2 DIABETES MELLITUS W/OUT COMPLICATIONS: ICD-10-CM

## 2025-07-21 DIAGNOSIS — Z79.4 TYPE 2 DIABETES MELLITUS W/OUT COMPLICATIONS: ICD-10-CM

## 2025-07-21 DIAGNOSIS — D75.1 SECONDARY POLYCYTHEMIA: ICD-10-CM

## 2025-07-21 DIAGNOSIS — Z87.19 PERSONAL HISTORY OF OTHER DISEASES OF THE DIGESTIVE SYSTEM: ICD-10-CM

## 2025-07-21 LAB
ALBUMIN SERPL ELPH-MCNC: 4.3 G/DL
ALP BLD-CCNC: 97 U/L
ALT SERPL-CCNC: 30 U/L
ANION GAP SERPL CALC-SCNC: 13 MMOL/L
AST SERPL-CCNC: 23 U/L
AUTO NRBC #: 0 K/UL
BILIRUB SERPL-MCNC: 0.5 MG/DL
BUN SERPL-MCNC: 16 MG/DL
CALCIUM SERPL-MCNC: 8.9 MG/DL
CHLORIDE SERPL-SCNC: 102 MMOL/L
CO2 SERPL-SCNC: 23 MMOL/L
CREAT SERPL-MCNC: 0.9 MG/DL
EGFRCR SERPLBLD CKD-EPI 2021: 106 ML/MIN/1.73M2
GLUCOSE SERPL-MCNC: 167 MG/DL
HCT VFR BLD CALC: 51.8 %
HGB BLD-MCNC: 17.6 G/DL
LDH SERPL-CCNC: 274 U/L
MCHC RBC-ENTMCNC: 29.7 PG
MCHC RBC-ENTMCNC: 34 G/DL
MCV RBC AUTO: 87.4 FL
PLATELET # BLD AUTO: 323 K/UL
PMV BLD AUTO: 0 /100 WBCS
PMV BLD: 10.1 FL
POTASSIUM SERPL-SCNC: 5.4 MMOL/L
PROT SERPL-MCNC: 6.9 G/DL
RBC # BLD: 5.93 M/UL
RBC # FLD: 14.4 %
SODIUM SERPL-SCNC: 138 MMOL/L
WBC # FLD AUTO: 8.08 K/UL

## 2025-07-21 PROCEDURE — 99244 OFF/OP CNSLTJ NEW/EST MOD 40: CPT

## 2025-07-21 RX ORDER — INSULIN GLARGINE 100 [IU]/ML
100 INJECTION, SOLUTION SUBCUTANEOUS
Refills: 0 | Status: ACTIVE | COMMUNITY

## 2025-07-21 RX ORDER — NIFEDIPINE 60 MG
60 TABLET, EXTENDED RELEASE ORAL
Refills: 0 | Status: ACTIVE | COMMUNITY

## 2025-07-21 RX ORDER — NYSTATIN 100K UNIT
100000 TABLET VAGINAL
Refills: 0 | Status: ACTIVE | COMMUNITY

## 2025-07-22 LAB
AFP-TM SERPL-MCNC: 2.4 NG/ML
FERRITIN SERPL-MCNC: 78 NG/ML

## 2025-07-23 LAB — EPO SERPL-MCNC: 20.3 MIU/ML

## 2025-07-24 LAB
TESTOST FREE SERPL-MCNC: 27.2 PG/ML
TESTOST SERPL-MCNC: 1337 NG/DL

## 2025-07-25 LAB — JAK2 GENE MUT ANL BLD/T: NORMAL

## 2025-08-08 ENCOUNTER — APPOINTMENT (OUTPATIENT)
Age: 45
End: 2025-08-08

## 2025-08-08 LAB
AUTO BASOPHILS #: 0.07 K/UL
AUTO BASOPHILS %: 0.9 %
AUTO EOSINOPHILS #: 0.09 K/UL
AUTO EOSINOPHILS %: 1.2 %
AUTO IMMATURE GRANULOCYTES #: 0.06 K/UL
AUTO LYMPHOCYTES #: 1.72 K/UL
AUTO LYMPHOCYTES %: 22.4 %
AUTO MONOCYTES #: 0.63 K/UL
AUTO MONOCYTES %: 8.2 %
AUTO NEUTROPHILS #: 5.11 K/UL
AUTO NEUTROPHILS %: 66.5 %
AUTO NRBC #: 0 K/UL
HCT VFR BLD CALC: 45.9 %
HGB BLD-MCNC: 15.9 G/DL
IMM GRANULOCYTES NFR BLD AUTO: 0.8 %
MAN DIFF?: NORMAL
MCHC RBC-ENTMCNC: 30.1 PG
MCHC RBC-ENTMCNC: 34.6 G/DL
MCV RBC AUTO: 86.9 FL
PLATELET # BLD AUTO: 291 K/UL
PMV BLD AUTO: 0 /100 WBCS
PMV BLD: 10.3 FL
RBC # BLD: 5.28 M/UL
RBC # FLD: 14.7 %
WBC # FLD AUTO: 7.68 K/UL

## 2025-09-05 ENCOUNTER — APPOINTMENT (OUTPATIENT)
Age: 45
End: 2025-09-05
Payer: COMMERCIAL

## 2025-09-05 ENCOUNTER — RESULT REVIEW (OUTPATIENT)
Age: 45
End: 2025-09-05

## 2025-09-07 PROCEDURE — 99223 1ST HOSP IP/OBS HIGH 75: CPT

## 2025-09-09 ENCOUNTER — TRANSCRIPTION ENCOUNTER (OUTPATIENT)
Age: 45
End: 2025-09-09

## 2025-09-10 ENCOUNTER — TRANSCRIPTION ENCOUNTER (OUTPATIENT)
Age: 45
End: 2025-09-10